# Patient Record
Sex: FEMALE | Race: WHITE | NOT HISPANIC OR LATINO | Employment: OTHER | URBAN - NONMETROPOLITAN AREA
[De-identification: names, ages, dates, MRNs, and addresses within clinical notes are randomized per-mention and may not be internally consistent; named-entity substitution may affect disease eponyms.]

---

## 2017-04-17 ENCOUNTER — TRANSCRIBE ORDERS (OUTPATIENT)
Dept: ADMINISTRATIVE | Facility: HOSPITAL | Age: 67
End: 2017-04-17

## 2017-04-17 DIAGNOSIS — Z12.31 ENCOUNTER FOR SCREENING MAMMOGRAM FOR MALIGNANT NEOPLASM OF BREAST: Primary | ICD-10-CM

## 2017-06-12 ENCOUNTER — HOSPITAL ENCOUNTER (OUTPATIENT)
Dept: MAMMOGRAPHY | Facility: HOSPITAL | Age: 67
Discharge: HOME | End: 2017-06-12
Attending: INTERNAL MEDICINE | Admitting: INTERNAL MEDICINE

## 2017-06-12 DIAGNOSIS — Z12.31 ENCOUNTER FOR SCREENING MAMMOGRAM FOR MALIGNANT NEOPLASM OF BREAST: ICD-10-CM

## 2017-06-12 PROCEDURE — G0202 SCR MAMMO BI INCL CAD: HCPCS

## 2017-06-12 PROCEDURE — 77063 BREAST TOMOSYNTHESIS BI: CPT

## 2017-08-18 RX ORDER — VERAPAMIL HYDROCHLORIDE 240 MG/1
240 TABLET, FILM COATED, EXTENDED RELEASE ORAL 2 TIMES DAILY
COMMUNITY
End: 2017-11-17 | Stop reason: SDUPTHER

## 2017-08-18 RX ORDER — VIT C/E/CUPERIC/ZINC/LUTEIN 226-90-0.8
1 CAPSULE ORAL DAILY
COMMUNITY

## 2017-08-18 RX ORDER — ERGOCALCIFEROL 1.25 MG/1
50000 CAPSULE ORAL WEEKLY
COMMUNITY
End: 2017-08-21 | Stop reason: SDUPTHER

## 2017-08-18 RX ORDER — LOSARTAN POTASSIUM 50 MG/1
50 TABLET ORAL DAILY
COMMUNITY
End: 2017-09-05 | Stop reason: SDUPTHER

## 2017-08-18 RX ORDER — ALPRAZOLAM 0.25 MG/1
0.25 TABLET ORAL DAILY PRN
COMMUNITY
End: 2018-04-09 | Stop reason: SDUPTHER

## 2017-08-18 RX ORDER — MONTELUKAST SODIUM 10 MG/1
10 TABLET ORAL DAILY
COMMUNITY
End: 2018-08-13 | Stop reason: SDUPTHER

## 2017-08-18 RX ORDER — NAPROXEN SODIUM 220 MG
220 TABLET ORAL DAILY PRN
COMMUNITY
End: 2018-04-09

## 2017-08-18 RX ORDER — CALCIUM CARBONATE 200(500)MG
1 TABLET,CHEWABLE ORAL DAILY
COMMUNITY

## 2017-08-18 RX ORDER — HYDROCHLOROTHIAZIDE 12.5 MG/1
12.5 CAPSULE, GELATIN COATED ORAL DAILY
COMMUNITY
End: 2018-08-13 | Stop reason: SDUPTHER

## 2017-08-18 RX ORDER — ESTRADIOL 0.1 MG/G
CREAM VAGINAL
COMMUNITY
End: 2018-04-09 | Stop reason: SDUPTHER

## 2017-08-18 RX ORDER — ROSUVASTATIN CALCIUM 5 MG/1
TABLET, COATED ORAL
COMMUNITY
End: 2018-08-13 | Stop reason: SDUPTHER

## 2017-08-20 PROBLEM — J45.909 CHRONIC ASTHMA WITHOUT COMPLICATION: Chronic | Status: ACTIVE | Noted: 2017-08-20

## 2017-08-20 PROBLEM — E11.9 TYPE 2 DIABETES MELLITUS WITHOUT COMPLICATION (HCC): Chronic | Status: ACTIVE | Noted: 2017-08-20

## 2017-08-20 PROBLEM — O16.9 HYPERTEN PREG NOS-UNSPEC: Chronic | Status: ACTIVE | Noted: 2017-08-20

## 2017-08-20 PROBLEM — G47.33 OBSTRUCTIVE SLEEP APNEA: Chronic | Status: ACTIVE | Noted: 2017-08-20

## 2017-08-20 PROBLEM — K76.0 FATTY LIVER: Chronic | Status: ACTIVE | Noted: 2017-08-20

## 2017-08-20 PROBLEM — K21.9 GASTROESOPHAGEAL REFLUX DISEASE WITHOUT ESOPHAGITIS: Chronic | Status: ACTIVE | Noted: 2017-08-20

## 2017-08-20 PROBLEM — G43.009 MIGRAINE WITHOUT AURA, NOT INTRACTABLE: Chronic | Status: ACTIVE | Noted: 2017-08-20

## 2017-08-20 PROBLEM — E78.2 MIXED HYPERLIPIDEMIA: Chronic | Status: ACTIVE | Noted: 2017-08-20

## 2017-08-20 PROBLEM — G50.0 TRIGEMINAL NEURALGIA OF LEFT SIDE OF FACE: Chronic | Status: ACTIVE | Noted: 2017-08-20

## 2017-08-20 PROBLEM — K80.20 CALCULUS OF GALLBLADDER: Chronic | Status: ACTIVE | Noted: 2017-08-20

## 2017-08-21 ENCOUNTER — OFFICE VISIT (OUTPATIENT)
Dept: INTERNAL MEDICINE | Age: 67
End: 2017-08-21
Payer: MEDICARE

## 2017-08-21 VITALS
OXYGEN SATURATION: 98 % | HEIGHT: 69 IN | DIASTOLIC BLOOD PRESSURE: 94 MMHG | RESPIRATION RATE: 20 BRPM | SYSTOLIC BLOOD PRESSURE: 132 MMHG | HEART RATE: 100 BPM | WEIGHT: 174 LBS | BODY MASS INDEX: 25.77 KG/M2

## 2017-08-21 DIAGNOSIS — I10 ESSENTIAL HYPERTENSION: Chronic | ICD-10-CM

## 2017-08-21 DIAGNOSIS — E11.9 TYPE 2 DIABETES MELLITUS WITHOUT COMPLICATION, WITHOUT LONG-TERM CURRENT USE OF INSULIN (HCC): Chronic | ICD-10-CM

## 2017-08-21 DIAGNOSIS — K21.9 GASTROESOPHAGEAL REFLUX DISEASE WITHOUT ESOPHAGITIS: Chronic | ICD-10-CM

## 2017-08-21 DIAGNOSIS — E55.9 VITAMIN D DEFICIENCY: Chronic | ICD-10-CM

## 2017-08-21 DIAGNOSIS — G50.0 TRIGEMINAL NEURALGIA OF LEFT SIDE OF FACE: Chronic | ICD-10-CM

## 2017-08-21 DIAGNOSIS — E78.2 MIXED HYPERLIPIDEMIA: Primary | Chronic | ICD-10-CM

## 2017-08-21 LAB
ALBUMIN SERPL-MCNC: 4.7 G/DL (ref 3.5–5.2)
ALP BLD-CCNC: 92 U/L (ref 35–104)
ALT SERPL-CCNC: 24 U/L (ref 5–33)
ANION GAP SERPL CALCULATED.3IONS-SCNC: 20 MMOL/L (ref 7–19)
AST SERPL-CCNC: 21 U/L (ref 5–32)
BILIRUB SERPL-MCNC: 0.3 MG/DL (ref 0.2–1.2)
BUN BLDV-MCNC: 23 MG/DL (ref 8–23)
CALCIUM SERPL-MCNC: 9.7 MG/DL (ref 8.8–10.2)
CHLORIDE BLD-SCNC: 99 MMOL/L (ref 98–111)
CHOLESTEROL, TOTAL: 153 MG/DL (ref 160–199)
CO2: 24 MMOL/L (ref 22–29)
CREAT SERPL-MCNC: 0.7 MG/DL (ref 0.5–0.9)
GFR NON-AFRICAN AMERICAN: >60
GLUCOSE BLD-MCNC: 138 MG/DL (ref 74–109)
HBA1C MFR BLD: 6.7 %
HDLC SERPL-MCNC: 34 MG/DL (ref 65–121)
LDL CHOLESTEROL CALCULATED: 93 MG/DL
POTASSIUM SERPL-SCNC: 3.6 MMOL/L (ref 3.5–5)
SODIUM BLD-SCNC: 143 MMOL/L (ref 136–145)
TOTAL PROTEIN: 8 G/DL (ref 6.6–8.7)
TRIGL SERPL-MCNC: 130 MG/DL (ref 150–199)
VITAMIN D 25-HYDROXY: 26.9 NG/ML

## 2017-08-21 PROCEDURE — 99214 OFFICE O/P EST MOD 30 MIN: CPT | Performed by: INTERNAL MEDICINE

## 2017-08-21 RX ORDER — ERGOCALCIFEROL 1.25 MG/1
50000 CAPSULE ORAL
Qty: 10 CAPSULE | Refills: 3 | Status: SHIPPED | OUTPATIENT
Start: 2017-08-21 | End: 2017-11-20 | Stop reason: SDUPTHER

## 2017-08-21 ASSESSMENT — ENCOUNTER SYMPTOMS
NAUSEA: 0
ABDOMINAL PAIN: 0
SHORTNESS OF BREATH: 0
COUGH: 0

## 2017-08-21 ASSESSMENT — PATIENT HEALTH QUESTIONNAIRE - PHQ9
SUM OF ALL RESPONSES TO PHQ9 QUESTIONS 1 & 2: 0
2. FEELING DOWN, DEPRESSED OR HOPELESS: 0
SUM OF ALL RESPONSES TO PHQ QUESTIONS 1-9: 0
1. LITTLE INTEREST OR PLEASURE IN DOING THINGS: 0

## 2017-09-05 RX ORDER — LOSARTAN POTASSIUM 50 MG/1
TABLET ORAL
Qty: 90 TABLET | Refills: 3 | Status: SHIPPED | OUTPATIENT
Start: 2017-09-05 | End: 2018-04-09 | Stop reason: DRUGHIGH

## 2017-09-06 RX ORDER — LOSARTAN POTASSIUM 100 MG/1
100 TABLET ORAL DAILY
Qty: 30 TABLET | Refills: 3 | Status: SHIPPED | OUTPATIENT
Start: 2017-09-06 | End: 2018-01-05 | Stop reason: SDUPTHER

## 2017-11-17 RX ORDER — VERAPAMIL HYDROCHLORIDE 240 MG/1
TABLET, FILM COATED, EXTENDED RELEASE ORAL
Qty: 180 TABLET | Refills: 3 | Status: SHIPPED | OUTPATIENT
Start: 2017-11-17 | End: 2018-08-13 | Stop reason: SDUPTHER

## 2017-11-20 RX ORDER — ERGOCALCIFEROL 1.25 MG/1
50000 CAPSULE ORAL
Qty: 10 CAPSULE | Refills: 3 | Status: SHIPPED | OUTPATIENT
Start: 2017-11-20 | End: 2018-04-09 | Stop reason: SDUPTHER

## 2017-12-06 ENCOUNTER — NURSE ONLY (OUTPATIENT)
Dept: INTERNAL MEDICINE | Age: 67
End: 2017-12-06
Payer: MEDICARE

## 2017-12-06 DIAGNOSIS — Z23 NEED FOR INFLUENZA VACCINATION: Primary | ICD-10-CM

## 2017-12-06 PROCEDURE — 90662 IIV NO PRSV INCREASED AG IM: CPT | Performed by: INTERNAL MEDICINE

## 2017-12-06 PROCEDURE — G0008 ADMIN INFLUENZA VIRUS VAC: HCPCS | Performed by: INTERNAL MEDICINE

## 2018-01-05 RX ORDER — LOSARTAN POTASSIUM 100 MG/1
TABLET ORAL
Qty: 90 TABLET | Refills: 3 | Status: SHIPPED | OUTPATIENT
Start: 2018-01-05 | End: 2018-08-13 | Stop reason: SDUPTHER

## 2018-01-08 ENCOUNTER — HOSPITAL ENCOUNTER (OUTPATIENT)
Dept: GENERAL RADIOLOGY | Age: 68
Discharge: HOME | End: 2018-01-08
Payer: MEDICARE

## 2018-01-08 DIAGNOSIS — K21.9 GASTROESOPHAGEAL REFLUX DISEASE WITHOUT ESOPHAGITIS: Chronic | ICD-10-CM

## 2018-01-08 DIAGNOSIS — E55.9 VITAMIN D DEFICIENCY: Chronic | ICD-10-CM

## 2018-01-08 DIAGNOSIS — I10 ESSENTIAL HYPERTENSION: Chronic | ICD-10-CM

## 2018-01-08 DIAGNOSIS — E78.2 MIXED HYPERLIPIDEMIA: Chronic | ICD-10-CM

## 2018-01-08 DIAGNOSIS — M79.642 HAND PAIN, LEFT: ICD-10-CM

## 2018-01-08 DIAGNOSIS — M79.643 PAIN OF HAND, UNSPECIFIED LATERALITY: ICD-10-CM

## 2018-01-08 DIAGNOSIS — G50.0 TRIGEMINAL NEURALGIA OF LEFT SIDE OF FACE: Chronic | ICD-10-CM

## 2018-01-08 DIAGNOSIS — M79.642 HAND PAIN, LEFT: Primary | ICD-10-CM

## 2018-01-08 DIAGNOSIS — E11.9 TYPE 2 DIABETES MELLITUS WITHOUT COMPLICATION, WITHOUT LONG-TERM CURRENT USE OF INSULIN (HCC): Chronic | ICD-10-CM

## 2018-01-08 LAB
ALBUMIN SERPL-MCNC: 4.6 G/DL (ref 3.5–5.2)
ALP BLD-CCNC: 90 U/L (ref 35–104)
ALT SERPL-CCNC: 19 U/L (ref 5–33)
ANION GAP SERPL CALCULATED.3IONS-SCNC: 13 MMOL/L (ref 7–19)
AST SERPL-CCNC: 21 U/L (ref 5–32)
BASOPHILS ABSOLUTE: 0 K/UL (ref 0–0.2)
BASOPHILS RELATIVE PERCENT: 0.6 % (ref 0–1)
BILIRUB SERPL-MCNC: 0.3 MG/DL (ref 0.2–1.2)
BUN BLDV-MCNC: 24 MG/DL (ref 8–23)
C-REACTIVE PROTEIN: 0.18 MG/DL (ref 0–0.5)
CALCIUM SERPL-MCNC: 10.1 MG/DL (ref 8.8–10.2)
CHLORIDE BLD-SCNC: 102 MMOL/L (ref 98–111)
CO2: 29 MMOL/L (ref 22–29)
CREAT SERPL-MCNC: 0.5 MG/DL (ref 0.5–0.9)
EOSINOPHILS ABSOLUTE: 0.2 K/UL (ref 0–0.6)
EOSINOPHILS RELATIVE PERCENT: 3.4 % (ref 0–5)
GFR NON-AFRICAN AMERICAN: >60
GLUCOSE BLD-MCNC: 136 MG/DL (ref 74–109)
HBA1C MFR BLD: 6.6 %
HCT VFR BLD CALC: 37.4 % (ref 37–47)
HEMOGLOBIN: 11.4 G/DL (ref 12–16)
LYMPHOCYTES ABSOLUTE: 1.8 K/UL (ref 1.1–4.5)
LYMPHOCYTES RELATIVE PERCENT: 25.3 % (ref 20–40)
MCH RBC QN AUTO: 25.1 PG (ref 27–31)
MCHC RBC AUTO-ENTMCNC: 30.5 G/DL (ref 33–37)
MCV RBC AUTO: 82.2 FL (ref 81–99)
MONOCYTES ABSOLUTE: 0.5 K/UL (ref 0–0.9)
MONOCYTES RELATIVE PERCENT: 6.8 % (ref 0–10)
NEUTROPHILS ABSOLUTE: 4.5 K/UL (ref 1.5–7.5)
NEUTROPHILS RELATIVE PERCENT: 63.6 % (ref 50–65)
PDW BLD-RTO: 15.6 % (ref 11.5–14.5)
PLATELET # BLD: 257 K/UL (ref 130–400)
PMV BLD AUTO: 11.1 FL (ref 9.4–12.3)
POTASSIUM SERPL-SCNC: 4.4 MMOL/L (ref 3.5–5)
RBC # BLD: 4.55 M/UL (ref 4.2–5.4)
RHEUMATOID FACTOR: <10 IU/ML
SEDIMENTATION RATE, ERYTHROCYTE: 12 MM/HR (ref 0–25)
SODIUM BLD-SCNC: 144 MMOL/L (ref 136–145)
TOTAL PROTEIN: 7.6 G/DL (ref 6.6–8.7)
URIC ACID, SERUM: 2.7 MG/DL (ref 2.4–5.7)
WBC # BLD: 7 K/UL (ref 4.8–10.8)

## 2018-01-08 PROCEDURE — 73130 X-RAY EXAM OF HAND: CPT

## 2018-01-10 LAB — VITAMIN D 1,25-DIHYDROXY: 71.8 PG/ML (ref 19.9–79.3)

## 2018-01-17 ENCOUNTER — TELEPHONE (OUTPATIENT)
Dept: INTERNAL MEDICINE | Age: 68
End: 2018-01-17

## 2018-01-17 DIAGNOSIS — D64.9 MILD ANEMIA: Primary | ICD-10-CM

## 2018-01-29 DIAGNOSIS — D64.9 MILD ANEMIA: ICD-10-CM

## 2018-01-29 LAB
BASOPHILS ABSOLUTE: 0.1 K/UL (ref 0–0.2)
BASOPHILS RELATIVE PERCENT: 1 % (ref 0–1)
EOSINOPHILS ABSOLUTE: 0.3 K/UL (ref 0–0.6)
EOSINOPHILS RELATIVE PERCENT: 4.1 % (ref 0–5)
FERRITIN: 8.5 NG/ML (ref 13–150)
HCT VFR BLD CALC: 37.2 % (ref 37–47)
HEMOGLOBIN: 11.2 G/DL (ref 12–16)
IRON: 20 UG/DL (ref 37–145)
LYMPHOCYTES ABSOLUTE: 1.9 K/UL (ref 1.1–4.5)
LYMPHOCYTES RELATIVE PERCENT: 27.5 % (ref 20–40)
MCH RBC QN AUTO: 24.9 PG (ref 27–31)
MCHC RBC AUTO-ENTMCNC: 30.1 G/DL (ref 33–37)
MCV RBC AUTO: 82.7 FL (ref 81–99)
MONOCYTES ABSOLUTE: 0.5 K/UL (ref 0–0.9)
MONOCYTES RELATIVE PERCENT: 7.6 % (ref 0–10)
NEUTROPHILS ABSOLUTE: 4.1 K/UL (ref 1.5–7.5)
NEUTROPHILS RELATIVE PERCENT: 59.5 % (ref 50–65)
PDW BLD-RTO: 16.1 % (ref 11.5–14.5)
PLATELET # BLD: 257 K/UL (ref 130–400)
PMV BLD AUTO: 11.6 FL (ref 9.4–12.3)
RBC # BLD: 4.5 M/UL (ref 4.2–5.4)
TOTAL IRON BINDING CAPACITY: 394 UG/DL (ref 250–400)
WBC # BLD: 6.8 K/UL (ref 4.8–10.8)

## 2018-02-05 DIAGNOSIS — D50.9 IRON DEFICIENCY ANEMIA, UNSPECIFIED IRON DEFICIENCY ANEMIA TYPE: Primary | ICD-10-CM

## 2018-02-09 ENCOUNTER — OFFICE VISIT (OUTPATIENT)
Dept: GASTROENTEROLOGY | Facility: CLINIC | Age: 68
End: 2018-02-09

## 2018-02-09 VITALS
HEIGHT: 69 IN | BODY MASS INDEX: 25.92 KG/M2 | SYSTOLIC BLOOD PRESSURE: 128 MMHG | DIASTOLIC BLOOD PRESSURE: 84 MMHG | WEIGHT: 175 LBS | HEART RATE: 84 BPM | OXYGEN SATURATION: 98 %

## 2018-02-09 DIAGNOSIS — R19.5 HEME POSITIVE STOOL: ICD-10-CM

## 2018-02-09 DIAGNOSIS — D50.9 IRON DEFICIENCY ANEMIA, UNSPECIFIED IRON DEFICIENCY ANEMIA TYPE: Primary | ICD-10-CM

## 2018-02-09 DIAGNOSIS — I10 HTN (HYPERTENSION), BENIGN: ICD-10-CM

## 2018-02-09 DIAGNOSIS — Z78.9 NONSMOKER: ICD-10-CM

## 2018-02-09 DIAGNOSIS — Z86.010 HX OF ADENOMATOUS COLONIC POLYPS: ICD-10-CM

## 2018-02-09 DIAGNOSIS — E11.9 CONTROLLED TYPE 2 DIABETES MELLITUS WITHOUT COMPLICATION, WITHOUT LONG-TERM CURRENT USE OF INSULIN (HCC): ICD-10-CM

## 2018-02-09 DIAGNOSIS — K21.9 GASTROESOPHAGEAL REFLUX DISEASE, ESOPHAGITIS PRESENCE NOT SPECIFIED: ICD-10-CM

## 2018-02-09 DIAGNOSIS — Z79.1 NSAID LONG-TERM USE: ICD-10-CM

## 2018-02-09 DIAGNOSIS — K62.5 BRBPR (BRIGHT RED BLOOD PER RECTUM): ICD-10-CM

## 2018-02-09 PROBLEM — Z86.0101 HX OF ADENOMATOUS COLONIC POLYPS: Status: ACTIVE | Noted: 2018-02-09

## 2018-02-09 PROCEDURE — 99204 OFFICE O/P NEW MOD 45 MIN: CPT | Performed by: CLINICAL NURSE SPECIALIST

## 2018-02-09 RX ORDER — ESOMEPRAZOLE MAGNESIUM 40 MG/1
20 CAPSULE, DELAYED RELEASE ORAL
COMMUNITY
End: 2019-11-15

## 2018-02-09 RX ORDER — MONTELUKAST SODIUM 10 MG/1
10 TABLET ORAL NIGHTLY
COMMUNITY
End: 2020-12-28

## 2018-02-09 RX ORDER — CALCIUM CARBONATE 200(500)MG
1 TABLET,CHEWABLE ORAL DAILY
COMMUNITY

## 2018-02-09 RX ORDER — HYDROCHLOROTHIAZIDE 12.5 MG/1
12.5 CAPSULE, GELATIN COATED ORAL EVERY MORNING
COMMUNITY

## 2018-02-09 RX ORDER — ERGOCALCIFEROL 1.25 MG/1
50000 CAPSULE ORAL
COMMUNITY
Start: 2017-11-20

## 2018-02-09 RX ORDER — ROSUVASTATIN CALCIUM 5 MG/1
TABLET, COATED ORAL
COMMUNITY

## 2018-02-09 RX ORDER — SODIUM, POTASSIUM,MAG SULFATES 17.5-3.13G
SOLUTION, RECONSTITUTED, ORAL ORAL
Qty: 2 BOTTLE | Refills: 0 | Status: SHIPPED | OUTPATIENT
Start: 2018-02-09 | End: 2018-03-12

## 2018-02-09 RX ORDER — VERAPAMIL HYDROCHLORIDE 240 MG/1
TABLET, FILM COATED, EXTENDED RELEASE ORAL
COMMUNITY
Start: 2017-11-17

## 2018-02-09 RX ORDER — LOSARTAN POTASSIUM 100 MG/1
TABLET ORAL
COMMUNITY
Start: 2018-01-05

## 2018-02-09 RX ORDER — ESTRADIOL 0.1 MG/G
CREAM VAGINAL
COMMUNITY
End: 2019-11-15

## 2018-02-09 NOTE — PROGRESS NOTES
Carlotta Dupont  1950 2/9/2018  Chief Complaint   Patient presents with   • GI Problem     Heme + stools     Subjective   HPI  Carlotta Dupont is a 67 y.o. female who presents with a complaint of newly diagnosed iron def anemia. Incidental finding on routine labs, chronicity is unknown. 1/29/2018 H/H 11.2/37.2 MCV 82.7, ferritin 8.5, iron 20. On  1/8/2018 H/H 11.4/37.4. She was hemoccult positive stool. Assocaited 2 episodes of BRBPR on the tissue when she wipes small amount 2 weeks ago. No triggers. No alleviating factors. No melena. No change in bowels she has chronic constipation intermittent she treats with Miralax as needed, this is stable. She has had some intermittent nausea since [place] mild to moderate no triggers. She has been on Nexium for years for her chronic reflux. This is stable. She has been taking some Aleve nightly to help her with sleeping and leg pain at night since her microvascular decompression over 1 year ago.   Past Medical History:   Diagnosis Date   • Asthma    • Diabetes mellitus     Type 2   • GERD (gastroesophageal reflux disease)    • Hx of colonic polyps    • Hyperlipidemia    • Hypertension    • Iron deficiency anemia    • Trigeminal neuralgia    • Vitamin D deficiency      Past Surgical History:   Procedure Laterality Date   • BREAST BIOPSY     • COLONOSCOPY W/ POLYPECTOMY  12/31/2014    Tubular adenomatous polyp at 80 cm, Hyperplastic polyp rectum repeat exam in 3 years   • HYSTERECTOMY     • TRIGEMINAL NERVE DECOMPRESSION       Outpatient Prescriptions Marked as Taking for the 2/9/18 encounter (Office Visit) with CHRISTIAN Jiang   Medication Sig Dispense Refill   • calcium carbonate (TUMS) 500 MG chewable tablet Chew.     • esomeprazole (nexIUM) 40 MG capsule Take 40 mg by mouth Every Morning Before Breakfast.     • estradiol (ESTRACE) 0.1 MG/GM vaginal cream Insert 1/4 applicatorful (1GM) vaginally twice weekly     • hydrochlorothiazide (MICROZIDE) 12.5 MG  capsule Take 12.5 mg by mouth.     • losartan (COZAAR) 100 MG tablet TAKE ONE TABLET BY MOUTH EVERY DAY     • metFORMIN (GLUCOPHAGE) 500 MG tablet Take 500 mg by mouth.     • montelukast (SINGULAIR) 10 MG tablet Take 10 mg by mouth.     • rosuvastatin (CRESTOR) 5 MG tablet Take 1/2 tablet on Mon, Wed, Fri     • verapamil SR (CALAN-SR) 240 MG CR tablet TAKE ONE TABLET BY MOUTH TWICE A DAY     • vitamin D (ERGOCALCIFEROL) 38189 units capsule capsule Take  by mouth.       Allergies   Allergen Reactions   • Zovirax [Acyclovir] Rash   • Vesicare [Solifenacin Succinate] Other (See Comments)     Severe constipation     Social History     Social History   • Marital status:      Spouse name: N/A   • Number of children: N/A   • Years of education: N/A     Occupational History   • Not on file.     Social History Main Topics   • Smoking status: Never Smoker   • Smokeless tobacco: Never Used   • Alcohol use Yes      Comment: Rare   • Drug use: Not on file   • Sexual activity: Not on file     Other Topics Concern   • Not on file     Social History Narrative     Family History   Problem Relation Age of Onset   • Breast cancer Sister    • Colon cancer Neg Hx    • Colon polyps Neg Hx      Health Maintenance   Topic Date Due   • TDAP/TD VACCINES (1 - Tdap) 09/12/1969   • PNEUMOCOCCAL VACCINES (65+ LOW/MEDIUM RISK) (1 of 2 - PCV13) 09/12/2015   • INFLUENZA VACCINE  08/01/2017   • HEPATITIS C SCREENING  09/28/2017   • MEDICARE ANNUAL WELLNESS  09/28/2017   • ZOSTER VACCINE  09/28/2017   • DIABETIC FOOT EXAM  02/09/2018   • LIPID PANEL  02/09/2018   • HEMOGLOBIN A1C  02/09/2018   • DIABETIC EYE EXAM  02/09/2018   • URINE MICROALBUMIN  02/09/2018   • MAMMOGRAM  06/12/2019   • COLONOSCOPY  12/31/2024     Review of Systems   Constitutional: Negative for activity change, appetite change, chills, diaphoresis, fatigue, fever and unexpected weight change.   HENT: Negative for ear pain, hearing loss, mouth sores, sore throat, trouble  "swallowing and voice change.    Eyes: Negative.    Respiratory: Negative for cough, choking, shortness of breath and wheezing.    Cardiovascular: Negative for chest pain and palpitations.   Gastrointestinal: Positive for blood in stool and nausea. Negative for abdominal pain, constipation, diarrhea and vomiting.   Endocrine: Negative for cold intolerance and heat intolerance.   Genitourinary: Negative for decreased urine volume, dysuria, frequency, hematuria and urgency.   Musculoskeletal: Negative for back pain, gait problem and myalgias.   Skin: Negative for color change, pallor and rash.   Allergic/Immunologic: Negative for food allergies and immunocompromised state.   Neurological: Negative for dizziness, tremors, seizures, syncope, weakness, light-headedness, numbness and headaches.   Hematological: Negative for adenopathy. Does not bruise/bleed easily.   Psychiatric/Behavioral: Negative for agitation and confusion. The patient is not nervous/anxious.    All other systems reviewed and are negative.    Objective   Vitals:    02/09/18 0951   BP: 128/84   Pulse: 84   SpO2: 98%   Weight: 79.4 kg (175 lb)   Height: 174 cm (68.5\")     Body mass index is 26.22 kg/(m^2).  Physical Exam   Constitutional: She is oriented to person, place, and time. She appears well-developed and well-nourished.   HENT:   Head: Normocephalic and atraumatic.   Eyes: Pupils are equal, round, and reactive to light.   Neck: Normal range of motion. Neck supple. No tracheal deviation present.   Cardiovascular: Normal rate, regular rhythm and normal heart sounds.  Exam reveals no gallop and no friction rub.    No murmur heard.  Pulmonary/Chest: Effort normal and breath sounds normal. No respiratory distress. She has no wheezes. She has no rales. She exhibits no tenderness.   Abdominal: Soft. Bowel sounds are normal. She exhibits no distension. There is no hepatosplenomegaly. There is tenderness (epigastric tenderness). There is no rigidity, no " rebound and no guarding.   Musculoskeletal: Normal range of motion. She exhibits no edema, tenderness or deformity.   Neurological: She is alert and oriented to person, place, and time. She has normal reflexes.   Skin: Skin is warm and dry. No rash noted. No pallor.   Psychiatric: She has a normal mood and affect. Her behavior is normal. Judgment and thought content normal.     Assessment/Plan   Carlotta was seen today for gi problem.    Diagnoses and all orders for this visit:    Iron deficiency anemia, unspecified iron deficiency anemia type  -     SUPREP BOWEL PREP KIT 17.5-3.13-1.6 GM/180ML solution oral solution; Take as directed by office instructions provided  -     Case Request; Standing  -     Implement Anesthesia Orders Day of Procedure; Standing  -     Obtain Informed Consent; Standing  -     Verify Bowel Prep Was Successful; Standing  -     Case Request    Heme positive stool    BRBPR (bright red blood per rectum)    Gastroesophageal reflux disease, esophagitis presence not specified  Comments:  stable with Nexium; cont current active therapy for this.     NSAID long-term use  Comments:  Aleve at night 1 year or more, advised to discontinue     Nonsmoker    Hx of adenomatous colonic polyps    Controlled type 2 diabetes mellitus without complication, without long-term current use of insulin  Comments:  Hold the am of procedure    HTN (hypertension), benign  Comments:  cont the am of procedure      Problem List Items Addressed This Visit        Cardiovascular and Mediastinum    HTN (hypertension), benign    Overview     cont the am of procedure         Relevant Medications    losartan (COZAAR) 100 MG tablet    verapamil SR (CALAN-SR) 240 MG CR tablet    hydrochlorothiazide (MICROZIDE) 12.5 MG capsule       Digestive    Heme positive stool    Gastroesophageal reflux disease    Overview     stable with Nexium; cont current active therapy for this.          Relevant Medications    calcium carbonate (TUMS) 500  MG chewable tablet    esomeprazole (nexIUM) 40 MG capsule       Endocrine    Controlled type 2 diabetes mellitus without complication, without long-term current use of insulin    Overview     Hold the am of procedure         Relevant Medications    metFORMIN (GLUCOPHAGE) 500 MG tablet       Hematopoietic and Hemostatic    Iron deficiency anemia - Primary    Relevant Medications    SUPREP BOWEL PREP KIT 17.5-3.13-1.6 GM/180ML solution oral solution    Other Relevant Orders    Case Request (Completed)       Other    BRBPR (bright red blood per rectum)    NSAID long-term use    Overview     Aleve at night 1 year or more, advised to discontinue          Hx of adenomatous colonic polyps    Overview     Colonoscopy tubular adenomatous polyp at 80cm; hyperplastic at rectum          Nonsmoker        Continue ongoing follow up and management with her PCP.   Dr Dupont is to order repeat CBC to follow trend, may consider iron supplementation. We discussed this being possible gastritis vs ulcer given NSAID use and tenderness on exam. Continue Nexium add Gaviscon as needed. Will get Endoscopy for further review as well.     ESOPHAGOGASTRODUODENOSCOPY WITH ANESTHESIA (N/A), COLONOSCOPY WITH ANESTHESIA (N/A)  EMR Dragon/transcription disclaimer: Much of this encounter note is electronic transcription/translation of spoken language to printed text. The electronic translation of spoken language may be erroneous, or at times, nonsensical words or phrases may be inadvertently transcribed. Although I have reviewed the note for such errors, some may still exist.  Body mass index is 26.22 kg/(m^2).  Return if symptoms worsen or fail to improve.    Patient's BMI is within normal parameters. No follow-up required.      All risks, benefits, alternatives, and indications of colonoscopy and/or Endoscopy procedure have been discussed with the patient. Risks to include perforation of the colon requiring possible surgery or colostomy, risk of  bleeding from biopsies or removal of colon tissue, possibility of missing a colon polyp or cancer, or adverse drug reaction.  Benefits to include the diagnosis and management of disease of the colon and rectum. Alternatives to include barium enema, radiographic evaluation, lab testing or no intervention. Pt verbalizes understanding and agrees.     Ayala Hooker, APRN  2/9/2018  11:52 AM

## 2018-02-21 DIAGNOSIS — D64.9 ANEMIA, UNSPECIFIED TYPE: Primary | ICD-10-CM

## 2018-02-22 DIAGNOSIS — D50.9 IRON DEFICIENCY ANEMIA, UNSPECIFIED IRON DEFICIENCY ANEMIA TYPE: ICD-10-CM

## 2018-02-22 DIAGNOSIS — D64.9 ANEMIA, UNSPECIFIED TYPE: ICD-10-CM

## 2018-02-22 LAB
ALBUMIN SERPL-MCNC: 4.3 G/DL (ref 3.5–5.2)
ALP BLD-CCNC: 84 U/L (ref 35–104)
ALT SERPL-CCNC: 17 U/L (ref 5–33)
ANION GAP SERPL CALCULATED.3IONS-SCNC: 18 MMOL/L (ref 7–19)
AST SERPL-CCNC: 14 U/L (ref 5–32)
BILIRUB SERPL-MCNC: <0.2 MG/DL (ref 0.2–1.2)
BUN BLDV-MCNC: 26 MG/DL (ref 8–23)
CALCIUM SERPL-MCNC: 9.5 MG/DL (ref 8.8–10.2)
CHLORIDE BLD-SCNC: 102 MMOL/L (ref 98–111)
CO2: 25 MMOL/L (ref 22–29)
CREAT SERPL-MCNC: 0.6 MG/DL (ref 0.5–0.9)
FERRITIN: 7.7 NG/ML (ref 13–150)
GFR NON-AFRICAN AMERICAN: >60
GLUCOSE BLD-MCNC: 142 MG/DL (ref 74–109)
HCT VFR BLD CALC: 37.5 % (ref 37–47)
HEMOGLOBIN: 11.2 G/DL (ref 12–16)
MCH RBC QN AUTO: 24.7 PG (ref 27–31)
MCHC RBC AUTO-ENTMCNC: 29.9 G/DL (ref 33–37)
MCV RBC AUTO: 82.6 FL (ref 81–99)
PDW BLD-RTO: 15.9 % (ref 11.5–14.5)
PLATELET # BLD: 278 K/UL (ref 130–400)
PMV BLD AUTO: 11.1 FL (ref 9.4–12.3)
POTASSIUM SERPL-SCNC: 3.8 MMOL/L (ref 3.5–5)
RBC # BLD: 4.54 M/UL (ref 4.2–5.4)
SODIUM BLD-SCNC: 145 MMOL/L (ref 136–145)
TOTAL PROTEIN: 7.1 G/DL (ref 6.6–8.7)
WBC # BLD: 6.8 K/UL (ref 4.8–10.8)

## 2018-03-01 ENCOUNTER — ANESTHESIA EVENT (OUTPATIENT)
Dept: GASTROENTEROLOGY | Facility: HOSPITAL | Age: 68
End: 2018-03-01

## 2018-03-01 ENCOUNTER — ANESTHESIA (OUTPATIENT)
Dept: GASTROENTEROLOGY | Facility: HOSPITAL | Age: 68
End: 2018-03-01

## 2018-03-01 ENCOUNTER — HOSPITAL ENCOUNTER (OUTPATIENT)
Facility: HOSPITAL | Age: 68
Setting detail: HOSPITAL OUTPATIENT SURGERY
Discharge: HOME | End: 2018-03-01
Attending: INTERNAL MEDICINE | Admitting: INTERNAL MEDICINE

## 2018-03-01 VITALS
BODY MASS INDEX: 25.46 KG/M2 | DIASTOLIC BLOOD PRESSURE: 70 MMHG | OXYGEN SATURATION: 99 % | HEIGHT: 68 IN | RESPIRATION RATE: 15 BRPM | SYSTOLIC BLOOD PRESSURE: 120 MMHG | TEMPERATURE: 98.1 F | WEIGHT: 168 LBS | HEART RATE: 78 BPM

## 2018-03-01 DIAGNOSIS — D50.9 IRON DEFICIENCY ANEMIA, UNSPECIFIED IRON DEFICIENCY ANEMIA TYPE: ICD-10-CM

## 2018-03-01 LAB — GLUCOSE BLDC GLUCOMTR-MCNC: 104 MG/DL (ref 70–130)

## 2018-03-01 PROCEDURE — 25010000002 PROPOFOL 10 MG/ML EMULSION: Performed by: NURSE ANESTHETIST, CERTIFIED REGISTERED

## 2018-03-01 PROCEDURE — 82962 GLUCOSE BLOOD TEST: CPT

## 2018-03-01 PROCEDURE — 45378 DIAGNOSTIC COLONOSCOPY: CPT | Performed by: INTERNAL MEDICINE

## 2018-03-01 PROCEDURE — 43235 EGD DIAGNOSTIC BRUSH WASH: CPT | Performed by: INTERNAL MEDICINE

## 2018-03-01 RX ORDER — SODIUM CHLORIDE 9 MG/ML
500 INJECTION, SOLUTION INTRAVENOUS CONTINUOUS PRN
Status: DISCONTINUED | OUTPATIENT
Start: 2018-03-01 | End: 2018-03-01 | Stop reason: HOSPADM

## 2018-03-01 RX ORDER — PROPOFOL 10 MG/ML
VIAL (ML) INTRAVENOUS AS NEEDED
Status: DISCONTINUED | OUTPATIENT
Start: 2018-03-01 | End: 2018-03-01 | Stop reason: SURG

## 2018-03-01 RX ORDER — SODIUM CHLORIDE 0.9 % (FLUSH) 0.9 %
3 SYRINGE (ML) INJECTION AS NEEDED
Status: DISCONTINUED | OUTPATIENT
Start: 2018-03-01 | End: 2018-03-01 | Stop reason: HOSPADM

## 2018-03-01 RX ADMIN — PROPOFOL 50 MG: 10 INJECTION, EMULSION INTRAVENOUS at 13:13

## 2018-03-01 RX ADMIN — PROPOFOL 50 MG: 10 INJECTION, EMULSION INTRAVENOUS at 13:10

## 2018-03-01 RX ADMIN — LIDOCAINE HYDROCHLORIDE 0.5 ML: 10 INJECTION, SOLUTION EPIDURAL; INFILTRATION; INTRACAUDAL; PERINEURAL at 12:35

## 2018-03-01 RX ADMIN — PROPOFOL 50 MG: 10 INJECTION, EMULSION INTRAVENOUS at 13:11

## 2018-03-01 RX ADMIN — PROPOFOL 50 MG: 10 INJECTION, EMULSION INTRAVENOUS at 13:07

## 2018-03-01 RX ADMIN — PROPOFOL 50 MG: 10 INJECTION, EMULSION INTRAVENOUS at 13:16

## 2018-03-01 RX ADMIN — PROPOFOL 50 MG: 10 INJECTION, EMULSION INTRAVENOUS at 13:08

## 2018-03-01 RX ADMIN — PROPOFOL 50 MG: 10 INJECTION, EMULSION INTRAVENOUS at 13:14

## 2018-03-01 RX ADMIN — SODIUM CHLORIDE 500 ML: 9 INJECTION, SOLUTION INTRAVENOUS at 12:35

## 2018-03-01 NOTE — INTERVAL H&P NOTE
H&P reviewed. The patient was examined and there are no changes to the H&P.      The following major R/B/A were discussed with the patient, however the list is not all inclusive . Risk:  Bleeding (immediate and delayed), perforation (rupture or tear), reaction to medication, missed lesion/cancer, pain during the procedure, infection, need for surgery, need for ostomy, need for mechanical ventilation (breathing machine), death.  Benefits: removal of polyp/tissue, burn/clip/or inject to stop bleeding, removal of foreign body, dilate any stricture.  Alternatives: Xray or CT, surgery, do nothing with associated risk   The patient was given time to ask question and received explanation, and agrees to proceed as per History and Physical.   No guarantee given or expressed.

## 2018-03-01 NOTE — PLAN OF CARE
Problem: GI Endoscopy (Adult)  Goal: Signs and Symptoms of Listed Potential Problems Will be Absent or Manageable (GI Endoscopy)  Outcome: Outcome(s) achieved Date Met: 03/01/18

## 2018-03-01 NOTE — PLAN OF CARE
Problem: Patient Care Overview (Adult)  Goal: Plan of Care Review  Outcome: Ongoing (interventions implemented as appropriate)   03/01/18 1313   Coping/Psychosocial Response Interventions   Plan Of Care Reviewed With patient   Patient Care Overview   Progress improving   Outcome Evaluation   Outcome Summary/Follow up Plan pt tolerating procedure well        Problem: GI Endoscopy (Adult)  Goal: Signs and Symptoms of Listed Potential Problems Will be Absent or Manageable (GI Endoscopy)  Outcome: Outcome(s) achieved Date Met: 03/01/18 03/01/18 1313   GI Endoscopy   Problems Assessed (GI Endoscopy) all   Problems Present (GI Endoscopy) none

## 2018-03-01 NOTE — ANESTHESIA PREPROCEDURE EVALUATION
Anesthesia Evaluation     Patient summary reviewed   no history of anesthetic complications:  NPO Solid Status: > 8 hours  NPO Liquid Status: > 4 hours           Airway   Mallampati: II  TM distance: >3 FB  Neck ROM: full  No difficulty expected  Dental - normal exam     Pulmonary    (+) asthma, sleep apnea,   (-) not a smoker  Cardiovascular   Exercise tolerance: good (4-7 METS)    (+) hypertension, hyperlipidemia,       Neuro/Psych  (+) numbness (s/p MVD for trigeminal neuralgia),     (-) seizures, TIA, CVA  GI/Hepatic/Renal/Endo    (+)  GERD,  diabetes mellitus,   (-) liver disease, no renal disease    Musculoskeletal     Abdominal    Substance History      OB/GYN          Other        ROS/Med Hx Other: Anemia                   Anesthesia Plan    ASA 2     general   total IV anesthesia  intravenous induction   Anesthetic plan and risks discussed with patient.

## 2018-03-01 NOTE — H&P (VIEW-ONLY)
Carlotta Dupont  1950 2/9/2018  Chief Complaint   Patient presents with   • GI Problem     Heme + stools     Subjective   HPI  Carlotta Dupont is a 67 y.o. female who presents with a complaint of newly diagnosed iron def anemia. Incidental finding on routine labs, chronicity is unknown. 1/29/2018 H/H 11.2/37.2 MCV 82.7, ferritin 8.5, iron 20. On  1/8/2018 H/H 11.4/37.4. She was hemoccult positive stool. Assocaited 2 episodes of BRBPR on the tissue when she wipes small amount 2 weeks ago. No triggers. No alleviating factors. No melena. No change in bowels she has chronic constipation intermittent she treats with Miralax as needed, this is stable. She has had some intermittent nausea since [place] mild to moderate no triggers. She has been on Nexium for years for her chronic reflux. This is stable. She has been taking some Aleve nightly to help her with sleeping and leg pain at night since her microvascular decompression over 1 year ago.   Past Medical History:   Diagnosis Date   • Asthma    • Diabetes mellitus     Type 2   • GERD (gastroesophageal reflux disease)    • Hx of colonic polyps    • Hyperlipidemia    • Hypertension    • Iron deficiency anemia    • Trigeminal neuralgia    • Vitamin D deficiency      Past Surgical History:   Procedure Laterality Date   • BREAST BIOPSY     • COLONOSCOPY W/ POLYPECTOMY  12/31/2014    Tubular adenomatous polyp at 80 cm, Hyperplastic polyp rectum repeat exam in 3 years   • HYSTERECTOMY     • TRIGEMINAL NERVE DECOMPRESSION       Outpatient Prescriptions Marked as Taking for the 2/9/18 encounter (Office Visit) with CHRISTIAN Jiang   Medication Sig Dispense Refill   • calcium carbonate (TUMS) 500 MG chewable tablet Chew.     • esomeprazole (nexIUM) 40 MG capsule Take 40 mg by mouth Every Morning Before Breakfast.     • estradiol (ESTRACE) 0.1 MG/GM vaginal cream Insert 1/4 applicatorful (1GM) vaginally twice weekly     • hydrochlorothiazide (MICROZIDE) 12.5 MG  capsule Take 12.5 mg by mouth.     • losartan (COZAAR) 100 MG tablet TAKE ONE TABLET BY MOUTH EVERY DAY     • metFORMIN (GLUCOPHAGE) 500 MG tablet Take 500 mg by mouth.     • montelukast (SINGULAIR) 10 MG tablet Take 10 mg by mouth.     • rosuvastatin (CRESTOR) 5 MG tablet Take 1/2 tablet on Mon, Wed, Fri     • verapamil SR (CALAN-SR) 240 MG CR tablet TAKE ONE TABLET BY MOUTH TWICE A DAY     • vitamin D (ERGOCALCIFEROL) 53888 units capsule capsule Take  by mouth.       Allergies   Allergen Reactions   • Zovirax [Acyclovir] Rash   • Vesicare [Solifenacin Succinate] Other (See Comments)     Severe constipation     Social History     Social History   • Marital status:      Spouse name: N/A   • Number of children: N/A   • Years of education: N/A     Occupational History   • Not on file.     Social History Main Topics   • Smoking status: Never Smoker   • Smokeless tobacco: Never Used   • Alcohol use Yes      Comment: Rare   • Drug use: Not on file   • Sexual activity: Not on file     Other Topics Concern   • Not on file     Social History Narrative     Family History   Problem Relation Age of Onset   • Breast cancer Sister    • Colon cancer Neg Hx    • Colon polyps Neg Hx      Health Maintenance   Topic Date Due   • TDAP/TD VACCINES (1 - Tdap) 09/12/1969   • PNEUMOCOCCAL VACCINES (65+ LOW/MEDIUM RISK) (1 of 2 - PCV13) 09/12/2015   • INFLUENZA VACCINE  08/01/2017   • HEPATITIS C SCREENING  09/28/2017   • MEDICARE ANNUAL WELLNESS  09/28/2017   • ZOSTER VACCINE  09/28/2017   • DIABETIC FOOT EXAM  02/09/2018   • LIPID PANEL  02/09/2018   • HEMOGLOBIN A1C  02/09/2018   • DIABETIC EYE EXAM  02/09/2018   • URINE MICROALBUMIN  02/09/2018   • MAMMOGRAM  06/12/2019   • COLONOSCOPY  12/31/2024     Review of Systems   Constitutional: Negative for activity change, appetite change, chills, diaphoresis, fatigue, fever and unexpected weight change.   HENT: Negative for ear pain, hearing loss, mouth sores, sore throat, trouble  "swallowing and voice change.    Eyes: Negative.    Respiratory: Negative for cough, choking, shortness of breath and wheezing.    Cardiovascular: Negative for chest pain and palpitations.   Gastrointestinal: Positive for blood in stool and nausea. Negative for abdominal pain, constipation, diarrhea and vomiting.   Endocrine: Negative for cold intolerance and heat intolerance.   Genitourinary: Negative for decreased urine volume, dysuria, frequency, hematuria and urgency.   Musculoskeletal: Negative for back pain, gait problem and myalgias.   Skin: Negative for color change, pallor and rash.   Allergic/Immunologic: Negative for food allergies and immunocompromised state.   Neurological: Negative for dizziness, tremors, seizures, syncope, weakness, light-headedness, numbness and headaches.   Hematological: Negative for adenopathy. Does not bruise/bleed easily.   Psychiatric/Behavioral: Negative for agitation and confusion. The patient is not nervous/anxious.    All other systems reviewed and are negative.    Objective   Vitals:    02/09/18 0951   BP: 128/84   Pulse: 84   SpO2: 98%   Weight: 79.4 kg (175 lb)   Height: 174 cm (68.5\")     Body mass index is 26.22 kg/(m^2).  Physical Exam   Constitutional: She is oriented to person, place, and time. She appears well-developed and well-nourished.   HENT:   Head: Normocephalic and atraumatic.   Eyes: Pupils are equal, round, and reactive to light.   Neck: Normal range of motion. Neck supple. No tracheal deviation present.   Cardiovascular: Normal rate, regular rhythm and normal heart sounds.  Exam reveals no gallop and no friction rub.    No murmur heard.  Pulmonary/Chest: Effort normal and breath sounds normal. No respiratory distress. She has no wheezes. She has no rales. She exhibits no tenderness.   Abdominal: Soft. Bowel sounds are normal. She exhibits no distension. There is no hepatosplenomegaly. There is tenderness (epigastric tenderness). There is no rigidity, no " rebound and no guarding.   Musculoskeletal: Normal range of motion. She exhibits no edema, tenderness or deformity.   Neurological: She is alert and oriented to person, place, and time. She has normal reflexes.   Skin: Skin is warm and dry. No rash noted. No pallor.   Psychiatric: She has a normal mood and affect. Her behavior is normal. Judgment and thought content normal.     Assessment/Plan   Carlotta was seen today for gi problem.    Diagnoses and all orders for this visit:    Iron deficiency anemia, unspecified iron deficiency anemia type  -     SUPREP BOWEL PREP KIT 17.5-3.13-1.6 GM/180ML solution oral solution; Take as directed by office instructions provided  -     Case Request; Standing  -     Implement Anesthesia Orders Day of Procedure; Standing  -     Obtain Informed Consent; Standing  -     Verify Bowel Prep Was Successful; Standing  -     Case Request    Heme positive stool    BRBPR (bright red blood per rectum)    Gastroesophageal reflux disease, esophagitis presence not specified  Comments:  stable with Nexium; cont current active therapy for this.     NSAID long-term use  Comments:  Aleve at night 1 year or more, advised to discontinue     Nonsmoker    Hx of adenomatous colonic polyps    Controlled type 2 diabetes mellitus without complication, without long-term current use of insulin  Comments:  Hold the am of procedure    HTN (hypertension), benign  Comments:  cont the am of procedure      Problem List Items Addressed This Visit        Cardiovascular and Mediastinum    HTN (hypertension), benign    Overview     cont the am of procedure         Relevant Medications    losartan (COZAAR) 100 MG tablet    verapamil SR (CALAN-SR) 240 MG CR tablet    hydrochlorothiazide (MICROZIDE) 12.5 MG capsule       Digestive    Heme positive stool    Gastroesophageal reflux disease    Overview     stable with Nexium; cont current active therapy for this.          Relevant Medications    calcium carbonate (TUMS) 500  MG chewable tablet    esomeprazole (nexIUM) 40 MG capsule       Endocrine    Controlled type 2 diabetes mellitus without complication, without long-term current use of insulin    Overview     Hold the am of procedure         Relevant Medications    metFORMIN (GLUCOPHAGE) 500 MG tablet       Hematopoietic and Hemostatic    Iron deficiency anemia - Primary    Relevant Medications    SUPREP BOWEL PREP KIT 17.5-3.13-1.6 GM/180ML solution oral solution    Other Relevant Orders    Case Request (Completed)       Other    BRBPR (bright red blood per rectum)    NSAID long-term use    Overview     Aleve at night 1 year or more, advised to discontinue          Hx of adenomatous colonic polyps    Overview     Colonoscopy tubular adenomatous polyp at 80cm; hyperplastic at rectum          Nonsmoker        Continue ongoing follow up and management with her PCP.   Dr Dupont is to order repeat CBC to follow trend, may consider iron supplementation. We discussed this being possible gastritis vs ulcer given NSAID use and tenderness on exam. Continue Nexium add Gaviscon as needed. Will get Endoscopy for further review as well.     ESOPHAGOGASTRODUODENOSCOPY WITH ANESTHESIA (N/A), COLONOSCOPY WITH ANESTHESIA (N/A)  EMR Dragon/transcription disclaimer: Much of this encounter note is electronic transcription/translation of spoken language to printed text. The electronic translation of spoken language may be erroneous, or at times, nonsensical words or phrases may be inadvertently transcribed. Although I have reviewed the note for such errors, some may still exist.  Body mass index is 26.22 kg/(m^2).  Return if symptoms worsen or fail to improve.    Patient's BMI is within normal parameters. No follow-up required.      All risks, benefits, alternatives, and indications of colonoscopy and/or Endoscopy procedure have been discussed with the patient. Risks to include perforation of the colon requiring possible surgery or colostomy, risk of  bleeding from biopsies or removal of colon tissue, possibility of missing a colon polyp or cancer, or adverse drug reaction.  Benefits to include the diagnosis and management of disease of the colon and rectum. Alternatives to include barium enema, radiographic evaluation, lab testing or no intervention. Pt verbalizes understanding and agrees.     Ayala Hooker, APRN  2/9/2018  11:52 AM

## 2018-03-01 NOTE — PLAN OF CARE
Problem: Patient Care Overview (Adult)  Goal: Plan of Care Review  Outcome: Outcome(s) achieved Date Met: 03/01/18 03/01/18 1339   Coping/Psychosocial Response Interventions   Plan Of Care Reviewed With patient   Patient Care Overview   Progress improving   Outcome Evaluation   Outcome Summary/Follow up Plan D/C CRITERIA MET

## 2018-03-01 NOTE — ANESTHESIA POSTPROCEDURE EVALUATION
Patient: Carlotta Dupont    Procedure Summary     Date Anesthesia Start Anesthesia Stop Room / Location    03/01/18 1303 [id number] ENDOSCOPY 4 / [facility] ENDOSCOPY       Procedure Diagnosis Surgeon Provider    ESOPHAGOGASTRODUODENOSCOPY WITH ANESTHESIA (N/A Esophagus); COLONOSCOPY WITH ANESTHESIA (N/A ) Iron deficiency anemia, unspecified iron deficiency anemia type  (Iron deficiency anemia, unspecified iron deficiency anemia type [D50.9]) MD Addi De La Cruz CRNA          Anesthesia Type: general  Last vitals  BP   122/86 (03/01/18 1218)   Temp   98.1 °F (36.7 °C) (03/01/18 1218)   Pulse   93 (03/01/18 1218)   Resp   20 (03/01/18 1218)     SpO2   98 % (03/01/18 1218)     Post Anesthesia Care and Evaluation    Patient location during evaluation: PHASE II  Patient participation: complete - patient participated  Level of consciousness: awake and sleepy but conscious  Pain score: 0  Pain management: adequate  Airway patency: patent  Anesthetic complications: No anesthetic complications    Cardiovascular status: acceptable  Respiratory status: acceptable  Hydration status: acceptable

## 2018-03-02 ENCOUNTER — TELEPHONE (OUTPATIENT)
Dept: INTERNAL MEDICINE | Age: 68
End: 2018-03-02

## 2018-03-02 DIAGNOSIS — D50.9 IRON DEFICIENCY ANEMIA, UNSPECIFIED IRON DEFICIENCY ANEMIA TYPE: Primary | ICD-10-CM

## 2018-03-06 ENCOUNTER — TELEPHONE (OUTPATIENT)
Dept: GASTROENTEROLOGY | Facility: CLINIC | Age: 68
End: 2018-03-06

## 2018-03-12 ENCOUNTER — OFFICE VISIT (OUTPATIENT)
Dept: NEUROSURGERY | Facility: CLINIC | Age: 68
End: 2018-03-12

## 2018-03-12 VITALS
SYSTOLIC BLOOD PRESSURE: 140 MMHG | WEIGHT: 170 LBS | DIASTOLIC BLOOD PRESSURE: 72 MMHG | HEIGHT: 69 IN | BODY MASS INDEX: 25.18 KG/M2

## 2018-03-12 DIAGNOSIS — G50.0 TRIGEMINAL NEURALGIA: ICD-10-CM

## 2018-03-12 DIAGNOSIS — IMO0001 SUPERFICIAL POSTOPERATIVE WOUND INFECTION, INITIAL ENCOUNTER: Primary | ICD-10-CM

## 2018-03-12 DIAGNOSIS — Z78.9 NONSMOKER: ICD-10-CM

## 2018-03-12 PROBLEM — T81.49XA SUPERFICIAL POSTOPERATIVE WOUND INFECTION: Status: ACTIVE | Noted: 2018-03-12

## 2018-03-12 PROCEDURE — 99203 OFFICE O/P NEW LOW 30 MIN: CPT | Performed by: NEUROLOGICAL SURGERY

## 2018-03-12 RX ORDER — CLINDAMYCIN HYDROCHLORIDE 300 MG/1
300 CAPSULE ORAL 4 TIMES DAILY
Qty: 28 CAPSULE | Refills: 0 | Status: SHIPPED | OUTPATIENT
Start: 2018-03-12 | End: 2018-03-19 | Stop reason: SDUPTHER

## 2018-03-12 NOTE — PROGRESS NOTES
Patient: Carlotta Dupont  : 1950    Primary Care Provider: Eduardo Woods MD    Requesting Provider: No ref. provider found        History    Chief Complaint: wound drainage  Chief Complaint   Patient presents with   • Scalp wound     patient had MVD on 2017 @ [facility]/[place]; she now presents to our office today with an open wound behind her left ear.       History of Present Illness: 67-year-old female who went to the operating room in May 2017 at [facility] in [place] for a left microvascular decompression.  She did very well after that surgery with a significant improvement in her trigeminal neuralgia pain.  About 4 weeks ago approximately she started to develop some changes to the after retro-a radicular wound and noticed some drainage on her pillow that was carole and color and about the size of a quarter.  This is continued has not really gotten any worse but has not really improved much.  She describes no worsening of her headache.  No recurrence of her trigeminal symptoms.  No fevers.  No difficulty with hearing.  No drainage from her nose.  She has been encouraged medication with [facility] and they have encouraged her to clean it with antibacterial solution but that does not seem to have improved the wound.     Review of Systems   All other systems reviewed and are negative.      Past Medical History:   Past Medical History:   Diagnosis Date   • Asthma    • Diabetes mellitus     Type 2   • GERD (gastroesophageal reflux disease)    • History of transfusion    • Hx of colonic polyps    • Hyperlipidemia    • Hypertension    • Iron deficiency anemia    • PONV (postoperative nausea and vomiting)    • Sleep apnea    • Trigeminal neuralgia    • Vitamin D deficiency        Past Surgical History:   Past Surgical History:   Procedure Laterality Date   • APPENDECTOMY     • BLADDER REPAIR      had vaginal repair at the same time   • BREAST BIOPSY     • COLONOSCOPY N/A  3/1/2018    Procedure: COLONOSCOPY WITH ANESTHESIA;  Surgeon: Garrett Ozuna MD;  Location: [facility] ENDOSCOPY;  Service:    • COLONOSCOPY W/ POLYPECTOMY  12/31/2014    Tubular adenomatous polyp at 80 cm, Hyperplastic polyp rectum repeat exam in 3 years   • ENDOSCOPY N/A 3/1/2018    Procedure: ESOPHAGOGASTRODUODENOSCOPY WITH ANESTHESIA;  Surgeon: Garrett Ozuna MD;  Location: [facility] ENDOSCOPY;  Service:    • HYSTERECTOMY     • TRIGEMINAL NERVE DECOMPRESSION         Family History: family history includes Breast cancer in her sister.    Social History:  reports that she has never smoked. She has never used smokeless tobacco. She reports that she drinks alcohol. She reports that she does not use drugs.    Medications:    (Not in a hospital admission)    Allergies:  Zovirax [acyclovir]; Vesicare [solifenacin succinate]; Meperidine; Propranolol; and Sertraline hcl    Physical Exam:     Physical Exam   Constitutional: She is oriented to person, place, and time. She appears well-developed and well-nourished.   Cardiovascular: Normal rate and regular rhythm.    Pulmonary/Chest: Effort normal. No respiratory distress.   Abdominal: Soft. She exhibits no distension.   Neurological: She is oriented to person, place, and time. She has normal strength. She has a normal Finger-Nose-Finger Test. Gait normal.   Psychiatric: Her speech is normal.       Neurologic Exam     Mental Status   Oriented to person, place, and time.   Attention: normal.   Speech: speech is normal   Level of consciousness: alert  Knowledge: good.     Cranial Nerves   Cranial nerves II through XII intact.     Motor Exam   Muscle bulk: normal  Overall muscle tone: normal  Right arm pronator drift: absent  Left arm pronator drift: absent    Strength   Strength 5/5 throughout.     Sensory Exam   Light touch normal.   Pinprick normal.     Gait, Coordination, and Reflexes     Gait  Gait: normal    Coordination   Finger to nose coordination: normal    Tremor    Resting tremor: absent  Intention tremor: absent  Action tremor: absent    Reflexes   Reflexes 2+ except as noted.     Wound with a small scab in the superior portion minimal erythema and no swelling no drainage visible or expressed from the wound.  See media tab for picture    Independent Review of Radiographic Studies:       ASSESSMENT/PLAN: The patient does have some scabbing and drainage around her wound.  This wound is almost a year old.  The musculature and skin around it seems to have atrophied and thin.  She is not demonstrating an overt signs or symptoms of spinal fluid fluid leak or meningismus.  There has been no recurrence of her trigeminal symptoms.  I may start her on clindamycin 300 mg every 6 for a week and we will get an MRI of the brain with and without contrast with attention to the skull base.  There is no portion of this wound that I feel is  culturable today so we will treat her with broad-spectrum antibiotic.  We will be in contact with her for the MRI results and see her in follow-up in a week  1. Superficial postoperative wound infection, initial encounter    2. Trigeminal neuralgia    3. BMI 25.0-25.9,adult    4. Nonsmoker          Return in about 1 week (around 3/19/2018).      Morro Perez MD

## 2018-03-12 NOTE — PATIENT INSTRUCTIONS

## 2018-03-14 ENCOUNTER — HOSPITAL ENCOUNTER (OUTPATIENT)
Dept: MRI IMAGING | Facility: HOSPITAL | Age: 68
Discharge: HOME | End: 2018-03-14
Attending: NEUROLOGICAL SURGERY | Admitting: NEUROLOGICAL SURGERY

## 2018-03-14 LAB — CREAT BLDA-MCNC: 0.8 MG/DL (ref 0.6–1.3)

## 2018-03-14 PROCEDURE — 82565 ASSAY OF CREATININE: CPT

## 2018-03-14 PROCEDURE — A9577 INJ MULTIHANCE: HCPCS | Performed by: NEUROLOGICAL SURGERY

## 2018-03-14 PROCEDURE — 70553 MRI BRAIN STEM W/O & W/DYE: CPT

## 2018-03-14 PROCEDURE — 0 GADOBENATE DIMEGLUMINE 529 MG/ML SOLUTION: Performed by: NEUROLOGICAL SURGERY

## 2018-03-14 RX ADMIN — GADOBENATE DIMEGLUMINE 15 ML: 529 INJECTION, SOLUTION INTRAVENOUS at 14:30

## 2018-03-19 ENCOUNTER — OFFICE VISIT (OUTPATIENT)
Dept: NEUROSURGERY | Facility: CLINIC | Age: 68
End: 2018-03-19

## 2018-03-19 VITALS
DIASTOLIC BLOOD PRESSURE: 76 MMHG | SYSTOLIC BLOOD PRESSURE: 134 MMHG | BODY MASS INDEX: 25.18 KG/M2 | HEIGHT: 69 IN | WEIGHT: 170 LBS

## 2018-03-19 DIAGNOSIS — IMO0001 SUPERFICIAL POSTOPERATIVE WOUND INFECTION, SUBSEQUENT ENCOUNTER: Primary | ICD-10-CM

## 2018-03-19 DIAGNOSIS — Z78.9 NONSMOKER: ICD-10-CM

## 2018-03-19 DIAGNOSIS — IMO0001 SUPERFICIAL POSTOPERATIVE WOUND INFECTION, INITIAL ENCOUNTER: ICD-10-CM

## 2018-03-19 PROCEDURE — 99213 OFFICE O/P EST LOW 20 MIN: CPT | Performed by: NEUROLOGICAL SURGERY

## 2018-03-19 RX ORDER — CLINDAMYCIN HYDROCHLORIDE 300 MG/1
300 CAPSULE ORAL 4 TIMES DAILY
Qty: 28 CAPSULE | Refills: 0 | Status: SHIPPED | OUTPATIENT
Start: 2018-03-19 | End: 2018-04-09

## 2018-03-19 NOTE — PROGRESS NOTES
SUBJECTIVE:  Patient ID: Carlotta Dupont is a 67 y.o. female is here today for follow-up.    Chief Complaint:draining wound  Chief Complaint   Patient presents with   • Superficial wound left scalp     patient here for wound check; she had MRI @ [facility] last week       HPI  68 yo female we've been following for a draining left retroauricular wound after MVD in May of 2017. She's been on clindamycin for 7 days. She says the pain around the wound has markedly improved, She still is getting dime size drainage on a dressing daily but no louie drainage running down her neck. She denies headache, face pain or nasal drainage    The following portions of the patient's history were reviewed and updated as appropriate: allergies, current medications, past family history, past medical history, past social history, past surgical history and problem list.    OBJECTIVE:    Review of Systems   All other systems reviewed and are negative.         Physical Exam   Constitutional: She is oriented to person, place, and time. She appears well-developed and well-nourished.   HENT:   Head: Normocephalic and atraumatic.   Right Ear: Hearing normal.   Left Ear: Hearing normal.   Eyes: EOM are normal. Pupils are equal, round, and reactive to light.   Neck: Normal range of motion.   Neurological: She is alert and oriented to person, place, and time. She has normal strength and normal reflexes. No cranial nerve deficit or sensory deficit. She displays a negative Romberg sign. GCS eye subscore is 4. GCS verbal subscore is 5. GCS motor subscore is 6. She displays no Babinski's sign on the right side. She displays no Babinski's sign on the left side.   Psychiatric: Her speech is normal. Judgment normal. Cognition and memory are normal.       Neurologic Exam     Mental Status   Oriented to person, place, and time.   Speech: speech is normal     Cranial Nerves     CN III, IV, VI   Pupils are equal, round, and reactive to light.  Extraocular motions are  normal.     Motor Exam     Strength   Strength 5/5 throughout.     See media tab for photographs of left retroauricular wound  Independent Review of Radiographic Studies:   MRI the brain with and without contrast shows expected postoperative changes likely fact graft in the mastoid air cells and no obvious signs or concern for deeper infection    ASSESSMENT/PLAN:  Mrs. Dupont is still having some scant drainage from the wound.  There is no obvious opening in the incision line.  The drainage seems to slow down in the pain is much better.  We are going to continue another week of clindamycin.  And see her in follow-up in a week.  There does appear to be a titanium plate underneath of the wound.  I explained to her that if this does not clear up with antibiotics there may be a role in a superficial debriding of this wound and removal of the titanium plate.      1. Superficial postoperative wound infection, subsequent encounter    2. Nonsmoker    3. BMI 25.0-25.9,adult    4. Superficial postoperative wound infection, initial encounter            Return in about 7 days (around 3/26/2018) for follow up w/DR TONEY.      Morro Toney MD

## 2018-03-20 ENCOUNTER — TELEPHONE (OUTPATIENT)
Dept: GASTROENTEROLOGY | Facility: CLINIC | Age: 68
End: 2018-03-20

## 2018-03-26 ENCOUNTER — OFFICE VISIT (OUTPATIENT)
Dept: NEUROSURGERY | Facility: CLINIC | Age: 68
End: 2018-03-26

## 2018-03-26 VITALS
DIASTOLIC BLOOD PRESSURE: 90 MMHG | SYSTOLIC BLOOD PRESSURE: 158 MMHG | BODY MASS INDEX: 25.18 KG/M2 | WEIGHT: 170 LBS | HEIGHT: 69 IN

## 2018-03-26 DIAGNOSIS — IMO0001 SUPERFICIAL POSTOPERATIVE WOUND INFECTION, SUBSEQUENT ENCOUNTER: Primary | ICD-10-CM

## 2018-03-26 DIAGNOSIS — Z78.9 NONSMOKER: ICD-10-CM

## 2018-03-26 PROCEDURE — 99213 OFFICE O/P EST LOW 20 MIN: CPT | Performed by: NEUROLOGICAL SURGERY

## 2018-03-26 NOTE — PROGRESS NOTES
SUBJECTIVE:  Patient ID: Carlotta Dupont is a 67 y.o. female is here today for follow-up.    Chief Complaint:wound  Chief Complaint   Patient presents with   • Superficial wound infection     patient here today for a 1 week wound check       HPI  66 yo female s/p MVD may of 2018. We've been following for scant wound drainage. She Reports daily scant yellow drainage on the dressing.  It is approximately a piece size area once a day.  She still says that the pain is remarkably improved and has no other complaints    The following portions of the patient's history were reviewed and updated as appropriate: allergies, current medications, past family history, past medical history, past social history, past surgical history and problem list.    OBJECTIVE:    Review of Systems   All other systems reviewed and are negative.         Physical Exam   Constitutional: She is oriented to person, place, and time. She appears well-developed and well-nourished.   HENT:   Head: Normocephalic and atraumatic.   Right Ear: Hearing normal.   Left Ear: Hearing normal.   Eyes: EOM are normal. Pupils are equal, round, and reactive to light.   Neck: Normal range of motion.   Neurological: She is alert and oriented to person, place, and time. She has normal strength and normal reflexes. No cranial nerve deficit or sensory deficit. She displays a negative Romberg sign. GCS eye subscore is 4. GCS verbal subscore is 5. GCS motor subscore is 6. She displays no Babinski's sign on the right side. She displays no Babinski's sign on the left side.   Psychiatric: Her speech is normal. Judgment normal. Cognition and memory are normal.       Neurologic Exam     Mental Status   Oriented to person, place, and time.   Speech: speech is normal     Cranial Nerves     CN III, IV, VI   Pupils are equal, round, and reactive to light.  Extraocular motions are normal.     Motor Exam     Strength   Strength 5/5 throughout.    See the media tab for  photographs    Independent Review of Radiographic Studies:       ASSESSMENT/PLAN:  Mrs. Dupont is completed 2 weeks of clindamycin.  The drainage has deathly slow down from where this started.  I wonder if she is just getting a little blotting of a non-epithelialized area on the dressing.  The wound overall looks better than it did 2 weeks ago.  We are going to take her off antibiotics and see her in follow up in 2 weeks.  This may be a subcutaneous stitch abscess that is just eroded through the skin and a few months later.      1. Superficial postoperative wound infection, subsequent encounter    2. Nonsmoker    3. BMI 25.0-25.9,adult            Return in about 2 weeks (around 4/9/2018) for follow up w/DR TONEY.      Morro Toney MD

## 2018-04-03 DIAGNOSIS — E11.9 TYPE 2 DIABETES MELLITUS WITHOUT COMPLICATION, WITHOUT LONG-TERM CURRENT USE OF INSULIN (HCC): ICD-10-CM

## 2018-04-03 DIAGNOSIS — D64.9 ANEMIA, UNSPECIFIED TYPE: ICD-10-CM

## 2018-04-03 DIAGNOSIS — D64.9 ANEMIA, UNSPECIFIED TYPE: Primary | ICD-10-CM

## 2018-04-03 LAB
ALBUMIN SERPL-MCNC: 4.7 G/DL (ref 3.5–5.2)
ALP BLD-CCNC: 86 U/L (ref 35–104)
ALT SERPL-CCNC: 17 U/L (ref 5–33)
ANION GAP SERPL CALCULATED.3IONS-SCNC: 15 MMOL/L (ref 7–19)
AST SERPL-CCNC: 15 U/L (ref 5–32)
BASOPHILS ABSOLUTE: 0 K/UL (ref 0–0.2)
BASOPHILS RELATIVE PERCENT: 0.6 % (ref 0–1)
BILIRUB SERPL-MCNC: 0.4 MG/DL (ref 0.2–1.2)
BUN BLDV-MCNC: 17 MG/DL (ref 8–23)
CALCIUM SERPL-MCNC: 10 MG/DL (ref 8.8–10.2)
CHLORIDE BLD-SCNC: 100 MMOL/L (ref 98–111)
CO2: 26 MMOL/L (ref 22–29)
CREAT SERPL-MCNC: 0.5 MG/DL (ref 0.5–0.9)
EOSINOPHILS ABSOLUTE: 0.2 K/UL (ref 0–0.6)
EOSINOPHILS RELATIVE PERCENT: 2.9 % (ref 0–5)
FERRITIN: 409 NG/ML (ref 13–150)
GFR NON-AFRICAN AMERICAN: >60
GLUCOSE BLD-MCNC: 110 MG/DL (ref 74–109)
HBA1C MFR BLD: 6.6 %
HCT VFR BLD CALC: 38 % (ref 37–47)
HEMOGLOBIN: 11.7 G/DL (ref 12–16)
IRON: 80 UG/DL (ref 37–145)
LYMPHOCYTES ABSOLUTE: 1.8 K/UL (ref 1.1–4.5)
LYMPHOCYTES RELATIVE PERCENT: 27 % (ref 20–40)
MCH RBC QN AUTO: 25.8 PG (ref 27–31)
MCHC RBC AUTO-ENTMCNC: 30.8 G/DL (ref 33–37)
MCV RBC AUTO: 83.7 FL (ref 81–99)
MONOCYTES ABSOLUTE: 0.4 K/UL (ref 0–0.9)
MONOCYTES RELATIVE PERCENT: 6.3 % (ref 0–10)
NEUTROPHILS ABSOLUTE: 4.1 K/UL (ref 1.5–7.5)
NEUTROPHILS RELATIVE PERCENT: 62.6 % (ref 50–65)
PDW BLD-RTO: 18.6 % (ref 11.5–14.5)
PLATELET # BLD: 231 K/UL (ref 130–400)
PMV BLD AUTO: 11.4 FL (ref 9.4–12.3)
POTASSIUM SERPL-SCNC: 4 MMOL/L (ref 3.5–5)
RBC # BLD: 4.54 M/UL (ref 4.2–5.4)
SODIUM BLD-SCNC: 141 MMOL/L (ref 136–145)
TOTAL PROTEIN: 7.2 G/DL (ref 6.6–8.7)
WBC # BLD: 6.5 K/UL (ref 4.8–10.8)

## 2018-04-05 LAB — VITAMIN D 1,25-DIHYDROXY: 45.2 PG/ML (ref 19.9–79.3)

## 2018-04-06 ENCOUNTER — PREP FOR SURGERY (OUTPATIENT)
Dept: GASTROENTEROLOGY | Facility: CLINIC | Age: 68
End: 2018-04-06

## 2018-04-09 ENCOUNTER — OFFICE VISIT (OUTPATIENT)
Dept: INTERNAL MEDICINE | Age: 68
End: 2018-04-09
Payer: MEDICARE

## 2018-04-09 ENCOUNTER — OFFICE VISIT (OUTPATIENT)
Dept: NEUROSURGERY | Facility: CLINIC | Age: 68
End: 2018-04-09

## 2018-04-09 VITALS
SYSTOLIC BLOOD PRESSURE: 142 MMHG | WEIGHT: 171.6 LBS | DIASTOLIC BLOOD PRESSURE: 86 MMHG | HEIGHT: 69 IN | BODY MASS INDEX: 25.42 KG/M2

## 2018-04-09 VITALS
WEIGHT: 170.6 LBS | SYSTOLIC BLOOD PRESSURE: 118 MMHG | DIASTOLIC BLOOD PRESSURE: 80 MMHG | HEART RATE: 89 BPM | BODY MASS INDEX: 25.27 KG/M2 | HEIGHT: 69 IN | OXYGEN SATURATION: 98 %

## 2018-04-09 DIAGNOSIS — IMO0001 SUPERFICIAL POSTOPERATIVE WOUND INFECTION, SUBSEQUENT ENCOUNTER: Primary | ICD-10-CM

## 2018-04-09 DIAGNOSIS — E78.2 MIXED HYPERLIPIDEMIA: Chronic | ICD-10-CM

## 2018-04-09 DIAGNOSIS — K21.9 GASTROESOPHAGEAL REFLUX DISEASE WITHOUT ESOPHAGITIS: Chronic | ICD-10-CM

## 2018-04-09 DIAGNOSIS — Z78.9 NONSMOKER: ICD-10-CM

## 2018-04-09 DIAGNOSIS — I10 ESSENTIAL HYPERTENSION: Primary | Chronic | ICD-10-CM

## 2018-04-09 DIAGNOSIS — G50.0 TRIGEMINAL NEURALGIA OF LEFT SIDE OF FACE: Chronic | ICD-10-CM

## 2018-04-09 DIAGNOSIS — G47.33 OBSTRUCTIVE SLEEP APNEA: Chronic | ICD-10-CM

## 2018-04-09 DIAGNOSIS — E11.9 TYPE 2 DIABETES MELLITUS WITHOUT COMPLICATION, WITHOUT LONG-TERM CURRENT USE OF INSULIN (HCC): Chronic | ICD-10-CM

## 2018-04-09 DIAGNOSIS — E55.9 VITAMIN D DEFICIENCY: Chronic | ICD-10-CM

## 2018-04-09 DIAGNOSIS — D50.8 OTHER IRON DEFICIENCY ANEMIA: ICD-10-CM

## 2018-04-09 PROBLEM — T81.49XA SUPERFICIAL POSTOPERATIVE WOUND INFECTION: Status: ACTIVE | Noted: 2018-03-12

## 2018-04-09 PROBLEM — D50.9 IRON DEFICIENCY ANEMIA: Status: ACTIVE | Noted: 2018-02-09

## 2018-04-09 PROCEDURE — 99214 OFFICE O/P EST MOD 30 MIN: CPT | Performed by: INTERNAL MEDICINE

## 2018-04-09 PROCEDURE — 4040F PNEUMOC VAC/ADMIN/RCVD: CPT | Performed by: INTERNAL MEDICINE

## 2018-04-09 PROCEDURE — 99213 OFFICE O/P EST LOW 20 MIN: CPT | Performed by: NEUROLOGICAL SURGERY

## 2018-04-09 PROCEDURE — G8400 PT W/DXA NO RESULTS DOC: HCPCS | Performed by: INTERNAL MEDICINE

## 2018-04-09 PROCEDURE — 3014F SCREEN MAMMO DOC REV: CPT | Performed by: INTERNAL MEDICINE

## 2018-04-09 PROCEDURE — 1123F ACP DISCUSS/DSCN MKR DOCD: CPT | Performed by: INTERNAL MEDICINE

## 2018-04-09 PROCEDURE — 1090F PRES/ABSN URINE INCON ASSESS: CPT | Performed by: INTERNAL MEDICINE

## 2018-04-09 PROCEDURE — 1036F TOBACCO NON-USER: CPT | Performed by: INTERNAL MEDICINE

## 2018-04-09 PROCEDURE — G8427 DOCREV CUR MEDS BY ELIG CLIN: HCPCS | Performed by: INTERNAL MEDICINE

## 2018-04-09 PROCEDURE — 3044F HG A1C LEVEL LT 7.0%: CPT | Performed by: INTERNAL MEDICINE

## 2018-04-09 PROCEDURE — G8419 CALC BMI OUT NRM PARAM NOF/U: HCPCS | Performed by: INTERNAL MEDICINE

## 2018-04-09 PROCEDURE — 3017F COLORECTAL CA SCREEN DOC REV: CPT | Performed by: INTERNAL MEDICINE

## 2018-04-09 RX ORDER — ESTRADIOL 0.1 MG/G
CREAM VAGINAL
Qty: 1 TUBE | Refills: 3 | Status: SHIPPED | OUTPATIENT
Start: 2018-04-09 | End: 2018-08-13 | Stop reason: CLARIF

## 2018-04-09 RX ORDER — ALPRAZOLAM 0.25 MG/1
0.25 TABLET ORAL 2 TIMES DAILY PRN
Qty: 60 TABLET | Refills: 0 | Status: SHIPPED | OUTPATIENT
Start: 2018-04-09 | End: 2018-05-09

## 2018-04-09 RX ORDER — ERGOCALCIFEROL 1.25 MG/1
50000 CAPSULE ORAL WEEKLY
Qty: 15 CAPSULE | Refills: 3 | Status: SHIPPED | OUTPATIENT
Start: 2018-04-09 | End: 2018-08-13 | Stop reason: SDUPTHER

## 2018-04-09 ASSESSMENT — ENCOUNTER SYMPTOMS
DIARRHEA: 0
TROUBLE SWALLOWING: 0
COUGH: 0
ABDOMINAL PAIN: 0
BLOOD IN STOOL: 0
NAUSEA: 0
CONSTIPATION: 0
SHORTNESS OF BREATH: 0
SORE THROAT: 0

## 2018-04-09 NOTE — PATIENT INSTRUCTIONS

## 2018-04-09 NOTE — PROGRESS NOTES
SUBJECTIVE:  Patient ID: Carlotta Dupont is a 67 y.o. female is here today for follow-up.    Chief Complaint:wound drainage  Chief Complaint   Patient presents with   • Superficial postoperative wound     patient here for a 2 wk follow up wound check       HPI  67-year-old female that we have been following for a draining left retrosigmoid wound after MVD.  This been going on since about January.  She has had 2 rounds of clindamycin she is here in follow-up.  She says the wound is still draining.  She gets a spot of sero sanquinous drainage on the dressing daily.  It is not any worse but clearly is not improving.  She denies any significant pain associated with this.  There is no headache.  Her trigeminal pain is still resolved.    The following portions of the patient's history were reviewed and updated as appropriate: allergies, current medications, past family history, past medical history, past social history, past surgical history and problem list.    OBJECTIVE:    Review of Systems   All other systems reviewed and are negative.         Physical Exam   Constitutional: She is oriented to person, place, and time. She appears well-developed and well-nourished.   HENT:   Head: Normocephalic and atraumatic.   Right Ear: Hearing normal.   Left Ear: Hearing normal.   Eyes: EOM are normal. Pupils are equal, round, and reactive to light.   Neck: Normal range of motion.   Neurological: She is alert and oriented to person, place, and time. She has normal strength and normal reflexes. No cranial nerve deficit or sensory deficit. She displays a negative Romberg sign. GCS eye subscore is 4. GCS verbal subscore is 5. GCS motor subscore is 6. She displays no Babinski's sign on the right side. She displays no Babinski's sign on the left side.   Psychiatric: Her speech is normal. Judgment normal. Cognition and memory are normal.       Neurologic Exam     Mental Status   Oriented to person, place, and time.   Speech: speech is  normal     Cranial Nerves     CN III, IV, VI   Pupils are equal, round, and reactive to light.  Extraocular motions are normal.     Motor Exam     Strength   Strength 5/5 throughout.       Independent Review of Radiographic Studies:       ASSESSMENT/PLAN:  The wound looks unchanged.  Superior portion there is a granulomatous pinkish area.  There is no clear drainage.  I agree with the patient that while this does not appear to be any worse it is not getting better on it has been a persistent problem now for several months despite 2 weeks of oral antibiotic therapy.  Wearing a get a CT scan with fine cuts the skull base to see if there is any sort of foreign body or osteomyelitis.  At this point [facility] considering debridement and reclosure of this wound.  Once we get the imaging completed I will talk to plastic surgery about it and we will see her in follow up in 2 weeks      1. Superficial postoperative wound infection, subsequent encounter    2. Nonsmoker    3. BMI 25.0-25.9,adult            Return in about 2 weeks (around 4/23/2018) for follow up w/DR TONEY.      Morro Toney MD

## 2018-04-10 ENCOUNTER — TELEPHONE (OUTPATIENT)
Dept: INTERNAL MEDICINE | Age: 68
End: 2018-04-10

## 2018-04-12 ENCOUNTER — HOSPITAL ENCOUNTER (OUTPATIENT)
Dept: CT IMAGING | Facility: HOSPITAL | Age: 68
Discharge: HOME | End: 2018-04-12
Attending: NEUROLOGICAL SURGERY | Admitting: NEUROLOGICAL SURGERY

## 2018-04-12 DIAGNOSIS — IMO0001 SUPERFICIAL POSTOPERATIVE WOUND INFECTION, SUBSEQUENT ENCOUNTER: ICD-10-CM

## 2018-04-12 PROCEDURE — 70450 CT HEAD/BRAIN W/O DYE: CPT

## 2018-04-16 DIAGNOSIS — IMO0001 SUPERFICIAL POSTOPERATIVE WOUND INFECTION, SUBSEQUENT ENCOUNTER: Primary | ICD-10-CM

## 2018-04-20 ENCOUNTER — HOSPITAL ENCOUNTER (OUTPATIENT)
Facility: HOSPITAL | Age: 68
Setting detail: HOSPITAL OUTPATIENT SURGERY
Discharge: HOME | End: 2018-04-20
Attending: INTERNAL MEDICINE | Admitting: INTERNAL MEDICINE

## 2018-04-20 PROCEDURE — 91110 GI TRC IMG INTRAL ESOPH-ILE: CPT | Performed by: INTERNAL MEDICINE

## 2018-04-23 ENCOUNTER — PROCEDURE VISIT (OUTPATIENT)
Dept: OTOLARYNGOLOGY | Facility: CLINIC | Age: 68
End: 2018-04-23

## 2018-04-23 ENCOUNTER — OFFICE VISIT (OUTPATIENT)
Dept: OTOLARYNGOLOGY | Facility: CLINIC | Age: 68
End: 2018-04-23

## 2018-04-23 VITALS
WEIGHT: 168 LBS | TEMPERATURE: 97.8 F | HEIGHT: 68 IN | HEART RATE: 81 BPM | SYSTOLIC BLOOD PRESSURE: 133 MMHG | RESPIRATION RATE: 20 BRPM | BODY MASS INDEX: 25.46 KG/M2 | DIASTOLIC BLOOD PRESSURE: 75 MMHG

## 2018-04-23 DIAGNOSIS — S01.00XA OPEN WOUND OF SCALP, UNSPECIFIED OPEN WOUND TYPE, INITIAL ENCOUNTER: Primary | ICD-10-CM

## 2018-04-23 DIAGNOSIS — H90.3 SENSORINEURAL HEARING LOSS (SNHL), BILATERAL: Primary | ICD-10-CM

## 2018-04-23 PROCEDURE — 99204 OFFICE O/P NEW MOD 45 MIN: CPT | Performed by: OTOLARYNGOLOGY

## 2018-04-23 NOTE — PROGRESS NOTES
CASE HISTORY DETAILS   Ms. Dupont presented to the clinic this date for a baseline hearing evaluation prior to treatment for complications with surgical site following surgery for trigeminal neuralgia.        SUMMARY   RIGHT  · Otoscopy revealed clear EAC/Unremarkable TM.  · Mild cookie bite sensorineural hearing loss.    LEFT  · Otoscopy revealed clear EAC/Unremarkable TM.  · Mild cookie bite sensorineural hearing loss.    RECOMMENDATIONS   Results of today's evaluation were discussed with Ms. Dupont and the following recommendations were made:  1. ENT evaluation today as scheduled.    AUDIOGRAM AND IMMITANCE       Justina Dent, CCC-A  Audiologist

## 2018-04-23 NOTE — PROGRESS NOTES
PRIMARY CARE PROVIDER: Eduardo Woods MD  REFERRING PROVIDER: No ref. provider found    Chief Complaint   Patient presents with   • Skin Lesion     WOUND CHECK SCALP       Subjective   History of Present Illness:  Carlotta Dupont is a  67 y.o. 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  She has a MVD at  by Dr. Vu in May 2017.  She developed erythema and draining in January.  Her father had been very sick and she has not been able to follow up with Dr. Vu, so she has been seeing Dr. Perez.  She has had 2 rounds of clindamycin and has had continued drainage.  No headaches or neck stiffness or fevers.  Drainage is mild.  Drainage is serosanguinous.  She has photos of the drainage - no halo sign on these.  She denies any significant pain associated with this.  There is no headache.  Her trigeminal pain has resolved following the MVD.    Review of Systems:  Review of Systems   Constitutional: Negative for chills and fever.   Respiratory: Negative for shortness of breath.    Cardiovascular: Negative for chest pain.   Musculoskeletal: Negative for neck pain.   Skin: Negative for wound.   Neurological: Negative for headaches.   All other systems reviewed and are negative.      Past History:  Past Medical History:   Diagnosis Date   • Asthma    • Diabetes mellitus     Type 2   • GERD (gastroesophageal reflux disease)    • History of transfusion    • Hx of colonic polyps    • Hyperlipidemia    • Hypertension    • Iron deficiency anemia    • PONV (postoperative nausea and vomiting)    • Sleep apnea    • Trigeminal neuralgia    • Vitamin D deficiency      Past Surgical History:   Procedure Laterality Date   • APPENDECTOMY     • BLADDER REPAIR      had vaginal repair at the same time   • BREAST BIOPSY     • CAPSULE ENDOSCOPY N/A 4/20/2018    Procedure: CAPSULE ENDOSCOPY M2A;  Surgeon: Garrett Ozuna MD;  Location: [facility] ENDOSCOPY;  Service: Gastroenterology   • COLONOSCOPY N/A 3/1/2018     Procedure: COLONOSCOPY WITH ANESTHESIA;  Surgeon: Garrett Ozuna MD;  Location: [facility] ENDOSCOPY;  Service:    • COLONOSCOPY W/ POLYPECTOMY  12/31/2014    Tubular adenomatous polyp at 80 cm, Hyperplastic polyp rectum repeat exam in 3 years   • ENDOSCOPY N/A 3/1/2018    Procedure: ESOPHAGOGASTRODUODENOSCOPY WITH ANESTHESIA;  Surgeon: Garrett Ozuna MD;  Location: [facility] ENDOSCOPY;  Service:    • HYSTERECTOMY     • TRIGEMINAL NERVE DECOMPRESSION       Family History   Problem Relation Age of Onset   • Breast cancer Sister    • Colon cancer Neg Hx    • Colon polyps Neg Hx      Social History   Substance Use Topics   • Smoking status: Never Smoker   • Smokeless tobacco: Never Used   • Alcohol use Yes      Comment: Rare     Allergies:  Zovirax [acyclovir]; Vesicare [solifenacin succinate]; Meperidine; Propranolol; and Sertraline hcl    Current Outpatient Prescriptions:   •  calcium carbonate (TUMS) 500 MG chewable tablet, Chew., Disp: , Rfl:   •  esomeprazole (nexIUM) 40 MG capsule, Take 40 mg by mouth Every Morning Before Breakfast., Disp: , Rfl:   •  estradiol (ESTRACE) 0.1 MG/GM vaginal cream, Insert 1/4 applicatorful (1GM) vaginally twice weekly, Disp: , Rfl:   •  hydrochlorothiazide (MICROZIDE) 12.5 MG capsule, Take 12.5 mg by mouth., Disp: , Rfl:   •  losartan (COZAAR) 100 MG tablet, TAKE ONE TABLET BY MOUTH EVERY DAY, Disp: , Rfl:   •  metFORMIN (GLUCOPHAGE) 500 MG tablet, Take 500 mg by mouth., Disp: , Rfl:   •  montelukast (SINGULAIR) 10 MG tablet, Take 10 mg by mouth., Disp: , Rfl:   •  rosuvastatin (CRESTOR) 5 MG tablet, Take 1/2 tablet on Mon, Wed, Fri, Disp: , Rfl:   •  verapamil SR (CALAN-SR) 240 MG CR tablet, TAKE ONE TABLET BY MOUTH TWICE A DAY, Disp: , Rfl:   •  vitamin D (ERGOCALCIFEROL) 20379 units capsule capsule, Take  by mouth., Disp: , Rfl:       Objective     Vital Signs:  Temp:  [97.8 °F (36.6 °C)] 97.8 °F (36.6 °C)  Heart Rate:  [81] 81  Resp:  [20] 20  BP: (133)/(75) 133/75    Physical  Exam:  Physical Exam   Constitutional: She is oriented to person, place, and time. She appears well-developed and well-nourished. She is cooperative. No distress.   HENT:   Head: Normocephalic and atraumatic.       Right Ear: Tympanic membrane, external ear and ear canal normal.   Left Ear: Tympanic membrane, external ear and ear canal normal.   Nose: Nose normal.   Mouth/Throat: Oropharynx is clear and moist.   Eyes: Conjunctivae and EOM are normal. Pupils are equal, round, and reactive to light. Right eye exhibits no discharge. Left eye exhibits no discharge. No scleral icterus.   Neck: Normal range of motion. Neck supple. No JVD present. No tracheal deviation present. No thyromegaly present.   Cardiovascular: Normal rate, regular rhythm and normal heart sounds.    Pulmonary/Chest: Effort normal and breath sounds normal. No stridor.   Musculoskeletal: Normal range of motion. She exhibits no edema or deformity.   Lymphadenopathy:     She has no cervical adenopathy.   Neurological: She is alert and oriented to person, place, and time. She has normal strength. No cranial nerve deficit. Coordination normal.   Skin: Skin is warm and dry. No rash noted. She is not diaphoretic. No erythema. No pallor.   Psychiatric: She has a normal mood and affect. Her speech is normal and behavior is normal. Judgment and thought content normal. Cognition and memory are normal.   Nursing note and vitals reviewed.      Results Review:   I reviewed the CT scan of the head dated 04/12/2018.  The following is my interpretation:  Patient is status post approximate puzzle craniotomy on the left.  The overlying mastoid air cells demonstrate some partial fluid opacification of the posterior aspect closest to the craniotomy.  The remainder of the mastoid air cells are healthy without any evidence of breakdown of the bony septae.  There is lack of subcutaneous fat in the area of depression corresponding to the wound.      I have reviewed the MRI  of the brain dated 03/14/2018.  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  There is loss of fat signal on T1 tracking back towards the defect in the mastoid.        On T2, the area of opacification in the mastoid appears to be isointense to brain and muscle.  There is a slight amount of peripheral enhancement which may be reactive mucosal change within the mastoid air cells and cells.  This may represent soft tissue ingrowth into the mastoid air cells from the craniotomy.        Audiogram was reviewed:  Mild bilateral SNHL      Assessment   Assessment:  1. Open wound of scalp, unspecified open wound type, initial encounter        Plan   Plan:    I discussed the option of wound excision with exploration for possible fistula with possible limited mastoidectomy and wound closure by simple tissue closure versus recruiting sternocleidomastoid flap in the area to bolster the incision.  She will see Dr. Perez tomorrow and discuss this further with him.  We would likely perform this as a combined procedure should there be any violation of the dura.    My findings and recommendations were discussed and questions were answered.     Kadeem Oconnor MD  04/23/18  5:14 PM

## 2018-04-24 ENCOUNTER — OFFICE VISIT (OUTPATIENT)
Dept: NEUROSURGERY | Facility: CLINIC | Age: 68
End: 2018-04-24

## 2018-04-24 VITALS
WEIGHT: 170.2 LBS | DIASTOLIC BLOOD PRESSURE: 82 MMHG | HEIGHT: 68 IN | SYSTOLIC BLOOD PRESSURE: 140 MMHG | BODY MASS INDEX: 25.79 KG/M2

## 2018-04-24 DIAGNOSIS — Z78.9 NONSMOKER: ICD-10-CM

## 2018-04-24 DIAGNOSIS — IMO0001 SUPERFICIAL POSTOPERATIVE WOUND INFECTION, SUBSEQUENT ENCOUNTER: Primary | ICD-10-CM

## 2018-04-24 PROCEDURE — 99213 OFFICE O/P EST LOW 20 MIN: CPT | Performed by: NEUROLOGICAL SURGERY

## 2018-04-24 NOTE — PROGRESS NOTES
SUBJECTIVE:  Patient ID: Carlotta Dupont is a 67 y.o. female is here today for follow-up.    Chief Complaint: Wound drainage  Chief Complaint   Patient presents with   • Superficial wound infection     saw Dr Oconnor yesterday and is here to discuss surgical options       HPI  67-year-old female been following for a left retroauricular wound bed is been draining.  It R imaging workup demonstrated some mastoid involvement which may be contributing to her R chronic nonhealing wound.  She was sent to see Dr. Rene from the ENT service to discuss surgical strategies and she is here to finalize our discussion and decisions    The following portions of the patient's history were reviewed and updated as appropriate: allergies, current medications, past family history, past medical history, past social history, past surgical history and problem list.    OBJECTIVE:    Review of Systems   All other systems reviewed and are negative.         Physical Exam   Constitutional: She is oriented to person, place, and time. She appears well-developed and well-nourished.   HENT:   Head: Normocephalic and atraumatic.   Right Ear: Hearing normal.   Left Ear: Hearing normal.   Eyes: EOM are normal. Pupils are equal, round, and reactive to light.   Neck: Normal range of motion.   Neurological: She is alert and oriented to person, place, and time. She has normal strength and normal reflexes. No cranial nerve deficit or sensory deficit. She displays a negative Romberg sign. GCS eye subscore is 4. GCS verbal subscore is 5. GCS motor subscore is 6. She displays no Babinski's sign on the right side. She displays no Babinski's sign on the left side.   Psychiatric: Her speech is normal. Judgment normal. Cognition and memory are normal.       Neurologic Exam     Mental Status   Oriented to person, place, and time.   Speech: speech is normal     Cranial Nerves     CN III, IV, VI   Pupils are equal, round, and reactive to light.  Extraocular  motions are normal.     Motor Exam     Strength   Strength 5/5 throughout.     Left retroauricular wound stable compared to previous exams  Independent Review of Radiographic Studies:       ASSESSMENT/PLAN:  Dr. Rene and I agree that there is some mastoid involvement in the vicinity of this wound that could be contributing to why it is not healing.  I think it would be reasonable at this point to take her to the operating room for an expiration of the wound and treatment of the mastoid pathology seen on the imaging.  I think this will likely result in definitive treatment and healing of the wound.  Dr. Rene and I will do this together and we will get this preop and scheduled as soon as possible.      1. Superficial postoperative wound infection, subsequent encounter    2. Nonsmoker    3. BMI 25.0-25.9,adult            Return for postoperatively.      Morro Perez MD

## 2018-04-24 NOTE — PATIENT INSTRUCTIONS

## 2018-04-25 DIAGNOSIS — T81.30XA WOUND DEHISCENCE: Primary | ICD-10-CM

## 2018-04-28 PROCEDURE — 91110 GI TRC IMG INTRAL ESOPH-ILE: CPT | Performed by: INTERNAL MEDICINE

## 2018-05-01 NOTE — OP NOTE
M2A Capsule     Date capsule was swallowed: 4/20/2018     Date capsule was read : 4/28/2018       Reason for procedure:       RESULTS:  Gastric passage time: 1 hour 55 minutes  Small bowel passage time: 2 hours 39 minutes  The capsule was clearly seen in the colon.     Conclusion: 1 small AVM was noted at 1 hour 59 minutes and 38 seconds.  Several small areas of the lymphangitic ectasia noted.  The capsule was seen freely passing into the cecum.  There was no active noted on this exam.    EMR Dragon/transcription disclaimer: Much of this encounter note is electronic transcription/translation of spoken language to printed text.  The electronic translation of spoken language may permit erroneous, or at times, nonsensical words or phrases to be inadvertently transcribed.  Although I have reviewed the note for such errors, some may still exist.      Garrett Ozuna MD  [facility]  05/01/18  1:03 PM

## 2018-05-08 DIAGNOSIS — D50.8 OTHER IRON DEFICIENCY ANEMIA: Primary | ICD-10-CM

## 2018-05-10 ENCOUNTER — APPOINTMENT (OUTPATIENT)
Dept: PREADMISSION TESTING | Facility: HOSPITAL | Age: 68
End: 2018-05-10

## 2018-05-10 VITALS
HEART RATE: 81 BPM | BODY MASS INDEX: 25.7 KG/M2 | OXYGEN SATURATION: 96 % | SYSTOLIC BLOOD PRESSURE: 136 MMHG | WEIGHT: 173.5 LBS | HEIGHT: 69 IN | DIASTOLIC BLOOD PRESSURE: 73 MMHG | RESPIRATION RATE: 20 BRPM

## 2018-05-10 LAB
ANION GAP SERPL CALCULATED.3IONS-SCNC: 13 MMOL/L (ref 4–13)
BUN BLD-MCNC: 23 MG/DL (ref 5–21)
BUN/CREAT SERPL: 35.9 (ref 7–25)
CALCIUM SPEC-SCNC: 10 MG/DL (ref 8.4–10.4)
CHLORIDE SERPL-SCNC: 102 MMOL/L (ref 98–110)
CO2 SERPL-SCNC: 28 MMOL/L (ref 24–31)
CREAT BLD-MCNC: 0.64 MG/DL (ref 0.5–1.4)
DEPRECATED RDW RBC AUTO: 60.4 FL (ref 40–54)
ERYTHROCYTE [DISTWIDTH] IN BLOOD BY AUTOMATED COUNT: 19.8 % (ref 12–15)
GFR SERPL CREATININE-BSD FRML MDRD: 93 ML/MIN/1.73
GLUCOSE BLD-MCNC: 119 MG/DL (ref 70–100)
HCT VFR BLD AUTO: 39.2 % (ref 37–47)
HGB BLD-MCNC: 13.1 G/DL (ref 12–16)
MCH RBC QN AUTO: 28.3 PG (ref 28–32)
MCHC RBC AUTO-ENTMCNC: 33.4 G/DL (ref 33–36)
MCV RBC AUTO: 84.7 FL (ref 82–98)
PLATELET # BLD AUTO: 222 10*3/MM3 (ref 130–400)
PMV BLD AUTO: 11.7 FL (ref 6–12)
POTASSIUM BLD-SCNC: 3.9 MMOL/L (ref 3.5–5.3)
RBC # BLD AUTO: 4.63 10*6/MM3 (ref 4.2–5.4)
SODIUM BLD-SCNC: 143 MMOL/L (ref 135–145)
WBC NRBC COR # BLD: 6 10*3/MM3 (ref 4.8–10.8)

## 2018-05-10 PROCEDURE — 93005 ELECTROCARDIOGRAM TRACING: CPT

## 2018-05-10 PROCEDURE — 85027 COMPLETE CBC AUTOMATED: CPT | Performed by: OTOLARYNGOLOGY

## 2018-05-10 PROCEDURE — 93010 ELECTROCARDIOGRAM REPORT: CPT | Performed by: INTERNAL MEDICINE

## 2018-05-10 PROCEDURE — 80048 BASIC METABOLIC PNL TOTAL CA: CPT | Performed by: OTOLARYNGOLOGY

## 2018-05-10 RX ORDER — FAMOTIDINE 10 MG
10 TABLET ORAL NIGHTLY PRN
COMMUNITY

## 2018-05-10 RX ORDER — ACETAMINOPHEN, ASPIRIN AND CAFFEINE 250; 250; 65 MG/1; MG/1; MG/1
1 TABLET, FILM COATED ORAL AS NEEDED
COMMUNITY
End: 2018-05-17 | Stop reason: HOSPADM

## 2018-05-10 RX ORDER — NAPROXEN SODIUM 220 MG
220 TABLET ORAL AS NEEDED
COMMUNITY
End: 2018-05-17 | Stop reason: HOSPADM

## 2018-05-10 NOTE — DISCHARGE INSTRUCTIONS
DAY OF SURGERY INSTRUCTIONS        YOUR SURGEON: dr angela , dr sanchez    PROCEDURE: ***Wound exploration with possible mastoidectomy; possible sternocleidomastoid flap. Please schedule with Dr. Sanchez.  POSSIBLE STERNOCLEIDOMASTOID FLAP    DATE OF SURGERY: ***5/17/2018    ARRIVAL TIME: AS DIRECTED BY OFFICE    DAY OF SURGERY TAKE ONLY THESE MEDICATIONS UNLESS OTHERWISE INSTRUCTED BY YOUR PHYSICIAN: ***verapamil          MANAGING PAIN AFTER SURGERY    We know you are probably wondering what your pain will be like after surgery.  Following surgery it is unrealistic to expect you will not have pain.   Pain is how our bodies let us know that something is wrong or cautions us to be careful.  That said, our goal is to make your pain tolerable.    Methods we may use to treat your pain include (oral or IV medications, PCAs, epidurals, nerve blocks, etc.)   While some procedures require IV pain medications for a short time after surgery, transitioning to pain medications by mouth allows for better management of pain.   Your nurse will encourage you to take oral pain medications whenever possible.  IV medications work almost immediately, but only last a short while.  Taking medications by mouth allows for a more constant level of medication in your blood stream for a longer period of time.      Once your pain is out of control it is harder to get back under control.  It is important you are aware when your next dose of pain medication is due.  If you are admitted, your nurse may write the time of your next dose on the white board in your room to help you remember.      We are interested in your pain and encourage you to inform us about aggravating factors during your visit.   Many times a simple repositioning every few hours can make a big difference.    If your physician says it is okay, do not let your pain prevent you from getting out of bed. Be sure to call your nurse for assistance prior to getting up so you do not  fall.      Before surgery, please decide your tolerable pain goal.  These faces help describe the pain ratings we use on a 0-10 scale.   Be prepared to tell us your goal and whether or not you take pain or anxiety medications at home.            BEFORE YOU COME TO THE HOSPITAL  (Pre-op instructions)  • Do not eat, drink, smoke or chew gum after midnight the night before surgery.  This also includes no mints.  • Morning of surgery take only the medicines you have been instructed with a sip of water unless otherwise instructed  by your physician.  • Do not shave, wear makeup or dark nail polish.  • Remove all jewelry including rings.  • Leave anything you consider valuable at home.  • Leave your suitcase in the car until after your surgery.  • Bring the following with you if applicable:  o Picture ID and insurance, Medicare or Medicaid cards  o Co-pay/deductible required by insurance (cash, check, credit card)  o Copy of advance directive, living will or power-of- documents if not brought to PAT  o CPAP or BIPAP mask and tubing  o Relaxation aids (MP3 player, book, magazine)  • On the day of surgery check in at registration located at the main entrance of the hospital.       Outpatient Surgery Guidelines, Adult  Outpatient procedures are those for which the person having the procedure is allowed to go home the same day as the procedure. Various procedures are done on an outpatient basis. You should follow some general guidelines if you will be having an outpatient procedure.  LET YOUR HEALTH CARE PROVIDER KNOW ABOUT:  · Any allergies you have.  · All medicines you are taking, including vitamins, herbs, eye drops, creams, and over-the-counter medicines.  · Previous problems you or members of your family have had with the use of anesthetics.  · Any blood disorders you have.  · Previous surgeries you have had.  · Medical conditions you have.  RISKS AND COMPLICATIONS  Your health care provider will discuss  possible risks and complications with you before surgery. Common risks and complications include:    · Problems due to the use of anesthetics.  · Blood loss and replacement (does not apply to minor surgical procedures).  · Temporary increase in pain due to surgery.  · Uncorrected pain or problems that the surgery was meant to correct.  · Infection.  · New damage.  BEFORE THE PROCEDURE  · Ask your health care provider about changing or stopping your regular medicines. You may need to stop taking certain medicines in the days or weeks before the procedure.  · Stop smoking at least 2 weeks before surgery. This lowers your risk for complications during and after surgery. Ask your health care provider for help with this if needed.  · Eat your usual meals and a light supper the day before surgery. Continue fluid intake. Do not drink alcohol.  · Do not eat or drink after midnight the night before your surgery.   · Arrange for someone to take you home and to stay with you for 24 hours after the procedure. Medicine given for your procedure may affect your ability to drive or to care for yourself.  · Call your health care provider's office if you develop an illness or problem that may prevent you from safely having your procedure.  AFTER THE PROCEDURE  After surgery, you will be taken to a recovery area, where your progress will be monitored. If there are no complications, you will be allowed to go home when you are awake, stable, and taking fluids well. You may have numbness around the surgical site. Healing will take some time. You will have tenderness at the surgical site and may have some swelling and bruising. You may also have some nausea.  HOME CARE INSTRUCTIONS  · Do not drive for 24 hours, or as directed by your health care provider. Do not drive while taking prescription pain medicines.  · Do not drink alcohol for 24 hours.  · Do not make important decisions or sign legal documents for 24 hours.  · You may resume a  normal diet and activities as directed.  · Do not lift anything heavier than 10 pounds (4.5 kg) or play contact sports until your health care provider says it is okay.  · Change your bandages (dressings) as directed.  · Only take over-the-counter or prescription medicines as directed by your health care provider.  · Follow up with your health care provider as directed.  SEEK MEDICAL CARE IF:  · You have increased bleeding (more than a small spot) from the surgical site.  · You have redness, swelling, or increasing pain in the wound.  · You see pus coming from the wound.  · You have a fever.  · You notice a bad smell coming from the wound or dressing.  · You feel lightheaded or faint.  · You develop a rash.  · You have trouble breathing.  · You develop allergies.  MAKE SURE YOU:  · Understand these instructions.  · Will watch your condition.  · Will get help right away if you are not doing well or get worse.     This information is not intended to replace advice given to you by your health care provider. Make sure you discuss any questions you have with your health care provider.     Document Released: 09/12/2002 Document Revised: 05/03/2016 Document Reviewed: 05/22/2014  [employer] Interactive Patient Education ©2016 [employer]       Fall Prevention in Hospitals, Adult  As a hospital patient, your condition and the treatments you receive can increase your risk for falls. Some additional risk factors for falls in a hospital include:  · Being in an unfamiliar environment.  · Being on bed rest.  · Your surgery.  · Taking certain medicines.  · Your tubing requirements, such as intravenous (IV) therapy or catheters.  It is important that you learn how to decrease fall risks while at the hospital. Below are important tips that can help prevent falls.  SAFETY TIPS FOR PREVENTING FALLS  Talk about your risk of falling.  · Ask your health care provider why you are at risk for falling. Is it your medicine, illness, tubing  placement, or something else?  · Make a plan with your health care provider to keep you safe from falls.  · Ask your health care provider or pharmacist about side effects of your medicines. Some medicines can make you dizzy or affect your coordination.  Ask for help.  · Ask for help before getting out of bed. You may need to press your call button.  · Ask for assistance in getting safely to the toilet.  · Ask for a walker or cane to be put at your bedside. Ask that most of the side rails on your bed be placed up before your health care provider leaves the room.  · Ask family or friends to sit with you.  · Ask for things that are out of your reach, such as your glasses, hearing aids, telephone, bedside table, or call button.  Follow these tips to avoid falling:  · Stay lying or seated, rather than standing, while waiting for help.  · Wear rubber-soled slippers or shoes whenever you walk in the hospital.  · Avoid quick, sudden movements.  ¨ Change positions slowly.  ¨ Sit on the side of your bed before standing.  ¨ Stand up slowly and wait before you start to walk.  · Let your health care provider know if there is a spill on the floor.  · Pay careful attention to the medical equipment, electrical cords, and tubes around you.  · When you need help, use your call button by your bed or in the bathroom. Wait for one of your health care providers to help you.  · If you feel dizzy or unsure of your footing, return to bed and wait for assistance.  · Avoid being distracted by the TV, telephone, or another person in your room.  · Do not lean or support yourself on rolling objects, such as IV poles or bedside tables.     This information is not intended to replace advice given to you by your health care provider. Make sure you discuss any questions you have with your health care provider.     Document Released: 12/15/2001 Document Revised: 01/08/2016 Document Reviewed: 08/25/2013  [employer] Interactive Patient Education ©2016  [employer]       Surgical Site Infections FAQs  What is a Surgical Site Infection (SSI)?  A surgical site infection is an infection that occurs after surgery in the part of the body where the surgery took place. Most patients who have surgery do not develop an infection. However, infections develop in about 1 to 3 out of every 100 patients who have surgery.  Some of the common symptoms of a surgical site infection are:  · Redness and pain around the area where you had surgery  · Drainage of cloudy fluid from your surgical wound  · Fever  Can SSIs be treated?  Yes. Most surgical site infections can be treated with antibiotics. The antibiotic given to you depends on the bacteria (germs) causing the infection. Sometimes patients with SSIs also need another surgery to treat the infection.  What are some of the things that hospitals are doing to prevent SSIs?  To prevent SSIs, doctors, nurses, and other healthcare providers:  · Clean their hands and arms up to their elbows with an antiseptic agent just before the surgery.  · Clean their hands with soap and water or an alcohol-based hand rub before and after caring for each patient.  · May remove some of your hair immediately before your surgery using electric clippers if the hair is in the same area where the procedure will occur. They should not shave you with a razor.  · Wear special hair covers, masks, gowns, and gloves during surgery to keep the surgery area clean.  · Give you antibiotics before your surgery starts. In most cases, you should get antibiotics within 60 minutes before the surgery starts and the antibiotics should be stopped within 24 hours after surgery.  · Clean the skin at the site of your surgery with a special soap that kills germs.  What can I do to help prevent SSIs?  Before your surgery:  · Tell your doctor about other medical problems you may have. Health problems such as allergies, diabetes, and obesity could affect your surgery and your  treatment.  · Quit smoking. Patients who smoke get more infections. Talk to your doctor about how you can quit before your surgery.  · Do not shave near where you will have surgery. Shaving with a razor can irritate your skin and make it easier to develop an infection.  At the time of your surgery:  · Speak up if someone tries to shave you with a razor before surgery. Ask why you need to be shaved and talk with your surgeon if you have any concerns.  · Ask if you will get antibiotics before surgery.  After your surgery:  · Make sure that your healthcare providers clean their hands before examining you, either with soap and water or an alcohol-based hand rub.  · If you do not see your providers clean their hands, please ask them to do so.  · Family and friends who visit you should not touch the surgical wound or dressings.  · Family and friends should clean their hands with soap and water or an alcohol-based hand rub before and after visiting you. If you do not see them clean their hands, ask them to clean their hands.  What do I need to do when I go home from the hospital?  · Before you go home, your doctor or nurse should explain everything you need to know about taking care of your wound. Make sure you understand how to care for your wound before you leave the hospital.  · Always clean your hands before and after caring for your wound.  · Before you go home, make sure you know who to contact if you have questions or problems after you get home.  · If you have any symptoms of an infection, such as redness and pain at the surgery site, drainage, or fever, call your doctor immediately.  If you have additional questions, please ask your doctor or nurse.  Developed and co-sponsored by The Society for Healthcare Epidemiology of [place] (SHEA); Infectious Diseases Society of [place] (IDSA); American Hospital Association; Association for Professionals in Infection Control and Epidemiology (APIC); Centers for Disease  Control and Prevention (CDC); and The Joint Commission.     This information is not intended to replace advice given to you by your health care provider. Make sure you discuss any questions you have with your health care provider.     Document Released: 12/23/2014 Document Revised: 01/08/2016 Document Reviewed: 03/02/2016  [employer] Interactive Patient Education ©2016 [employer]     PATIENT/FAMILY/RESPONSIBLE PARTY VERBALIZES UNDERSTANDING OF ABOVE EDUCATION.  COPY OF PAIN SCALE GIVEN AND REVIEWED WITH VERBALIZED UNDERSTANDING.

## 2018-05-15 ENCOUNTER — HOSPITAL ENCOUNTER (OUTPATIENT)
Dept: CT IMAGING | Facility: HOSPITAL | Age: 68
Discharge: HOME | End: 2018-05-15
Attending: INTERNAL MEDICINE | Admitting: INTERNAL MEDICINE

## 2018-05-15 ENCOUNTER — APPOINTMENT (OUTPATIENT)
Dept: CT IMAGING | Facility: HOSPITAL | Age: 68
End: 2018-05-15
Attending: INTERNAL MEDICINE

## 2018-05-15 DIAGNOSIS — D50.8 OTHER IRON DEFICIENCY ANEMIA: ICD-10-CM

## 2018-05-15 PROCEDURE — 74160 CT ABDOMEN W/CONTRAST: CPT

## 2018-05-15 PROCEDURE — 0 IOPAMIDOL PER 1 ML: Performed by: INTERNAL MEDICINE

## 2018-05-15 RX ADMIN — IOPAMIDOL 150 ML: 755 INJECTION, SOLUTION INTRAVENOUS at 11:48

## 2018-05-16 ENCOUNTER — ANESTHESIA EVENT (OUTPATIENT)
Dept: PERIOP | Facility: HOSPITAL | Age: 68
End: 2018-05-16

## 2018-05-17 ENCOUNTER — HOSPITAL ENCOUNTER (OUTPATIENT)
Facility: HOSPITAL | Age: 68
Setting detail: HOSPITAL OUTPATIENT SURGERY
Discharge: HOME | End: 2018-05-17
Attending: OTOLARYNGOLOGY | Admitting: OTOLARYNGOLOGY

## 2018-05-17 ENCOUNTER — ANESTHESIA (OUTPATIENT)
Dept: PERIOP | Facility: HOSPITAL | Age: 68
End: 2018-05-17

## 2018-05-17 VITALS
RESPIRATION RATE: 16 BRPM | TEMPERATURE: 98 F | HEART RATE: 104 BPM | SYSTOLIC BLOOD PRESSURE: 120 MMHG | DIASTOLIC BLOOD PRESSURE: 57 MMHG | OXYGEN SATURATION: 94 %

## 2018-05-17 DIAGNOSIS — T81.30XA WOUND DEHISCENCE: ICD-10-CM

## 2018-05-17 LAB
GLUCOSE BLDC GLUCOMTR-MCNC: 145 MG/DL (ref 70–130)
GLUCOSE BLDC GLUCOMTR-MCNC: 212 MG/DL (ref 70–130)

## 2018-05-17 PROCEDURE — 87077 CULTURE AEROBIC IDENTIFY: CPT | Performed by: OTOLARYNGOLOGY

## 2018-05-17 PROCEDURE — 25010000002 DEXAMETHASONE PER 1 MG: Performed by: ANESTHESIOLOGY

## 2018-05-17 PROCEDURE — 25010000002 SUCCINYLCHOLINE PER 20 MG: Performed by: NURSE ANESTHETIST, CERTIFIED REGISTERED

## 2018-05-17 PROCEDURE — 87205 SMEAR GRAM STAIN: CPT | Performed by: OTOLARYNGOLOGY

## 2018-05-17 PROCEDURE — 25010000002 FENTANYL CITRATE (PF) 250 MCG/5ML SOLUTION: Performed by: NURSE ANESTHETIST, CERTIFIED REGISTERED

## 2018-05-17 PROCEDURE — 87075 CULTR BACTERIA EXCEPT BLOOD: CPT | Performed by: OTOLARYNGOLOGY

## 2018-05-17 PROCEDURE — 82962 GLUCOSE BLOOD TEST: CPT

## 2018-05-17 PROCEDURE — 69502 MASTOIDECTOMY: CPT | Performed by: OTOLARYNGOLOGY

## 2018-05-17 PROCEDURE — 12042 INTMD RPR N-HF/GENIT2.6-7.5: CPT | Performed by: NEUROLOGICAL SURGERY

## 2018-05-17 PROCEDURE — 87206 SMEAR FLUORESCENT/ACID STAI: CPT | Performed by: OTOLARYNGOLOGY

## 2018-05-17 PROCEDURE — 25010000002 MIDAZOLAM PER 1 MG: Performed by: ANESTHESIOLOGY

## 2018-05-17 PROCEDURE — 11423 EXC H-F-NK-SP B9+MARG 2.1-3: CPT | Performed by: NEUROLOGICAL SURGERY

## 2018-05-17 PROCEDURE — 25010000002 PROPOFOL 10 MG/ML EMULSION: Performed by: NURSE ANESTHETIST, CERTIFIED REGISTERED

## 2018-05-17 PROCEDURE — 87186 SC STD MICRODIL/AGAR DIL: CPT | Performed by: OTOLARYNGOLOGY

## 2018-05-17 PROCEDURE — 87070 CULTURE OTHR SPECIMN AEROBIC: CPT | Performed by: OTOLARYNGOLOGY

## 2018-05-17 PROCEDURE — 21554 EXC NECK TUM DEEP 5 CM/>: CPT | Performed by: OTOLARYNGOLOGY

## 2018-05-17 PROCEDURE — 25010000002 DEXAMETHASONE PER 1 MG: Performed by: NURSE ANESTHETIST, CERTIFIED REGISTERED

## 2018-05-17 PROCEDURE — 25010000002 FENTANYL CITRATE (PF) 100 MCG/2ML SOLUTION: Performed by: ANESTHESIOLOGY

## 2018-05-17 PROCEDURE — 87102 FUNGUS ISOLATION CULTURE: CPT | Performed by: OTOLARYNGOLOGY

## 2018-05-17 PROCEDURE — 25010000002 ONDANSETRON PER 1 MG: Performed by: NURSE ANESTHETIST, CERTIFIED REGISTERED

## 2018-05-17 PROCEDURE — 87116 MYCOBACTERIA CULTURE: CPT | Performed by: OTOLARYNGOLOGY

## 2018-05-17 PROCEDURE — 87176 TISSUE HOMOGENIZATION CULTR: CPT | Performed by: OTOLARYNGOLOGY

## 2018-05-17 RX ORDER — SODIUM CHLORIDE 9 MG/ML
INJECTION, SOLUTION INTRAVENOUS AS NEEDED
Status: DISCONTINUED | OUTPATIENT
Start: 2018-05-17 | End: 2018-05-17 | Stop reason: HOSPADM

## 2018-05-17 RX ORDER — FENTANYL CITRATE 50 UG/ML
25 INJECTION, SOLUTION INTRAMUSCULAR; INTRAVENOUS AS NEEDED
Status: DISCONTINUED | OUTPATIENT
Start: 2018-05-17 | End: 2018-05-17 | Stop reason: HOSPADM

## 2018-05-17 RX ORDER — SUCCINYLCHOLINE CHLORIDE 20 MG/ML
INJECTION INTRAMUSCULAR; INTRAVENOUS AS NEEDED
Status: DISCONTINUED | OUTPATIENT
Start: 2018-05-17 | End: 2018-05-17 | Stop reason: SURG

## 2018-05-17 RX ORDER — SODIUM CHLORIDE, SODIUM LACTATE, POTASSIUM CHLORIDE, CALCIUM CHLORIDE 600; 310; 30; 20 MG/100ML; MG/100ML; MG/100ML; MG/100ML
100 INJECTION, SOLUTION INTRAVENOUS CONTINUOUS
Status: DISCONTINUED | OUTPATIENT
Start: 2018-05-17 | End: 2018-05-17 | Stop reason: HOSPADM

## 2018-05-17 RX ORDER — IPRATROPIUM BROMIDE AND ALBUTEROL SULFATE 2.5; .5 MG/3ML; MG/3ML
3 SOLUTION RESPIRATORY (INHALATION) ONCE AS NEEDED
Status: DISCONTINUED | OUTPATIENT
Start: 2018-05-17 | End: 2018-05-17 | Stop reason: HOSPADM

## 2018-05-17 RX ORDER — ONDANSETRON 2 MG/ML
INJECTION INTRAMUSCULAR; INTRAVENOUS AS NEEDED
Status: DISCONTINUED | OUTPATIENT
Start: 2018-05-17 | End: 2018-05-17 | Stop reason: SURG

## 2018-05-17 RX ORDER — LABETALOL HYDROCHLORIDE 5 MG/ML
5 INJECTION, SOLUTION INTRAVENOUS
Status: DISCONTINUED | OUTPATIENT
Start: 2018-05-17 | End: 2018-05-17 | Stop reason: HOSPADM

## 2018-05-17 RX ORDER — MAGNESIUM HYDROXIDE 1200 MG/15ML
LIQUID ORAL AS NEEDED
Status: DISCONTINUED | OUTPATIENT
Start: 2018-05-17 | End: 2018-05-17 | Stop reason: HOSPADM

## 2018-05-17 RX ORDER — ONDANSETRON 2 MG/ML
4 INJECTION INTRAMUSCULAR; INTRAVENOUS AS NEEDED
Status: DISCONTINUED | OUTPATIENT
Start: 2018-05-17 | End: 2018-05-17 | Stop reason: HOSPADM

## 2018-05-17 RX ORDER — MORPHINE SULFATE 2 MG/ML
2 INJECTION, SOLUTION INTRAMUSCULAR; INTRAVENOUS
Status: DISCONTINUED | OUTPATIENT
Start: 2018-05-17 | End: 2018-05-17 | Stop reason: HOSPADM

## 2018-05-17 RX ORDER — FLUMAZENIL 0.1 MG/ML
0.2 INJECTION INTRAVENOUS AS NEEDED
Status: DISCONTINUED | OUTPATIENT
Start: 2018-05-17 | End: 2018-05-17 | Stop reason: HOSPADM

## 2018-05-17 RX ORDER — DEXAMETHASONE SODIUM PHOSPHATE 4 MG/ML
4 INJECTION, SOLUTION INTRA-ARTICULAR; INTRALESIONAL; INTRAMUSCULAR; INTRAVENOUS; SOFT TISSUE ONCE AS NEEDED
Status: COMPLETED | OUTPATIENT
Start: 2018-05-17 | End: 2018-05-17

## 2018-05-17 RX ORDER — DEXAMETHASONE SODIUM PHOSPHATE 4 MG/ML
INJECTION, SOLUTION INTRA-ARTICULAR; INTRALESIONAL; INTRAMUSCULAR; INTRAVENOUS; SOFT TISSUE AS NEEDED
Status: DISCONTINUED | OUTPATIENT
Start: 2018-05-17 | End: 2018-05-17 | Stop reason: SURG

## 2018-05-17 RX ORDER — SCOLOPAMINE TRANSDERMAL SYSTEM 1 MG/1
1 PATCH, EXTENDED RELEASE TRANSDERMAL ONCE
Status: DISCONTINUED | OUTPATIENT
Start: 2018-05-17 | End: 2018-05-17 | Stop reason: HOSPADM

## 2018-05-17 RX ORDER — METOCLOPRAMIDE HYDROCHLORIDE 5 MG/ML
5 INJECTION INTRAMUSCULAR; INTRAVENOUS
Status: DISCONTINUED | OUTPATIENT
Start: 2018-05-17 | End: 2018-05-17 | Stop reason: HOSPADM

## 2018-05-17 RX ORDER — SODIUM CHLORIDE 0.9 % (FLUSH) 0.9 %
1-10 SYRINGE (ML) INJECTION AS NEEDED
Status: DISCONTINUED | OUTPATIENT
Start: 2018-05-17 | End: 2018-05-17 | Stop reason: HOSPADM

## 2018-05-17 RX ORDER — NALOXONE HCL 0.4 MG/ML
0.04 VIAL (ML) INJECTION AS NEEDED
Status: DISCONTINUED | OUTPATIENT
Start: 2018-05-17 | End: 2018-05-17 | Stop reason: HOSPADM

## 2018-05-17 RX ORDER — LIDOCAINE HYDROCHLORIDE 20 MG/ML
INJECTION, SOLUTION INFILTRATION; PERINEURAL AS NEEDED
Status: DISCONTINUED | OUTPATIENT
Start: 2018-05-17 | End: 2018-05-17 | Stop reason: SURG

## 2018-05-17 RX ORDER — PROPOFOL 10 MG/ML
VIAL (ML) INTRAVENOUS AS NEEDED
Status: DISCONTINUED | OUTPATIENT
Start: 2018-05-17 | End: 2018-05-17 | Stop reason: SURG

## 2018-05-17 RX ORDER — HYDROCODONE BITARTRATE AND ACETAMINOPHEN 7.5; 325 MG/1; MG/1
1 TABLET ORAL EVERY 4 HOURS PRN
Qty: 20 TABLET | Refills: 0 | Status: SHIPPED | OUTPATIENT
Start: 2018-05-17 | End: 2018-05-25

## 2018-05-17 RX ORDER — MIDAZOLAM HYDROCHLORIDE 1 MG/ML
1 INJECTION INTRAMUSCULAR; INTRAVENOUS
Status: DISCONTINUED | OUTPATIENT
Start: 2018-05-17 | End: 2018-05-17 | Stop reason: HOSPADM

## 2018-05-17 RX ORDER — SODIUM CHLORIDE, SODIUM LACTATE, POTASSIUM CHLORIDE, CALCIUM CHLORIDE 600; 310; 30; 20 MG/100ML; MG/100ML; MG/100ML; MG/100ML
9 INJECTION, SOLUTION INTRAVENOUS CONTINUOUS PRN
Status: DISCONTINUED | OUTPATIENT
Start: 2018-05-17 | End: 2018-05-17 | Stop reason: HOSPADM

## 2018-05-17 RX ORDER — BACITRACIN ZINC 500 [USP'U]/G
OINTMENT TOPICAL AS NEEDED
Status: DISCONTINUED | OUTPATIENT
Start: 2018-05-17 | End: 2018-05-17 | Stop reason: HOSPADM

## 2018-05-17 RX ORDER — HYDRALAZINE HYDROCHLORIDE 20 MG/ML
5 INJECTION INTRAMUSCULAR; INTRAVENOUS
Status: DISCONTINUED | OUTPATIENT
Start: 2018-05-17 | End: 2018-05-17 | Stop reason: HOSPADM

## 2018-05-17 RX ORDER — FAMOTIDINE 10 MG/ML
20 INJECTION, SOLUTION INTRAVENOUS
Status: DISCONTINUED | OUTPATIENT
Start: 2018-05-17 | End: 2018-05-17 | Stop reason: HOSPADM

## 2018-05-17 RX ORDER — MIDAZOLAM HYDROCHLORIDE 1 MG/ML
2 INJECTION INTRAMUSCULAR; INTRAVENOUS
Status: DISCONTINUED | OUTPATIENT
Start: 2018-05-17 | End: 2018-05-17 | Stop reason: HOSPADM

## 2018-05-17 RX ORDER — OXYCODONE AND ACETAMINOPHEN 10; 325 MG/1; MG/1
1 TABLET ORAL ONCE AS NEEDED
Status: COMPLETED | OUTPATIENT
Start: 2018-05-17 | End: 2018-05-17

## 2018-05-17 RX ORDER — FENTANYL CITRATE 50 UG/ML
INJECTION, SOLUTION INTRAMUSCULAR; INTRAVENOUS AS NEEDED
Status: DISCONTINUED | OUTPATIENT
Start: 2018-05-17 | End: 2018-05-17 | Stop reason: SURG

## 2018-05-17 RX ORDER — HYDROCODONE BITARTRATE AND ACETAMINOPHEN 7.5; 325 MG/1; MG/1
1 TABLET ORAL ONCE AS NEEDED
Status: DISCONTINUED | OUTPATIENT
Start: 2018-05-17 | End: 2018-05-17 | Stop reason: HOSPADM

## 2018-05-17 RX ORDER — LIDOCAINE HYDROCHLORIDE 40 MG/ML
SOLUTION TOPICAL AS NEEDED
Status: DISCONTINUED | OUTPATIENT
Start: 2018-05-17 | End: 2018-05-17 | Stop reason: SURG

## 2018-05-17 RX ORDER — ACETAMINOPHEN 500 MG
1000 TABLET ORAL ONCE
Status: COMPLETED | OUTPATIENT
Start: 2018-05-17 | End: 2018-05-17

## 2018-05-17 RX ORDER — CEFDINIR 300 MG/1
300 CAPSULE ORAL 2 TIMES DAILY
Qty: 20 CAPSULE | Refills: 0 | Status: SHIPPED | OUTPATIENT
Start: 2018-05-17 | End: 2018-05-27

## 2018-05-17 RX ORDER — LIDOCAINE HYDROCHLORIDE AND EPINEPHRINE 10; 10 MG/ML; UG/ML
INJECTION, SOLUTION INFILTRATION; PERINEURAL AS NEEDED
Status: DISCONTINUED | OUTPATIENT
Start: 2018-05-17 | End: 2018-05-17 | Stop reason: HOSPADM

## 2018-05-17 RX ORDER — IPRATROPIUM BROMIDE AND ALBUTEROL SULFATE 2.5; .5 MG/3ML; MG/3ML
3 SOLUTION RESPIRATORY (INHALATION) ONCE
Status: COMPLETED | OUTPATIENT
Start: 2018-05-17 | End: 2018-05-17

## 2018-05-17 RX ORDER — ROCURONIUM BROMIDE 10 MG/ML
INJECTION, SOLUTION INTRAVENOUS AS NEEDED
Status: DISCONTINUED | OUTPATIENT
Start: 2018-05-17 | End: 2018-05-17 | Stop reason: SURG

## 2018-05-17 RX ADMIN — FENTANYL CITRATE 25 MCG: 50 INJECTION, SOLUTION INTRAMUSCULAR; INTRAVENOUS at 10:25

## 2018-05-17 RX ADMIN — DEXAMETHASONE SODIUM PHOSPHATE 8 MG: 4 INJECTION, SOLUTION INTRAMUSCULAR; INTRAVENOUS at 08:00

## 2018-05-17 RX ADMIN — LIDOCAINE HYDROCHLORIDE 80 MG: 20 INJECTION, SOLUTION INFILTRATION; PERINEURAL at 07:48

## 2018-05-17 RX ADMIN — LIDOCAINE HYDROCHLORIDE 1 EACH: 40 SOLUTION TOPICAL at 07:48

## 2018-05-17 RX ADMIN — FENTANYL CITRATE 150 MCG: 50 INJECTION INTRAMUSCULAR; INTRAVENOUS at 08:15

## 2018-05-17 RX ADMIN — IPRATROPIUM BROMIDE AND ALBUTEROL SULFATE 3 ML: 2.5; .5 SOLUTION RESPIRATORY (INHALATION) at 06:55

## 2018-05-17 RX ADMIN — SCOPOLAMINE 1 PATCH: 1 PATCH, EXTENDED RELEASE TRANSDERMAL at 06:55

## 2018-05-17 RX ADMIN — FENTANYL CITRATE 25 MCG: 50 INJECTION, SOLUTION INTRAMUSCULAR; INTRAVENOUS at 11:09

## 2018-05-17 RX ADMIN — OXYCODONE HYDROCHLORIDE AND ACETAMINOPHEN 1 TABLET: 10; 325 TABLET ORAL at 10:25

## 2018-05-17 RX ADMIN — DEXAMETHASONE SODIUM PHOSPHATE 4 MG: 4 INJECTION, SOLUTION INTRA-ARTICULAR; INTRALESIONAL; INTRAMUSCULAR; INTRAVENOUS; SOFT TISSUE at 06:55

## 2018-05-17 RX ADMIN — ROCURONIUM BROMIDE 5 MG: 10 INJECTION INTRAVENOUS at 07:48

## 2018-05-17 RX ADMIN — FAMOTIDINE 20 MG: 10 INJECTION INTRAVENOUS at 06:56

## 2018-05-17 RX ADMIN — SODIUM CHLORIDE, POTASSIUM CHLORIDE, SODIUM LACTATE AND CALCIUM CHLORIDE 9 ML/HR: 600; 310; 30; 20 INJECTION, SOLUTION INTRAVENOUS at 06:20

## 2018-05-17 RX ADMIN — ONDANSETRON HYDROCHLORIDE 4 MG: 2 SOLUTION INTRAMUSCULAR; INTRAVENOUS at 09:24

## 2018-05-17 RX ADMIN — PROPOFOL 150 MG: 10 INJECTION, EMULSION INTRAVENOUS at 07:48

## 2018-05-17 RX ADMIN — SUCCINYLCHOLINE CHLORIDE 100 MG: 20 INJECTION, SOLUTION INTRAMUSCULAR; INTRAVENOUS at 07:48

## 2018-05-17 RX ADMIN — FENTANYL CITRATE 25 MCG: 50 INJECTION, SOLUTION INTRAMUSCULAR; INTRAVENOUS at 10:30

## 2018-05-17 RX ADMIN — Medication 2 G: at 08:00

## 2018-05-17 RX ADMIN — FENTANYL CITRATE 100 MCG: 50 INJECTION INTRAMUSCULAR; INTRAVENOUS at 07:48

## 2018-05-17 RX ADMIN — MIDAZOLAM HYDROCHLORIDE 2 MG: 1 INJECTION, SOLUTION INTRAMUSCULAR; INTRAVENOUS at 07:03

## 2018-05-17 RX ADMIN — ACETAMINOPHEN 1000 MG: 500 TABLET ORAL at 06:55

## 2018-05-17 NOTE — H&P (VIEW-ONLY)
PRIMARY CARE PROVIDER: Eduardo Woods MD  REFERRING PROVIDER: No ref. provider found    Chief Complaint   Patient presents with   • Skin Lesion     WOUND CHECK SCALP       Subjective   History of Present Illness:  Carlotta Dupont is a  67 y.o. 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  She has a MVD at  by Dr. Vu in May 2017.  She developed erythema and draining in January.  Her father had been very sick and she has not been able to follow up with Dr. Vu, so she has been seeing Dr. Perez.  She has had 2 rounds of clindamycin and has had continued drainage.  No headaches or neck stiffness or fevers.  Drainage is mild.  Drainage is serosanguinous.  She has photos of the drainage - no halo sign on these.  She denies any significant pain associated with this.  There is no headache.  Her trigeminal pain has resolved following the MVD.    Review of Systems:  Review of Systems   Constitutional: Negative for chills and fever.   Respiratory: Negative for shortness of breath.    Cardiovascular: Negative for chest pain.   Musculoskeletal: Negative for neck pain.   Skin: Negative for wound.   Neurological: Negative for headaches.   All other systems reviewed and are negative.      Past History:  Past Medical History:   Diagnosis Date   • Asthma    • Diabetes mellitus     Type 2   • GERD (gastroesophageal reflux disease)    • History of transfusion    • Hx of colonic polyps    • Hyperlipidemia    • Hypertension    • Iron deficiency anemia    • PONV (postoperative nausea and vomiting)    • Sleep apnea    • Trigeminal neuralgia    • Vitamin D deficiency      Past Surgical History:   Procedure Laterality Date   • APPENDECTOMY     • BLADDER REPAIR      had vaginal repair at the same time   • BREAST BIOPSY     • CAPSULE ENDOSCOPY N/A 4/20/2018    Procedure: CAPSULE ENDOSCOPY M2A;  Surgeon: Garrett Ozuna MD;  Location: [facility] ENDOSCOPY;  Service: Gastroenterology   • COLONOSCOPY N/A 3/1/2018     Procedure: COLONOSCOPY WITH ANESTHESIA;  Surgeon: Garrett Ozuna MD;  Location: [facility] ENDOSCOPY;  Service:    • COLONOSCOPY W/ POLYPECTOMY  12/31/2014    Tubular adenomatous polyp at 80 cm, Hyperplastic polyp rectum repeat exam in 3 years   • ENDOSCOPY N/A 3/1/2018    Procedure: ESOPHAGOGASTRODUODENOSCOPY WITH ANESTHESIA;  Surgeon: Garrett Ozuna MD;  Location: [facility] ENDOSCOPY;  Service:    • HYSTERECTOMY     • TRIGEMINAL NERVE DECOMPRESSION       Family History   Problem Relation Age of Onset   • Breast cancer Sister    • Colon cancer Neg Hx    • Colon polyps Neg Hx      Social History   Substance Use Topics   • Smoking status: Never Smoker   • Smokeless tobacco: Never Used   • Alcohol use Yes      Comment: Rare     Allergies:  Zovirax [acyclovir]; Vesicare [solifenacin succinate]; Meperidine; Propranolol; and Sertraline hcl    Current Outpatient Prescriptions:   •  calcium carbonate (TUMS) 500 MG chewable tablet, Chew., Disp: , Rfl:   •  esomeprazole (nexIUM) 40 MG capsule, Take 40 mg by mouth Every Morning Before Breakfast., Disp: , Rfl:   •  estradiol (ESTRACE) 0.1 MG/GM vaginal cream, Insert 1/4 applicatorful (1GM) vaginally twice weekly, Disp: , Rfl:   •  hydrochlorothiazide (MICROZIDE) 12.5 MG capsule, Take 12.5 mg by mouth., Disp: , Rfl:   •  losartan (COZAAR) 100 MG tablet, TAKE ONE TABLET BY MOUTH EVERY DAY, Disp: , Rfl:   •  metFORMIN (GLUCOPHAGE) 500 MG tablet, Take 500 mg by mouth., Disp: , Rfl:   •  montelukast (SINGULAIR) 10 MG tablet, Take 10 mg by mouth., Disp: , Rfl:   •  rosuvastatin (CRESTOR) 5 MG tablet, Take 1/2 tablet on Mon, Wed, Fri, Disp: , Rfl:   •  verapamil SR (CALAN-SR) 240 MG CR tablet, TAKE ONE TABLET BY MOUTH TWICE A DAY, Disp: , Rfl:   •  vitamin D (ERGOCALCIFEROL) 22643 units capsule capsule, Take  by mouth., Disp: , Rfl:       Objective     Vital Signs:  Temp:  [97.8 °F (36.6 °C)] 97.8 °F (36.6 °C)  Heart Rate:  [81] 81  Resp:  [20] 20  BP: (133)/(75) 133/75    Physical  Exam:  Physical Exam   Constitutional: She is oriented to person, place, and time. She appears well-developed and well-nourished. She is cooperative. No distress.   HENT:   Head: Normocephalic and atraumatic.       Right Ear: Tympanic membrane, external ear and ear canal normal.   Left Ear: Tympanic membrane, external ear and ear canal normal.   Nose: Nose normal.   Mouth/Throat: Oropharynx is clear and moist.   Eyes: Conjunctivae and EOM are normal. Pupils are equal, round, and reactive to light. Right eye exhibits no discharge. Left eye exhibits no discharge. No scleral icterus.   Neck: Normal range of motion. Neck supple. No JVD present. No tracheal deviation present. No thyromegaly present.   Cardiovascular: Normal rate, regular rhythm and normal heart sounds.    Pulmonary/Chest: Effort normal and breath sounds normal. No stridor.   Musculoskeletal: Normal range of motion. She exhibits no edema or deformity.   Lymphadenopathy:     She has no cervical adenopathy.   Neurological: She is alert and oriented to person, place, and time. She has normal strength. No cranial nerve deficit. Coordination normal.   Skin: Skin is warm and dry. No rash noted. She is not diaphoretic. No erythema. No pallor.   Psychiatric: She has a normal mood and affect. Her speech is normal and behavior is normal. Judgment and thought content normal. Cognition and memory are normal.   Nursing note and vitals reviewed.      Results Review:   I reviewed the CT scan of the head dated 04/12/2018.  The following is my interpretation:  Patient is status post approximate puzzle craniotomy on the left.  The overlying mastoid air cells demonstrate some partial fluid opacification of the posterior aspect closest to the craniotomy.  The remainder of the mastoid air cells are healthy without any evidence of breakdown of the bony septae.  There is lack of subcutaneous fat in the area of depression corresponding to the wound.      I have reviewed the MRI  of the brain dated 03/14/2018.  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  There is loss of fat signal on T1 tracking back towards the defect in the mastoid.        On T2, the area of opacification in the mastoid appears to be isointense to brain and muscle.  There is a slight amount of peripheral enhancement which may be reactive mucosal change within the mastoid air cells and cells.  This may represent soft tissue ingrowth into the mastoid air cells from the craniotomy.        Audiogram was reviewed:  Mild bilateral SNHL      Assessment   Assessment:  1. Open wound of scalp, unspecified open wound type, initial encounter        Plan   Plan:    I discussed the option of wound excision with exploration for possible fistula with possible limited mastoidectomy and wound closure by simple tissue closure versus recruiting sternocleidomastoid flap in the area to bolster the incision.  She will see Dr. Perez tomorrow and discuss this further with him.  We would likely perform this as a combined procedure should there be any violation of the dura.    My findings and recommendations were discussed and questions were answered.     Kadeem Oconnor MD  04/23/18  5:14 PM

## 2018-05-17 NOTE — ANESTHESIA PROCEDURE NOTES
Airway  Urgency: elective    Airway not difficult    General Information and Staff    Patient location during procedure: OR  CRNA: STANFORD URIAS    Indications and Patient Condition  Indications for airway management: airway protection    Preoxygenated: yes  MILS maintained throughout  Mask difficulty assessment: 1 - vent by mask    Final Airway Details  Final airway type: endotracheal airway      Successful airway: ETT  Cuffed: yes   Successful intubation technique: direct laryngoscopy  Endotracheal tube insertion site: oral  Blade: Valadez  Blade size: #3  ETT size: 7.5 mm  Cormack-Lehane Classification: grade I - full view of glottis  Placement verified by: chest auscultation and capnometry   Cuff volume (mL): 10  Measured from: lips  ETT to lips (cm): 22  Number of attempts at approach: 1

## 2018-05-17 NOTE — OP NOTE
Procedure Note  Preop Diagnosis: Wound dehiscence [T81.30XA]  Mastoid cutaneous fistula  Post-Op Diagnosis Codes:     * Wound dehiscence [T81.30XA]     Procedure Name: Revision and debridement of retrosigmoid cranial wound    Indications:  A CT scan and MRI and clinical findings revealed findings of chronically draining microvascular decompression wound. The patient now presents for debridement after discussing therapeutic alternatives.          Surgeon: Morro Perez MD   Co-surgeon: Kadeem Oconnor M.D.  Assistants: None    Anesthesia: General endotracheal anesthesia    ASA Class: 3    Procedure Details   After obtaining informed consent, having the risks and benefits of the procedure explained including but not limited to infection, bleeding, spinal fluid leak, stroke, coma, and death.  The patient was brought to the operating.  She was given general anesthesia via an endotracheal tube.  She was laid supine on operating table.  Her head was turned to the right exposing the left mastoid.  The patient was then prepped and draped in a standard sterile fashion.  Dr. Rene will dictate the excision of the fistula and mastoidectomy.  Once the soft tissues around the cranial site were exposed it was immediately evident that there was a granulation tissue and purulent material coming from the cutaneous fistula connecting the mastoid air cells.  Several pieces of bone wax were removed the granulation tissue and scar covering the cranial defect was carefully debrided and removed using a 15 blade scalpel.  The condition of the dura appeared to be well vascularized and intact.  The dura was then debrided of granulation tissue.  The exposed edges of the craniotomy site were debrided using a Kerrison instrument.  Dr. Rene will dictate the mastoidectomy and closure of this wound.  All sponge needle and enhancement counts were correct at the end the procedure the patient was extubated in stable condition returned  recovery with about 10 mL of blood loss    Findings:  Mastoid cutaneous fistula, infection]    Estimated Blood Loss:  10           Drains: None           Total IV Fluids: ml           Specimens:   ID Type Source Tests Collected by Time   1 : left head behind ear. Wound Head ANAEROBIC CULTURE, WOUND CULTURE Kadeem Oconnor MD 5/17/2018 0839   2 : left head wound Wound Head ANAEROBIC CULTURE, WOUND CULTURE Kadeem Oconnor MD 5/17/2018 0849   3 : left head wound  Tissue Head ANAEROBIC CULTURE, FUNGAL CULTURE, TISSUE / BONE CULTURE, AFB CULTURE Kadeem Oconnor MD 5/17/2018 0852              Implants: * No implants in log *           Complications:  None           Disposition: PACU - hemodynamically stable.           Condition: stable      Morro Perez MD

## 2018-05-17 NOTE — OP NOTE
Preoperative diagnosis: Chronic, draining postoperative wound following suboccipital approach with microvascular decompression    Postoperative diagnosis: 1) chronic mastoiditis     2) mastoid-cutaneous fistula     3) foreign body reaction of mastoid    Procedure(s) performed: 1) excision of mastoid-cutaneous fistula 1 cm x 5 cm     2) left mastoidectomy, cortical     3) complex closure skin of left neck, 5 cm     4) facial nerve monitoring    Surgeon: Kadeem Oconnor M.D.  Complaints of-surgeon: Morro Perez MD    Anesthesia: General Endotracheal    IV fluids: per anesthesia    Estimated blood loss: 50cc    Drains: None    Implants: None    Specimens: 1) Left mastoid wound culture (swab)    2) Left mastoid-cutaneous fistula    Complications: None    Operative findings: There is a fistula from the mastoidectomy site of the craniotomy with associated bone wax and likely foreign body reaction.  This gross purulence was localized to the area of the bone wax and soft tissue, extending to the prior incision.  This was removed en bloc.  The posterior mastoid air cells were with bone wax and this was removed.  The exposed mastoid cells were then saucerized.  The approximately 2 cm craniotomy site appeared healthy with vascularized tissue overlying the dura.    Details: After patient verification and consent was reviewed, the patient was taken to the operating room.  Induction of anesthesia was performed.    The facial nerve monitoring electrodes were placed and confirmed.  I infiltrated 10 mL of 1% lidocaine with 1-100,000 epinephrine around the prior incision and extending into the neck for a possible sternocleidomastoid flap.    The patient was then sterilely prepped and draped.  I opened band was used.    The [place] overlying the prior suboccipital craniotomy incision was then removed.  A fusiform incision was created around this measuring approximate 1 cm x 5 cm using a fresh 15 blade.  Skin was reflected anteriorly  and posteriorly with skin hooks as the periphery of the prior scar was dissected down towards the craniotomy site utilizing sharp dissection with a 15 blade and tenotomy scissors.  The healthy bone surrounding the craniotomy site was identified and incised to the periosteum using the 15 blade.  The Lempert elevator was used to elevate circumferentially outward from the craniotomy site to expose the healthy bone.  Then used a Lempert to dissect the periosteum back towards the craniotomy site.  At the inferior anterior aspect, gross purulence was noted in an area of granulation and this was cultured.  The surrounding tissue was excised in order to help remove any associated infection and foreign body.  Overlying the mastoid, bone wax was noted in the mastoid air cells laterally.  Dissecting through the tracheotomy site using a [place] elevator, the prior dura was identified.  The fistula was elevated off the dura using a [place] elevator, and sharp dissection using a 15 blade.  Healthy tissue was noted to be overlying the dura with no evidence of CSF leak or fistula in this area.  The specimen was sent en bloc to pathology for culture.    The 6 mm cutting bur was then used to take down the lateral mastoid cells in the bone wax was also removed.  This is saucerized to expose the involved mastoid air cells.  The bone was taken down using constant irrigation with saline.  Then removed the mucosa of the mastoid air cells with the cutting bur and with the [place] elevator.  The deep aspect was cauterized using needle tip cautery set at 15.  Then used the 3 mm jose bur to further saucerized and smooth bone edges.  Hemostasis was obtained with bipolar cautery set at 15.  The wound was undermined for approximately 2 cm posteriorly and anteriorly to allow some advancement of the occipital muscle.  I initially placed 3-0 Vicryl's on the muscle to try to advance this to provide additional soft tissue coverage over the craniotomy  site.  There is very minimal advancement of the muscle.  I then advanced the deep subcutaneous tinnitus fat and dermis to close the defect overlying using 3-0 chromic suture.  Then reapproximated the dermis with 3-0 and 4-0 undyed Vicryl suture.  The skin was further closed using a running, locking 4-0 Prolene.  Anabolic ointment and Telfa followed by tape was then placed.  This marks the conclusion of the procedure the patient was weaned from anesthesia and transferred to PACU for recovery without complication.    Patient was weaned from anesthesia and transferred to PACU for recovery without immediate complication.

## 2018-05-17 NOTE — DISCHARGE INSTRUCTIONS

## 2018-05-17 NOTE — ANESTHESIA POSTPROCEDURE EVALUATION
Patient: Carlotta Dupont    Procedure Summary     Date:  05/17/18 Room / Location:  [facility] OR  /  [facility] OR    Anesthesia Start:  0744 Anesthesia Stop:  0953    Procedures:       Wound exploration with possible mastoidectomy; possible sternocleidomastoid flap.  Please schedule with Dr. Perez. (Left Ear)      POSSIBLE STERNOCLEIDOMASTOID FLAP (Left ) Diagnosis:       Wound dehiscence      (Wound dehiscence [T81.30XA])    Surgeon:  Kadeem Oconnor MD Provider:  Miles Stone CRNA    Anesthesia Type:  general ASA Status:  2          Anesthesia Type: general  Last vitals  BP   133/66 (05/17/18 1142)   Temp   98 °F (36.7 °C) (05/17/18 1135)   Pulse   102 (05/17/18 1142)   Resp   16 (05/17/18 1142)     SpO2   94 % (05/17/18 1142)     Post Anesthesia Care and Evaluation    Patient location during evaluation: PACU  Patient participation: complete - patient participated  Level of consciousness: awake and alert  Pain management: adequate  Airway patency: patent  Anesthetic complications: No anesthetic complications  PONV Status: none  Cardiovascular status: acceptable and hemodynamically stable  Respiratory status: acceptable  Hydration status: acceptable    Comments: Blood pressure 133/66, pulse 102, temperature 98 °F (36.7 °C), temperature source Temporal Artery , resp. rate 16, SpO2 94 %, not currently breastfeeding.    Patient discharged from PACU based upon Brittani score. Please see RN notes for further details

## 2018-05-17 NOTE — ANESTHESIA PREPROCEDURE EVALUATION
Anesthesia Evaluation     Patient summary reviewed and Nursing notes reviewed   history of anesthetic complications: PONV  NPO Solid Status: > 8 hours  NPO Liquid Status: > 8 hours           Airway   Mallampati: I  TM distance: >3 FB  No difficulty expected  Dental      Pulmonary     breath sounds clear to auscultation  (+) asthma (mild, well controlled), sleep apnea,   Cardiovascular   Exercise tolerance: good (4-7 METS)    ECG reviewed  Rhythm: regular  Rate: normal    (+) hypertension, hyperlipidemia,   (-) CAD      Neuro/Psych  (-) seizures, TIA, CVA    ROS Comment: H/o trigeminal neuralgia, s/p decompression in [place]. Now with chronic wound behind left ear. Presents for wound exploration.   GI/Hepatic/Renal/Endo    (+)  GERD,  diabetes mellitus type 2,   (-) liver disease, no renal disease    Musculoskeletal     Abdominal    Substance History      OB/GYN          Other          Other Comment: Iron deficiency anemia                  Anesthesia Plan    ASA 2     general     intravenous induction   Anesthetic plan and risks discussed with patient.

## 2018-05-18 ENCOUNTER — TELEPHONE (OUTPATIENT)
Dept: OTOLARYNGOLOGY | Facility: CLINIC | Age: 68
End: 2018-05-18

## 2018-05-18 NOTE — TELEPHONE ENCOUNTER
Postop phone call.  Doing well. No complaints.  Baking cookies.  Went to [place] and [place].  No fevers/chills/drainage.

## 2018-05-19 LAB
BACTERIA SPEC AEROBE CULT: ABNORMAL
GRAM STN SPEC: ABNORMAL

## 2018-05-22 LAB
BACTERIA SPEC ANAEROBE CULT: ABNORMAL

## 2018-05-25 ENCOUNTER — OFFICE VISIT (OUTPATIENT)
Dept: OTOLARYNGOLOGY | Facility: CLINIC | Age: 68
End: 2018-05-25

## 2018-05-25 VITALS
RESPIRATION RATE: 16 BRPM | TEMPERATURE: 97 F | HEIGHT: 68 IN | BODY MASS INDEX: 26.22 KG/M2 | SYSTOLIC BLOOD PRESSURE: 163 MMHG | HEART RATE: 95 BPM | WEIGHT: 173 LBS | DIASTOLIC BLOOD PRESSURE: 96 MMHG

## 2018-05-25 DIAGNOSIS — H90.3 SENSORINEURAL HEARING LOSS (SNHL), BILATERAL: Primary | ICD-10-CM

## 2018-05-25 DIAGNOSIS — S01.00XA OPEN WOUND OF SCALP, UNSPECIFIED OPEN WOUND TYPE, INITIAL ENCOUNTER: ICD-10-CM

## 2018-05-25 PROCEDURE — 99024 POSTOP FOLLOW-UP VISIT: CPT | Performed by: OTOLARYNGOLOGY

## 2018-05-25 NOTE — PROGRESS NOTES
"Carlotta Dupont returns to the office following excision of a mastoid-cutaneous fistula, left cortical mastoidectomy, complex closure of the wound and facial nerve monitoring on 5/17/2018.    SUBJECTIVE:  Doing well without complaints.  No fever/chills/headaches.  No drainage from the wound.  The fluttering in her left ear that was occurring after her initial MVD resolved 3 days after this surgery.    OBJECTIVE:  /96   Pulse 95   Temp 97 °F (36.1 °C)   Resp 16   Ht 172.7 cm (68\")   Wt 78.5 kg (173 lb)   BMI 26.30 kg/m²   Incision line healing well without dehiscence or drainage.  Sutures removed.  Tympanic membrane healthy without effusion present.    ASSESSMENT:  Carlotta was seen today for post-op.    Diagnoses and all orders for this visit:    Sensorineural hearing loss (SNHL), bilateral    Open wound of scalp, unspecified open wound type, initial encounter        PLAN:   Protect the incisions from sunlight. Sunlight to the incisions will cause permanent pigmentation to the incision line and make the incision more noticeable. After the incision has reepithelialized, you may begin to use sunscreen with an SPF of 15 or greater    I discussed the use of  Vitamin E, Mederma, or a high-quality extra virgin olive oil to the incisions to optimize the end result. Apply topically twice daily, or as directed, to help optimize wound healing and decrease erythema.    Call with questions/concerns.     If the flutter comes back, observe for soft palatal movement (palatal myoclonus)    Kadeem Oconnor MD   05/25/2018  10:38 AM    "

## 2018-06-18 ENCOUNTER — TELEPHONE (OUTPATIENT)
Dept: OTOLARYNGOLOGY | Facility: CLINIC | Age: 68
End: 2018-06-18

## 2018-06-18 ENCOUNTER — LAB (OUTPATIENT)
Dept: LAB | Facility: HOSPITAL | Age: 68
End: 2018-06-18
Attending: OTOLARYNGOLOGY

## 2018-06-18 ENCOUNTER — OFFICE VISIT (OUTPATIENT)
Dept: OTOLARYNGOLOGY | Facility: CLINIC | Age: 68
End: 2018-06-18

## 2018-06-18 VITALS
WEIGHT: 173 LBS | TEMPERATURE: 97.8 F | BODY MASS INDEX: 26.22 KG/M2 | HEART RATE: 76 BPM | SYSTOLIC BLOOD PRESSURE: 131 MMHG | HEIGHT: 68 IN | RESPIRATION RATE: 20 BRPM | DIASTOLIC BLOOD PRESSURE: 79 MMHG

## 2018-06-18 DIAGNOSIS — H92.02 OTALGIA OF LEFT EAR: Primary | ICD-10-CM

## 2018-06-18 DIAGNOSIS — H92.02 OTALGIA OF LEFT EAR: ICD-10-CM

## 2018-06-18 LAB
BASOPHILS # BLD AUTO: 0.05 10*3/MM3 (ref 0–0.2)
BASOPHILS NFR BLD AUTO: 0.7 % (ref 0–2)
DEPRECATED RDW RBC AUTO: 52.8 FL (ref 40–54)
EOSINOPHIL # BLD AUTO: 0.35 10*3/MM3 (ref 0–0.7)
EOSINOPHIL NFR BLD AUTO: 5 % (ref 0–4)
ERYTHROCYTE [DISTWIDTH] IN BLOOD BY AUTOMATED COUNT: 16.1 % (ref 12–15)
ERYTHROCYTE [SEDIMENTATION RATE] IN BLOOD: <1 MM/HR (ref 0–20)
HCT VFR BLD AUTO: 43.4 % (ref 37–47)
HGB BLD-MCNC: 14.5 G/DL (ref 12–16)
IMM GRANULOCYTES # BLD: 0.03 10*3/MM3 (ref 0–0.03)
IMM GRANULOCYTES NFR BLD: 0.4 % (ref 0–5)
LYMPHOCYTES # BLD AUTO: 2.04 10*3/MM3 (ref 0.72–4.86)
LYMPHOCYTES NFR BLD AUTO: 29.1 % (ref 15–45)
MCH RBC QN AUTO: 29.5 PG (ref 28–32)
MCHC RBC AUTO-ENTMCNC: 33.4 G/DL (ref 33–36)
MCV RBC AUTO: 88.4 FL (ref 82–98)
MONOCYTES # BLD AUTO: 0.46 10*3/MM3 (ref 0.19–1.3)
MONOCYTES NFR BLD AUTO: 6.6 % (ref 4–12)
NEUTROPHILS # BLD AUTO: 4.07 10*3/MM3 (ref 1.87–8.4)
NEUTROPHILS NFR BLD AUTO: 58.2 % (ref 39–78)
NRBC BLD MANUAL-RTO: 0 /100 WBC (ref 0–0)
PLATELET # BLD AUTO: 232 10*3/MM3 (ref 130–400)
PMV BLD AUTO: 11.7 FL (ref 6–12)
RBC # BLD AUTO: 4.91 10*6/MM3 (ref 4.2–5.4)
WBC NRBC COR # BLD: 7 10*3/MM3 (ref 4.8–10.8)

## 2018-06-18 PROCEDURE — 85651 RBC SED RATE NONAUTOMATED: CPT | Performed by: OTOLARYNGOLOGY

## 2018-06-18 PROCEDURE — 99024 POSTOP FOLLOW-UP VISIT: CPT | Performed by: OTOLARYNGOLOGY

## 2018-06-18 PROCEDURE — 85025 COMPLETE CBC W/AUTO DIFF WBC: CPT | Performed by: OTOLARYNGOLOGY

## 2018-06-18 PROCEDURE — 36415 COLL VENOUS BLD VENIPUNCTURE: CPT

## 2018-06-18 RX ORDER — AMOXICILLIN AND CLAVULANATE POTASSIUM 875; 125 MG/1; MG/1
1 TABLET, FILM COATED ORAL 2 TIMES DAILY
Qty: 20 TABLET | Refills: 0 | Status: SHIPPED | OUTPATIENT
Start: 2018-06-18 | End: 2018-06-25 | Stop reason: SDUPTHER

## 2018-06-25 ENCOUNTER — OFFICE VISIT (OUTPATIENT)
Dept: OTOLARYNGOLOGY | Facility: CLINIC | Age: 68
End: 2018-06-25

## 2018-06-25 VITALS
RESPIRATION RATE: 18 BRPM | WEIGHT: 172 LBS | TEMPERATURE: 97.2 F | DIASTOLIC BLOOD PRESSURE: 93 MMHG | BODY MASS INDEX: 26.07 KG/M2 | HEIGHT: 68 IN | SYSTOLIC BLOOD PRESSURE: 136 MMHG | HEART RATE: 85 BPM

## 2018-06-25 DIAGNOSIS — H92.02 OTALGIA OF LEFT EAR: Primary | ICD-10-CM

## 2018-06-25 DIAGNOSIS — S01.00XA OPEN WOUND OF SCALP, UNSPECIFIED OPEN WOUND TYPE, INITIAL ENCOUNTER: ICD-10-CM

## 2018-06-25 DIAGNOSIS — H90.3 SENSORINEURAL HEARING LOSS (SNHL), BILATERAL: ICD-10-CM

## 2018-06-25 PROCEDURE — 99024 POSTOP FOLLOW-UP VISIT: CPT | Performed by: OTOLARYNGOLOGY

## 2018-06-25 RX ORDER — AMOXICILLIN AND CLAVULANATE POTASSIUM 875; 125 MG/1; MG/1
1 TABLET, FILM COATED ORAL 2 TIMES DAILY
Qty: 20 TABLET | Refills: 0 | Status: SHIPPED | OUTPATIENT
Start: 2018-06-25 | End: 2018-07-05

## 2018-06-25 NOTE — PROGRESS NOTES
"Carlotta Dupont returns to the office following excision of a mastoid-cutaneous fistula, left cortical mastoidectomy, complex closure of the wound and facial nerve monitoring on 5/17/2018.    SUBJECTIVE:  Mrs. Dupont reports that the pain is decreasing in the left preauricular, infra-auricular, and inferior aspect of the incision.  The pain started decreasing on day for the antibiotic.  She does still have some residual pain and is interested in possibly continuing the antibiotics for another 10 days.      OBJECTIVE:  /93   Pulse 85   Temp 97.2 °F (36.2 °C)   Resp 18   Ht 172.7 cm (68\")   Wt 78 kg (172 lb)   BMI 26.15 kg/m²   The incision line is healing well.  There is mild tenderness over the mastoid process of the sternocleidomastoid insertion.  There is no erythema surrounding the wound.  There is no drainage.  The parotid is healthy without any evidence of mass.      Culture results:            ASSESSMENT:  Carlotta was seen today for post-op.    Diagnoses and all orders for this visit:    Otalgia of left ear    Sensorineural hearing loss (SNHL), bilateral    Open wound of scalp, unspecified open wound type, initial encounter        PLAN:     Medical ahead and continue the Augmentin for an additional 10 days.  We will send a follow-up in approximate 6 weeks.  Should the pain became completely resolved at that time, we will continue to follow.  Should the pain persist or get worse, will consider CT scan     Kadeem Oconnor MD   05/25/2018  10:38 AM    "

## 2018-06-28 LAB
FUNGUS WND CULT: NORMAL
MYCOBACTERIUM SPEC CULT: NORMAL
NIGHT BLUE STAIN TISS: NORMAL
NIGHT BLUE STAIN TISS: NORMAL

## 2018-07-17 ENCOUNTER — TELEPHONE (OUTPATIENT)
Dept: OTOLARYNGOLOGY | Facility: CLINIC | Age: 68
End: 2018-07-17

## 2018-07-17 ENCOUNTER — DOCUMENTATION (OUTPATIENT)
Dept: OTOLARYNGOLOGY | Facility: CLINIC | Age: 68
End: 2018-07-17

## 2018-07-17 DIAGNOSIS — M86.48 CHRONIC OSTEOMYELITIS OF OTHER SITE WITH DRAINING SINUS (HCC): Primary | ICD-10-CM

## 2018-07-17 RX ORDER — AMOXICILLIN AND CLAVULANATE POTASSIUM 562.5; 437.5; 62.5 MG/1; MG/1; MG/1
2 TABLET, FILM COATED, EXTENDED RELEASE ORAL EVERY 12 HOURS SCHEDULED
Qty: 112 TABLET | Refills: 0 | Status: SHIPPED | OUTPATIENT
Start: 2018-07-17 | End: 2018-08-13

## 2018-07-17 NOTE — TELEPHONE ENCOUNTER
Spouse called re: patient having pain at surgical site, behind left ear and over mastoid area. Pain has increased every day x3. There is no drainage, no redness, skin is not warm to touch. Denies fever. States patient finished 20 days of Augmentin on 07/08. Area is tender to touch. Dr. Oconnor notified

## 2018-07-17 NOTE — PROGRESS NOTES
Dr. Dupont regarding his wife.  She is status post repair of mastoid cutaneous fistula on 05/17/2018.  Postoperatively she was doing well, but developed pain on the incision line.  She was treated with 20 days of Augmentin.  During the course of the Augmentin, she had absolutely no pain.  Approximately 4-5 days after finishing her Augmentin has had 4 days of increasing pain in the area.  They deny any erythema or drainage.  Denies any fevers or chills.  Dr. Dupont is concerned that she may be developing osteomyelitis.    I called and discussed her case with Dr. Perez in neurosurgery, who was performing the procedure with me.      We have opted to obtain an ESR, CRP, CBC, CMP, CT scan without contrast of the temporal bone, and a contrast MRI of the temporal bone.  We will discuss the case with infectious disease, in anticipation of possibly needing X weeks of IV antibiotics    We do have cultures from the surgery demonstrating 3+, moderate growth of Enterobacter cloacae with sensitivities (resistant to cefazolin).  There is also 1+ Propionibacterium growth.  AFB was negative.  Fungal cultures were negative.

## 2018-07-18 DIAGNOSIS — M86.8X8 OTHER OSTEOMYELITIS, OTHER SITE (HCC): Primary | ICD-10-CM

## 2018-07-20 ENCOUNTER — HOSPITAL ENCOUNTER (OUTPATIENT)
Dept: CT IMAGING | Facility: HOSPITAL | Age: 68
Discharge: HOME | End: 2018-07-20
Attending: OTOLARYNGOLOGY | Admitting: OTOLARYNGOLOGY

## 2018-07-20 ENCOUNTER — HOSPITAL ENCOUNTER (OUTPATIENT)
Dept: MRI IMAGING | Facility: HOSPITAL | Age: 68
Discharge: HOME | End: 2018-07-20
Attending: OTOLARYNGOLOGY

## 2018-07-20 DIAGNOSIS — M86.48 CHRONIC OSTEOMYELITIS OF OTHER SITE WITH DRAINING SINUS (HCC): ICD-10-CM

## 2018-07-20 LAB — CREAT BLDA-MCNC: 0.6 MG/DL (ref 0.6–1.3)

## 2018-07-20 PROCEDURE — 70480 CT ORBIT/EAR/FOSSA W/O DYE: CPT

## 2018-07-20 PROCEDURE — 0 GADOBENATE DIMEGLUMINE 529 MG/ML SOLUTION: Performed by: OTOLARYNGOLOGY

## 2018-07-20 PROCEDURE — A9577 INJ MULTIHANCE: HCPCS | Performed by: OTOLARYNGOLOGY

## 2018-07-20 PROCEDURE — 70553 MRI BRAIN STEM W/O & W/DYE: CPT

## 2018-07-20 PROCEDURE — 82565 ASSAY OF CREATININE: CPT

## 2018-07-20 RX ADMIN — GADOBENATE DIMEGLUMINE 10 ML: 529 INJECTION, SOLUTION INTRAVENOUS at 14:30

## 2018-07-25 ENCOUNTER — LAB (OUTPATIENT)
Dept: LAB | Facility: HOSPITAL | Age: 68
End: 2018-07-25
Attending: OTOLARYNGOLOGY

## 2018-07-25 DIAGNOSIS — M86.48 CHRONIC OSTEOMYELITIS OF OTHER SITE WITH DRAINING SINUS (HCC): ICD-10-CM

## 2018-07-25 LAB
ALBUMIN SERPL-MCNC: 4.5 G/DL (ref 3.5–5)
ALBUMIN/GLOB SERPL: 1.7 G/DL (ref 1.1–2.5)
ALP SERPL-CCNC: 60 U/L (ref 24–120)
ALT SERPL W P-5'-P-CCNC: 43 U/L (ref 0–54)
ANION GAP SERPL CALCULATED.3IONS-SCNC: 15 MMOL/L (ref 4–13)
AST SERPL-CCNC: 34 U/L (ref 7–45)
BASOPHILS # BLD AUTO: 0.07 10*3/MM3 (ref 0–0.2)
BASOPHILS NFR BLD AUTO: 1 % (ref 0–2)
BILIRUB SERPL-MCNC: 0.6 MG/DL (ref 0.1–1)
BUN BLD-MCNC: 24 MG/DL (ref 5–21)
BUN/CREAT SERPL: 40.7 (ref 7–25)
CALCIUM SPEC-SCNC: 9.8 MG/DL (ref 8.4–10.4)
CHLORIDE SERPL-SCNC: 98 MMOL/L (ref 98–110)
CO2 SERPL-SCNC: 28 MMOL/L (ref 24–31)
CREAT BLD-MCNC: 0.59 MG/DL (ref 0.5–1.4)
CRP SERPL-MCNC: 0.73 MG/DL (ref 0–0.99)
DEPRECATED RDW RBC AUTO: 43.8 FL (ref 40–54)
EOSINOPHIL # BLD AUTO: 0.45 10*3/MM3 (ref 0–0.7)
EOSINOPHIL NFR BLD AUTO: 6.1 % (ref 0–4)
ERYTHROCYTE [DISTWIDTH] IN BLOOD BY AUTOMATED COUNT: 13.6 % (ref 12–15)
ERYTHROCYTE [SEDIMENTATION RATE] IN BLOOD: 6 MM/HR (ref 0–20)
GFR SERPL CREATININE-BSD FRML MDRD: 102 ML/MIN/1.73
GLOBULIN UR ELPH-MCNC: 2.6 GM/DL
GLUCOSE BLD-MCNC: 119 MG/DL (ref 70–100)
HCT VFR BLD AUTO: 40.5 % (ref 37–47)
HGB BLD-MCNC: 14 G/DL (ref 12–16)
IMM GRANULOCYTES # BLD: 0.02 10*3/MM3 (ref 0–0.03)
IMM GRANULOCYTES NFR BLD: 0.3 % (ref 0–5)
LYMPHOCYTES # BLD AUTO: 2.06 10*3/MM3 (ref 0.72–4.86)
LYMPHOCYTES NFR BLD AUTO: 28.1 % (ref 15–45)
MCH RBC QN AUTO: 30.8 PG (ref 28–32)
MCHC RBC AUTO-ENTMCNC: 34.6 G/DL (ref 33–36)
MCV RBC AUTO: 89 FL (ref 82–98)
MONOCYTES # BLD AUTO: 0.5 10*3/MM3 (ref 0.19–1.3)
MONOCYTES NFR BLD AUTO: 6.8 % (ref 4–12)
NEUTROPHILS # BLD AUTO: 4.22 10*3/MM3 (ref 1.87–8.4)
NEUTROPHILS NFR BLD AUTO: 57.7 % (ref 39–78)
NRBC BLD MANUAL-RTO: 0 /100 WBC (ref 0–0)
PLATELET # BLD AUTO: 241 10*3/MM3 (ref 130–400)
PMV BLD AUTO: 12 FL (ref 6–12)
POTASSIUM BLD-SCNC: 4 MMOL/L (ref 3.5–5.3)
PROT SERPL-MCNC: 7.1 G/DL (ref 6.3–8.7)
RBC # BLD AUTO: 4.55 10*6/MM3 (ref 4.2–5.4)
SODIUM BLD-SCNC: 141 MMOL/L (ref 135–145)
WBC NRBC COR # BLD: 7.32 10*3/MM3 (ref 4.8–10.8)

## 2018-07-25 PROCEDURE — 36415 COLL VENOUS BLD VENIPUNCTURE: CPT

## 2018-07-25 PROCEDURE — 86140 C-REACTIVE PROTEIN: CPT | Performed by: OTOLARYNGOLOGY

## 2018-07-25 PROCEDURE — 85025 COMPLETE CBC W/AUTO DIFF WBC: CPT | Performed by: OTOLARYNGOLOGY

## 2018-07-25 PROCEDURE — 85651 RBC SED RATE NONAUTOMATED: CPT | Performed by: OTOLARYNGOLOGY

## 2018-07-25 PROCEDURE — 80053 COMPREHEN METABOLIC PANEL: CPT | Performed by: OTOLARYNGOLOGY

## 2018-08-09 DIAGNOSIS — E55.9 VITAMIN D DEFICIENCY: Chronic | ICD-10-CM

## 2018-08-09 DIAGNOSIS — E78.2 MIXED HYPERLIPIDEMIA: Chronic | ICD-10-CM

## 2018-08-09 DIAGNOSIS — G50.0 TRIGEMINAL NEURALGIA OF LEFT SIDE OF FACE: Chronic | ICD-10-CM

## 2018-08-09 DIAGNOSIS — I10 ESSENTIAL HYPERTENSION: Chronic | ICD-10-CM

## 2018-08-09 DIAGNOSIS — D50.8 OTHER IRON DEFICIENCY ANEMIA: ICD-10-CM

## 2018-08-09 DIAGNOSIS — E11.9 TYPE 2 DIABETES MELLITUS WITHOUT COMPLICATION, WITHOUT LONG-TERM CURRENT USE OF INSULIN (HCC): Chronic | ICD-10-CM

## 2018-08-09 LAB
ALBUMIN SERPL-MCNC: 4.5 G/DL (ref 3.5–5.2)
ALP BLD-CCNC: 74 U/L (ref 35–104)
ALT SERPL-CCNC: 30 U/L (ref 5–33)
ANION GAP SERPL CALCULATED.3IONS-SCNC: 17 MMOL/L (ref 7–19)
AST SERPL-CCNC: 19 U/L (ref 5–32)
BASOPHILS ABSOLUTE: 0.1 K/UL (ref 0–0.2)
BASOPHILS RELATIVE PERCENT: 0.9 % (ref 0–1)
BILIRUB SERPL-MCNC: 0.4 MG/DL (ref 0.2–1.2)
BUN BLDV-MCNC: 20 MG/DL (ref 8–23)
C-REACTIVE PROTEIN: 0.28 MG/DL (ref 0–0.5)
CALCIUM SERPL-MCNC: 10.1 MG/DL (ref 8.8–10.2)
CHLORIDE BLD-SCNC: 101 MMOL/L (ref 98–111)
CHOLESTEROL, TOTAL: 141 MG/DL (ref 160–199)
CO2: 26 MMOL/L (ref 22–29)
CREAT SERPL-MCNC: 0.7 MG/DL (ref 0.5–0.9)
EOSINOPHILS ABSOLUTE: 0.4 K/UL (ref 0–0.6)
EOSINOPHILS RELATIVE PERCENT: 5.4 % (ref 0–5)
GFR NON-AFRICAN AMERICAN: >60
GLUCOSE BLD-MCNC: 156 MG/DL (ref 74–109)
HBA1C MFR BLD: 6.7 % (ref 4–6)
HCT VFR BLD CALC: 43.2 % (ref 37–47)
HDLC SERPL-MCNC: 33 MG/DL (ref 65–121)
HEMOGLOBIN: 14.1 G/DL (ref 12–16)
LDL CHOLESTEROL CALCULATED: 81 MG/DL
LYMPHOCYTES ABSOLUTE: 1.8 K/UL (ref 1.1–4.5)
LYMPHOCYTES RELATIVE PERCENT: 27.5 % (ref 20–40)
MCH RBC QN AUTO: 30.6 PG (ref 27–31)
MCHC RBC AUTO-ENTMCNC: 32.6 G/DL (ref 33–37)
MCV RBC AUTO: 93.7 FL (ref 81–99)
MONOCYTES ABSOLUTE: 0.5 K/UL (ref 0–0.9)
MONOCYTES RELATIVE PERCENT: 7.5 % (ref 0–10)
NEUTROPHILS ABSOLUTE: 3.8 K/UL (ref 1.5–7.5)
NEUTROPHILS RELATIVE PERCENT: 58.4 % (ref 50–65)
PDW BLD-RTO: 13.2 % (ref 11.5–14.5)
PLATELET # BLD: 224 K/UL (ref 130–400)
PMV BLD AUTO: 11.2 FL (ref 9.4–12.3)
POTASSIUM SERPL-SCNC: 4.5 MMOL/L (ref 3.5–5)
RBC # BLD: 4.61 M/UL (ref 4.2–5.4)
SODIUM BLD-SCNC: 144 MMOL/L (ref 136–145)
TOTAL PROTEIN: 7.2 G/DL (ref 6.6–8.7)
TRIGL SERPL-MCNC: 137 MG/DL (ref 0–149)
VITAMIN D 25-HYDROXY: 40.7 NG/ML
WBC # BLD: 6.5 K/UL (ref 4.8–10.8)

## 2018-08-13 ENCOUNTER — OFFICE VISIT (OUTPATIENT)
Dept: OTOLARYNGOLOGY | Facility: CLINIC | Age: 68
End: 2018-08-13

## 2018-08-13 ENCOUNTER — OFFICE VISIT (OUTPATIENT)
Dept: INTERNAL MEDICINE | Age: 68
End: 2018-08-13
Payer: MEDICARE

## 2018-08-13 VITALS
SYSTOLIC BLOOD PRESSURE: 124 MMHG | TEMPERATURE: 98 F | BODY MASS INDEX: 26.37 KG/M2 | HEIGHT: 68 IN | RESPIRATION RATE: 20 BRPM | WEIGHT: 174 LBS | DIASTOLIC BLOOD PRESSURE: 68 MMHG | HEART RATE: 65 BPM

## 2018-08-13 VITALS
HEART RATE: 93 BPM | WEIGHT: 176.2 LBS | OXYGEN SATURATION: 94 % | HEIGHT: 69 IN | BODY MASS INDEX: 26.1 KG/M2 | SYSTOLIC BLOOD PRESSURE: 126 MMHG | DIASTOLIC BLOOD PRESSURE: 88 MMHG

## 2018-08-13 DIAGNOSIS — H90.3 SENSORINEURAL HEARING LOSS (SNHL), BILATERAL: ICD-10-CM

## 2018-08-13 DIAGNOSIS — J45.909 CHRONIC ASTHMA WITHOUT COMPLICATION, UNSPECIFIED ASTHMA SEVERITY, UNSPECIFIED WHETHER PERSISTENT: Chronic | ICD-10-CM

## 2018-08-13 DIAGNOSIS — Z12.31 ENCOUNTER FOR SCREENING MAMMOGRAM FOR BREAST CANCER: ICD-10-CM

## 2018-08-13 DIAGNOSIS — I10 ESSENTIAL HYPERTENSION: Chronic | ICD-10-CM

## 2018-08-13 DIAGNOSIS — H70.12 MASTOIDITIS, CHRONIC, LEFT: Chronic | ICD-10-CM

## 2018-08-13 DIAGNOSIS — E11.9 TYPE 2 DIABETES MELLITUS WITHOUT COMPLICATION, WITHOUT LONG-TERM CURRENT USE OF INSULIN (HCC): Primary | Chronic | ICD-10-CM

## 2018-08-13 DIAGNOSIS — K21.9 GASTROESOPHAGEAL REFLUX DISEASE WITHOUT ESOPHAGITIS: Chronic | ICD-10-CM

## 2018-08-13 DIAGNOSIS — M86.8X8 OTHER OSTEOMYELITIS, OTHER SITE (HCC): Primary | ICD-10-CM

## 2018-08-13 DIAGNOSIS — D50.8 OTHER IRON DEFICIENCY ANEMIA: ICD-10-CM

## 2018-08-13 DIAGNOSIS — E78.2 MIXED HYPERLIPIDEMIA: Chronic | ICD-10-CM

## 2018-08-13 DIAGNOSIS — G50.0 TRIGEMINAL NEURALGIA OF LEFT SIDE OF FACE: Chronic | ICD-10-CM

## 2018-08-13 PROCEDURE — 2022F DILAT RTA XM EVC RTNOPTHY: CPT | Performed by: INTERNAL MEDICINE

## 2018-08-13 PROCEDURE — 1101F PT FALLS ASSESS-DOCD LE1/YR: CPT | Performed by: INTERNAL MEDICINE

## 2018-08-13 PROCEDURE — 4040F PNEUMOC VAC/ADMIN/RCVD: CPT | Performed by: INTERNAL MEDICINE

## 2018-08-13 PROCEDURE — 1123F ACP DISCUSS/DSCN MKR DOCD: CPT | Performed by: INTERNAL MEDICINE

## 2018-08-13 PROCEDURE — G8419 CALC BMI OUT NRM PARAM NOF/U: HCPCS | Performed by: INTERNAL MEDICINE

## 2018-08-13 PROCEDURE — 99214 OFFICE O/P EST MOD 30 MIN: CPT | Performed by: INTERNAL MEDICINE

## 2018-08-13 PROCEDURE — G8400 PT W/DXA NO RESULTS DOC: HCPCS | Performed by: INTERNAL MEDICINE

## 2018-08-13 PROCEDURE — G8427 DOCREV CUR MEDS BY ELIG CLIN: HCPCS | Performed by: INTERNAL MEDICINE

## 2018-08-13 PROCEDURE — 1090F PRES/ABSN URINE INCON ASSESS: CPT | Performed by: INTERNAL MEDICINE

## 2018-08-13 PROCEDURE — 3044F HG A1C LEVEL LT 7.0%: CPT | Performed by: INTERNAL MEDICINE

## 2018-08-13 PROCEDURE — 3017F COLORECTAL CA SCREEN DOC REV: CPT | Performed by: INTERNAL MEDICINE

## 2018-08-13 PROCEDURE — 99024 POSTOP FOLLOW-UP VISIT: CPT | Performed by: OTOLARYNGOLOGY

## 2018-08-13 PROCEDURE — 1036F TOBACCO NON-USER: CPT | Performed by: INTERNAL MEDICINE

## 2018-08-13 RX ORDER — MONTELUKAST SODIUM 10 MG/1
10 TABLET ORAL DAILY
Qty: 90 TABLET | Refills: 3 | Status: SHIPPED | OUTPATIENT
Start: 2018-08-13 | End: 2019-01-14 | Stop reason: SDUPTHER

## 2018-08-13 RX ORDER — HYDROCHLOROTHIAZIDE 12.5 MG/1
12.5 CAPSULE, GELATIN COATED ORAL DAILY
Qty: 90 CAPSULE | Refills: 3 | Status: SHIPPED | OUTPATIENT
Start: 2018-08-13 | End: 2019-01-14 | Stop reason: SDUPTHER

## 2018-08-13 RX ORDER — VERAPAMIL HYDROCHLORIDE 240 MG/1
240 TABLET, FILM COATED, EXTENDED RELEASE ORAL 2 TIMES DAILY
Qty: 180 TABLET | Refills: 3 | Status: SHIPPED | OUTPATIENT
Start: 2018-08-13 | End: 2019-01-14 | Stop reason: SDUPTHER

## 2018-08-13 RX ORDER — ROSUVASTATIN CALCIUM 5 MG/1
TABLET, COATED ORAL
Qty: 36 TABLET | Refills: 3 | Status: SHIPPED | OUTPATIENT
Start: 2018-08-13 | End: 2019-01-14 | Stop reason: SDUPTHER

## 2018-08-13 RX ORDER — LOSARTAN POTASSIUM 100 MG/1
100 TABLET ORAL DAILY
Qty: 90 TABLET | Refills: 3 | Status: SHIPPED | OUTPATIENT
Start: 2018-08-13 | End: 2019-01-14 | Stop reason: SDUPTHER

## 2018-08-13 RX ORDER — ERGOCALCIFEROL 1.25 MG/1
50000 CAPSULE ORAL WEEKLY
Qty: 15 CAPSULE | Refills: 3 | Status: SHIPPED | OUTPATIENT
Start: 2018-08-13 | End: 2019-01-14 | Stop reason: SDUPTHER

## 2018-08-13 ASSESSMENT — ENCOUNTER SYMPTOMS
DIARRHEA: 0
SHORTNESS OF BREATH: 0
ABDOMINAL PAIN: 0
TROUBLE SWALLOWING: 0
COUGH: 0

## 2018-08-13 NOTE — PROGRESS NOTES
conjugated estrogens (PREMARIN) 0.625 MG/GM vaginal cream 1gm twice a week (this replaces Estradiol cream per insurance 4/10/18) 1 Tube 3    rosuvastatin (CRESTOR) 5 MG tablet Take 1/2 tablet on Mon, Wed, Fri 36 tablet 3    montelukast (SINGULAIR) 10 MG tablet Take 1 tablet by mouth daily 90 tablet 3    hydrochlorothiazide (MICROZIDE) 12.5 MG capsule Take 1 capsule by mouth daily 90 capsule 3    aspirin 81 MG tablet Take 81 mg by mouth daily      Multiple Vitamins-Minerals (PRESERVISION/LUTEIN) CAPS Take 1 capsule by mouth 2 times daily      calcium carbonate (TUMS) 500 MG chewable tablet Take 1 tablet by mouth daily       No current facility-administered medications for this visit. Review of Systems   Constitutional: Negative for chills, fatigue, fever and unexpected weight change. HENT: Negative for congestion and trouble swallowing. Respiratory: Negative for cough and shortness of breath. Cardiovascular: Negative for chest pain, palpitations and leg swelling. Gastrointestinal: Negative for abdominal pain and diarrhea. Musculoskeletal: Negative for arthralgias and myalgias. Neurological: Negative for weakness and light-headedness. /86 this morning. BP (!) 140/90   Pulse 93   Ht 5' 8.5\" (1.74 m)   Wt 176 lb 3.2 oz (79.9 kg)   SpO2 94%   BMI 26.40 kg/m²    Physical Exam   Gen. : Alert in no distress. HEENT: Normocephalic. Neck: No thyromegaly or adenopathy  Cardiac: Regular rate and rhythm without murmur S3 or S4. No carotid bruits  Pulmonary: Lungs clear with normal respiratory effort. Extremities: No clubbing cyanosis or edema. Neurologic: No focal weakness. Cranial nerves are intact.      Results for orders placed or performed in visit on 08/09/18   C-Reactive Protein   Result Value Ref Range    CRP 0.28 0.00 - 0.50 mg/dL      Lab Results   Component Value Date     08/09/2018    K 4.5 08/09/2018     08/09/2018    CO2 26 08/09/2018    BUN 20 08/09/2018 CREATININE 0.7 08/09/2018    GLUCOSE 156 (H) 08/09/2018    CALCIUM 10.1 08/09/2018    PROT 7.2 08/09/2018    LABALBU 4.5 08/09/2018    BILITOT 0.4 08/09/2018    ALKPHOS 74 08/09/2018    AST 19 08/09/2018    ALT 30 08/09/2018    LABGLOM >60 08/09/2018        Lab Results   Component Value Date    WBC 6.5 08/09/2018    HGB 14.1 08/09/2018    HCT 43.2 08/09/2018    MCV 93.7 08/09/2018     08/09/2018      Lab Results   Component Value Date    CHOL 141 (L) 08/09/2018    CHOL 153 (L) 08/21/2017     Lab Results   Component Value Date    TRIG 137 08/09/2018    TRIG 130 (L) 08/21/2017     Lab Results   Component Value Date    HDL 33 (L) 08/09/2018    HDL 34 (L) 08/21/2017     Lab Results   Component Value Date    LDLCALC 81 08/09/2018    [facility] 93 08/21/2017     No results found for: LABVLDL, VLDL  No results found for: CHOLHDLRATIO   Lab Results   Component Value Date    LABA1C 6.7 (H) 08/09/2018     No results found for: EAG   Lab Results   Component Value Date    VITD25 40.7 08/09/2018         Patient Active Problem List   Diagnosis    Trigeminal neuralgia of left side of face    Mixed hyperlipidemia    Type 2 diabetes mellitus without complication (HCC)    Chronic asthma without complication    Migraine without aura, not intractable    Fatty liver    Calculus of gallbladder    Gastroesophageal reflux disease without esophagitis    Obstructive sleep apnea    Vitamin D deficiency    Essential hypertension    Iron deficiency anemia    Superficial postoperative wound infection    Mastoiditis, chronic, left - post surgical       DIAGNOSES:    ICD-10-CM ICD-9-CM    1. Type 2 diabetes mellitus without complication, without long-term current use of insulin (HCC) E11.9 250.00 Comprehensive Metabolic Panel      Hemoglobin A1C   2. Encounter for screening mammogram for breast cancer Z12.31 V76.12 JOSE LUIS Digital Screen Bilateral [[id number]]   3.  Chronic asthma without complication, unspecified asthma severity, unspecified whether persistent J45.909 493.90 CBC Auto Differential      Comprehensive Metabolic Panel   4. Essential hypertension I10 401.9 Comprehensive Metabolic Panel   5. Gastroesophageal reflux disease without esophagitis K21.9 530.81 Comprehensive Metabolic Panel   6. Other iron deficiency anemia D50.8 280.8 CBC Auto Differential      Comprehensive Metabolic Panel   7. Mixed hyperlipidemia E78.2 272.2 Comprehensive Metabolic Panel      Lipid Panel   8. Trigeminal neuralgia of left side of face G50.0 350.1 Comprehensive Metabolic Panel   9. Mastoiditis, chronic, left - post surgical H70.12 383.1 CBC Auto Differential      Comprehensive Metabolic Panel        Orders Placed This Encounter   Procedures    JOSE LUIS Digital Screen Bilateral [[id number]]    CBC Auto Differential    Comprehensive Metabolic Panel    Hemoglobin A1C    Lipid Panel          New Prescriptions    No medications on file        ASSESSMENT/PLAN:  Her diabetic control remains very good. Lipids are acceptable. She is having to eat a bit more due to the ongoing Augmentin therapy due to GI symptoms. She will likely be on as another 4 weeks possibly need IV antibiotics depending on response. She is seeing both Dr. Michelle Baxter and Dr. Js Coon on a regular basis at this point. Her trigeminal neuralgia pain has been minimal. She has been pleased with the results of the surgery even though she's having to do with current mastoiditis  Her hemoglobin has recovered. Blood pressure has been stable at home. I'll see her back in follow-up in 4 months with CBC CMP lipid and hemoglobin A1c. She has asked that we let Jorge Lemus do her Pap smear which is fine.

## 2018-08-13 NOTE — PROGRESS NOTES
"Carlotta Dupont returns to the office following excision of a mastoid-cutaneous fistula, left cortical mastoidectomy, complex closure of the wound and facial nerve monitoring on 5/17/2018.    SUBJECTIVE:  Mrs. Dupont reports no pain in the left preauricular, infra-auricular, and inferior aspect of the incision. She reports the pain resolved within days of restarting the Augmentin. Tomorrow, she will complete 4 weeks of Augmentin 2,000 mg BID. She is going to continue this for another 4 weeks per Dr. Pelayo and will be following up with him on the 30th - planning for repeat scan after completion of Augmentin. If the Augmentin is not effective as monotherapy, Dr. Pelayo may opt for a PICC and antibiotic change.  She reports she cannot lay flat and will wake up with pain in the left mastoid region if she lays on the her left side for too long. This is a little better.  The pain is not immediate, it presents and wakes her after laying on it for a while.      OBJECTIVE:  /68   Pulse 65   Temp 98 °F (36.7 °C)   Resp 20   Ht 172.7 cm (68\")   Wt 78.9 kg (174 lb)   BMI 26.46 kg/m²   The incision line is well healed. No tenderness over the mastoid process of the sternocleidomastoid insertion.  There is no erythema surrounding the wound.  There is no drainage.     Culture results:            ASSESSMENT:  Carlotta was seen today for follow-up.    Diagnoses and all orders for this visit:    Other osteomyelitis, other site (CMS/HCC)    Sensorineural hearing loss (SNHL), bilateral        PLAN:     Continue Augmentin for another 4 weeks per Dr. Pelayo. Plan is to repeat CT after completion of Augmentin. I would like to follow-up with her after this in 2 months. She was instructed to call or return should symptoms worsen.     Kadeem Oconnor MD   05/25/2018  10:38 AM    "

## 2018-08-14 ENCOUNTER — TRANSCRIBE ORDERS (OUTPATIENT)
Dept: ADMINISTRATIVE | Facility: HOSPITAL | Age: 68
End: 2018-08-14

## 2018-08-14 DIAGNOSIS — Z12.31 ENCOUNTER FOR SCREENING MAMMOGRAM FOR MALIGNANT NEOPLASM OF BREAST: Primary | ICD-10-CM

## 2018-08-16 ENCOUNTER — HOSPITAL ENCOUNTER (OUTPATIENT)
Dept: MAMMOGRAPHY | Facility: HOSPITAL | Age: 68
Discharge: HOME | End: 2018-08-16
Attending: INTERNAL MEDICINE | Admitting: INTERNAL MEDICINE

## 2018-08-16 DIAGNOSIS — Z12.31 ENCOUNTER FOR SCREENING MAMMOGRAM FOR MALIGNANT NEOPLASM OF BREAST: ICD-10-CM

## 2018-08-16 PROCEDURE — 77067 SCR MAMMO BI INCL CAD: CPT

## 2018-08-16 PROCEDURE — 77063 BREAST TOMOSYNTHESIS BI: CPT

## 2018-08-17 DIAGNOSIS — Z12.31 ENCOUNTER FOR SCREENING MAMMOGRAM FOR BREAST CANCER: ICD-10-CM

## 2018-08-27 DIAGNOSIS — E78.2 MIXED HYPERLIPIDEMIA: Chronic | ICD-10-CM

## 2018-08-27 DIAGNOSIS — Z12.4 ENCOUNTER FOR SCREENING FOR CERVICAL CANCER: ICD-10-CM

## 2018-10-02 ENCOUNTER — NURSE ONLY (OUTPATIENT)
Dept: INTERNAL MEDICINE | Age: 68
End: 2018-10-02
Payer: MEDICARE

## 2018-10-02 DIAGNOSIS — Z23 NEED FOR INFLUENZA VACCINATION: Primary | ICD-10-CM

## 2018-10-02 PROCEDURE — G0008 ADMIN INFLUENZA VIRUS VAC: HCPCS | Performed by: NURSE PRACTITIONER

## 2018-10-02 PROCEDURE — 90662 IIV NO PRSV INCREASED AG IM: CPT | Performed by: NURSE PRACTITIONER

## 2018-10-04 PROBLEM — H70.12: Status: ACTIVE | Noted: 2018-08-13

## 2018-10-08 ENCOUNTER — OFFICE VISIT (OUTPATIENT)
Dept: OTOLARYNGOLOGY | Facility: CLINIC | Age: 68
End: 2018-10-08

## 2018-10-08 VITALS
HEIGHT: 68 IN | DIASTOLIC BLOOD PRESSURE: 76 MMHG | RESPIRATION RATE: 20 BRPM | SYSTOLIC BLOOD PRESSURE: 145 MMHG | HEART RATE: 80 BPM | WEIGHT: 174 LBS | TEMPERATURE: 98 F | BODY MASS INDEX: 26.37 KG/M2

## 2018-10-08 DIAGNOSIS — H92.02 OTALGIA OF LEFT EAR: ICD-10-CM

## 2018-10-08 DIAGNOSIS — M86.8X8 OTHER OSTEOMYELITIS, OTHER SITE (HCC): Primary | ICD-10-CM

## 2018-10-08 PROCEDURE — 99213 OFFICE O/P EST LOW 20 MIN: CPT | Performed by: OTOLARYNGOLOGY

## 2018-10-08 RX ORDER — ALPRAZOLAM 0.25 MG/1
TABLET ORAL 3 TIMES DAILY PRN
COMMUNITY

## 2018-10-08 NOTE — PROGRESS NOTES
PRIMARY CARE PROVIDER: Eduardo Woods MD  REFERRING PROVIDER: No ref. provider found    Chief Complaint   Patient presents with   • Follow-up     EXC OF MASTOID-CUTANEOUS FISTULA        Subjective   History of Present Illness:  Carlotta Dupont is a  68 y.o. female who returns to the office following excision of a mastoid-cutaneous fistula, left cortical mastoidectomy, complex closure of the wound and facial nerve monitoring on 5/17/2018.  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  She been seeing Dr. Pelayo.  He has placed her on two 28 day courses of 4000 mg Augmentin.  She reports that during the Augmentin, her pain level is down to 5 out of 10.  She is been off the Augmentin since September 13 and reports that her pain has increased to 7-8 out of 10.  This is constant and gnawing in nature.  Nothing makes this better.  Laying on that side makes this worse.  She has no associated drainage.  Her pain is different from the original trigeminal neuralgia that she had prior to Dr. Chemo Vu's decompression.    Review of Systems:  Review of Systems   Constitutional: Negative for chills and fatigue.   HENT: Positive for ear pain and facial swelling. Negative for congestion and ear discharge.    Eyes: Negative for visual disturbance.   Skin: Negative for wound.   Neurological: Positive for facial asymmetry and headaches.       Past History:  Past Medical History:   Diagnosis Date   • Acid reflux    • Asthma    • Diabetes mellitus (CMS/HCC)     Type 2   • Fistula of mastoid, left    • GERD (gastroesophageal reflux disease)    • History of transfusion    • Hx of colonic polyps    • Hyperlipidemia    • Hypertension    • Iron deficiency anemia    • Mastoid pain, left    • Numbness     left side of face   • Other osteomyelitis, other site (CMS/HCC)    • PONV (postoperative nausea and vomiting)    • Sensorineural hearing loss    • Sleep apnea     does not use machine   •  Trigeminal neuralgia    • Vitamin D deficiency      Past Surgical History:   Procedure Laterality Date   • APPENDECTOMY     • BLADDER REPAIR      had vaginal repair at the same time   • BREAST BIOPSY Bilateral    • CAPSULE ENDOSCOPY N/A 4/20/2018    Procedure: CAPSULE ENDOSCOPY M2A;  Surgeon: Garrett Ozuna MD;  Location:  [facility];  Service: Gastroenterology   • COLONOSCOPY N/A 3/1/2018    Procedure: COLONOSCOPY WITH ANESTHESIA;  Surgeon: Garrett Ozuna MD;  Location:  [facility];  Service:    • COLONOSCOPY W/ POLYPECTOMY  12/31/2014    Tubular adenomatous polyp at 80 cm, Hyperplastic polyp rectum repeat exam in 3 years   • ENDOSCOPY N/A 3/1/2018    Procedure: ESOPHAGOGASTRODUODENOSCOPY WITH ANESTHESIA;  Surgeon: Garrett Ozuna MD;  Location:  [facility];  Service:    • FLAP HEAD/NECK Left 5/17/2018    Procedure: POSSIBLE STERNOCLEIDOMASTOID FLAP;  Surgeon: Kadeem Oconnor MD;  Location:  [facility];  Service: ENT   • HYSTERECTOMY     • MASTOIDECTOMY Left 5/17/2018    Procedure: Wound exploration with possible mastoidectomy; possible sternocleidomastoid flap.  Please schedule with Dr. Perez.;  Surgeon: Kadeem Oconnor MD;  Location:  [facility];  Service: ENT   • TRIGEMINAL NERVE DECOMPRESSION       Family History   Problem Relation Age of Onset   • Breast cancer Sister    • Colon cancer Neg Hx    • Colon polyps Neg Hx      Social History   Substance Use Topics   • Smoking status: Never Smoker   • Smokeless tobacco: Never Used   • Alcohol use Yes      Comment: Rare     Allergies:  Zovirax [acyclovir]; Meperidine; Propranolol; Sertraline hcl; Vesicare [solifenacin succinate]; and Solifenacin    Current Outpatient Prescriptions:   •  ALPRAZolam (XANAX) 0.25 MG tablet, Take  by mouth., Disp: , Rfl:   •  calcium carbonate (TUMS) 500 MG chewable tablet, Chew 1 tablet Daily., Disp: , Rfl:   •  esomeprazole (nexIUM) 40 MG capsule, Take 20 mg by mouth Every Morning Before Breakfast., Disp: , Rfl:   •   estradiol (ESTRACE) 0.1 MG/GM vaginal cream, Insert 1/4 applicatorful (1GM) vaginally twice weekly, Disp: , Rfl:   •  famotidine (PEPCID) 10 MG tablet, Take 10 mg by mouth At Night As Needed for Heartburn., Disp: , Rfl:   •  hydrochlorothiazide (MICROZIDE) 12.5 MG capsule, Take 12.5 mg by mouth Every Morning., Disp: , Rfl:   •  losartan (COZAAR) 100 MG tablet, TAKE ONE TABLET BY MOUTH EVERY DAY, Disp: , Rfl:   •  metFORMIN (GLUCOPHAGE) 500 MG tablet, Take 500 mg by mouth 2 (Two) Times a Day With Meals. 500m g in am and 1000mg at night, Disp: , Rfl:   •  montelukast (SINGULAIR) 10 MG tablet, Take 10 mg by mouth Every Night., Disp: , Rfl:   •  Multiple Vitamins-Minerals (PRESERVISION AREDS PO), Take  by mouth., Disp: , Rfl:   •  rosuvastatin (CRESTOR) 5 MG tablet, Take 1/2 tablet on Mon, Wed, Fri, Disp: , Rfl:   •  verapamil SR (CALAN-SR) 240 MG CR tablet, TAKE ONE TABLET BY MOUTH TWICE A DAY, Disp: , Rfl:   •  vitamin D (ERGOCALCIFEROL) 08048 units capsule capsule, Take 50,000 Units by mouth Every 7 (Seven) Days., Disp: , Rfl:       Objective     Vital Signs:  Temp:  [98 °F (36.7 °C)] 98 °F (36.7 °C)  Heart Rate:  [80] 80  Resp:  [20] 20  BP: (145)/(76) 145/76    Physical Exam:  Physical Exam   Constitutional: She is oriented to person, place, and time. She appears well-developed and well-nourished. She is cooperative. No distress.   HENT:   Head: Normocephalic and atraumatic.       Right Ear: External ear normal.   Left Ear: External ear normal.   Nose: Nose normal.   Mouth/Throat: Oropharynx is clear and moist.   Eyes: Pupils are equal, round, and reactive to light. Conjunctivae and EOM are normal. Right eye exhibits no discharge. Left eye exhibits no discharge. No scleral icterus.   Neck: Normal range of motion. Neck supple. No JVD present. No tracheal deviation present. No thyromegaly present.   Pulmonary/Chest: Effort normal. No stridor.   Musculoskeletal: Normal range of motion. She exhibits no edema or  deformity.   Lymphadenopathy:     She has no cervical adenopathy.   Neurological: She is alert and oriented to person, place, and time. She has normal strength. No cranial nerve deficit. Coordination normal.   Skin: Skin is warm and dry. No rash noted. She is not diaphoretic. No erythema. No pallor.   Psychiatric: She has a normal mood and affect. Her speech is normal and behavior is normal. Judgment and thought content normal. Cognition and memory are normal.   Nursing note and vitals reviewed.        Assessment   Assessment:  1. Other osteomyelitis, other site (CMS/[facility])    2. Otalgia of left ear        Plan   Plan:    With her persistence of symptoms despite 2 oral 4 week courses of Augmentin, I would like to speak with Dr. Pelayo about possibly trying oral Cipro with IV Unasyn or Zosyn.  I discussed the case with Dr. Perez, regarding imaging and whether a CT scan, MRI, and/or PET/CT would be helpful.  He is recommended starting with a CT of the skull base without contrast to assess for any evidence of persistent osteomyelitis.  After speaking with Dr. Pelayo, he would like a contrasted and noncontrasted CT.  ESR and CRP have been relatively unhelpful.  Dr. Pelayo is considering PICC and Merrem - he would like to review the CT first.  I called Dr. Dupont and discussed this with him and eh is in agreement.  He is not interested in going back to [facility] at this time.      My findings and recommendations were discussed and questions were answered.     Kadeem Oconnor MD  10/08/18  7:27 PM

## 2018-10-09 ENCOUNTER — TRANSCRIBE ORDERS (OUTPATIENT)
Dept: ADMINISTRATIVE | Facility: HOSPITAL | Age: 68
End: 2018-10-09

## 2018-10-09 ENCOUNTER — HOSPITAL ENCOUNTER (OUTPATIENT)
Dept: CT IMAGING | Facility: HOSPITAL | Age: 68
Discharge: HOME | End: 2018-10-09
Attending: OTOLARYNGOLOGY | Admitting: OTOLARYNGOLOGY

## 2018-10-09 ENCOUNTER — APPOINTMENT (OUTPATIENT)
Dept: LAB | Facility: HOSPITAL | Age: 68
End: 2018-10-09
Attending: INTERNAL MEDICINE

## 2018-10-09 DIAGNOSIS — H70.92 MASTOIDITIS OF LEFT SIDE: ICD-10-CM

## 2018-10-09 DIAGNOSIS — M86.8X9: ICD-10-CM

## 2018-10-09 DIAGNOSIS — M86.8X8 OTHER OSTEOMYELITIS, OTHER SITE (HCC): ICD-10-CM

## 2018-10-09 LAB
ALBUMIN SERPL-MCNC: 4.8 G/DL (ref 3.5–5)
ALBUMIN/GLOB SERPL: 1.7 G/DL (ref 1.1–2.5)
ALP SERPL-CCNC: 70 U/L (ref 24–120)
ALT SERPL W P-5'-P-CCNC: 60 U/L (ref 0–54)
ANION GAP SERPL CALCULATED.3IONS-SCNC: 15 MMOL/L (ref 4–13)
AST SERPL-CCNC: 38 U/L (ref 7–45)
BASOPHILS # BLD AUTO: 0.05 10*3/MM3 (ref 0–0.2)
BASOPHILS NFR BLD AUTO: 0.8 % (ref 0–2)
BILIRUB SERPL-MCNC: 0.4 MG/DL (ref 0.1–1)
BUN BLD-MCNC: 19 MG/DL (ref 5–21)
BUN/CREAT SERPL: 33.9 (ref 7–25)
CALCIUM SPEC-SCNC: 10 MG/DL (ref 8.4–10.4)
CHLORIDE SERPL-SCNC: 100 MMOL/L (ref 98–110)
CO2 SERPL-SCNC: 25 MMOL/L (ref 24–31)
CREAT BLD-MCNC: 0.56 MG/DL (ref 0.5–1.4)
CREAT BLDA-MCNC: 0.7 MG/DL (ref 0.6–1.3)
CRP SERPL-MCNC: <0.5 MG/DL (ref 0–0.99)
DEPRECATED RDW RBC AUTO: 41 FL (ref 40–54)
EOSINOPHIL # BLD AUTO: 0.13 10*3/MM3 (ref 0–0.7)
EOSINOPHIL NFR BLD AUTO: 2 % (ref 0–4)
ERYTHROCYTE [DISTWIDTH] IN BLOOD BY AUTOMATED COUNT: 12.2 % (ref 12–15)
ERYTHROCYTE [SEDIMENTATION RATE] IN BLOOD: 5 MM/HR (ref 0–20)
GFR SERPL CREATININE-BSD FRML MDRD: 108 ML/MIN/1.73
GLOBULIN UR ELPH-MCNC: 2.8 GM/DL
GLUCOSE BLD-MCNC: 134 MG/DL (ref 70–100)
HCT VFR BLD AUTO: 43.2 % (ref 37–47)
HGB BLD-MCNC: 15.1 G/DL (ref 12–16)
IMM GRANULOCYTES # BLD: 0.03 10*3/MM3 (ref 0–0.03)
IMM GRANULOCYTES NFR BLD: 0.5 % (ref 0–5)
LYMPHOCYTES # BLD AUTO: 1.9 10*3/MM3 (ref 0.72–4.86)
LYMPHOCYTES NFR BLD AUTO: 29 % (ref 15–45)
MCH RBC QN AUTO: 31.9 PG (ref 28–32)
MCHC RBC AUTO-ENTMCNC: 35 G/DL (ref 33–36)
MCV RBC AUTO: 91.1 FL (ref 82–98)
MONOCYTES # BLD AUTO: 0.4 10*3/MM3 (ref 0.19–1.3)
MONOCYTES NFR BLD AUTO: 6.1 % (ref 4–12)
NEUTROPHILS # BLD AUTO: 4.05 10*3/MM3 (ref 1.87–8.4)
NEUTROPHILS NFR BLD AUTO: 61.6 % (ref 39–78)
NRBC BLD MANUAL-RTO: 0 /100 WBC (ref 0–0)
PLATELET # BLD AUTO: 231 10*3/MM3 (ref 130–400)
PMV BLD AUTO: 11.5 FL (ref 6–12)
POTASSIUM BLD-SCNC: 3.6 MMOL/L (ref 3.5–5.3)
PROT SERPL-MCNC: 7.6 G/DL (ref 6.3–8.7)
RBC # BLD AUTO: 4.74 10*6/MM3 (ref 4.2–5.4)
SODIUM BLD-SCNC: 140 MMOL/L (ref 135–145)
WBC NRBC COR # BLD: 6.56 10*3/MM3 (ref 4.8–10.8)

## 2018-10-09 PROCEDURE — 86140 C-REACTIVE PROTEIN: CPT | Performed by: INTERNAL MEDICINE

## 2018-10-09 PROCEDURE — 80053 COMPREHEN METABOLIC PANEL: CPT | Performed by: INTERNAL MEDICINE

## 2018-10-09 PROCEDURE — 36415 COLL VENOUS BLD VENIPUNCTURE: CPT | Performed by: INTERNAL MEDICINE

## 2018-10-09 PROCEDURE — 85651 RBC SED RATE NONAUTOMATED: CPT | Performed by: INTERNAL MEDICINE

## 2018-10-09 PROCEDURE — 25010000002 IOPAMIDOL 61 % SOLUTION: Performed by: OTOLARYNGOLOGY

## 2018-10-09 PROCEDURE — 85025 COMPLETE CBC W/AUTO DIFF WBC: CPT | Performed by: INTERNAL MEDICINE

## 2018-10-09 PROCEDURE — 82565 ASSAY OF CREATININE: CPT

## 2018-10-09 PROCEDURE — 70470 CT HEAD/BRAIN W/O & W/DYE: CPT

## 2018-10-09 RX ADMIN — IOPAMIDOL 100 ML: 612 INJECTION, SOLUTION INTRAVENOUS at 08:17

## 2018-10-11 ENCOUNTER — APPOINTMENT (OUTPATIENT)
Dept: LAB | Facility: HOSPITAL | Age: 68
End: 2018-10-11
Attending: INTERNAL MEDICINE

## 2018-10-11 PROCEDURE — 36415 COLL VENOUS BLD VENIPUNCTURE: CPT | Performed by: INTERNAL MEDICINE

## 2018-10-12 ENCOUNTER — TRANSCRIBE ORDERS (OUTPATIENT)
Dept: ADMINISTRATIVE | Facility: HOSPITAL | Age: 68
End: 2018-10-12

## 2018-10-12 DIAGNOSIS — M86.38 CHRONIC MULTIFOCAL OSTEOMYELITIS, OTHER SITE (HCC): Primary | ICD-10-CM

## 2018-10-16 ENCOUNTER — HOSPITAL ENCOUNTER (OUTPATIENT)
Dept: MRI IMAGING | Facility: HOSPITAL | Age: 68
Discharge: HOME | End: 2018-10-16
Attending: INTERNAL MEDICINE | Admitting: INTERNAL MEDICINE

## 2018-10-16 DIAGNOSIS — M86.38 CHRONIC MULTIFOCAL OSTEOMYELITIS, OTHER SITE (HCC): ICD-10-CM

## 2018-10-16 PROCEDURE — A9577 INJ MULTIHANCE: HCPCS | Performed by: INTERNAL MEDICINE

## 2018-10-16 PROCEDURE — 0 GADOBENATE DIMEGLUMINE 529 MG/ML SOLUTION: Performed by: INTERNAL MEDICINE

## 2018-10-16 PROCEDURE — 70553 MRI BRAIN STEM W/O & W/DYE: CPT

## 2018-10-16 RX ADMIN — GADOBENATE DIMEGLUMINE 17 ML: 529 INJECTION, SOLUTION INTRAVENOUS at 15:16

## 2018-11-24 NOTE — PATIENT INSTRUCTIONS
"  History     Chief Complaint   Patient presents with     Dental Pain     swelling and bleeding     Oral Swelling     HPI  Alec Kenny is a 22 year old male who presents to the ED with dental pain for the past month. He states the pain is located in his upper jaw.  Friend reports he vomited earlier today. He denies any medication allergies. He is not driving.         I have reviewed the Medications, Allergies, Past Medical and Surgical History, and Social History in the Epic system.  No past medical history on file.    No past surgical history on file.    No family history on file.    Social History   Substance Use Topics     Smoking status: Not on file     Smokeless tobacco: Not on file     Alcohol use Not on file       No current facility-administered medications for this encounter.      Current Outpatient Prescriptions   Medication     acetaminophen-codeine (TYLENOL WITH CODEINE #3) 300-30 MG per tablet     clindamycin (CLEOCIN) 300 MG capsule     IBUPROFEN PO      Not on File    Review of Systems   Constitutional: Negative for fever.   HENT: Positive for dental problem (upper).    Gastrointestinal: Positive for vomiting.   All other systems reviewed and are negative.      Physical Exam   BP: 127/45  Pulse: 58  Temp: 98  F (36.7  C)  Resp: 16  Height: 172.7 cm (5' 8\")  Weight: 65.2 kg (143 lb 11.2 oz)  SpO2: 100 %      Physical Exam   Constitutional: No distress.   HENT:   Head: Atraumatic.   Mouth/Throat: Oropharynx is clear and moist. No oropharyngeal exudate.   Multiple caries and poor oral hygiene with moderate decay of the right second molar as well as the first upper molar teeth.  There is also moderate gingivitis but no abscesses no other abnormalities, no trismus   Eyes: Pupils are equal, round, and reactive to light. No scleral icterus.   Cardiovascular: Normal heart sounds and intact distal pulses.    Pulmonary/Chest: Breath sounds normal. No respiratory distress.   Abdominal: Soft. Bowel sounds are " BMI for Adults  Body mass index (BMI) is a number that is calculated from a person's weight and height. In most adults, the number is used to find how much of an adult's weight is made up of fat. BMI is not as accurate as a direct measure of body fat.  How is BMI calculated?  BMI is calculated by dividing weight in kilograms by height in meters squared. It can also be calculated by dividing weight in pounds by height in inches squared, then multiplying the resulting number by 703. Charts are available to help you find your BMI quickly and easily without doing this calculation.  How is BMI interpreted?  Health care professionals use BMI charts to identify whether an adult is underweight, at a normal weight, or overweight based on the following guidelines:  · Underweight: BMI less than 18.5.  · Normal weight: BMI between 18.5 and 24.9.  · Overweight: BMI between 25 and 29.9.  · Obese: BMI of 30 and above.  BMI is usually interpreted the same for males and females.  Weight includes both fat and muscle, so someone with a muscular build, such as an athlete, may have a BMI that is higher than 24.9. In cases like these, BMI may not accurately depict body fat. To determine if excess body fat is the cause of a BMI of 25 or higher, further assessments may need to be done by a health care provider.  Why is BMI a useful tool?  BMI is used to identify a possible weight problem that may be related to a medical problem or may increase the risk for medical problems. BMI can also be used to promote changes to reach a healthy weight.  This information is not intended to replace advice given to you by your health care provider. Make sure you discuss any questions you have with your health care provider.  Document Released: 08/29/2005 Document Revised: 04/27/2017 Document Reviewed: 05/15/2015  [employer] Interactive Patient Education © 2017 [employer]     normal. There is no tenderness.   Musculoskeletal: He exhibits no edema or tenderness.   Skin: Skin is warm. No rash noted. He is not diaphoretic.     Physical Exam   Constitutional:   well nourished, well developed, resting comfortably   HENT:   Head: Normocephalic and atraumatic.   Eyes: Conjunctivae are normal. Pupils are equal, round, and reactive to light.   TMS are clear, pharynx has no erythema or exudate, mucous membranes are moist  Neck:   no adenopathy, no bony tenderness  Cardiovascular: regular rate and rhythm without murmurs or gallops  Pulmonary/Chest: Clear to auscultation bilaterally, with no wheezes or retractions. No respiratory distress.  GI: Soft with good bowel sounds.  Non-tender, non-distended, with no guarding, no rebound, no peritoneal signs.   Back:  No bony or CVA tenderness   Musculoskeletal:  no edema or clubbing   Skin: Skin is warm and dry. No rash noted.   Neurological: alert and oriented to person, place, and time. Nonfocal exam  Psychiatric:  normal mood and affect.      ED Course     ED Course   Dictation Disclaimer: These note that this medical chart was completed with an electronic voice recognition dictation system ([email or web address]).  Even though efforts have been made to review and edit this chart, it is possible that some warts may have been transcribed by error in my dictation.  This is simply due to normal deficiencies and variance that may happen while using a voice recognition dictation.    Plan to do a nerve block and will send patient home with dental follow-up and antibiotics  Procedures   A dental block block was in this patient by applying 1% lidocaine without epinephrine with a 27-gauge around the inferior alveolar nerves on both right and left side.  Patient did have some periapical injection of anesthetic solution as well for a total of 4 cc of lidocaine without epinephrine patient tolerated procedure well and was pain-free after the injections.  The procedure was done  using normal sterile technique by Dr. Mcmahon       Critical Care time:  none             Labs Ordered and Resulted from Time of ED Arrival Up to the Time of Departure from the ED - No data to display         Assessments & Plan (with Medical Decision Making)   This is a 20-year-old male with dental pain patient has multiple cavities and caries I plan to discharge on clindamycin with dental follow-up patient agrees    I have reviewed the nursing notes.    I have reviewed the findings, diagnosis, plan and need for follow up with the patient.    New Prescriptions    ACETAMINOPHEN-CODEINE (TYLENOL WITH CODEINE #3) 300-30 MG PER TABLET    Take 1-2 tablets by mouth every 6 hours as needed for pain    CLINDAMYCIN (CLEOCIN) 300 MG CAPSULE    Take 1 capsule (300 mg) by mouth 4 times daily for 10 days       Final diagnoses:   Toothache   Pain, dental     I, Lola Delgadillo, am serving as a trained medical scribe to document services personally performed by Feng Mcmahon MD, based on the provider's statements to me.      IFeng MD, was physically present and have reviewed and verified the accuracy of this note documented by Lola Delgadillo.     11/24/2018   [facility], [place], EMERGENCY DEPARTMENT     Feng Mcmahon MD  11/24/18 [id number]

## 2019-01-07 RX ORDER — METHYLPREDNISOLONE SODIUM SUCCINATE 125 MG/2ML
125 INJECTION, POWDER, LYOPHILIZED, FOR SOLUTION INTRAMUSCULAR; INTRAVENOUS AS NEEDED
Status: CANCELLED | OUTPATIENT
Start: 2019-01-11

## 2019-01-07 RX ORDER — DIPHENHYDRAMINE HYDROCHLORIDE 50 MG/ML
50 INJECTION INTRAMUSCULAR; INTRAVENOUS AS NEEDED
Status: CANCELLED | OUTPATIENT
Start: 2019-01-11

## 2019-01-09 DIAGNOSIS — E78.2 MIXED HYPERLIPIDEMIA: Chronic | ICD-10-CM

## 2019-01-09 DIAGNOSIS — E11.9 TYPE 2 DIABETES MELLITUS WITHOUT COMPLICATION, WITHOUT LONG-TERM CURRENT USE OF INSULIN (HCC): Chronic | ICD-10-CM

## 2019-01-09 DIAGNOSIS — K21.9 GASTROESOPHAGEAL REFLUX DISEASE WITHOUT ESOPHAGITIS: Chronic | ICD-10-CM

## 2019-01-09 DIAGNOSIS — H70.12 MASTOIDITIS, CHRONIC, LEFT: Chronic | ICD-10-CM

## 2019-01-09 DIAGNOSIS — J45.909 CHRONIC ASTHMA WITHOUT COMPLICATION, UNSPECIFIED ASTHMA SEVERITY, UNSPECIFIED WHETHER PERSISTENT: Chronic | ICD-10-CM

## 2019-01-09 DIAGNOSIS — D50.8 OTHER IRON DEFICIENCY ANEMIA: ICD-10-CM

## 2019-01-09 DIAGNOSIS — G50.0 TRIGEMINAL NEURALGIA OF LEFT SIDE OF FACE: Chronic | ICD-10-CM

## 2019-01-09 DIAGNOSIS — I10 ESSENTIAL HYPERTENSION: Chronic | ICD-10-CM

## 2019-01-09 LAB
ALBUMIN SERPL-MCNC: 4.7 G/DL (ref 3.5–5.2)
ALP BLD-CCNC: 77 U/L (ref 35–104)
ALT SERPL-CCNC: 54 U/L (ref 5–33)
ANION GAP SERPL CALCULATED.3IONS-SCNC: 15 MMOL/L (ref 7–19)
AST SERPL-CCNC: 29 U/L (ref 5–32)
BASOPHILS ABSOLUTE: 0.1 K/UL (ref 0–0.2)
BASOPHILS RELATIVE PERCENT: 0.8 % (ref 0–1)
BILIRUB SERPL-MCNC: 0.5 MG/DL (ref 0.2–1.2)
BUN BLDV-MCNC: 17 MG/DL (ref 8–23)
CALCIUM SERPL-MCNC: 10.1 MG/DL (ref 8.8–10.2)
CHLORIDE BLD-SCNC: 103 MMOL/L (ref 98–111)
CO2: 26 MMOL/L (ref 22–29)
CREAT SERPL-MCNC: 0.6 MG/DL (ref 0.5–0.9)
EOSINOPHILS ABSOLUTE: 0.5 K/UL (ref 0–0.6)
EOSINOPHILS RELATIVE PERCENT: 8 % (ref 0–5)
GFR NON-AFRICAN AMERICAN: >60
GLUCOSE BLD-MCNC: 161 MG/DL (ref 74–109)
HBA1C MFR BLD: 6.1 % (ref 4–6)
HCT VFR BLD CALC: 43.1 % (ref 37–47)
HEMOGLOBIN: 14.4 G/DL (ref 12–16)
LYMPHOCYTES ABSOLUTE: 1.6 K/UL (ref 1.1–4.5)
LYMPHOCYTES RELATIVE PERCENT: 24.9 % (ref 20–40)
MCH RBC QN AUTO: 32.1 PG (ref 27–31)
MCHC RBC AUTO-ENTMCNC: 33.4 G/DL (ref 33–37)
MCV RBC AUTO: 96.2 FL (ref 81–99)
MONOCYTES ABSOLUTE: 0.5 K/UL (ref 0–0.9)
MONOCYTES RELATIVE PERCENT: 7.4 % (ref 0–10)
NEUTROPHILS ABSOLUTE: 3.8 K/UL (ref 1.5–7.5)
NEUTROPHILS RELATIVE PERCENT: 58.6 % (ref 50–65)
PDW BLD-RTO: 13.3 % (ref 11.5–14.5)
PLATELET # BLD: 215 K/UL (ref 130–400)
PMV BLD AUTO: 11.5 FL (ref 9.4–12.3)
POTASSIUM SERPL-SCNC: 3.9 MMOL/L (ref 3.5–5)
RBC # BLD: 4.48 M/UL (ref 4.2–5.4)
SODIUM BLD-SCNC: 144 MMOL/L (ref 136–145)
TOTAL PROTEIN: 7.5 G/DL (ref 6.6–8.7)
WBC # BLD: 6.4 K/UL (ref 4.8–10.8)

## 2019-01-11 ENCOUNTER — INFUSION (OUTPATIENT)
Dept: ONCOLOGY | Facility: HOSPITAL | Age: 69
End: 2019-01-11

## 2019-01-11 VITALS
RESPIRATION RATE: 17 BRPM | HEIGHT: 68 IN | HEART RATE: 74 BPM | BODY MASS INDEX: 27.58 KG/M2 | DIASTOLIC BLOOD PRESSURE: 65 MMHG | WEIGHT: 182 LBS | SYSTOLIC BLOOD PRESSURE: 119 MMHG | OXYGEN SATURATION: 97 % | TEMPERATURE: 97.8 F

## 2019-01-11 DIAGNOSIS — D50.9 IRON DEFICIENCY ANEMIA, UNSPECIFIED IRON DEFICIENCY ANEMIA TYPE: Primary | ICD-10-CM

## 2019-01-11 PROCEDURE — 96365 THER/PROPH/DIAG IV INF INIT: CPT

## 2019-01-11 PROCEDURE — 25010000002 DIPHENHYDRAMINE PER 50 MG: Performed by: INTERNAL MEDICINE

## 2019-01-11 PROCEDURE — 96367 TX/PROPH/DG ADDL SEQ IV INF: CPT

## 2019-01-11 PROCEDURE — 96375 TX/PRO/DX INJ NEW DRUG ADDON: CPT

## 2019-01-11 PROCEDURE — 25010000002 HYDROCORTISONE SODIUM SUCCINATE 100 MG RECONSTITUTED SOLUTION: Performed by: INTERNAL MEDICINE

## 2019-01-11 PROCEDURE — 25010000002 FERRIC CARBOXYMALTOSE 750 MG/15ML SOLUTION 15 ML VIAL: Performed by: INTERNAL MEDICINE

## 2019-01-11 RX ORDER — METHYLPREDNISOLONE SODIUM SUCCINATE 125 MG/2ML
125 INJECTION, POWDER, LYOPHILIZED, FOR SOLUTION INTRAMUSCULAR; INTRAVENOUS AS NEEDED
OUTPATIENT
Start: 2019-01-11

## 2019-01-11 RX ORDER — DIPHENHYDRAMINE HYDROCHLORIDE 50 MG/ML
50 INJECTION INTRAMUSCULAR; INTRAVENOUS AS NEEDED
OUTPATIENT
Start: 2019-01-11

## 2019-01-11 RX ORDER — METHYLPREDNISOLONE SODIUM SUCCINATE 125 MG/2ML
125 INJECTION, POWDER, LYOPHILIZED, FOR SOLUTION INTRAMUSCULAR; INTRAVENOUS AS NEEDED
Status: DISCONTINUED | OUTPATIENT
Start: 2019-01-11 | End: 2019-01-11 | Stop reason: HOSPADM

## 2019-01-11 RX ORDER — DIPHENHYDRAMINE HYDROCHLORIDE 50 MG/ML
50 INJECTION INTRAMUSCULAR; INTRAVENOUS AS NEEDED
Status: DISCONTINUED | OUTPATIENT
Start: 2019-01-11 | End: 2019-01-11 | Stop reason: HOSPADM

## 2019-01-11 RX ORDER — SODIUM CHLORIDE 9 MG/ML
250 INJECTION, SOLUTION INTRAVENOUS ONCE
Status: COMPLETED | OUTPATIENT
Start: 2019-01-11 | End: 2019-01-11

## 2019-01-11 RX ADMIN — FERRIC CARBOXYMALTOSE INJECTION 750 MG: 50 INJECTION, SOLUTION INTRAVENOUS at 08:42

## 2019-01-11 RX ADMIN — SODIUM CHLORIDE 250 ML: 9 INJECTION, SOLUTION INTRAVENOUS at 08:07

## 2019-01-11 RX ADMIN — DIPHENHYDRAMINE HYDROCHLORIDE 25 MG: 50 INJECTION, SOLUTION INTRAMUSCULAR; INTRAVENOUS at 08:16

## 2019-01-11 RX ADMIN — HYDROCORTISONE SODIUM SUCCINATE 100 MG: 100 INJECTION, POWDER, FOR SOLUTION INTRAMUSCULAR; INTRAVENOUS at 08:11

## 2019-01-14 ENCOUNTER — OFFICE VISIT (OUTPATIENT)
Dept: INTERNAL MEDICINE | Age: 69
End: 2019-01-14
Payer: MEDICARE

## 2019-01-14 ENCOUNTER — TELEPHONE (OUTPATIENT)
Dept: INTERNAL MEDICINE | Age: 69
End: 2019-01-14

## 2019-01-14 VITALS
WEIGHT: 181 LBS | SYSTOLIC BLOOD PRESSURE: 164 MMHG | HEIGHT: 68 IN | OXYGEN SATURATION: 97 % | BODY MASS INDEX: 27.43 KG/M2 | HEART RATE: 90 BPM | RESPIRATION RATE: 18 BRPM | DIASTOLIC BLOOD PRESSURE: 92 MMHG

## 2019-01-14 DIAGNOSIS — Z23 NEED FOR PROPHYLACTIC VACCINATION AND INOCULATION AGAINST VARICELLA: ICD-10-CM

## 2019-01-14 DIAGNOSIS — I10 ESSENTIAL HYPERTENSION: Chronic | ICD-10-CM

## 2019-01-14 DIAGNOSIS — F41.9 ANXIETY: ICD-10-CM

## 2019-01-14 DIAGNOSIS — M51.16 LUMBAR DISC DISEASE WITH RADICULOPATHY: Chronic | ICD-10-CM

## 2019-01-14 DIAGNOSIS — K76.0 FATTY LIVER: Chronic | ICD-10-CM

## 2019-01-14 DIAGNOSIS — Z23 NEED FOR PNEUMOCOCCAL VACCINATION: ICD-10-CM

## 2019-01-14 DIAGNOSIS — M54.32 SCIATICA OF LEFT SIDE: Chronic | ICD-10-CM

## 2019-01-14 DIAGNOSIS — G47.33 OBSTRUCTIVE SLEEP APNEA: Chronic | ICD-10-CM

## 2019-01-14 DIAGNOSIS — Z78.0 MENOPAUSE: ICD-10-CM

## 2019-01-14 DIAGNOSIS — E78.2 MIXED HYPERLIPIDEMIA: Chronic | ICD-10-CM

## 2019-01-14 DIAGNOSIS — G50.0 TRIGEMINAL NEURALGIA OF LEFT SIDE OF FACE: Chronic | ICD-10-CM

## 2019-01-14 DIAGNOSIS — J45.909 CHRONIC ASTHMA WITHOUT COMPLICATION, UNSPECIFIED ASTHMA SEVERITY, UNSPECIFIED WHETHER PERSISTENT: Chronic | ICD-10-CM

## 2019-01-14 DIAGNOSIS — E11.9 TYPE 2 DIABETES MELLITUS WITHOUT COMPLICATION, WITHOUT LONG-TERM CURRENT USE OF INSULIN (HCC): Primary | Chronic | ICD-10-CM

## 2019-01-14 DIAGNOSIS — E55.9 VITAMIN D DEFICIENCY: Chronic | ICD-10-CM

## 2019-01-14 DIAGNOSIS — K21.9 GASTROESOPHAGEAL REFLUX DISEASE WITHOUT ESOPHAGITIS: Chronic | ICD-10-CM

## 2019-01-14 PROCEDURE — G8400 PT W/DXA NO RESULTS DOC: HCPCS | Performed by: INTERNAL MEDICINE

## 2019-01-14 PROCEDURE — G8482 FLU IMMUNIZE ORDER/ADMIN: HCPCS | Performed by: INTERNAL MEDICINE

## 2019-01-14 PROCEDURE — 2022F DILAT RTA XM EVC RTNOPTHY: CPT | Performed by: INTERNAL MEDICINE

## 2019-01-14 PROCEDURE — 1101F PT FALLS ASSESS-DOCD LE1/YR: CPT | Performed by: INTERNAL MEDICINE

## 2019-01-14 PROCEDURE — 3044F HG A1C LEVEL LT 7.0%: CPT | Performed by: INTERNAL MEDICINE

## 2019-01-14 PROCEDURE — G8419 CALC BMI OUT NRM PARAM NOF/U: HCPCS | Performed by: INTERNAL MEDICINE

## 2019-01-14 PROCEDURE — 1036F TOBACCO NON-USER: CPT | Performed by: INTERNAL MEDICINE

## 2019-01-14 PROCEDURE — 4040F PNEUMOC VAC/ADMIN/RCVD: CPT | Performed by: INTERNAL MEDICINE

## 2019-01-14 PROCEDURE — G8427 DOCREV CUR MEDS BY ELIG CLIN: HCPCS | Performed by: INTERNAL MEDICINE

## 2019-01-14 PROCEDURE — 1123F ACP DISCUSS/DSCN MKR DOCD: CPT | Performed by: INTERNAL MEDICINE

## 2019-01-14 PROCEDURE — 1090F PRES/ABSN URINE INCON ASSESS: CPT | Performed by: INTERNAL MEDICINE

## 2019-01-14 PROCEDURE — 99214 OFFICE O/P EST MOD 30 MIN: CPT | Performed by: INTERNAL MEDICINE

## 2019-01-14 PROCEDURE — G0009 ADMIN PNEUMOCOCCAL VACCINE: HCPCS | Performed by: INTERNAL MEDICINE

## 2019-01-14 PROCEDURE — 90732 PPSV23 VACC 2 YRS+ SUBQ/IM: CPT | Performed by: INTERNAL MEDICINE

## 2019-01-14 PROCEDURE — 3017F COLORECTAL CA SCREEN DOC REV: CPT | Performed by: INTERNAL MEDICINE

## 2019-01-14 RX ORDER — ROSUVASTATIN CALCIUM 5 MG/1
TABLET, COATED ORAL
Qty: 36 TABLET | Refills: 3 | Status: SHIPPED | OUTPATIENT
Start: 2019-01-14 | End: 2020-04-10 | Stop reason: SDUPTHER

## 2019-01-14 RX ORDER — ACETAMINOPHEN, ASPIRIN AND CAFFEINE 250; 250; 65 MG/1; MG/1; MG/1
1 TABLET, FILM COATED ORAL
COMMUNITY
End: 2022-06-24

## 2019-01-14 RX ORDER — VERAPAMIL HYDROCHLORIDE 240 MG/1
240 TABLET, FILM COATED, EXTENDED RELEASE ORAL 2 TIMES DAILY
Qty: 180 TABLET | Refills: 3 | Status: SHIPPED | OUTPATIENT
Start: 2019-01-14 | End: 2019-06-19 | Stop reason: SDUPTHER

## 2019-01-14 RX ORDER — MONTELUKAST SODIUM 10 MG/1
10 TABLET ORAL DAILY
Qty: 90 TABLET | Refills: 3 | Status: SHIPPED | OUTPATIENT
Start: 2019-01-14 | End: 2020-10-16

## 2019-01-14 RX ORDER — METHYLPREDNISOLONE 4 MG/1
TABLET ORAL
Qty: 1 KIT | Refills: 0 | Status: SHIPPED | OUTPATIENT
Start: 2019-01-14 | End: 2019-01-20

## 2019-01-14 RX ORDER — HYDROCHLOROTHIAZIDE 12.5 MG/1
12.5 CAPSULE, GELATIN COATED ORAL DAILY
Qty: 90 CAPSULE | Refills: 3 | Status: SHIPPED | OUTPATIENT
Start: 2019-01-14 | End: 2021-03-16

## 2019-01-14 RX ORDER — LOSARTAN POTASSIUM 100 MG/1
100 TABLET ORAL DAILY
Qty: 90 TABLET | Refills: 3 | Status: SHIPPED | OUTPATIENT
Start: 2019-01-14 | End: 2021-03-26

## 2019-01-14 RX ORDER — ALPRAZOLAM 0.25 MG/1
0.25 TABLET ORAL 2 TIMES DAILY PRN
Qty: 60 TABLET | Refills: 2 | Status: SHIPPED | OUTPATIENT
Start: 2019-01-14 | End: 2019-04-14

## 2019-01-14 RX ORDER — ALPRAZOLAM 0.25 MG/1
TABLET ORAL
COMMUNITY
End: 2019-01-14 | Stop reason: SDUPTHER

## 2019-01-14 RX ORDER — ERGOCALCIFEROL 1.25 MG/1
50000 CAPSULE ORAL WEEKLY
Qty: 15 CAPSULE | Refills: 3 | Status: SHIPPED | OUTPATIENT
Start: 2019-01-14 | End: 2020-03-13 | Stop reason: SDUPTHER

## 2019-01-14 RX ORDER — FAMOTIDINE 10 MG
10 TABLET ORAL
COMMUNITY
End: 2020-05-18

## 2019-01-14 ASSESSMENT — PATIENT HEALTH QUESTIONNAIRE - PHQ9
2. FEELING DOWN, DEPRESSED OR HOPELESS: 0
1. LITTLE INTEREST OR PLEASURE IN DOING THINGS: 0
SUM OF ALL RESPONSES TO PHQ QUESTIONS 1-9: 0
SUM OF ALL RESPONSES TO PHQ9 QUESTIONS 1 & 2: 0
SUM OF ALL RESPONSES TO PHQ QUESTIONS 1-9: 0

## 2019-01-14 ASSESSMENT — ENCOUNTER SYMPTOMS
ABDOMINAL PAIN: 0
COUGH: 0
SHORTNESS OF BREATH: 0
NAUSEA: 0
BACK PAIN: 1

## 2019-01-15 ENCOUNTER — TRANSCRIBE ORDERS (OUTPATIENT)
Dept: ADMINISTRATIVE | Facility: HOSPITAL | Age: 69
End: 2019-01-15

## 2019-01-15 DIAGNOSIS — Z78.0 MENOPAUSE: Primary | ICD-10-CM

## 2019-02-13 ENCOUNTER — HOSPITAL ENCOUNTER (OUTPATIENT)
Dept: BONE DENSITY | Facility: HOSPITAL | Age: 69
Discharge: HOME | End: 2019-02-13
Attending: INTERNAL MEDICINE | Admitting: INTERNAL MEDICINE

## 2019-02-13 DIAGNOSIS — Z78.0 MENOPAUSE: ICD-10-CM

## 2019-02-13 PROCEDURE — 77080 DXA BONE DENSITY AXIAL: CPT

## 2019-03-05 ENCOUNTER — TRANSCRIBE ORDERS (OUTPATIENT)
Dept: ONCOLOGY | Facility: CLINIC | Age: 69
End: 2019-03-05

## 2019-03-05 DIAGNOSIS — D50.9 IRON DEFICIENCY ANEMIA, UNSPECIFIED IRON DEFICIENCY ANEMIA TYPE: Primary | ICD-10-CM

## 2019-03-22 ENCOUNTER — APPOINTMENT (OUTPATIENT)
Dept: LAB | Facility: HOSPITAL | Age: 69
End: 2019-03-22

## 2019-03-22 ENCOUNTER — TRANSCRIBE ORDERS (OUTPATIENT)
Dept: ADMINISTRATIVE | Facility: HOSPITAL | Age: 69
End: 2019-03-22

## 2019-03-22 DIAGNOSIS — R79.89 ELEVATED LIVER FUNCTION TESTS: Primary | ICD-10-CM

## 2019-03-22 LAB
ALBUMIN SERPL-MCNC: 4.8 G/DL (ref 3.5–5)
ALBUMIN/GLOB SERPL: 1.8 G/DL (ref 1.1–2.5)
ALP SERPL-CCNC: 86 U/L (ref 24–120)
ALT SERPL W P-5'-P-CCNC: 93 U/L (ref 0–54)
ANION GAP SERPL CALCULATED.3IONS-SCNC: 14 MMOL/L (ref 4–13)
AST SERPL-CCNC: 56 U/L (ref 7–45)
BASOPHILS # BLD AUTO: 0.05 10*3/MM3 (ref 0–0.2)
BASOPHILS NFR BLD AUTO: 0.9 % (ref 0–2)
BILIRUB SERPL-MCNC: 0.7 MG/DL (ref 0.1–1)
BUN BLD-MCNC: 22 MG/DL (ref 5–21)
BUN/CREAT SERPL: 36.7 (ref 7–25)
CALCIUM SPEC-SCNC: 10 MG/DL (ref 8.4–10.4)
CHLORIDE SERPL-SCNC: 98 MMOL/L (ref 98–110)
CO2 SERPL-SCNC: 28 MMOL/L (ref 24–31)
CREAT BLD-MCNC: 0.6 MG/DL (ref 0.5–1.4)
DEPRECATED RDW RBC AUTO: 41.9 FL (ref 40–54)
EOSINOPHIL # BLD AUTO: 0.35 10*3/MM3 (ref 0–0.7)
EOSINOPHIL NFR BLD AUTO: 6.3 % (ref 0–4)
ERYTHROCYTE [DISTWIDTH] IN BLOOD BY AUTOMATED COUNT: 12 % (ref 12–15)
FERRITIN SERPL-MCNC: 745 NG/ML (ref 11.1–264)
GFR SERPL CREATININE-BSD FRML MDRD: 99 ML/MIN/1.73
GLOBULIN UR ELPH-MCNC: 2.6 GM/DL
GLUCOSE BLD-MCNC: 215 MG/DL (ref 70–100)
HCT VFR BLD AUTO: 43.1 % (ref 37–47)
HGB BLD-MCNC: 14.8 G/DL (ref 12–16)
IMM GRANULOCYTES # BLD AUTO: 0.02 10*3/MM3 (ref 0–0.05)
IMM GRANULOCYTES NFR BLD AUTO: 0.4 % (ref 0–5)
IRON 24H UR-MRATE: 126 MCG/DL (ref 42–180)
IRON SATN MFR SERPL: 43 % (ref 20–45)
LYMPHOCYTES # BLD AUTO: 1.31 10*3/MM3 (ref 0.72–4.86)
LYMPHOCYTES NFR BLD AUTO: 23.6 % (ref 15–45)
MCH RBC QN AUTO: 32 PG (ref 28–32)
MCHC RBC AUTO-ENTMCNC: 34.3 G/DL (ref 33–36)
MCV RBC AUTO: 93.1 FL (ref 82–98)
MONOCYTES # BLD AUTO: 0.46 10*3/MM3 (ref 0.19–1.3)
MONOCYTES NFR BLD AUTO: 8.3 % (ref 4–12)
NEUTROPHILS # BLD AUTO: 3.36 10*3/MM3 (ref 1.87–8.4)
NEUTROPHILS NFR BLD AUTO: 60.5 % (ref 39–78)
NRBC BLD AUTO-RTO: 0 /100 WBC (ref 0–0)
PLATELET # BLD AUTO: 196 10*3/MM3 (ref 130–400)
PMV BLD AUTO: 11.7 FL (ref 6–12)
POTASSIUM BLD-SCNC: 3.8 MMOL/L (ref 3.5–5.3)
PROT SERPL-MCNC: 7.4 G/DL (ref 6.3–8.7)
RBC # BLD AUTO: 4.63 10*6/MM3 (ref 4.2–5.4)
SODIUM BLD-SCNC: 140 MMOL/L (ref 135–145)
TIBC SERPL-MCNC: 294 MCG/DL (ref 225–420)
WBC NRBC COR # BLD: 5.55 10*3/MM3 (ref 4.8–10.8)

## 2019-03-22 PROCEDURE — 80053 COMPREHEN METABOLIC PANEL: CPT | Performed by: INTERNAL MEDICINE

## 2019-03-22 PROCEDURE — 85025 COMPLETE CBC W/AUTO DIFF WBC: CPT | Performed by: INTERNAL MEDICINE

## 2019-03-22 PROCEDURE — 36415 COLL VENOUS BLD VENIPUNCTURE: CPT | Performed by: INTERNAL MEDICINE

## 2019-03-22 PROCEDURE — 82728 ASSAY OF FERRITIN: CPT | Performed by: INTERNAL MEDICINE

## 2019-03-22 PROCEDURE — 83550 IRON BINDING TEST: CPT | Performed by: INTERNAL MEDICINE

## 2019-03-22 PROCEDURE — 83540 ASSAY OF IRON: CPT | Performed by: INTERNAL MEDICINE

## 2019-03-27 ENCOUNTER — HOSPITAL ENCOUNTER (OUTPATIENT)
Dept: ULTRASOUND IMAGING | Facility: HOSPITAL | Age: 69
Discharge: HOME | End: 2019-03-27
Admitting: INTERNAL MEDICINE

## 2019-03-27 DIAGNOSIS — R79.89 ELEVATED LIVER FUNCTION TESTS: ICD-10-CM

## 2019-03-27 PROCEDURE — 76705 ECHO EXAM OF ABDOMEN: CPT

## 2019-04-01 ENCOUNTER — TRANSCRIBE ORDERS (OUTPATIENT)
Dept: ONCOLOGY | Facility: CLINIC | Age: 69
End: 2019-04-01

## 2019-04-01 DIAGNOSIS — D50.9 IRON DEFICIENCY ANEMIA, UNSPECIFIED IRON DEFICIENCY ANEMIA TYPE: Primary | ICD-10-CM

## 2019-06-19 ENCOUNTER — TRANSCRIBE ORDERS (OUTPATIENT)
Dept: ADMINISTRATIVE | Facility: HOSPITAL | Age: 69
End: 2019-06-19

## 2019-06-19 ENCOUNTER — OFFICE VISIT (OUTPATIENT)
Dept: INTERNAL MEDICINE | Age: 69
End: 2019-06-19
Payer: MEDICARE

## 2019-06-19 VITALS
WEIGHT: 180 LBS | OXYGEN SATURATION: 97 % | BODY MASS INDEX: 26.66 KG/M2 | SYSTOLIC BLOOD PRESSURE: 138 MMHG | HEART RATE: 56 BPM | HEIGHT: 69 IN | DIASTOLIC BLOOD PRESSURE: 90 MMHG

## 2019-06-19 DIAGNOSIS — E55.9 VITAMIN D DEFICIENCY: ICD-10-CM

## 2019-06-19 DIAGNOSIS — Z12.31 SCREENING MAMMOGRAM, ENCOUNTER FOR: Primary | ICD-10-CM

## 2019-06-19 DIAGNOSIS — Z12.31 SCREENING MAMMOGRAM, ENCOUNTER FOR: ICD-10-CM

## 2019-06-19 DIAGNOSIS — Z00.00 ROUTINE GENERAL MEDICAL EXAMINATION AT A HEALTH CARE FACILITY: ICD-10-CM

## 2019-06-19 DIAGNOSIS — Z23 NEED FOR PROPHYLACTIC VACCINATION AND INOCULATION AGAINST VARICELLA: ICD-10-CM

## 2019-06-19 DIAGNOSIS — Z00.00 MEDICARE ANNUAL WELLNESS VISIT, SUBSEQUENT: Primary | ICD-10-CM

## 2019-06-19 DIAGNOSIS — D50.8 OTHER IRON DEFICIENCY ANEMIA: ICD-10-CM

## 2019-06-19 DIAGNOSIS — I10 ESSENTIAL HYPERTENSION: Chronic | ICD-10-CM

## 2019-06-19 DIAGNOSIS — N89.9 NODULE OF VAGINA: ICD-10-CM

## 2019-06-19 DIAGNOSIS — Z23 NEED FOR PROPHYLACTIC VACCINATION AGAINST DIPHTHERIA-TETANUS-PERTUSSIS (DTP): ICD-10-CM

## 2019-06-19 DIAGNOSIS — G50.0 TRIGEMINAL NEURALGIA OF LEFT SIDE OF FACE: ICD-10-CM

## 2019-06-19 DIAGNOSIS — E11.9 TYPE 2 DIABETES MELLITUS WITHOUT COMPLICATION, WITHOUT LONG-TERM CURRENT USE OF INSULIN (HCC): ICD-10-CM

## 2019-06-19 PROCEDURE — 1123F ACP DISCUSS/DSCN MKR DOCD: CPT | Performed by: INTERNAL MEDICINE

## 2019-06-19 PROCEDURE — 4040F PNEUMOC VAC/ADMIN/RCVD: CPT | Performed by: INTERNAL MEDICINE

## 2019-06-19 PROCEDURE — 3044F HG A1C LEVEL LT 7.0%: CPT | Performed by: INTERNAL MEDICINE

## 2019-06-19 PROCEDURE — G0439 PPPS, SUBSEQ VISIT: HCPCS | Performed by: INTERNAL MEDICINE

## 2019-06-19 PROCEDURE — 3017F COLORECTAL CA SCREEN DOC REV: CPT | Performed by: INTERNAL MEDICINE

## 2019-06-19 RX ORDER — VERAPAMIL HYDROCHLORIDE 240 MG/1
240 TABLET, FILM COATED, EXTENDED RELEASE ORAL 2 TIMES DAILY
Qty: 180 TABLET | Refills: 3 | Status: SHIPPED | OUTPATIENT
Start: 2019-06-19 | End: 2020-08-11

## 2019-06-19 RX ORDER — ALPRAZOLAM 0.25 MG/1
0.25 TABLET ORAL NIGHTLY PRN
COMMUNITY
End: 2022-04-12 | Stop reason: SDUPTHER

## 2019-06-19 ASSESSMENT — LIFESTYLE VARIABLES
HOW OFTEN DO YOU HAVE SIX OR MORE DRINKS ON ONE OCCASION: 0
AUDIT-C TOTAL SCORE: 1
HOW OFTEN DURING THE LAST YEAR HAVE YOU FAILED TO DO WHAT WAS NORMALLY EXPECTED FROM YOU BECAUSE OF DRINKING: 0
AUDIT TOTAL SCORE: 1
HAVE YOU OR SOMEONE ELSE BEEN INJURED AS A RESULT OF YOUR DRINKING: 0
HOW OFTEN DO YOU HAVE A DRINK CONTAINING ALCOHOL: 1
HAS A RELATIVE, FRIEND, DOCTOR, OR ANOTHER HEALTH PROFESSIONAL EXPRESSED CONCERN ABOUT YOUR DRINKING OR SUGGESTED YOU CUT DOWN: 0
HOW OFTEN DURING THE LAST YEAR HAVE YOU FOUND THAT YOU WERE NOT ABLE TO STOP DRINKING ONCE YOU HAD STARTED: 0
HOW MANY STANDARD DRINKS CONTAINING ALCOHOL DO YOU HAVE ON A TYPICAL DAY: 0
HOW OFTEN DURING THE LAST YEAR HAVE YOU NEEDED AN ALCOHOLIC DRINK FIRST THING IN THE MORNING TO GET YOURSELF GOING AFTER A NIGHT OF HEAVY DRINKING: 0
HOW OFTEN DURING THE LAST YEAR HAVE YOU BEEN UNABLE TO REMEMBER WHAT HAPPENED THE NIGHT BEFORE BECAUSE YOU HAD BEEN DRINKING: 0
HOW OFTEN DURING THE LAST YEAR HAVE YOU HAD A FEELING OF GUILT OR REMORSE AFTER DRINKING: 0

## 2019-06-19 ASSESSMENT — PATIENT HEALTH QUESTIONNAIRE - PHQ9
SUM OF ALL RESPONSES TO PHQ QUESTIONS 1-9: 0
SUM OF ALL RESPONSES TO PHQ QUESTIONS 1-9: 0

## 2019-06-19 NOTE — PROGRESS NOTES
Chief Complaint   Patient presents with    Medicare AWV    Diabetes    Hypertension       HPI: Patient is here today to establish as a new patient with me and for Medicare annual wellness visit very complicated history of trigeminal neuralgia requiring Surgery that was done at the [facility]. He had extremely severe pain prior to this getting diagnosis. She also has a history of the wound later becoming infected have prolonged antibiotics is finally better but since that time has had some tightness and discomfort in the left parotid left face with some swelling. 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 In the midst of all this she's had this issue with trigeminal neuralgia and parotid swelling. She also had an abnormality felt on pelvic exam a firm nodule at the opening of the vaginal introitus that 12:00 this was assessed by Dr. Maria Del Rosario Isbell at [address] [place] [address] MRI and other testing was done it was felt to be consistent with some postsurgical scar tissue.  In terms of the trigeminal neuralgia procedure that was about 2 years ago surgery was done at the [address] [place] [address],[address] - 2 yrs ago surgery for trigeminal neuralgia and then several months later started draining and then pain at incision site and drainage for some time and took antibiotics for 6 months    Past Medical History:   Diagnosis Date    Calculus of gallbladder 2017    Chronic asthma without complication     Essential hypertension 2017    Fatty liver 2017    Gastroesophageal reflux disease without esophagitis 2017    Lumbar disc disease     L5-S1    Migraine without aura, not intractable 2017    Mixed hyperlipidemia 2017    Obstructive sleep apnea 2017    [address]roiliitis ([address]     Trigeminal neuralgia of left side of face 8/20/2017    Type 2 diabetes mellitus without complication ([facility] [address] [address] 2/45/7013    Venous insufficiency     Vitamin D deficiency        Past Surgical History:   Procedure Laterality Date    HYSTERECTOMY, TOTAL ABDOMINAL      No BSO       Family History   Problem Relation Age of Onset    High Blood Pressure Mother     High Blood Pressure Father     Prostate Cancer Father     Diabetes Father         Type 2    Breast Cancer Sister 47    Diabetes Sister         Type 2       Social History     Socioeconomic History    Marital status:      Spouse name: Poncho Canales Number of children: 2    Years of education: 15    Highest education level: Not on file   Occupational History    Occupation: retired    Social Needs    Financial resource strain: Not on file    Food insecurity:     Worry: Not on file     Inability: Not on file    Transportation needs:     Medical: Not on file     Non-medical: Not on file   Tobacco Use    Smoking status: Never Smoker    Smokeless tobacco: Never Used   Substance and Sexual Activity    Alcohol use: No    Drug use: No    Sexual activity: Yes     Partners: Male   Lifestyle    Physical activity:     Days per week: Not on file     Minutes per session: Not on file    Stress: Not on file   Relationships    Social connections:     Talks on phone: Not on file     Gets together: Not on file     Attends Religion service: Not on file     Active member of club or organization: Not on file     Attends meetings of clubs or organizations: Not on file     Relationship status: Not on file    Intimate partner violence:     Fear of current or ex partner: Not on file     Emotionally abused: Not on file     Physically abused: Not on file     Forced sexual activity: Not on file   Other Topics Concern    Not on file   Social History Narrative    Not on file       Allergies   Allergen Reactions    Inderal [Propranolol] Other (See Comments) Bad dreams  unknown    Solifenacin Succinate Other (See Comments)     Severe constipation    Demerol Hcl [Meperidine]      halluncinations    Vesicare [Solifenacin]      Couldn't urinate    Zoloft [Sertraline Hcl]      hallucinations    Zovirax [Acyclovir] Hives and Other (See Comments)     PhX       Current Outpatient Medications   Medication Sig Dispense Refill    ALPRAZolam (XANAX) 0.25 MG tablet Take 0.25 mg by mouth nightly as needed for Sleep.  verapamil (CALAN SR) 240 MG extended release tablet Take 1 tablet by mouth 2 times daily 180 tablet 3    metFORMIN (GLUCOPHAGE) 500 MG tablet Take one tablet q am and 2 tablets q hs 270 tablet 3    aspirin-acetaminophen-caffeine (EXCEDRIN MIGRAINE) 250-250-65 MG per tablet Take 1 tablet by mouth      famotidine (PEPCID) 10 MG tablet Take 10 mg by mouth      esomeprazole (NEXIUM) 20 MG delayed release capsule Take 20 mg by mouth every morning (before breakfast)      vitamin D (ERGOCALCIFEROL) 80516 units CAPS capsule Take 1 capsule by mouth once a week 15 capsule 3    rosuvastatin (CRESTOR) 5 MG tablet Take 1/2 tablet on Mon, Wed, Fri 36 tablet 3    montelukast (SINGULAIR) 10 MG tablet Take 1 tablet by mouth daily 90 tablet 3    losartan (COZAAR) 100 MG tablet Take 1 tablet by mouth daily 90 tablet 3    hydrochlorothiazide (MICROZIDE) 12.5 MG capsule Take 1 capsule by mouth daily 90 capsule 3    conjugated estrogens (PREMARIN) 0.625 MG/GM vaginal cream 1gm twice a week (this replaces Estradiol cream per insurance 4/10/18) 1 Tube 3    aspirin 81 MG tablet Take 81 mg by mouth daily      Multiple Vitamins-Minerals (PRESERVISION/LUTEIN) CAPS Take 1 capsule by mouth 2 times daily      calcium carbonate (TUMS) 500 MG chewable tablet Take 1 tablet by mouth daily      zoster recombinant adjuvanted vaccine (SHINGRIX) 50 MCG/0.5ML SUSR injection 50 MCG IM then repeat 2-6 months. 0.5 mL 1     No current facility-administered medications for this visit. Review of Systems   Constitutional: Positive for fatigue. Negative for chills and fever. HENT: Negative for congestion and sinus pressure. Facial pain. Eyes: Negative for discharge and redness. Respiratory: Negative for cough and shortness of breath. Cardiovascular: Negative for chest pain, palpitations and leg swelling. Gastrointestinal: Negative for abdominal distention and abdominal pain. Genitourinary: Negative for dysuria, frequency and urgency. Musculoskeletal: Negative for arthralgias and back pain. Skin: Negative for rash and wound. Neurological: Positive for headaches. Negative for dizziness and light-headedness. Psychiatric/Behavioral: Negative for dysphoric mood and sleep disturbance. The patient is not nervous/anxious.          Stressors       BP (!) 138/90   Pulse 56   Ht 5' 9\" (1.753 m)   Wt 180 lb (81.6 kg)   SpO2 97%   BMI 26.58 kg/m²   BP Readings from Last 7 Encounters:   06/19/19 (!) 138/90   01/14/19 (!) 164/92   08/27/18 130/82   08/13/18 126/88   04/09/18 118/80   01/08/18 134/82   08/21/17 (!) 132/94     Wt Readings from Last 7 Encounters:   06/19/19 180 lb (81.6 kg)   01/14/19 181 lb (82.1 kg)   08/27/18 178 lb 9.6 oz (81 kg)   08/13/18 176 lb 3.2 oz (79.9 kg)   04/09/18 170 lb 9.6 oz (77.4 kg)   01/08/18 175 lb (79.4 kg)   08/21/17 174 lb (78.9 kg)     BMI Readings from Last 7 Encounters:   06/19/19 26.58 kg/m²   01/14/19 27.52 kg/m²   08/27/18 27.16 kg/m²   08/13/18 26.40 kg/m²   04/09/18 25.56 kg/m²   01/08/18 26.22 kg/m²   08/21/17 26.07 kg/m²     Resp Readings from Last 7 Encounters:   01/14/19 18   01/08/18 16   08/21/17 20       Physical Exam    Results for orders placed or performed in visit on 01/09/19   CBC Auto Differential   Result Value Ref Range    WBC 6.4 4.8 - 10.8 K/uL    RBC 4.48 4.20 - 5.40 M/uL    Hemoglobin 14.4 12.0 - 16.0 g/dL    Hematocrit 43.1 37.0 - 47.0 %    MCV 96.2 81.0 - 99.0 fL    MCH 32.1 (H) 27.0 - 31.0 pg    MCHC 33.4 33.0 - 37.0 g/dL    RDW 13.3 11.5 - 14.5 %    Platelets 457 663 - 371 K/uL    MPV 11.5 9.4 - 12.3 fL    Neutrophils % 58.6 50.0 - 65.0 %    Lymphocytes % 24.9 20.0 - 40.0 %    Monocytes % 7.4 0.0 - 10.0 %    Eosinophils % 8.0 (H) 0.0 - 5.0 %    Basophils % 0.8 0.0 - 1.0 %    Neutrophils # 3.8 1.5 - 7.5 K/uL    Lymphocytes # 1.6 1.1 - 4.5 K/uL    Monocytes # 0.50 0.00 - 0.90 K/uL    Eosinophils # 0.50 0.00 - 0.60 K/uL    Basophils # 0.10 0.00 - 0.20 K/uL   Comprehensive Metabolic Panel   Result Value Ref Range    Sodium 144 136 - 145 mmol/L    Potassium 3.9 3.5 - 5.0 mmol/L    Chloride 103 98 - 111 mmol/L    CO2 26 22 - 29 mmol/L    Anion Gap 15 7 - 19 mmol/L    Glucose 161 (H) 74 - 109 mg/dL    BUN 17 8 - 23 mg/dL    CREATININE 0.6 0.5 - 0.9 mg/dL    GFR Non-African American >60 >60    Calcium 10.1 8.8 - 10.2 mg/dL    Total Protein 7.5 6.6 - 8.7 g/dL    Alb 4.7 3.5 - 5.2 g/dL    Total Bilirubin 0.5 0.2 - 1.2 mg/dL    Alkaline Phosphatase 77 35 - 104 U/L    ALT 54 (H) 5 - 33 U/L    AST 29 5 - 32 U/L   Hemoglobin A1C   Result Value Ref Range    Hemoglobin A1C 6.1 (H) 4.0 - 6.0 %       ASSESSMENT/ PLAN:  1. Medicare annual wellness visit, subsequent  Chart medications labs vaccines reviewed keep up-to-date with routine medical care follow-up call with any proximal or complaints    2. Trigeminal neuralgia of left side of face  Pt is better with the trigeminal neuralgia - infection also resolved   - Sedimentation Rate; Future  - C-Reactive Protein; Future    3. Type 2 diabetes mellitus without complication, without long-term current use of insulin (HCC)  Stable and f/u  - CBC; Future  - Comprehensive Metabolic Panel; Future  - Hemoglobin A1C; Future  - TSH without Reflex; Future  - Lipid Panel; Future    4. Need for prophylactic vaccination and inoculation against varicella    - zoster recombinant adjuvanted vaccine [facility]) 50 MCG/0.5ML SUSR injection;  Inject 0.5 mLs into the muscle once for 1 dose 50 MCG IM then repeat 2-6 months. Dispense: 1 each; Refill: 1    5. Screening mammogram, encounter for    - JOSE LUIS DIGITAL SCREEN W OR WO CAD BILATERAL; Future    6. Routine general medical examination at a health care facility  As above keep up-to-date with routine medical screening and care vaccines reviewed    7. Need for prophylactic vaccination against diphtheria-tetanus-pertussis (DTP)    - Tdap (ADACEL) 5-2-15.5 LF-MCG/0.5 injection; Inject 0.5 mLs into the muscle once for 1 dose  Dispense: 0.5 mL; Refill: 0    8. Vitamin D deficiency  Continue vitamin D therapy    9. Essential hypertension  Continue current blood pressure regimen and follow    10. Other iron deficiency anemia  Follow labs    11. Nodule of vagina  Unusual nodule palpable it sounds like it stable and it was felt to be a postoperative change but I would fill safer having the urologist Dr. Roby Jacobo follow-up patient sees her in the near future                    Medicare Annual Wellness Visit  Name: Holly Iraheta Date: 2019   MRN: [id number] Sex: Female   Age: 76 y.o. Ethnicity: Non-/Non    : 1950 Race: Mary Persaud is here for Medicare AWV; Diabetes; and Hypertension    Screenings for behavioral, psychosocial and functional/safety risks, and cognitive dysfunction are all negative except as indicated below. These results, as well as other patient data from the [address] [facility] [place] form, are documented in Flowsheets linked to this Encounter. Allergies   Allergen Reactions    Inderal [Propranolol] Other (See Comments)     Bad dreams  unknown    Solifenacin Succinate Other (See Comments)     Severe constipation    Demerol Hcl [Meperidine]      halluncinations    Vesicare [Solifenacin]      Couldn't urinate    Zoloft [Sertraline Hcl]      hallucinations    Zovirax [Acyclovir] Hives and Other (See Comments)     PhX       Prior to Visit Medications    Medication Sig Taking?  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/20/2017    Lumbar disc disease     L5-S1    Migraine without aura, not intractable 8/20/2017    Mixed hyperlipidemia 8/20/2017    Obstructive sleep apnea 8/20/2017    Sacroiliitis (HCC)     Trigeminal neuralgia of left side of face 8/20/2017    Type [facility] mellitus without complication [facility] 9/80/9011    Venous insufficiency     Vitamin D deficiency      Past Surgical History:   Procedure Laterality Date    HYSTERECTOMY, TOTAL ABDOMINAL      No BSO       Family History   Problem Relation Age of Onset    High Blood Pressure Mother     High Blood Pressure Father     Prostate Cancer Father     Diabetes Father         Type 2    Breast Cancer Sister 47    Diabetes Sister         Type 2       CareTeam (Including outside providers/suppliers regularly involved in providing care):   Patient Care Team:  Venancio Rivers MD as PCP - General (Internal Medicine)  Venancio Rivers MD as PCP - [facility] Empaneled Provider    Wt Readings from Last 3 Encounters:   06/19/19 180 lb (81.6 kg)   01/14/19 181 lb (82.1 kg)   08/27/18 178 lb 9.6 oz (81 kg)     Vitals:    06/19/19 0830   BP: (!) 138/90   Pulse: 56   SpO2: 97%   Weight: 180 lb (81.6 kg)   Height: 5' 9\" (1.753 m)     Body mass index is 26.58 kg/m². Based upon direct observation of the patient, evaluation of cognition reveals no issues    Patient's complete Health Risk Assessment and screening values have been reviewed and are found in Flowsheets. The following problems were reviewed today and where indicated follow up appointments were made and/or referrals ordered.     Positive Risk Factor Screenings with Interventions:     Health Habits/Nutrition:  Health Habits/Nutrition  Do you exercise for at least 20 minutes 2-3 times per week?: (!) No  Have you lost any weight without trying in the past 3 months?: No  Do you eat fewer than 2 meals per day?: No  Have you seen a dentist within the past year?: Yes  Body mass index is 26.58 kg/m². Health Habits/Nutrition Interventions:  · follow    Personalized Preventive Plan   Current Health Maintenance Status  Immunization History   Administered Date(s) Administered    Influenza, High Dose (Fluzone 65 yrs and older) 10/03/2016, 12/06/2017, 10/02/2018    Pneumococcal Conjugate 13-valent ([id number]) 11/30/2015    Pneumococcal Polysaccharide ([id number]) 01/14/2019        Health Maintenance   Topic Date Due    Hepatitis C screen  1950    Diabetic foot exam  09/12/1960    Diabetic microalbuminuria test  09/12/1968    DTaP/Tdap/Td vaccine (1 - Tdap) 09/12/1969    Shingles Vaccine (1 of 2) 09/12/2000    Colon cancer screen colonoscopy  12/31/2017    Lipid screen  08/09/2019    Breast cancer screen  08/16/2019    Diabetic retinal exam  11/21/2019    A1C test (Diabetic or Prediabetic)  01/09/2020    Potassium monitoring  01/09/2020    Creatinine monitoring  01/09/2020    Annual Wellness Visit (AWV)  06/18/2020    DEXA (modify frequency per FRAX score)  02/13/2021    Flu vaccine  Completed    Pneumococcal 65+ years Vaccine  Completed     Recommendations for Preventive Services Due: see orders and patient instructions/AVS.  .   Recommended screening schedule for the next 5-10 years is provided to the patient in written form: see Patient Instructions/AVS.

## 2019-06-21 ENCOUNTER — TRANSCRIBE ORDERS (OUTPATIENT)
Dept: ADMINISTRATIVE | Facility: HOSPITAL | Age: 69
End: 2019-06-21

## 2019-06-21 ENCOUNTER — LAB (OUTPATIENT)
Dept: LAB | Facility: HOSPITAL | Age: 69
End: 2019-06-21

## 2019-06-21 DIAGNOSIS — G50.0 TRIGEMINAL NEURALGIA OF LEFT SIDE OF FACE: ICD-10-CM

## 2019-06-21 DIAGNOSIS — E11.9 TYPE 2 DIABETES MELLITUS WITHOUT COMPLICATION, WITHOUT LONG-TERM CURRENT USE OF INSULIN (HCC): ICD-10-CM

## 2019-06-21 DIAGNOSIS — E11.9 TYPE 2 DIABETES MELLITUS WITHOUT COMPLICATION, WITHOUT LONG-TERM CURRENT USE OF INSULIN (HCC): Primary | ICD-10-CM

## 2019-06-21 DIAGNOSIS — D50.9 IRON DEFICIENCY ANEMIA, UNSPECIFIED IRON DEFICIENCY ANEMIA TYPE: Primary | ICD-10-CM

## 2019-06-21 LAB
ALBUMIN SERPL-MCNC: 4.9 G/DL (ref 3.5–5)
ALBUMIN/GLOB SERPL: 1.8 G/DL (ref 1.1–2.5)
ALP SERPL-CCNC: 85 U/L (ref 24–120)
ALT SERPL W P-5'-P-CCNC: 75 U/L (ref 0–54)
ANION GAP SERPL CALCULATED.3IONS-SCNC: 14 MMOL/L (ref 4–13)
AST SERPL-CCNC: 55 U/L (ref 7–45)
BASOPHILS # BLD AUTO: 0.05 10*3/MM3 (ref 0–0.2)
BASOPHILS NFR BLD AUTO: 0.8 % (ref 0–2)
BILIRUB SERPL-MCNC: 0.9 MG/DL (ref 0.1–1)
BUN BLD-MCNC: 25 MG/DL (ref 5–21)
BUN/CREAT SERPL: 38.5 (ref 7–25)
CALCIUM SPEC-SCNC: 9.9 MG/DL (ref 8.4–10.4)
CHLORIDE SERPL-SCNC: 100 MMOL/L (ref 98–110)
CO2 SERPL-SCNC: 26 MMOL/L (ref 24–31)
CREAT BLD-MCNC: 0.65 MG/DL (ref 0.5–1.4)
DEPRECATED RDW RBC AUTO: 42 FL (ref 40–54)
EOSINOPHIL # BLD AUTO: 0.24 10*3/MM3 (ref 0–0.7)
EOSINOPHIL NFR BLD AUTO: 3.8 % (ref 0–4)
ERYTHROCYTE [DISTWIDTH] IN BLOOD BY AUTOMATED COUNT: 12.4 % (ref 12–15)
FERRITIN SERPL-MCNC: 548 NG/ML (ref 11.1–264)
GFR SERPL CREATININE-BSD FRML MDRD: 91 ML/MIN/1.73
GLOBULIN UR ELPH-MCNC: 2.8 GM/DL
GLUCOSE BLD-MCNC: 162 MG/DL (ref 70–100)
HCT VFR BLD AUTO: 41.9 % (ref 37–47)
HGB BLD-MCNC: 14.6 G/DL (ref 12–16)
HOLD SPECIMEN: NORMAL
IMM GRANULOCYTES # BLD AUTO: 0.02 10*3/MM3 (ref 0–0.05)
IMM GRANULOCYTES NFR BLD AUTO: 0.3 % (ref 0–5)
IRON 24H UR-MRATE: 126 MCG/DL (ref 42–180)
IRON SATN MFR SERPL: 43 % (ref 20–45)
LYMPHOCYTES # BLD AUTO: 1.48 10*3/MM3 (ref 0.72–4.86)
LYMPHOCYTES NFR BLD AUTO: 23.6 % (ref 15–45)
MCH RBC QN AUTO: 32.1 PG (ref 28–32)
MCHC RBC AUTO-ENTMCNC: 34.8 G/DL (ref 33–36)
MCV RBC AUTO: 92.1 FL (ref 82–98)
MONOCYTES # BLD AUTO: 0.49 10*3/MM3 (ref 0.19–1.3)
MONOCYTES NFR BLD AUTO: 7.8 % (ref 4–12)
NEUTROPHILS # BLD AUTO: 3.98 10*3/MM3 (ref 1.87–8.4)
NEUTROPHILS NFR BLD AUTO: 63.7 % (ref 39–78)
NRBC BLD AUTO-RTO: 0 /100 WBC (ref 0–0.2)
PLATELET # BLD AUTO: 196 10*3/MM3 (ref 130–400)
PMV BLD AUTO: 11.7 FL (ref 6–12)
POTASSIUM BLD-SCNC: 3.9 MMOL/L (ref 3.5–5.3)
PROT SERPL-MCNC: 7.7 G/DL (ref 6.3–8.7)
RBC # BLD AUTO: 4.55 10*6/MM3 (ref 4.2–5.4)
SODIUM BLD-SCNC: 140 MMOL/L (ref 135–145)
TIBC SERPL-MCNC: 294 MCG/DL (ref 225–420)
WBC NRBC COR # BLD: 6.26 10*3/MM3 (ref 4.8–10.8)
WHOLE BLOOD HOLD SPECIMEN: NORMAL

## 2019-06-21 PROCEDURE — 80053 COMPREHEN METABOLIC PANEL: CPT | Performed by: INTERNAL MEDICINE

## 2019-06-21 PROCEDURE — 80061 LIPID PANEL: CPT

## 2019-06-21 PROCEDURE — 83550 IRON BINDING TEST: CPT | Performed by: INTERNAL MEDICINE

## 2019-06-21 PROCEDURE — 84443 ASSAY THYROID STIM HORMONE: CPT

## 2019-06-21 PROCEDURE — 85025 COMPLETE CBC W/AUTO DIFF WBC: CPT | Performed by: INTERNAL MEDICINE

## 2019-06-21 PROCEDURE — 86140 C-REACTIVE PROTEIN: CPT

## 2019-06-21 PROCEDURE — 83540 ASSAY OF IRON: CPT | Performed by: INTERNAL MEDICINE

## 2019-06-21 PROCEDURE — 82728 ASSAY OF FERRITIN: CPT | Performed by: INTERNAL MEDICINE

## 2019-06-21 PROCEDURE — 36415 COLL VENOUS BLD VENIPUNCTURE: CPT | Performed by: INTERNAL MEDICINE

## 2019-06-23 PROBLEM — N89.9 NODULE OF VAGINA: Status: ACTIVE | Noted: 2019-06-23

## 2019-06-23 ASSESSMENT — ENCOUNTER SYMPTOMS
SINUS PRESSURE: 0
EYE DISCHARGE: 0
EYE REDNESS: 0
ABDOMINAL DISTENTION: 0
SHORTNESS OF BREATH: 0
ABDOMINAL PAIN: 0
BACK PAIN: 0
COUGH: 0

## 2019-06-24 LAB
ARTICHOKE IGE QN: 101 MG/DL (ref 0–99)
CHOLEST SERPL-MCNC: 142 MG/DL (ref 130–200)
CRP SERPL-MCNC: 0.84 MG/DL (ref 0–0.99)
HDLC SERPL-MCNC: 27 MG/DL
LDLC/HDLC SERPL: 3.14 {RATIO}
TRIGL SERPL-MCNC: 151 MG/DL (ref 0–149)
TSH SERPL DL<=0.05 MIU/L-ACNC: 1.22 MIU/ML (ref 0.47–4.68)

## 2019-06-25 DIAGNOSIS — E11.9 TYPE 2 DIABETES MELLITUS WITHOUT COMPLICATION, WITHOUT LONG-TERM CURRENT USE OF INSULIN (HCC): ICD-10-CM

## 2019-06-25 DIAGNOSIS — G50.0 TRIGEMINAL NEURALGIA OF LEFT SIDE OF FACE: ICD-10-CM

## 2019-06-26 ENCOUNTER — LAB (OUTPATIENT)
Dept: LAB | Facility: HOSPITAL | Age: 69
End: 2019-06-26

## 2019-06-26 DIAGNOSIS — E11.9 TYPE 2 DIABETES MELLITUS WITHOUT COMPLICATION, WITHOUT LONG-TERM CURRENT USE OF INSULIN (HCC): ICD-10-CM

## 2019-06-26 DIAGNOSIS — G50.0 TRIGEMINAL NEURALGIA OF LEFT SIDE OF FACE: ICD-10-CM

## 2019-06-26 LAB
ERYTHROCYTE [SEDIMENTATION RATE] IN BLOOD: 7 MM/HR (ref 0–20)
HBA1C MFR BLD: 7.3 %

## 2019-06-26 PROCEDURE — 83036 HEMOGLOBIN GLYCOSYLATED A1C: CPT

## 2019-06-26 PROCEDURE — 85651 RBC SED RATE NONAUTOMATED: CPT

## 2019-06-26 PROCEDURE — 36415 COLL VENOUS BLD VENIPUNCTURE: CPT

## 2019-06-27 DIAGNOSIS — E11.9 TYPE 2 DIABETES MELLITUS WITHOUT COMPLICATION, WITHOUT LONG-TERM CURRENT USE OF INSULIN (HCC): ICD-10-CM

## 2019-06-27 DIAGNOSIS — D50.8 OTHER IRON DEFICIENCY ANEMIA: Primary | ICD-10-CM

## 2019-06-27 DIAGNOSIS — E78.2 MIXED HYPERLIPIDEMIA: ICD-10-CM

## 2019-06-27 DIAGNOSIS — G50.0 TRIGEMINAL NEURALGIA OF LEFT SIDE OF FACE: ICD-10-CM

## 2019-06-27 DIAGNOSIS — K76.0 FATTY LIVER: ICD-10-CM

## 2019-06-28 ENCOUNTER — LAB REQUISITION (OUTPATIENT)
Dept: LAB | Facility: HOSPITAL | Age: 69
End: 2019-06-28

## 2019-06-28 DIAGNOSIS — Z00.00 ENCOUNTER FOR GENERAL ADULT MEDICAL EXAMINATION WITHOUT ABNORMAL FINDINGS: ICD-10-CM

## 2019-06-28 LAB
HAV IGM SERPL QL IA: NEGATIVE
HBV CORE IGM SERPL QL IA: NEGATIVE
HBV SURFACE AG SERPL QL IA: NEGATIVE
HCV AB SER DONR QL: NEGATIVE
HCV S/C RATIO: 0.01 (ref 0–0.99)

## 2019-06-28 PROCEDURE — 80074 ACUTE HEPATITIS PANEL: CPT | Performed by: INTERNAL MEDICINE

## 2019-09-17 DIAGNOSIS — D50.9 IRON DEFICIENCY ANEMIA, UNSPECIFIED IRON DEFICIENCY ANEMIA TYPE: Primary | ICD-10-CM

## 2019-09-30 ENCOUNTER — HOSPITAL ENCOUNTER (OUTPATIENT)
Dept: MAMMOGRAPHY | Facility: HOSPITAL | Age: 69
Discharge: HOME | End: 2019-09-30
Admitting: INTERNAL MEDICINE

## 2019-09-30 DIAGNOSIS — Z12.31 SCREENING MAMMOGRAM, ENCOUNTER FOR: ICD-10-CM

## 2019-09-30 PROCEDURE — 77067 SCR MAMMO BI INCL CAD: CPT

## 2019-09-30 PROCEDURE — 77063 BREAST TOMOSYNTHESIS BI: CPT

## 2019-11-05 PROBLEM — Z86.010 HX OF ADENOMATOUS COLONIC POLYPS: Status: ACTIVE | Noted: 2018-02-09

## 2019-11-05 PROBLEM — Z96.1 PSEUDOPHAKIA: Status: ACTIVE | Noted: 2018-11-21

## 2019-11-05 PROBLEM — M72.2 PLANTAR FASCIITIS: Status: ACTIVE | Noted: 2019-11-05

## 2019-11-05 PROBLEM — Z86.0101 HX OF ADENOMATOUS COLONIC POLYPS: Status: ACTIVE | Noted: 2018-02-09

## 2019-11-05 PROBLEM — H04.129 TEAR FILM INSUFFICIENCY: Status: ACTIVE | Noted: 2018-11-21

## 2019-11-08 ENCOUNTER — LAB (OUTPATIENT)
Dept: LAB | Facility: HOSPITAL | Age: 69
End: 2019-11-08

## 2019-11-08 DIAGNOSIS — D50.9 IRON DEFICIENCY ANEMIA, UNSPECIFIED IRON DEFICIENCY ANEMIA TYPE: ICD-10-CM

## 2019-11-08 LAB
ALBUMIN SERPL-MCNC: 4.6 G/DL (ref 3.5–5.2)
ALBUMIN/GLOB SERPL: 2 G/DL
ALP SERPL-CCNC: 72 U/L (ref 39–117)
ALT SERPL W P-5'-P-CCNC: 24 U/L (ref 1–33)
ANION GAP SERPL CALCULATED.3IONS-SCNC: 10 MMOL/L (ref 5–15)
AST SERPL-CCNC: 18 U/L (ref 1–32)
BASOPHILS # BLD AUTO: 0.06 10*3/MM3 (ref 0–0.2)
BASOPHILS NFR BLD AUTO: 0.8 % (ref 0–1.5)
BILIRUB SERPL-MCNC: 0.4 MG/DL (ref 0.2–1.2)
BUN BLD-MCNC: 25 MG/DL (ref 8–23)
BUN/CREAT SERPL: 43.9 (ref 7–25)
CALCIUM SPEC-SCNC: 9.8 MG/DL (ref 8.6–10.5)
CHLORIDE SERPL-SCNC: 100 MMOL/L (ref 98–107)
CO2 SERPL-SCNC: 29 MMOL/L (ref 22–29)
CREAT BLD-MCNC: 0.57 MG/DL (ref 0.57–1)
DEPRECATED RDW RBC AUTO: 42.2 FL (ref 37–54)
EOSINOPHIL # BLD AUTO: 0.23 10*3/MM3 (ref 0–0.4)
EOSINOPHIL NFR BLD AUTO: 3.1 % (ref 0.3–6.2)
ERYTHROCYTE [DISTWIDTH] IN BLOOD BY AUTOMATED COUNT: 12.6 % (ref 12.3–15.4)
FERRITIN SERPL-MCNC: 407.7 NG/ML (ref 13–150)
GFR SERPL CREATININE-BSD FRML MDRD: 105 ML/MIN/1.73
GLOBULIN UR ELPH-MCNC: 2.3 GM/DL
GLUCOSE BLD-MCNC: 160 MG/DL (ref 65–99)
HCT VFR BLD AUTO: 41.9 % (ref 34–46.6)
HGB BLD-MCNC: 14.4 G/DL (ref 12–15.9)
IMM GRANULOCYTES # BLD AUTO: 0.04 10*3/MM3 (ref 0–0.05)
IMM GRANULOCYTES NFR BLD AUTO: 0.5 % (ref 0–0.5)
IRON 24H UR-MRATE: 74 MCG/DL (ref 37–145)
IRON SATN MFR SERPL: 24 % (ref 20–50)
LYMPHOCYTES # BLD AUTO: 1.77 10*3/MM3 (ref 0.7–3.1)
LYMPHOCYTES NFR BLD AUTO: 24.1 % (ref 19.6–45.3)
MCH RBC QN AUTO: 31.4 PG (ref 26.6–33)
MCHC RBC AUTO-ENTMCNC: 34.4 G/DL (ref 31.5–35.7)
MCV RBC AUTO: 91.3 FL (ref 79–97)
MONOCYTES # BLD AUTO: 0.51 10*3/MM3 (ref 0.1–0.9)
MONOCYTES NFR BLD AUTO: 7 % (ref 5–12)
NEUTROPHILS # BLD AUTO: 4.72 10*3/MM3 (ref 1.7–7)
NEUTROPHILS NFR BLD AUTO: 64.5 % (ref 42.7–76)
NRBC BLD AUTO-RTO: 0 /100 WBC (ref 0–0.2)
PLATELET # BLD AUTO: 200 10*3/MM3 (ref 140–450)
PMV BLD AUTO: 11.6 FL (ref 6–12)
POTASSIUM BLD-SCNC: 3.7 MMOL/L (ref 3.5–5.2)
PROT SERPL-MCNC: 6.9 G/DL (ref 6–8.5)
RBC # BLD AUTO: 4.59 10*6/MM3 (ref 3.77–5.28)
SODIUM BLD-SCNC: 139 MMOL/L (ref 136–145)
TIBC SERPL-MCNC: 314 MCG/DL (ref 298–536)
TRANSFERRIN SERPL-MCNC: 211 MG/DL (ref 200–360)
WBC NRBC COR # BLD: 7.33 10*3/MM3 (ref 3.4–10.8)

## 2019-11-08 PROCEDURE — 83540 ASSAY OF IRON: CPT

## 2019-11-08 PROCEDURE — 36415 COLL VENOUS BLD VENIPUNCTURE: CPT

## 2019-11-08 PROCEDURE — 85025 COMPLETE CBC W/AUTO DIFF WBC: CPT

## 2019-11-08 PROCEDURE — 82728 ASSAY OF FERRITIN: CPT

## 2019-11-08 PROCEDURE — 80053 COMPREHEN METABOLIC PANEL: CPT

## 2019-11-08 PROCEDURE — 84466 ASSAY OF TRANSFERRIN: CPT

## 2019-11-13 DIAGNOSIS — E78.2 MIXED HYPERLIPIDEMIA: ICD-10-CM

## 2019-11-13 DIAGNOSIS — K76.0 FATTY LIVER: ICD-10-CM

## 2019-11-13 DIAGNOSIS — E11.9 TYPE 2 DIABETES MELLITUS WITHOUT COMPLICATION, WITHOUT LONG-TERM CURRENT USE OF INSULIN (HCC): ICD-10-CM

## 2019-11-13 LAB
ALBUMIN SERPL-MCNC: 4.9 G/DL (ref 3.5–5.2)
ALP BLD-CCNC: 78 U/L (ref 35–104)
ALT SERPL-CCNC: 26 U/L (ref 5–33)
ANION GAP SERPL CALCULATED.3IONS-SCNC: 18 MMOL/L (ref 7–19)
AST SERPL-CCNC: 21 U/L (ref 5–32)
BILIRUB SERPL-MCNC: 0.6 MG/DL (ref 0.2–1.2)
BUN BLDV-MCNC: 22 MG/DL (ref 8–23)
CALCIUM SERPL-MCNC: 10 MG/DL (ref 8.8–10.2)
CHLORIDE BLD-SCNC: 99 MMOL/L (ref 98–111)
CHOLESTEROL, TOTAL: 149 MG/DL (ref 160–199)
CO2: 24 MMOL/L (ref 22–29)
CREAT SERPL-MCNC: 0.6 MG/DL (ref 0.5–0.9)
FERRITIN: 487.5 NG/ML (ref 13–150)
GFR NON-AFRICAN AMERICAN: >60
GLUCOSE BLD-MCNC: 137 MG/DL (ref 74–109)
HBA1C MFR BLD: 6.4 % (ref 4–6)
HCT VFR BLD CALC: 43.9 % (ref 37–47)
HDLC SERPL-MCNC: 41 MG/DL (ref 65–121)
HEMOGLOBIN: 14.2 G/DL (ref 12–16)
HEPATITIS C ANTIBODY INTERPRETATION: NORMAL
IRON: 101 UG/DL (ref 37–145)
LDL CHOLESTEROL CALCULATED: 86 MG/DL
MCH RBC QN AUTO: 30.9 PG (ref 27–31)
MCHC RBC AUTO-ENTMCNC: 32.3 G/DL (ref 33–37)
MCV RBC AUTO: 95.4 FL (ref 81–99)
PDW BLD-RTO: 12.6 % (ref 11.5–14.5)
PLATELET # BLD: 229 K/UL (ref 130–400)
PMV BLD AUTO: 12 FL (ref 9.4–12.3)
POTASSIUM SERPL-SCNC: 4.1 MMOL/L (ref 3.5–5)
RBC # BLD: 4.6 M/UL (ref 4.2–5.4)
SODIUM BLD-SCNC: 141 MMOL/L (ref 136–145)
TOTAL PROTEIN: 7.6 G/DL (ref 6.6–8.7)
TRIGL SERPL-MCNC: 108 MG/DL (ref 0–149)
TSH SERPL DL<=0.05 MIU/L-ACNC: 1.74 UIU/ML (ref 0.27–4.2)
WBC # BLD: 7.3 K/UL (ref 4.8–10.8)

## 2019-11-13 NOTE — PROGRESS NOTES
"[facility] HEMATOLOGY AND ONCOLOGY  [address]  [facility] [address] [phone]    Patient Name: Carlotta Dupont  Encounter Date: 11/15/2019  YOB: 1950  Patient Number: [id number]      REASON FOR VISIT: Ms. Carlotta Dupont is a 68-year-old female who returns in follow-up of anemia with iron deficiency. It as been 10 months since last receipt of IV Injectafer. She is here alone (usually with her spouse Hung). History is obtained from the patient who is considered reliable.     DIAGNOSTIC ABNORMALITIES:   1. Review of labs from the referring office dating back to 01/12/2018 includes a CBC showing a hemoglobin of 11.4, hematocrit 37.4, MCV 82.2 (81 - 99), MCH 25.1, MCHC 30 (each depressed), RDW 15.6% (elevated). CMP notable for hyperglycemia and BUN of 21 (elevated), otherwise normal with GFR greater than 60, calcium 10.1, total protein 7.6, and normal liver enzymes.   2. Labs, 02/03/2018: Hemoglobin 11.2, hematocrit 37.2, MCV 82.7, platelets 257,000, WBC 6.8. Serum iron 20, \"low iron saturation,\" ferritin 8.5. She prescribed ferrous sulfate 1 p.o. daily 02/03/2018 which she did not start due to prior intolerance, \"Bone and joint pains when I took it while I was pregnant.\" Her last colonoscopy was 12/2014 by Dr. Ozuna with 3-year followup. Stools for fecal occult blood x3 were said to be (+).   3. Labs, 02/22/2018: Hemoglobin 11.2, hematocrit 37.5, MCV 82.6, platelets 278,000, WBC 6.8. Ferritin 7.7. BUN 26, creatinine 0.6 (GFR greater than 60).  4. EGD, 03/01/2018: Normal examined duodenum. Normal stomach. Z-line regular, 38 cm from the incisors. No specimens collected.  5. Colonoscopy, 03/01/2018: The entire examined colon is normal on direct and retroflexion views. No specimens collected. Repeat 3 years.  6. Labs, 03/19/2018: Hemoglobin 12.2, hematocrit 35.5, MCV 81.5, platelets 315,000, WBC 7.7 with a normal differential. CMP " notable for glucose 157, otherwise normal with BUN 18, creatinine 0.7 (GFR 88.7), calcium 10.4, total protein 8.3, normal liver enzymes. Serum iron 30, saturation 6.22%, ferritin 5.2, TIBC 482.  7. M2A Capsule - Date capsule was swallowed: 04/20/2018. Date capsule was read : 04/28/2018. RESULTS: Gastric passage time: 1 hour 55 minutes. Small bowel passage time: 2 hours 39 minutes. The capsule was clearly seen in the colon. Conclusion: 1 small AVM was noted at 1 hour 59 minutes and 38 seconds. Several small areas of the lymphangitic ectasia noted. The capsule was seen freely passing into the cecum. There was no activity noted on this exam.    PREVIOUS INTERVENTIONS:   1. Injectafer 750 mg IV weekly x2, 03/23/2018 through 03/30/2018 (1500 mg); 10/12/2018 (750 mg); 01/11/2019 (750 mg)        Problem List Items Addressed This Visit        Endocrine    Controlled type 2 diabetes mellitus without complication, without long-term current use of insulin (CMS/[facility])    Overview     Hold the am of procedure            Nervous and Auditory    Trigeminal neuralgia       Hematopoietic and Hemostatic    Iron deficiency anemia - Primary         No history exists.       PAST MEDICAL HISTORY:  ALLERGIES:  Allergies   Allergen Reactions   • Zovirax [Acyclovir] Rash and Other (See Comments)     Other reaction(s): Other (See Comments)  PhX  PhX   • Meperidine Other (See Comments)     halluncinations  halluncinations   • Propranolol Other (See Comments)     Other reaction(s): Other (See Comments)  Bad dreams  unknown  Bad dreams  unknown  Bad dreams   • Sertraline Hcl Other (See Comments)     hallucinations  hallucinations   • Vesicare [Solifenacin Succinate] Other (See Comments)     Severe constipation   • Solifenacin Other (See Comments)     Other reaction(s): Other (See Comments)  Phx  Phx  Couldn't urinate     CURRENT MEDICATIONS:  Outpatient Encounter Medications as of 11/15/2019   Medication Sig Dispense Refill   • albuterol sulfate   (90 Base) MCG/ACT inhaler   5   • ALPRAZolam (XANAX) 0.25 MG tablet Take  by mouth.     • aspirin 81 MG tablet Take 81 mg by mouth.     • aspirin-acetaminophen-caffeine (EXCEDRIN MIGRAINE) 250-250-65 MG per tablet Take 1 tablet by mouth.     • calcium carbonate (TUMS) 500 MG chewable tablet Chew 1 tablet Daily.     • conjugated estrogens (PREMARIN) 0.625 MG/GM vaginal cream 1gm twice a week (this replaces Estradiol cream per insurance 4/10/18)     • famotidine (PEPCID) 10 MG tablet Take 10 mg by mouth At Night As Needed for Heartburn.     • hydrochlorothiazide (MICROZIDE) 12.5 MG capsule Take 12.5 mg by mouth Every Morning.     • losartan (COZAAR) 100 MG tablet TAKE ONE TABLET BY MOUTH EVERY DAY     • metFORMIN (GLUCOPHAGE) 500 MG tablet Take 500 mg by mouth 2 (Two) Times a Day With Meals. 500m g in am and 1000mg at night     • montelukast (SINGULAIR) 10 MG tablet Take 10 mg by mouth Every Night.     • Multiple Vitamins-Minerals (PRESERVISION AREDS PO) Take  by mouth.     • rosuvastatin (CRESTOR) 5 MG tablet Take 1/2 tablet on Mon, Wed, Fri     • verapamil SR (CALAN-SR) 240 MG CR tablet TAKE ONE TABLET BY MOUTH TWICE A DAY     • vitamin D (ERGOCALCIFEROL) 49925 units capsule capsule Take 50,000 Units by mouth Every 7 (Seven) Days.     • [DISCONTINUED] butalbital-acetaminophen-caffeine (FIORICET, ESGIC) -40 MG per tablet Take 1-2 tablets by mouth.     • [DISCONTINUED] esomeprazole (nexIUM) 40 MG capsule Take 20 mg by mouth Every Morning Before Breakfast.     • [DISCONTINUED] estradiol (ESTRACE) 0.1 MG/GM vaginal cream Insert 1/4 applicatorful (1GM) vaginally twice weekly     • [DISCONTINUED] oxyCODONE (ROXICODONE) 5 MG immediate release tablet Take 5-10 mg by mouth.     • [DISCONTINUED] SHINGRIX 50 MCG/0.5ML reconstituted suspension        No facility-administered encounter medications on file as of 11/15/2019.      ADULT ILLNESSES:   Iron deficiency anemia (disorder) ( ICD-10:D50.9 ;Iron deficiency  anemia, unspecified   Elevated liver function test ( ICD-10:R94.5 ;Abnormal results of liver function studies   Fatigue ( ICD-10:R53.83 ;Other fatigue   Fatty liver ( ICD-10:K76.0 ;Fatty (change of) liver, not elsewhere classified   Gallbladder calculus (disorder) ( ICD-10:K80.20 ;Calculus of gallbladder without cholecystitis without obstruction   Gastroesophageal reflux disease without esophagitis (disorder) ( ICD-10:K21.9 ;Gastro-esophageal reflux disease without esophagitis   Hyperlipidemia ( ICD-10:E78.5 ;Hyperlipidemia, unspecified   Hypertension ( ICD-10:I10 ;Essential (primary) hypertension   Lumbar disc degenerative disease ( ICD-10:M51.36 ;Other intervertebral disc degeneration, lumbar region   Migraine ( ICD-10:G43.909 ;Migraine, unspecified, not intractable, without status migrainosus   Mixed hyperlipidemia ( ICD-10:E78.2 ;Mixed hyperlipidemia   Obstructive sleep apnea ( ICD-10:G47.33 ;Obstructive sleep apnea (adult) (pediatric)   Sacroiliitis ( ICD-10:M46.1 ;Sacroiliitis, not elsewhere classified   Trigeminal neuralgia ( ICD-10:G50.0 ;Trigeminal neuralgia   Type 2 diabetes ( ICD-10:E11.9 ;Type 2 diabetes mellitus without complications   Vitamin D deficiency (disorder) ( ICD-10:E55.9 ;Vitamin D deficiency, unspecified    SURGERIES:   Mastoidectomy, 05/17/2018. 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 Perez/Dr. Oconnor)   Colonoscopy, 03/01/2018. The entire examined colon is normal on direct and retroflexion views. No specimens collected. Repeat 3 years   Cystoscopy, 09/072018 ([facility]). No urethral lesions. No tumors, stones, or other mucosal lesions in the bladder. Ureteral orifices were orthotopic and normal in their appearance   EGD, 03/01/2018. Normal examined duodenum. Normal stomach. Z-line regular, 38 cm from the incisors. No specimens collected.   Decompression of trigeminal nerve (V) (procedure), Note: microvascular, at the [facility]  "[place], 05/12/2017   Biopsy of breast, x3, 1969, 1974, 1980   Vaginal and bladder repair, 07/03/2012   Hysterectomy without BSO, 1976   Colonoscopic polypectomy: Colonoscopy, 2014. \"3 polyps.\" Dr. Ozuna  Sacrocolpopexy and Macroplastique, 7/3/2012      ADULT ILLNESSES:  Patient Active Problem List   Diagnosis Code   • Iron deficiency anemia D50.9   • Heme positive stool R19.5   • BRBPR (bright red blood per rectum) K62.5   • NSAID long-term use Z79.1   • Gastroesophageal reflux disease K21.9   • Hx of adenomatous colonic polyps Z86.010   • Nonsmoker Z78.9   • Controlled type 2 diabetes mellitus without complication, without long-term current use of insulin (CMS/HCC) E11.9   • HTN (hypertension), benign I10   • BMI 25.0-25.9,adult Z68.25   • Superficial postoperative wound infection T81.49XA   • Trigeminal neuralgia G50.0   • Open scalp wound S01.00XA   • Wound dehiscence T81.30XA   • Mastoiditis, chronic, left H70.12   • Asthma J45.909   • Calculus of gallbladder K80.20   • Fatty liver K76.0   • Lumbar disc disease with radiculopathy M51.16   • Migraine without aura, not intractable G43.009   • Mixed hyperlipidemia E78.2   • Hill's metatarsalgia G57.60   • Plantar fasciitis M72.2   • Obstructive sleep apnea G47.33   • Nodule of vagina N89.9   • Prolapse of vaginal wall N81.10   • Pseudophakia Z96.1   • Tear film insufficiency H04.129   • Vitamin D deficiency E55.9     SURGERIES:  Past Surgical History:   Procedure Laterality Date   • APPENDECTOMY     • BLADDER REPAIR      had vaginal repair at the same time   • BREAST BIOPSY Bilateral    • CAPSULE ENDOSCOPY N/A 4/20/2018    Procedure: CAPSULE ENDOSCOPY M2A;  Surgeon: Garrett Ozuna MD;  Location:  [facility];  Service: Gastroenterology   • COLONOSCOPY N/A 3/1/2018    Procedure: COLONOSCOPY WITH ANESTHESIA;  Surgeon: Garrett Ozuna MD;  Location:  [facility];  Service:    • COLONOSCOPY W/ POLYPECTOMY  12/31/2014    Tubular adenomatous polyp at 80 cm, " Hyperplastic polyp rectum repeat exam in 3 years   • ENDOSCOPY N/A 3/1/2018    Procedure: ESOPHAGOGASTRODUODENOSCOPY WITH ANESTHESIA;  Surgeon: Garrett Ozuna MD;  Location:  [facility];  Service:    • FLAP HEAD/NECK Left 5/17/2018    Procedure: POSSIBLE STERNOCLEIDOMASTOID FLAP;  Surgeon: Kadeem Oconnor MD;  Location:  [facility];  Service: ENT   • HYSTERECTOMY     • MASTOIDECTOMY Left 5/17/2018    Procedure: Wound exploration with possible mastoidectomy; possible sternocleidomastoid flap.  Please schedule with Dr. Perez.;  Surgeon: Kadeem Oconnor MD;  Location:  [facility];  Service: ENT   • TRIGEMINAL NERVE DECOMPRESSION       HEALTH MAINTENANCE ITEMS:  Health Maintenance Due   Topic Date Due   • URINE MICROALBUMIN  1950   • TDAP/TD VACCINES (1 - Tdap) 09/12/1969   • ZOSTER VACCINE (1 of 2) 09/12/2000   • MEDICARE ANNUAL WELLNESS  09/28/2017   • DIABETIC FOOT EXAM  09/28/2017   • DIABETIC EYE EXAM  09/28/2017   • INFLUENZA VACCINE  08/01/2019       <no information>  Last Completed Colonoscopy       Status Date      COLONOSCOPY Done 3/1/2018 Surg:COLONOSCOPY     Patient has more history with this topic...          There is no immunization history on file for this patient.  Last Completed Mammogram       Status Date      MAMMOGRAM Done 9/30/2019 MAMMO SCREENING DIGITAL TOMOSYNTHESIS BILATERAL W CAD     Patient has more history with this topic...            FAMILY HISTORY:  Family History   Problem Relation Age of Onset   • Breast cancer Sister    • No Known Problems Mother    • No Known Problems Father    • No Known Problems Brother    • No Known Problems Daughter    • No Known Problems Son    • No Known Problems Maternal Grandmother    • No Known Problems Paternal Grandmother    • No Known Problems Maternal Aunt    • No Known Problems Paternal Aunt    • Breast cancer Niece    • Colon cancer Neg Hx    • Colon polyps Neg Hx    • BRCA 1/2 Neg Hx    • Endometrial cancer Neg Hx    • Ovarian cancer Neg Hx   "    SOCIAL HISTORY:  Social History     Socioeconomic History   • Marital status:      Spouse name: Not on file   • Number of children: Not on file   • Years of education: Not on file   • Highest education level: Not on file   Tobacco Use   • Smoking status: Never Smoker   • Smokeless tobacco: Never Used   Substance and Sexual Activity   • Alcohol use: Yes     Comment: Rare   • Drug use: No   • Sexual activity: Defer       REVIEW OF SYSTEMS:  Constitutional:   The patient's appetite is good but energy is low. \"Tired from taking care of my mother.\" She has no fevers, chills, or drenching night sweats. Her sleep habits seem appropriate with a posture pedic bed that allows her to sleep semi-recumbent.  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 Perez and Dr. Oconnor). \"It's just there.\" Says repeat MRI sometime 11/2018 and 12/2018 after antibiotics (Dr. Rivers) showed clearing of the mastoid infection.  She has no ear pains, sinus symptoms, sore throat, nosebleeds, or sore tongue. She has no headaches. She denies any hoarseness, change in voice quality, or hemoptysis.   Ocular:   She reports no eye pain, significant change in visual acuity, double vision, with occasional blurry vision, left eye (OS).  Respiratory:   She reports no chronic cough, significant shortness of breathing, phlegm production, or unexplained chest wall pain. Has sleep apnea but still does not tolerate mask for CPAP.  Cardiovascular:   She reports no exertional chest pain, chest pressure, or chest heaviness. She reports no claudication. She reports no palpitations or symptomatic orthostasis.  Gastrointestinal:   She reports no pica, dysphagia, nausea, vomiting, postprandial abdominal pain, bloating, cramping, change in bowel habits, or discoloration of the " "stool. She reports no rectal bleeding. She reports no constipation or diarrhea. EGD and colonoscopy last 03/01/2018.  Genitourinary:   She reports no urinary burning, frequency, dribbling, or discoloration. She reports no difficulty controlling her bladder. She has no need to urinate frequently through the night.   Musculoskeletal:   She reports no new arthralgias or myalgias but has more frequent bouts of nighttime leg pains/cramping for the past \"10 years or more.\" Says she uses Aleve at bedtime 3-4x/week.  Extremities:   She reports no trouble with fluid retention or significant leg swelling.  Endocrine:   She reports no problems with excess thirst, excessive urination, vasomotor instability, or unexplained fatigue.  Heme/Lymphatic:   She reports no unexplained bleeding, bruising, petechial rashes, or swollen glands.  Skin:   She reports no itching, rashes, or lesions which won't heal.  Neuro:   She reports no loss of consciousness, seizures, fainting spells, or dizziness. She reports no weakness of face, arms, or legs. She has no difficulty with speech. She has no tremors. Has paresthesias of the soles of her feet.  She has residual left-sided facial numbness.   Psych:   She denies depression currently. She reports no mood swings.  Thinks she may be fatigued due to being the primary caregiver of her 90 year old mother. \"All my woes may be related to that.\"          VITAL SIGNS: /82   Pulse 76   Temp 96.5 °F (35.8 °C)   Resp 16   Ht 172.7 cm (68\")   Wt 78.7 kg (173 lb 9.6 oz)   SpO2 98%   Breastfeeding? No   BMI 26.40 kg/m² Body surface area is 1.92 meters squared.  Pain Score    11/15/19 0936   PainSc: 0-No pain         PHYSICAL EXAMINATION:   General:   She is a pleasant, well-developed, well-nourished, and modestly-kept female who is comfortable at rest. She arrived in the exam room ambulatory. She appears to be her stated age. Her skin color is normal (previously slightly pale).  Head/Neck: "   The patient is anicteric and atraumatic. The oropharynx is clear. The mouth and throat are clear. The trachea is midline. The neck is supple without evidence of jugular venous distention or cervical adenopathy. The left mastoid is not swollen and much less tender.  Eyes:   The pupils are equal, round, and reactive to light. The extraocular movements are full. There is no scleral jaundice or erythema.   Chest:   The respiratory efforts are normal and unhindered. The chest is clear to auscultation and percussion. There are no wheezes, rhonchi, rales, or asymmetry of breath sounds.  Cardiovascular:   The patient has a regular cardiac rate and rhythm without murmurs, rubs, or gallops. The peripheral pulses are equal and full.  Abdomen:   The belly is soft and slightly globose. There is no rebound or guarding. There is no organomegaly, mass-effect, or tenderness. Bowel sounds are active and of normal character.  Extremities:   There is no evidence of cyanosis, clubbing, or edema.  Rheumatologic:   There is no overt evidence of rheumatoid deformities of the hands. There is no sausaging of the fingers. There is no sign of active synovitis. The gait is normal.  Cutaneous:   There are no overt rashes, disseminated lesions, purpura, or petechiae.   Lymphatics:   There is no evidence of adenopathy in the cervical, supraclavicular, axillary, inguinal, or femoral areas. There is no splenomegaly.  Neurologic:   The patient is alert, oriented, cooperative, and pleasant. She is appropriately conversant. She ambulated into the exam room without assistance and transferred from chair to exam table unaided. There is no overt dysfunction of the motor, sensory, cerebellar systems.  Psych:   Mood and affect are appropriate for circumstance. Eye contact is appropriate. Normal judgement and decision making.         LABS    Lab Results - Last 18 Months   Lab Units 11/13/19  0647 11/08/19  0739 06/21/19  0824 03/22/19  0950 10/09/18  120  08/09/18  0802 07/25/18  1126 06/18/18  1119   HEMOGLOBIN g/dL 14.2 14.4 14.6 14.8 15.1 14.1 14.0 14.5   HEMATOCRIT % 43.9 41.9 41.9 43.1 43.2 43.2 40.5 43.4   MCV fL 95.4 91.3 92.1 93.1 91.1 93.7 89.0 88.4   WBC K/uL 7.3 7.33 6.26 5.55 6.56 6.5 7.32 7.00   RDW % 12.6 12.6 12.4 12.0 12.2 13.2 13.6 16.1*   MPV fL 12.0 11.6 11.7 11.7 11.5 11.2 12.0 11.7   PLATELETS K/uL 229 200 196 196 231 224 241 232   IMM GRAN % %  --  0.5 0.3 0.4 0.5  --  0.3 0.4   NEUTROS ABS 10*3/mm3  --  4.72 3.98 3.36 4.05 3.8 4.22 4.07   LYMPHS ABS 10*3/mm3  --  1.77 1.48 1.31 1.90 1.8 2.06 2.04   MONOS ABS 10*3/mm3  --  0.51 0.49 0.46 0.40 0.50 0.50 0.46   EOS ABS 10*3/mm3  --  0.23 0.24 0.35 0.13 0.40 0.45 0.35   BASOS ABS 10*3/mm3  --  0.06 0.05 0.05 0.05 0.10 0.07 0.05   IMMATURE GRANS (ABS) 10*3/mm3  --  0.04 0.02 0.02 0.03  --  0.02 0.03   NRBC /100 WBC  --  0.0 0.0 0.0 0.0  --  0.0 0.0       Lab Results - Last 18 Months   Lab Units 11/13/19  0647 11/08/19  0739 06/21/19  0824 03/22/19  0950 10/09/18  1207 10/09/18  0813 07/25/18  1126   GLUCOSE mg/dL 137* 160* 162* 215* 134*  --  119*   SODIUM mmol/L 141 139 140 140 140  --  141   POTASSIUM mmol/L 4.1 3.7 3.9 3.8 3.6  --  4.0   TOTAL CO2 mmol/L 24  --   --   --   --   --   --    CO2 mmol/L  --  29.0 26.0 28.0 25.0  --  28.0   CHLORIDE mmol/L 99 100 100 98 100  --  98   ANION GAP mmol/L 18 10.0 14.0* 14.0* 15.0*  --  15.0*   CREATININE mg/dL 0.6 0.57 0.65 0.60 0.56 0.70 0.59   BUN mg/dL 22 25* 25* 22* 19  --  24*   BUN / CREAT RATIO   --  43.9* 38.5* 36.7* 33.9*  --  40.7*   CALCIUM mg/dL 10.0 9.8 9.9 10.0 10.0  --  9.8   EGFR IF NONAFRICN AM  >60 105 91 99 108  --  102   ALK PHOS U/L 78 72 85 86 70  --  60   TOTAL PROTEIN g/dL 7.6 6.9 7.7 7.4 7.6  --  7.1   ALT (SGPT) U/L 26 24 75* 93* 60*  --  43   AST (SGOT) U/L 21 18 55* 56* 38  --  34   BILIRUBIN mg/dL 0.6 0.4 0.9 0.7 0.4  --  0.6   ALBUMIN g/dL 4.9 4.60 4.90 4.80 4.80  --  4.50   GLOBULIN gm/dL  --  2.3 2.8 2.6 2.8  --  2.6        No results for input(s): MSPIKE, KAPPALAMB, IGLFLC, URICACID, FREEKAPPAL, CEA, LDH, REFLABREPO in the last 54781 hours.    Lab Results - Last 18 Months   Lab Units 11/13/19  0647 11/08/19  0739 06/21/19  0825 06/21/19  0824 03/22/19  0950   IRON ug/dL 101 74  --  126 126   TIBC mcg/dL  --  314  --  294 294   IRON SATURATION %  --  24  --  43 43   FERRITIN ng/mL 487.5* 407.70*  --  548.00* 745.00*   TSH uIU/mL 1.740  --  1.220  --   --        ASSESSMENT:   1. Normocytic/borderline microcytic anemia with profound iron deficiency. Hemoglobin 14.4; MCV 91.3, 11/08/2019 (prior range: Hemoglobin 11.2 - 14.2; MCV 81.5 - 92.1). Resolved since last receipt of Injectafer.   2. Fatigue related to #1. Much improved.   3. Profound iron deficiency with a ferritin of 2.7 on 03/03/2018.   a. Last EGD and colonoscopy 03/01/2018 (above). Both unrevealing.   b. M2A Capsule - Date capsule was swallowed: 04/20/2018. Date capsule was read : 04/28/2018. RESULTS: Gastric passage time: 1 hour 55 minutes. Small bowel passage time: 2 hours 39 minutes. The capsule was clearly seen in the colon. Conclusion: 1 small AVM was noted at 1 hour 59 minutes and 38 seconds. Several small areas of the lymphangitic ectasia noted. The capsule was seen freely passing into the cecum. There was no activity noted on this exam.   c. Abnormal CT abdomen/enterography, 05/15/2018 at [facility]: Impression: 3.9 cm focus of mass-like enhancement involving the vagina, proximal urethra, and bladder dome. Unsure if this reflects neoplasm or post surgical change; however, neoplastic process arising from the vagina certainly not excluded by CT. Recommend direct visualization. No suspicious focal lesions identified in the bowel. No suspicious adenopathy in the abdomen or pelvis. Hepatic steatosis.         d. Seen by Urology (Dr. Mae Esquivel) at [place], 08/04/2019. Stable exam, MRI and cystoscopic findings highly suggestive of the nodule  "representing the Macroplastique implant.  Discussed the risks of excision including recurrent stress incontinence and fistula.  Continue observation.  Continue transvaginal estrogen.  RTC 6 months..     4. Insulin-requiring type 2 diabetes.   5. Hyperlipidemia.   6. Trigeminal neuralgia of the left side of the face, lingering symptoms since trigeminal decompression procedure. 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   7. Gastroesophageal reflux disease with esophagitis.   8. Vitamin D deficiency.   9. Lumbar disk disease.   10. Elevated LFTs, NOS. Crestor held, hep profile and liver US ordered on 03/22. Liver US, 03/27/2019: 1. Hepatic steatosis. 2. Limited visualization of the pancreas. Hepatitis profile, 06/28/2019 negative.  Normal on 11/08/2019        11. Fatigue of caregiver.  Primary caregiver of her 90 year old mother. \"All my woes may be related to that.\"    RECOMMENDATIONS:   1.  Re: Apprised of the labs from 11/08/2019 (above) noting the normalization of the MCV and normalization of the Hgb, hyperglycemia, normal calcium, normal AST/ALT with otherwise normal CMP, repleted ferritin (> 100) normal Fe sat (greater than 20%), normal serum iron (from previously severe iron deficiency).   2. Previously discussed the liver ultrasound, 03/27/2019: 1. Hepatic steatosis. 2. Limited visualization of the pancreas.   3. Discuss the leg pains.  Offered a gabapentin trial.  Says she did not do well on it previously.  Still has some at home.   4. Previously reviewed report of capsule endoscopy on 04/20/2018 (above). 1 AVM and lymphangitic ectasia noted.   5. Previously reviewed reports of EGD and colonoscopy, 03/01/2018 (above).   6. The rationale and potential toxicities of IV iron (anaphylaxis included) previously discussed at length. She will agree to therapy as indicated.   7.  Review office encounter with Urology (Dr. Wall " Alvin) at [place], 08/04/2019.  Stable exam, MRI and cystoscopic findings highly suggestive of the nodule representing the Macroplastique implant.  Discussed the risks of excision including recurrent stress incontinence and fistula.  Continue observation.  Continue transvaginal estrogen.  RTC 6 months..   8. Previously reviewed reports from Urology brought in from [place], including pelvis US/MRI, 09/14/2018 and cystoscopy report, 09/07/2018 (above). Followup 6 months by Dr. Mae Ko (GYN).   9. Continue currently identified medications.   10. Continue management per primary care and other specialists.   11. Advance Care Directive discussed and forms given, 03/29/2019.   12. Return to the [place] office in 20 weeks with pre-office CMP, CBC, serum iron, iron saturation, ferritin.    MEDICAL DECISION MAKING: Moderate Complexity   AMOUNT OF DATA: Moderate     TIME SPENT: Face-to-face time on this encounter, as defined by the [employer] in the 2019 Current Procedural Terminology codebook; assessment, record review, lab review, review of office encounters from [place], planning and education - 30 minutes (greater than 50% face to face).     cc: MD Garrett Borrego MD

## 2019-11-14 LAB — ANA IGG, ELISA: NORMAL

## 2019-11-15 ENCOUNTER — OFFICE VISIT (OUTPATIENT)
Dept: ONCOLOGY | Facility: CLINIC | Age: 69
End: 2019-11-15

## 2019-11-15 VITALS
WEIGHT: 173.6 LBS | HEIGHT: 68 IN | BODY MASS INDEX: 26.31 KG/M2 | RESPIRATION RATE: 16 BRPM | OXYGEN SATURATION: 98 % | HEART RATE: 76 BPM | DIASTOLIC BLOOD PRESSURE: 82 MMHG | TEMPERATURE: 96.5 F | SYSTOLIC BLOOD PRESSURE: 128 MMHG

## 2019-11-15 DIAGNOSIS — E11.9 CONTROLLED TYPE 2 DIABETES MELLITUS WITHOUT COMPLICATION, WITHOUT LONG-TERM CURRENT USE OF INSULIN (HCC): ICD-10-CM

## 2019-11-15 DIAGNOSIS — D50.9 IRON DEFICIENCY ANEMIA, UNSPECIFIED IRON DEFICIENCY ANEMIA TYPE: Primary | ICD-10-CM

## 2019-11-15 DIAGNOSIS — G50.0 TRIGEMINAL NEURALGIA: ICD-10-CM

## 2019-11-15 PROBLEM — N81.10 PROLAPSE OF VAGINAL WALL: Status: ACTIVE | Noted: 2019-11-15

## 2019-11-15 PROBLEM — Z96.1 PSEUDOPHAKIA: Status: ACTIVE | Noted: 2018-11-21

## 2019-11-15 PROBLEM — M72.2 PLANTAR FASCIITIS: Status: ACTIVE | Noted: 2019-11-05

## 2019-11-15 PROBLEM — J45.909 ASTHMA: Status: ACTIVE | Noted: 2017-08-20

## 2019-11-15 PROBLEM — G57.60 MORTON'S METATARSALGIA: Status: ACTIVE | Noted: 2019-11-15

## 2019-11-15 PROBLEM — G47.33 OBSTRUCTIVE SLEEP APNEA: Status: ACTIVE | Noted: 2017-08-20

## 2019-11-15 PROBLEM — M51.16 LUMBAR DISC DISEASE WITH RADICULOPATHY: Status: ACTIVE | Noted: 2019-01-14

## 2019-11-15 PROBLEM — E78.2 MIXED HYPERLIPIDEMIA: Status: ACTIVE | Noted: 2017-08-20

## 2019-11-15 PROBLEM — E55.9 VITAMIN D DEFICIENCY: Status: ACTIVE | Noted: 2017-08-21

## 2019-11-15 PROBLEM — K76.0 FATTY LIVER: Status: ACTIVE | Noted: 2017-08-20

## 2019-11-15 PROBLEM — K80.20 CALCULUS OF GALLBLADDER: Status: ACTIVE | Noted: 2017-08-20

## 2019-11-15 PROBLEM — N89.9 NODULE OF VAGINA: Status: ACTIVE | Noted: 2019-06-23

## 2019-11-15 PROBLEM — G43.009 MIGRAINE WITHOUT AURA, NOT INTRACTABLE: Status: ACTIVE | Noted: 2017-08-20

## 2019-11-15 PROBLEM — H04.129 TEAR FILM INSUFFICIENCY: Status: ACTIVE | Noted: 2018-11-21

## 2019-11-15 PROCEDURE — 99214 OFFICE O/P EST MOD 30 MIN: CPT | Performed by: INTERNAL MEDICINE

## 2019-11-15 RX ORDER — ZOSTER VACCINE RECOMBINANT, ADJUVANTED 50 MCG/0.5
KIT INTRAMUSCULAR
COMMUNITY
Start: 2019-10-18 | End: 2019-11-15

## 2019-11-15 RX ORDER — BUTALBITAL, ACETAMINOPHEN AND CAFFEINE 50; 325; 40 MG/1; MG/1; MG/1
1-2 TABLET ORAL
COMMUNITY
Start: 2017-05-14 | End: 2019-11-15 | Stop reason: ALTCHOICE

## 2019-11-15 RX ORDER — ACETAMINOPHEN, ASPIRIN AND CAFFEINE 250; 250; 65 MG/1; MG/1; MG/1
1 TABLET, FILM COATED ORAL AS NEEDED
COMMUNITY

## 2019-11-15 RX ORDER — ALBUTEROL SULFATE 90 UG/1
AEROSOL, METERED RESPIRATORY (INHALATION) AS NEEDED
Refills: 5 | COMMUNITY
Start: 2019-10-25

## 2019-11-15 RX ORDER — OXYCODONE HYDROCHLORIDE 5 MG/1
5-10 TABLET ORAL
COMMUNITY
Start: 2017-05-14 | End: 2019-11-15

## 2019-11-15 NOTE — TELEPHONE ENCOUNTER
Patient reports that she has Gabapentin 100 mg. I let her know that Dr. Hsieh wants her to take 1 tablet at bedtime for her leg pains.

## 2019-11-18 ENCOUNTER — OFFICE VISIT (OUTPATIENT)
Dept: INTERNAL MEDICINE | Age: 69
End: 2019-11-18
Payer: MEDICARE

## 2019-11-18 VITALS
HEART RATE: 86 BPM | OXYGEN SATURATION: 98 % | WEIGHT: 170 LBS | BODY MASS INDEX: 25.18 KG/M2 | SYSTOLIC BLOOD PRESSURE: 130 MMHG | DIASTOLIC BLOOD PRESSURE: 80 MMHG | HEIGHT: 69 IN

## 2019-11-18 DIAGNOSIS — G50.0 TRIGEMINAL NEURALGIA OF LEFT SIDE OF FACE: Chronic | ICD-10-CM

## 2019-11-18 DIAGNOSIS — Z23 NEED FOR INFLUENZA VACCINATION: ICD-10-CM

## 2019-11-18 DIAGNOSIS — I10 ESSENTIAL HYPERTENSION: Chronic | ICD-10-CM

## 2019-11-18 DIAGNOSIS — E11.9 TYPE 2 DIABETES MELLITUS WITHOUT COMPLICATION, WITHOUT LONG-TERM CURRENT USE OF INSULIN (HCC): Primary | Chronic | ICD-10-CM

## 2019-11-18 DIAGNOSIS — M79.604 PAIN IN BOTH LOWER EXTREMITIES: ICD-10-CM

## 2019-11-18 DIAGNOSIS — M79.605 PAIN IN BOTH LOWER EXTREMITIES: ICD-10-CM

## 2019-11-18 PROCEDURE — 99213 OFFICE O/P EST LOW 20 MIN: CPT | Performed by: INTERNAL MEDICINE

## 2019-11-18 PROCEDURE — G8400 PT W/DXA NO RESULTS DOC: HCPCS | Performed by: INTERNAL MEDICINE

## 2019-11-18 PROCEDURE — 1123F ACP DISCUSS/DSCN MKR DOCD: CPT | Performed by: INTERNAL MEDICINE

## 2019-11-18 PROCEDURE — 90653 IIV ADJUVANT VACCINE IM: CPT | Performed by: INTERNAL MEDICINE

## 2019-11-18 PROCEDURE — G8417 CALC BMI ABV UP PARAM F/U: HCPCS | Performed by: INTERNAL MEDICINE

## 2019-11-18 PROCEDURE — 1036F TOBACCO NON-USER: CPT | Performed by: INTERNAL MEDICINE

## 2019-11-18 PROCEDURE — 1090F PRES/ABSN URINE INCON ASSESS: CPT | Performed by: INTERNAL MEDICINE

## 2019-11-18 PROCEDURE — 4040F PNEUMOC VAC/ADMIN/RCVD: CPT | Performed by: INTERNAL MEDICINE

## 2019-11-18 PROCEDURE — 3017F COLORECTAL CA SCREEN DOC REV: CPT | Performed by: INTERNAL MEDICINE

## 2019-11-18 PROCEDURE — G8427 DOCREV CUR MEDS BY ELIG CLIN: HCPCS | Performed by: INTERNAL MEDICINE

## 2019-11-18 PROCEDURE — 2022F DILAT RTA XM EVC RTNOPTHY: CPT | Performed by: INTERNAL MEDICINE

## 2019-11-18 PROCEDURE — G0008 ADMIN INFLUENZA VIRUS VAC: HCPCS | Performed by: INTERNAL MEDICINE

## 2019-11-18 PROCEDURE — G8482 FLU IMMUNIZE ORDER/ADMIN: HCPCS | Performed by: INTERNAL MEDICINE

## 2019-11-18 PROCEDURE — 3044F HG A1C LEVEL LT 7.0%: CPT | Performed by: INTERNAL MEDICINE

## 2019-11-18 RX ORDER — GABAPENTIN 100 MG/1
100 CAPSULE ORAL NIGHTLY
Qty: 90 CAPSULE | Refills: 1
Start: 2019-11-18 | End: 2020-05-18

## 2019-11-30 ASSESSMENT — ENCOUNTER SYMPTOMS
COUGH: 0
ABDOMINAL PAIN: 0
SINUS PRESSURE: 0
EYE DISCHARGE: 0
BACK PAIN: 0
ABDOMINAL DISTENTION: 0
SHORTNESS OF BREATH: 0
EYE REDNESS: 0

## 2020-02-03 NOTE — PROGRESS NOTES
"Carlotta Dupont returns to the office following excision of a mastoid-cutaneous fistula, left cortical mastoidectomy, complex closure of the wound and facial nerve monitoring on 2018.    SUBJECTIVE:  Mrs Dupont reports immediate onset nerve pain along the scar 1 week ago on .  This resolved after 3 minutes.  She now complains of 6 at 10 tenderness to the left mastoid, auricle, and on the upper aspect of the sternocleidomastoid muscle.  This is mildly tender to palpation.  This is no longer like a nerve type pain.  She denies symptoms similar to her prior trigeminal neuralgia.  She denies any fevers or chills.  She reports a little bit of decreased hearing on the left.  Denies any vertigo or disequilibrium.  Denies any wound drainage.  She is taking Fioricet and this helped a little bit.  She has been under a bit of stress lately with the loss of her father and the amount of visitors due to the .      OBJECTIVE:  /79   Pulse 76   Temp 97.8 °F (36.6 °C)   Resp 20   Ht 172.7 cm (68\")   Wt 78.5 kg (173 lb)   BMI 26.30 kg/m²   The incision line is healing well.  There is mild tenderness over the mastoid process of the sternocleidomastoid insertion.  There is no erythema surrounding the wound.  There is no drainage.  The parotid is healthy without any evidence of mass.  The external auditory canal and tympanic membranes are healthy.  There is no evidence of middle ear effusion.  [place] testing is non-lateralizing at 512 Hz.  [place] testing given strict air conduction greater than bone conduction bilaterally.      Culture results:          She was originally discharged on Omnicef for 10 days.  She finished this.      ASSESSMENT:  Carlotta was seen today for follow-up.    Diagnoses and all orders for this visit:    Otalgia of left ear  -     CBC & Differential; Future  -     Sedimentation Rate; Future    Other orders  -     amoxicillin-clavulanate (AUGMENTIN) 875-125 MG per tablet; Take 1 tablet " by mouth 2 (Two) Times a Day for 10 days.        PLAN:     We will start 10 days of Augmentin.  Prior to treatment, will get an ESR and a CBC.  If her symptoms persist after to 3 days, may consider switching to Bactrim.  I do not feel like a CT scan is warranted at this time, unless her symptoms fail to resolve on antibiotics or progress.    Follow-up one week.  I provided them my cell phone number should her symptoms be worsening.      Kadeem Oconnor MD   05/25/2018  10:38 AM     DISPLAY PLAN FREE TEXT

## 2020-03-13 ENCOUNTER — LAB (OUTPATIENT)
Dept: LAB | Facility: HOSPITAL | Age: 70
End: 2020-03-13

## 2020-03-13 DIAGNOSIS — G50.0 TRIGEMINAL NEURALGIA: ICD-10-CM

## 2020-03-13 DIAGNOSIS — E11.9 CONTROLLED TYPE 2 DIABETES MELLITUS WITHOUT COMPLICATION, WITHOUT LONG-TERM CURRENT USE OF INSULIN (HCC): ICD-10-CM

## 2020-03-13 DIAGNOSIS — D50.9 IRON DEFICIENCY ANEMIA, UNSPECIFIED IRON DEFICIENCY ANEMIA TYPE: ICD-10-CM

## 2020-03-13 LAB
ALBUMIN SERPL-MCNC: 4.9 G/DL (ref 3.5–5.2)
ALBUMIN/GLOB SERPL: 1.7 G/DL
ALP SERPL-CCNC: 88 U/L (ref 39–117)
ALT SERPL W P-5'-P-CCNC: 41 U/L (ref 1–33)
ANION GAP SERPL CALCULATED.3IONS-SCNC: 15 MMOL/L (ref 5–15)
AST SERPL-CCNC: 24 U/L (ref 1–32)
BASOPHILS # BLD AUTO: 0.04 10*3/MM3 (ref 0–0.2)
BASOPHILS NFR BLD AUTO: 0.6 % (ref 0–1.5)
BILIRUB SERPL-MCNC: 0.6 MG/DL (ref 0.2–1.2)
BUN BLD-MCNC: 25 MG/DL (ref 8–23)
BUN/CREAT SERPL: 37.9 (ref 7–25)
CALCIUM SPEC-SCNC: 10 MG/DL (ref 8.6–10.5)
CHLORIDE SERPL-SCNC: 101 MMOL/L (ref 98–107)
CO2 SERPL-SCNC: 25 MMOL/L (ref 22–29)
CREAT BLD-MCNC: 0.66 MG/DL (ref 0.57–1)
DEPRECATED RDW RBC AUTO: 42 FL (ref 37–54)
EOSINOPHIL # BLD AUTO: 0.22 10*3/MM3 (ref 0–0.4)
EOSINOPHIL NFR BLD AUTO: 3.3 % (ref 0.3–6.2)
ERYTHROCYTE [DISTWIDTH] IN BLOOD BY AUTOMATED COUNT: 12.5 % (ref 12.3–15.4)
FERRITIN SERPL-MCNC: 446.8 NG/ML (ref 13–150)
GFR SERPL CREATININE-BSD FRML MDRD: 89 ML/MIN/1.73
GLOBULIN UR ELPH-MCNC: 2.9 GM/DL
GLUCOSE BLD-MCNC: 210 MG/DL (ref 65–99)
HCT VFR BLD AUTO: 43.8 % (ref 34–46.6)
HGB BLD-MCNC: 15 G/DL (ref 12–15.9)
IMM GRANULOCYTES # BLD AUTO: 0.01 10*3/MM3 (ref 0–0.05)
IMM GRANULOCYTES NFR BLD AUTO: 0.2 % (ref 0–0.5)
IRON 24H UR-MRATE: 74 MCG/DL (ref 37–145)
IRON SATN MFR SERPL: 22 % (ref 20–50)
LYMPHOCYTES # BLD AUTO: 1.96 10*3/MM3 (ref 0.7–3.1)
LYMPHOCYTES NFR BLD AUTO: 29.8 % (ref 19.6–45.3)
MCH RBC QN AUTO: 31.3 PG (ref 26.6–33)
MCHC RBC AUTO-ENTMCNC: 34.2 G/DL (ref 31.5–35.7)
MCV RBC AUTO: 91.3 FL (ref 79–97)
MONOCYTES # BLD AUTO: 0.49 10*3/MM3 (ref 0.1–0.9)
MONOCYTES NFR BLD AUTO: 7.5 % (ref 5–12)
NEUTROPHILS # BLD AUTO: 3.85 10*3/MM3 (ref 1.7–7)
NEUTROPHILS NFR BLD AUTO: 58.6 % (ref 42.7–76)
NRBC BLD AUTO-RTO: 0 /100 WBC (ref 0–0.2)
PLATELET # BLD AUTO: 230 10*3/MM3 (ref 140–450)
PMV BLD AUTO: 11.3 FL (ref 6–12)
POTASSIUM BLD-SCNC: 3.7 MMOL/L (ref 3.5–5.2)
PROT SERPL-MCNC: 7.8 G/DL (ref 6–8.5)
RBC # BLD AUTO: 4.8 10*6/MM3 (ref 3.77–5.28)
SODIUM BLD-SCNC: 141 MMOL/L (ref 136–145)
TIBC SERPL-MCNC: 337 MCG/DL (ref 298–536)
TRANSFERRIN SERPL-MCNC: 226 MG/DL (ref 200–360)
WBC NRBC COR # BLD: 6.57 10*3/MM3 (ref 3.4–10.8)

## 2020-03-13 PROCEDURE — 36415 COLL VENOUS BLD VENIPUNCTURE: CPT

## 2020-03-13 PROCEDURE — 85025 COMPLETE CBC W/AUTO DIFF WBC: CPT

## 2020-03-13 PROCEDURE — 80053 COMPREHEN METABOLIC PANEL: CPT

## 2020-03-13 PROCEDURE — 83540 ASSAY OF IRON: CPT

## 2020-03-13 PROCEDURE — 84466 ASSAY OF TRANSFERRIN: CPT

## 2020-03-13 PROCEDURE — 82728 ASSAY OF FERRITIN: CPT

## 2020-03-13 RX ORDER — ERGOCALCIFEROL 1.25 MG/1
50000 CAPSULE ORAL WEEKLY
Qty: 12 CAPSULE | Refills: 1 | Status: SHIPPED | OUTPATIENT
Start: 2020-03-13 | End: 2020-09-17

## 2020-03-17 ENCOUNTER — TELEPHONE (OUTPATIENT)
Dept: ONCOLOGY | Facility: CLINIC | Age: 70
End: 2020-03-17

## 2020-03-17 NOTE — TELEPHONE ENCOUNTER
When I called patient to schedule she wanted to know if she could be given a call concerning her iron results that she just recently had done.

## 2020-04-10 RX ORDER — ROSUVASTATIN CALCIUM 5 MG/1
TABLET, COATED ORAL
Qty: 36 TABLET | Refills: 3 | Status: SHIPPED | OUTPATIENT
Start: 2020-04-10 | End: 2021-07-06

## 2020-05-08 ENCOUNTER — LAB (OUTPATIENT)
Dept: LAB | Facility: HOSPITAL | Age: 70
End: 2020-05-08

## 2020-05-08 DIAGNOSIS — D50.9 IRON DEFICIENCY ANEMIA, UNSPECIFIED IRON DEFICIENCY ANEMIA TYPE: Primary | ICD-10-CM

## 2020-05-08 DIAGNOSIS — D50.9 IRON DEFICIENCY ANEMIA, UNSPECIFIED IRON DEFICIENCY ANEMIA TYPE: ICD-10-CM

## 2020-05-08 LAB
ALBUMIN SERPL-MCNC: 4.7 G/DL (ref 3.5–5.2)
ALBUMIN/GLOB SERPL: 2 G/DL
ALP SERPL-CCNC: 65 U/L (ref 39–117)
ALT SERPL W P-5'-P-CCNC: 31 U/L (ref 1–33)
ANION GAP SERPL CALCULATED.3IONS-SCNC: 13 MMOL/L (ref 5–15)
AST SERPL-CCNC: 23 U/L (ref 1–32)
BASOPHILS # BLD AUTO: 0.06 10*3/MM3 (ref 0–0.2)
BASOPHILS NFR BLD AUTO: 0.9 % (ref 0–1.5)
BILIRUB SERPL-MCNC: 0.4 MG/DL (ref 0.2–1.2)
BUN BLD-MCNC: 21 MG/DL (ref 8–23)
BUN/CREAT SERPL: 39.6 (ref 7–25)
CALCIUM SPEC-SCNC: 9.5 MG/DL (ref 8.6–10.5)
CHLORIDE SERPL-SCNC: 98 MMOL/L (ref 98–107)
CO2 SERPL-SCNC: 26 MMOL/L (ref 22–29)
CREAT BLD-MCNC: 0.53 MG/DL (ref 0.57–1)
DEPRECATED RDW RBC AUTO: 40.9 FL (ref 37–54)
EOSINOPHIL # BLD AUTO: 0.39 10*3/MM3 (ref 0–0.4)
EOSINOPHIL NFR BLD AUTO: 6.1 % (ref 0.3–6.2)
ERYTHROCYTE [DISTWIDTH] IN BLOOD BY AUTOMATED COUNT: 12.3 % (ref 12.3–15.4)
FERRITIN SERPL-MCNC: 349.7 NG/ML (ref 13–150)
GFR SERPL CREATININE-BSD FRML MDRD: 114 ML/MIN/1.73
GLOBULIN UR ELPH-MCNC: 2.4 GM/DL
GLUCOSE BLD-MCNC: 135 MG/DL (ref 65–99)
HCT VFR BLD AUTO: 39.7 % (ref 34–46.6)
HGB BLD-MCNC: 13.6 G/DL (ref 12–15.9)
IMM GRANULOCYTES # BLD AUTO: 0.01 10*3/MM3 (ref 0–0.05)
IMM GRANULOCYTES NFR BLD AUTO: 0.2 % (ref 0–0.5)
IRON 24H UR-MRATE: 69 MCG/DL (ref 37–145)
IRON SATN MFR SERPL: 22 % (ref 20–50)
LYMPHOCYTES # BLD AUTO: 1.77 10*3/MM3 (ref 0.7–3.1)
LYMPHOCYTES NFR BLD AUTO: 27.6 % (ref 19.6–45.3)
MCH RBC QN AUTO: 31.1 PG (ref 26.6–33)
MCHC RBC AUTO-ENTMCNC: 34.3 G/DL (ref 31.5–35.7)
MCV RBC AUTO: 90.6 FL (ref 79–97)
MONOCYTES # BLD AUTO: 0.48 10*3/MM3 (ref 0.1–0.9)
MONOCYTES NFR BLD AUTO: 7.5 % (ref 5–12)
NEUTROPHILS # BLD AUTO: 3.7 10*3/MM3 (ref 1.7–7)
NEUTROPHILS NFR BLD AUTO: 57.7 % (ref 42.7–76)
NRBC BLD AUTO-RTO: 0 /100 WBC (ref 0–0.2)
PLATELET # BLD AUTO: 206 10*3/MM3 (ref 140–450)
PMV BLD AUTO: 11.7 FL (ref 6–12)
POTASSIUM BLD-SCNC: 3.9 MMOL/L (ref 3.5–5.2)
PROT SERPL-MCNC: 7.1 G/DL (ref 6–8.5)
RBC # BLD AUTO: 4.38 10*6/MM3 (ref 3.77–5.28)
SODIUM BLD-SCNC: 137 MMOL/L (ref 136–145)
TIBC SERPL-MCNC: 311 MCG/DL (ref 298–536)
TRANSFERRIN SERPL-MCNC: 209 MG/DL (ref 200–360)
WBC NRBC COR # BLD: 6.41 10*3/MM3 (ref 3.4–10.8)

## 2020-05-08 PROCEDURE — 85025 COMPLETE CBC W/AUTO DIFF WBC: CPT

## 2020-05-08 PROCEDURE — 80053 COMPREHEN METABOLIC PANEL: CPT

## 2020-05-08 PROCEDURE — 36415 COLL VENOUS BLD VENIPUNCTURE: CPT

## 2020-05-08 PROCEDURE — 83540 ASSAY OF IRON: CPT

## 2020-05-08 PROCEDURE — 82728 ASSAY OF FERRITIN: CPT

## 2020-05-08 PROCEDURE — 84466 ASSAY OF TRANSFERRIN: CPT

## 2020-05-09 NOTE — PROGRESS NOTES
"[facility] GROUP HEMATOLOGY AND ONCOLOGY  [address]  [place] [address] [phone]    Patient Name: Carlotta Dupont  Encounter Date: 05/15/2020  YOB: 1950  Patient Number: [id number]    REASON FOR VISIT: Ms. Carlotta Dupont is a 69-year-old female who returns in follow-up of anemia with iron deficiency. It as been 16 months since last receipt of IV Injectafer. She is here alone (usually with her spouse Hung). History is obtained from the patient who is considered reliable.     DIAGNOSTIC ABNORMALITIES:   1. Review of labs from the referring office dating back to 01/12/2018 includes a CBC showing a hemoglobin of 11.4, hematocrit 37.4, MCV 82.2 (81 - 99), MCH 25.1, MCHC 30 (each depressed), RDW 15.6% (elevated). CMP notable for hyperglycemia and BUN of 21 (elevated), otherwise normal with GFR greater than 60, calcium 10.1, total protein 7.6, and normal liver enzymes.   2. Labs, 02/03/2018: Hemoglobin 11.2, hematocrit 37.2, MCV 82.7, platelets 257,000, WBC 6.8. Serum iron 20, \"low iron saturation,\" ferritin 8.5. She prescribed ferrous sulfate 1 p.o. daily 02/03/2018 which she did not start due to prior intolerance, \"Bone and joint pains when I took it while I was pregnant.\" Her last colonoscopy was 12/2014 by Dr. Ozuna with 3-year followup. Stools for fecal occult blood x3 were said to be (+).   3. Labs, 02/22/2018: Hemoglobin 11.2, hematocrit 37.5, MCV 82.6, platelets 278,000, WBC 6.8. Ferritin 7.7. BUN 26, creatinine 0.6 (GFR greater than 60).  4. EGD, 03/01/2018: Normal examined duodenum. Normal stomach. Z-line regular, 38 cm from the incisors. No specimens collected.  5. Colonoscopy, 03/01/2018: The entire examined colon is normal on direct and retroflexion views. No specimens collected. Repeat 3 years.  6. Labs, 03/19/2018: Hemoglobin 12.2, hematocrit 35.5, MCV 81.5, platelets 315,000, WBC 7.7 with a normal differential. CMP " notable for glucose 157, otherwise normal with BUN 18, creatinine 0.7 (GFR 88.7), calcium 10.4, total protein 8.3, normal liver enzymes. Serum iron 30, saturation 6.22%, ferritin 5.2, TIBC 482.  7. M2A Capsule - Date capsule was swallowed: 04/20/2018. Date capsule was read : 04/28/2018. RESULTS: Gastric passage time: 1 hour 55 minutes. Small bowel passage time: 2 hours 39 minutes. The capsule was clearly seen in the colon. Conclusion: 1 small AVM was noted at 1 hour 59 minutes and 38 seconds. Several small areas of the lymphangitic ectasia noted. The capsule was seen freely passing into the cecum. There was no activity noted on this exam.    PREVIOUS INTERVENTIONS:   1. Injectafer 750 mg IV weekly x2, 03/23/2018 through 03/30/2018 (1500 mg); 10/12/2018 (750 mg); 01/11/2019 (750 mg)        Problem List Items Addressed This Visit        Hematopoietic and Hemostatic    Iron deficiency anemia - Primary         No history exists.       PAST MEDICAL HISTORY:  ALLERGIES:  Allergies   Allergen Reactions   • Zovirax [Acyclovir] Rash and Provider Review Needed     Other reaction(s): Other (See Comments)  PhX  PhX   • Meperidine Provider Review Needed     halluncinations  halluncinations   • Propranolol Provider Review Needed     Other reaction(s): Other (See Comments)  Bad dreams  unknown  Bad dreams  unknown  Bad dreams   • Sertraline Hcl Provider Review Needed     hallucinations  hallucinations   • Vesicare [Solifenacin Succinate] Provider Review Needed     Severe constipation   • Solifenacin Provider Review Needed     Other reaction(s): Other (See Comments)  Phx  Phx  Couldn't urinate     CURRENT MEDICATIONS:  Outpatient Encounter Medications as of 5/15/2020   Medication Sig Dispense Refill   • albuterol sulfate  (90 Base) MCG/ACT inhaler   5   • ALPRAZolam (XANAX) 0.25 MG tablet Take  by mouth.     • aspirin 81 MG tablet Take 81 mg by mouth.     • aspirin-acetaminophen-caffeine (EXCEDRIN MIGRAINE) 250-250-65 MG per  tablet Take 1 tablet by mouth.     • calcium carbonate (TUMS) 500 MG chewable tablet Chew 1 tablet Daily.     • conjugated estrogens (PREMARIN) 0.625 MG/GM vaginal cream 1gm twice a week (this replaces Estradiol cream per insurance 4/10/18)     • famotidine (PEPCID) 10 MG tablet Take 10 mg by mouth At Night As Needed for Heartburn.     • hydrochlorothiazide (MICROZIDE) 12.5 MG capsule Take 12.5 mg by mouth Every Morning.     • losartan (COZAAR) 100 MG tablet TAKE ONE TABLET BY MOUTH EVERY DAY     • metFORMIN (GLUCOPHAGE) 500 MG tablet Take 1,000 mg by mouth 2 (Two) Times a Day With Meals. 500m g in am and 1000mg at night     • montelukast (SINGULAIR) 10 MG tablet Take 10 mg by mouth Every Night.     • Multiple Vitamins-Minerals (PRESERVISION AREDS PO) Take  by mouth.     • rosuvastatin (CRESTOR) 5 MG tablet Take 1/2 tablet on Mon, Wed, Fri     • verapamil SR (CALAN-SR) 240 MG CR tablet TAKE ONE TABLET BY MOUTH TWICE A DAY     • vitamin D (ERGOCALCIFEROL) 60657 units capsule capsule Take 50,000 Units by mouth Every 7 (Seven) Days.       No facility-administered encounter medications on file as of 5/15/2020.      ADULT ILLNESSES:   Iron deficiency anemia (disorder) ( ICD-10:D50.9 ;Iron deficiency anemia, unspecified   Elevated liver function test ( ICD-10:R94.5 ;Abnormal results of liver function studies   Fatigue ( ICD-10:R53.83 ;Other fatigue   Fatty liver ( ICD-10:K76.0 ;Fatty (change of) liver, not elsewhere classified   Gallbladder calculus (disorder) ( ICD-10:K80.20 ;Calculus of gallbladder without cholecystitis without obstruction   Gastroesophageal reflux disease without esophagitis (disorder) ( ICD-10:K21.9 ;Gastro-esophageal reflux disease without esophagitis   Hyperlipidemia ( ICD-10:E78.5 ;Hyperlipidemia, unspecified   Hypertension ( ICD-10:I10 ;Essential (primary) hypertension   Lumbar disc degenerative disease ( ICD-10:M51.36 ;Other intervertebral disc degeneration, lumbar region   Migraine (  "ICD-10:G43.909 ;Migraine, unspecified, not intractable, without status migrainosus   Mixed hyperlipidemia ( ICD-10:E78.2 ;Mixed hyperlipidemia   Obstructive sleep apnea ( ICD-10:G47.33 ;Obstructive sleep apnea (adult) (pediatric)   Sacroiliitis ( ICD-10:M46.1 ;Sacroiliitis, not elsewhere classified   Trigeminal neuralgia ( ICD-10:G50.0 ;Trigeminal neuralgia   Type 2 diabetes ( ICD-10:E11.9 ;Type 2 diabetes mellitus without complications   Vitamin D deficiency (disorder) ( ICD-10:E55.9 ;Vitamin D deficiency, unspecified    SURGERIES:   Mastoidectomy, 05/17/2018. 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 Perez/Dr. Oconnor)   Colonoscopy, 03/01/2018. The entire examined colon is normal on direct and retroflexion views. No specimens collected. Repeat 3 years   Cystoscopy, 09/072018 ([facility]). No urethral lesions. No tumors, stones, or other mucosal lesions in the bladder. Ureteral orifices were orthotopic and normal in their appearance   EGD, 03/01/2018. Normal examined duodenum. Normal stomach. Z-line regular, 38 cm from the incisors. No specimens collected.   Decompression of trigeminal nerve (V) (procedure), Note: microvascular, at the [facility], 05/12/2017   Biopsy of breast, x3, 1969, 1974, 1980   Vaginal and bladder repair, 07/03/2012   Hysterectomy without BSO, 1976   Colonoscopic polypectomy: Colonoscopy, 2014. \"3 polyps.\" Dr. Ozuna  Sacrocolpopexy and Macroplastique, 7/3/2012      ADULT ILLNESSES:  Patient Active Problem List   Diagnosis Code   • Iron deficiency anemia D50.9   • Heme positive stool R19.5   • BRBPR (bright red blood per rectum) K62.5   • NSAID long-term use Z79.1   • Gastroesophageal reflux disease K21.9   • Hx of adenomatous colonic polyps Z86.010   • Nonsmoker Z78.9   • Controlled type 2 diabetes mellitus without complication, without long-term current use of insulin (CMS/[facility]) E11.9   • HTN (hypertension), benign I10   • BMI " 25.0-25.9,adult Z68.25   • Superficial postoperative wound infection T81.49XA   • Trigeminal neuralgia G50.0   • Open scalp wound S01.00XA   • Wound dehiscence T81.30XA   • Mastoiditis, chronic, left H70.12   • Asthma J45.909   • Calculus of gallbladder K80.20   • Fatty liver K76.0   • Lumbar disc disease with radiculopathy M51.16   • Migraine without aura, not intractable G43.009   • Mixed hyperlipidemia E78.2   • Hill's metatarsalgia G57.60   • Plantar fasciitis M72.2   • Obstructive sleep apnea G47.33   • Nodule of vagina N89.9   • Prolapse of vaginal wall N81.10   • Pseudophakia Z96.1   • Tear film insufficiency H04.129   • Vitamin D deficiency E55.9     SURGERIES:  Past Surgical History:   Procedure Laterality Date   • APPENDECTOMY     • BLADDER REPAIR      had vaginal repair at the same time   • BREAST BIOPSY Bilateral    • CAPSULE ENDOSCOPY N/A 4/20/2018    Procedure: CAPSULE ENDOSCOPY M2A;  Surgeon: Garrett Ozuna MD;  Location: [facility] ENDOSCOPY;  Service: Gastroenterology   • COLONOSCOPY N/A 3/1/2018    Procedure: COLONOSCOPY WITH ANESTHESIA;  Surgeon: Garrett Ozuna MD;  Location: [facility] ENDOSCOPY;  Service:    • COLONOSCOPY W/ POLYPECTOMY  12/31/2014    Tubular adenomatous polyp at 80 cm, Hyperplastic polyp rectum repeat exam in 3 years   • ENDOSCOPY N/A 3/1/2018    Procedure: ESOPHAGOGASTRODUODENOSCOPY WITH ANESTHESIA;  Surgeon: Garrett Ozuna MD;  Location: [facility] ENDOSCOPY;  Service:    • FLAP HEAD/NECK Left 5/17/2018    Procedure: POSSIBLE STERNOCLEIDOMASTOID FLAP;  Surgeon: Kadeem Oconnor MD;  Location: [facility] OR;  Service: ENT   • HYSTERECTOMY     • MASTOIDECTOMY Left 5/17/2018    Procedure: Wound exploration with possible mastoidectomy; possible sternocleidomastoid flap.  Please schedule with Dr. Perez.;  Surgeon: Kadeem Oconnor MD;  Location: [facility] OR;  Service: ENT   • TRIGEMINAL NERVE DECOMPRESSION       HEALTH MAINTENANCE ITEMS:  Health Maintenance Due   Topic Date Due   • URINE  "MICROALBUMIN  1950   • ZOSTER VACCINE (1 of 2) 09/12/2000   • Pneumococcal Vaccine Once at 65 Years Old  09/12/2015   • MEDICARE ANNUAL WELLNESS  09/28/2017   • DIABETIC FOOT EXAM  09/28/2017   • DIABETIC EYE EXAM  09/28/2017   • HEMOGLOBIN A1C  05/13/2020       <no information>  Last Completed Colonoscopy       Status Date      COLONOSCOPY Done 3/1/2018 COLONOSCOPY     Patient has more history with this topic...          There is no immunization history on file for this patient.  Last Completed Mammogram       Status Date      MAMMOGRAM Done 9/30/2019 MAMMO SCREENING DIGITAL TOMOSYNTHESIS BILATERAL W CAD     Patient has more history with this topic...            FAMILY HISTORY:  Family History   Problem Relation Age of Onset   • Breast cancer Sister    • No Known Problems Mother    • No Known Problems Father    • No Known Problems Brother    • No Known Problems Daughter    • No Known Problems Son    • No Known Problems Maternal Grandmother    • No Known Problems Paternal Grandmother    • No Known Problems Maternal Aunt    • No Known Problems Paternal Aunt    • Breast cancer Niece    • Colon cancer Neg Hx    • Colon polyps Neg Hx    • BRCA 1/2 Neg Hx    • Endometrial cancer Neg Hx    • Ovarian cancer Neg Hx      SOCIAL HISTORY:  Social History     Socioeconomic History   • Marital status:      Spouse name: Not on file   • Number of children: Not on file   • Years of education: Not on file   • Highest education level: Not on file   Tobacco Use   • Smoking status: Never Smoker   • Smokeless tobacco: Never Used   Substance and Sexual Activity   • Alcohol use: Yes     Comment: Rare   • Drug use: No   • Sexual activity: Defer       REVIEW OF SYSTEMS:  Constitutional:   The patient's appetite is good but energy is low to fair. \"Tired from being my mother's primary care giver.\" Says she has help with 12 hour shift caregivers.  She has lost 8 pounds since her last visit, \"I had lost 12 pounds from the " "diabetes.\" She has no fevers, chills, or drenching night sweats. Her sleep habits seem appropriate with a posture pedic bed that allows her to sleep semi-recumbent.  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 Perez and Dr. Oconnor). \"It's part of life now.\" Says repeat MRI sometime 11/2018 and 12/2018 after antibiotics (Dr. Rivers) showed clearing of the mastoid infection.  She has no ear pains, sinus symptoms, sore throat, nosebleeds, or sore tongue. She has no headaches. She denies any hoarseness, change in voice quality, or hemoptysis.   Ocular:   She reports no eye pain, significant change in visual acuity, double vision, with occasional blurry vision, left eye (OS).  Respiratory:   She reports no chronic cough, significant shortness of breathing, phlegm production, or unexplained chest wall pain. Has sleep apnea but still does not tolerate mask for CPAP.  Cardiovascular:   She reports no exertional chest pain, chest pressure, or chest heaviness. She reports no claudication. She reports no palpitations or symptomatic orthostasis.  Gastrointestinal:   She reports no pica, dysphagia, nausea, vomiting, postprandial abdominal pain, bloating, cramping, change in bowel habits, or discoloration of the stool. She reports no rectal bleeding. She reports no constipation or diarrhea. EGD and colonoscopy last 03/01/2018.  Genitourinary:   She reports no urinary burning, frequency, dribbling, or discoloration. She reports no difficulty controlling her bladder. She has no need to urinate frequently through the night.   Musculoskeletal:   She reports no new arthralgias or myalgias with less frequent bouts of nighttime leg pains/cramping, chronic, \"90% better.\" Says back pains have been quiescent. Says she uses Aleve at bedtime 1x/week (from " "3-4x/week).  Extremities:   She reports no trouble with fluid retention or significant leg swelling.  Endocrine:   She reports no problems with excess thirst, excessive urination, vasomotor instability.  Heme/Lymphatic:   She reports no unexplained bleeding, bruising, petechial rashes, or swollen glands.  Skin:   She reports no itching, rashes, or lesions which won't heal.  Neuro:   She reports no loss of consciousness, seizures, fainting spells, or dizziness. She reports no weakness of face, arms, or legs. She has no difficulty with speech. She has no tremors. Has paresthesias of the soles of her feet.  She has residual left-sided facial numbness.   Psych:         She denies depression nor anxiety currently. She reports no mood swings.  Again says she is fatigued and stressed due to being the primary caregiver of her 90 year old mother. \"It's better since I have help now.\"      VITAL SIGNS: /74   Pulse 86   Temp 96 °F (35.6 °C)   Resp 16   Ht 172.7 cm (68\")   Wt 74.9 kg (165 lb 1.6 oz)   SpO2 96%   Breastfeeding No   BMI 25.10 kg/m² Body surface area is 1.88 meters squared.  Pain Score    05/15/20 0946   PainSc: 0-No pain         PHYSICAL EXAMINATION:   General:   She is a pleasant, slimmer, otherwise well-developed, well-nourished, and modestly-kept elderly female who is comfortable at rest. She arrived in the exam room ambulatory. She appears to be her stated age. Her skin color is normal (previously slightly pale).  Head/Neck:   The patient is anicteric and atraumatic. The mouth and throat are clear. She is wearing a surgical mask.  The trachea is midline. The neck is supple without evidence of jugular venous distention or cervical adenopathy. The left mastoid is not swollen and much less tender.  Eyes:   The pupils are equal, round, and reactive to light. The extraocular movements are full. There is no scleral jaundice or erythema.   Chest:   The respiratory efforts are normal and unhindered. The " chest is clear to auscultation and percussion. There are no wheezes, rhonchi, rales, or asymmetry of breath sounds.  Cardiovascular:   The patient has a regular cardiac rate and rhythm without murmurs, rubs, or gallops. The peripheral pulses are equal and full.  Abdomen:   The belly is soft and slightly globose. There is no rebound or guarding. There is no organomegaly, mass-effect, or tenderness. Bowel sounds are active and of normal character.  Extremities:   There is no evidence of cyanosis, clubbing, or edema.  Rheumatologic:   There is no overt evidence of rheumatoid deformities of the hands. There is no sausaging of the fingers. There is no sign of active synovitis. The gait is normal.  Cutaneous:   There are no overt rashes, disseminated lesions, purpura, or petechiae.   Lymphatics:   There is no evidence of adenopathy in the cervical, supraclavicular, axillary areas. There is no splenomegaly.  Neurologic:   The patient is alert, oriented, cooperative, and pleasant. She is appropriately conversant. She ambulated into the exam room without assistance and transferred from chair to exam table unaided. There is no overt dysfunction of the motor, sensory, cerebellar systems.  Psych:   Mood and affect are appropriate for circumstance. Eye contact is appropriate. Normal judgement and decision making.         LABS    Lab Results - Last 18 Months   Lab Units 05/12/20  0806 05/08/20  0746 03/13/20  0730 11/13/19  0647 11/08/19  0739 06/21/19  0824 03/22/19  0950  01/09/19  1030   HEMOGLOBIN g/dL 13.9 13.6 15.0 14.2 14.4 14.6 14.8   < > 14.4   HEMATOCRIT % 41.7 39.7 43.8 43.9 41.9 41.9 43.1   < > 43.1   MCV fL 95.2 90.6 91.3 95.4 91.3 92.1 93.1   < > 96.2   WBC K/uL 6.9 6.41 6.57 7.3 7.33 6.26 5.55   < > 6.4   RDW % 12.6 12.3 12.5 12.6 12.6 12.4 12.0   < > 13.3   MPV fL 11.9 11.7 11.3 12.0 11.6 11.7 11.7   < > 11.5   PLATELETS K/uL 222 206 230 229 200 196 196   < > 215   IMM GRAN % %  --  0.2 0.2  --  0.5 0.3 0.4  --    --    NEUTROS ABS 10*3/mm3  --  3.70 3.85  --  4.72 3.98 3.36  --  3.8   LYMPHS ABS 10*3/mm3  --  1.77 1.96  --  1.77 1.48 1.31  --  1.6   MONOS ABS 10*3/mm3  --  0.48 0.49  --  0.51 0.49 0.46  --  0.50   EOS ABS 10*3/mm3  --  0.39 0.22  --  0.23 0.24 0.35  --  0.50   BASOS ABS 10*3/mm3  --  0.06 0.04  --  0.06 0.05 0.05  --  0.10   IMMATURE GRANS (ABS) 10*3/mm3  --  0.01 0.01  --  0.04 0.02 0.02  --   --    NRBC /100 WBC  --  0.0 0.0  --  0.0 0.0 0.0  --   --     < > = values in this interval not displayed.       Lab Results - Last 18 Months   Lab Units 05/12/20  0806 05/08/20  0746 03/13/20  0730 11/13/19  0647 11/08/19  0739 06/21/19  0824 03/22/19  0950   GLUCOSE mg/dL 135* 135* 210* 137* 160* 162* 215*   SODIUM mmol/L 140 137 141 141 139 140 140   POTASSIUM mmol/L 4.0 3.9 3.7 4.1 3.7 3.9 3.8   TOTAL CO2 mmol/L 26  --   --  24  --   --   --    CO2 mmol/L  --  26.0 25.0  --  29.0 26.0 28.0   CHLORIDE mmol/L 98 98 101 99 100 100 98   ANION GAP mmol/L 16 13.0 15.0 18 10.0 14.0* 14.0*   CREATININE mg/dL 0.5 0.53* 0.66 0.6 0.57 0.65 0.60   BUN mg/dL 22 21 25* 22 25* 25* 22*   BUN / CREAT RATIO   --  39.6* 37.9*  --  43.9* 38.5* 36.7*   CALCIUM mg/dL 9.7 9.5 10.0 10.0 9.8 9.9 10.0   EGFR IF NONAFRICN AM  >60 114 89 >60 105 91 99   ALK PHOS U/L 69 65 88 78 72 85 86   TOTAL PROTEIN g/dL 7.2 7.1 7.8 7.6 6.9 7.7 7.4   ALT (SGPT) U/L 28 31 41* 26 24 75* 93*   AST (SGOT) U/L 19 23 24 21 18 55* 56*   BILIRUBIN mg/dL 0.5 0.4 0.6 0.6 0.4 0.9 0.7   ALBUMIN g/dL 4.9 4.70 4.90 4.9 4.60 4.90 4.80   GLOBULIN gm/dL  --  2.4 2.9  --  2.3 2.8 2.6       No results for input(s): MSPIKE, KAPPALAMB, IGLFLC, URICACID, FREEKAPPAL, CEA, LDH, REFLABREPO in the last 34002 hours.    Lab Results - Last 18 Months   Lab Units 05/08/20  0746 03/13/20  0730 11/13/19  0647 11/08/19  0739 06/21/19  0825 06/21/19  0824 03/22/19  0950   IRON mcg/dL 69 74 101 74  --  126 126   TIBC mcg/dL 311 337  --  314  --  294 294   IRON SATURATION % 22 22  --  24   --  43 43   FERRITIN ng/mL 349.70* 446.80* 487.5* 407.70*  --  548.00* 745.00*   TSH uIU/mL  --   --  1.740  --  1.220  --   --        ASSESSMENT:   1. Normocytic/borderline microcytic anemia with profound iron deficiency. Hemoglobin 13.6; MCV 90.6, 05/08/2020 (prior range: Hemoglobin 11.2 - 14.4; MCV 81.5 - 92.1). Resolved since last receipt of Injectafer.   2. Fatigue related to #1. Subjectively improved.   3. Profound iron deficiency with a ferritin of 2.7 on 03/03/2018.   a. Last EGD and colonoscopy 03/01/2018 (above). Both unrevealing.   b. M2A Capsule - Date capsule was swallowed: 04/20/2018. Date capsule was read : 04/28/2018. RESULTS: Gastric passage time: 1 hour 55 minutes. Small bowel passage time: 2 hours 39 minutes. The capsule was clearly seen in the colon. Conclusion: 1 small AVM was noted at 1 hour 59 minutes and 38 seconds. Several small areas of the lymphangitic ectasia noted. The capsule was seen freely passing into the cecum. There was no activity noted on this exam.   c. Abnormal CT abdomen/enterography, 05/15/2018 at [facility]: Impression: 3.9 cm focus of mass-like enhancement involving the vagina, proximal urethra, and bladder dome. Unsure if this reflects neoplasm or post surgical change; however, neoplastic process arising from the vagina certainly not excluded by CT. Recommend direct visualization. No suspicious focal lesions identified in the bowel. No suspicious adenopathy in the abdomen or pelvis. Hepatic steatosis.         d. Seen by Urology (Dr. Mae Esquivel) at [place], 08/04/2019. Stable exam, MRI and cystoscopic findings highly suggestive of the nodule representing the Macroplastique implant.  Discussed the risks of excision including recurrent stress incontinence and fistula.  Continue observation.  Continue transvaginal estrogen.  RTC 6 months..     4. Insulin-requiring type 2 diabetes. Dr. Pelayo.  5. Hyperlipidemia. Remains on Crestor  6. Trigeminal neuralgia  "of the left side of the face, lingering symptoms since trigeminal decompression procedure. 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   7. Gastroesophageal reflux disease with esophagitis. On Pepcid.  8. Vitamin D deficiency. On weekly ergocalciferol.  9. Lumbar disk disease. Variable back pains.  Uses NSAIDs sparingly.  10. Elevated LFTs, NOS. Crestor held, hep profile and liver US ordered on 03/22. Liver US, 03/27/2019: 1. Hepatic steatosis. 2. Limited visualization of the pancreas. Hepatitis profile, 06/28/2019 negative.  Normal since 11/08/2019        11. Fatigue of caregiver.  Primary caregiver of her 90 year old mother. \"Better since I've gotten caregivers to help.\"    RECOMMENDATIONS:   1.  Re: Apprised of the labs from 05/08/2020 (above) noting the normal MCV and normal Hgb, hyperglycemia, normal calcium, normal AST/ALT with otherwise normal CMP, repleted ferritin (> 100) normal Fe sat (greater than 20%), normal serum iron (from previously severe iron deficiency).   2.  Previously reviewed report of capsule endoscopy on 04/20/2018 (above). 1 AVM and lymphangitic ectasia noted.   3. Previously reviewed reports of EGD and colonoscopy, 03/01/2018 (above).   4. The rationale and potential toxicities of IV iron (anaphylaxis included) previously discussed at length. She will agree to therapy as indicated.   5.  Continue currently identified medications.   6.  Continue management per primary care and other specialists.   7.  Return to the [place] office in 24 weeks with pre-office CMP, CBC, serum iron, iron saturation, ferritin.    MEDICAL DECISION MAKING: Moderate Complexity   AMOUNT OF DATA: Moderate     I spent 25 total minutes, face-to-face, caring for Carlotta today.  Greater than 50% of this time involved counseling and/or coordination of care as documented within this note regarding the patient's illness(es), pros and " cons of various treatment options, instructions and/or risk reduction.    cc: MD Garrett Borrego MD

## 2020-05-12 DIAGNOSIS — E11.9 TYPE 2 DIABETES MELLITUS WITHOUT COMPLICATION, WITHOUT LONG-TERM CURRENT USE OF INSULIN (HCC): Chronic | ICD-10-CM

## 2020-05-12 LAB
ALBUMIN SERPL-MCNC: 4.9 G/DL (ref 3.5–5.2)
ALP BLD-CCNC: 69 U/L (ref 35–104)
ALT SERPL-CCNC: 28 U/L (ref 5–33)
ANION GAP SERPL CALCULATED.3IONS-SCNC: 16 MMOL/L (ref 7–19)
AST SERPL-CCNC: 19 U/L (ref 5–32)
BILIRUB SERPL-MCNC: 0.5 MG/DL (ref 0.2–1.2)
BUN BLDV-MCNC: 22 MG/DL (ref 8–23)
CALCIUM SERPL-MCNC: 9.7 MG/DL (ref 8.8–10.2)
CHLORIDE BLD-SCNC: 98 MMOL/L (ref 98–111)
CO2: 26 MMOL/L (ref 22–29)
CREAT SERPL-MCNC: 0.5 MG/DL (ref 0.5–0.9)
GFR NON-AFRICAN AMERICAN: >60
GLUCOSE BLD-MCNC: 135 MG/DL (ref 74–109)
HBA1C MFR BLD: 6.8 % (ref 4–6)
HCT VFR BLD CALC: 41.7 % (ref 37–47)
HEMOGLOBIN: 13.9 G/DL (ref 12–16)
MCH RBC QN AUTO: 31.7 PG (ref 27–31)
MCHC RBC AUTO-ENTMCNC: 33.3 G/DL (ref 33–37)
MCV RBC AUTO: 95.2 FL (ref 81–99)
PDW BLD-RTO: 12.6 % (ref 11.5–14.5)
PLATELET # BLD: 222 K/UL (ref 130–400)
PMV BLD AUTO: 11.9 FL (ref 9.4–12.3)
POTASSIUM SERPL-SCNC: 4 MMOL/L (ref 3.5–5)
RBC # BLD: 4.38 M/UL (ref 4.2–5.4)
SODIUM BLD-SCNC: 140 MMOL/L (ref 136–145)
TOTAL PROTEIN: 7.2 G/DL (ref 6.6–8.7)
WBC # BLD: 6.9 K/UL (ref 4.8–10.8)

## 2020-05-15 ENCOUNTER — OFFICE VISIT (OUTPATIENT)
Dept: ONCOLOGY | Facility: CLINIC | Age: 70
End: 2020-05-15

## 2020-05-15 VITALS
TEMPERATURE: 96 F | HEIGHT: 68 IN | SYSTOLIC BLOOD PRESSURE: 122 MMHG | DIASTOLIC BLOOD PRESSURE: 74 MMHG | RESPIRATION RATE: 16 BRPM | BODY MASS INDEX: 25.02 KG/M2 | HEART RATE: 86 BPM | OXYGEN SATURATION: 96 % | WEIGHT: 165.1 LBS

## 2020-05-15 DIAGNOSIS — D50.9 IRON DEFICIENCY ANEMIA, UNSPECIFIED IRON DEFICIENCY ANEMIA TYPE: Primary | ICD-10-CM

## 2020-05-15 PROCEDURE — 99214 OFFICE O/P EST MOD 30 MIN: CPT | Performed by: INTERNAL MEDICINE

## 2020-05-18 ENCOUNTER — OFFICE VISIT (OUTPATIENT)
Dept: INTERNAL MEDICINE | Age: 70
End: 2020-05-18
Payer: MEDICARE

## 2020-05-18 VITALS
HEART RATE: 86 BPM | DIASTOLIC BLOOD PRESSURE: 80 MMHG | SYSTOLIC BLOOD PRESSURE: 120 MMHG | OXYGEN SATURATION: 98 % | WEIGHT: 163 LBS | BODY MASS INDEX: 24.14 KG/M2 | HEIGHT: 69 IN

## 2020-05-18 PROCEDURE — 3044F HG A1C LEVEL LT 7.0%: CPT | Performed by: INTERNAL MEDICINE

## 2020-05-18 PROCEDURE — G8427 DOCREV CUR MEDS BY ELIG CLIN: HCPCS | Performed by: INTERNAL MEDICINE

## 2020-05-18 PROCEDURE — 1036F TOBACCO NON-USER: CPT | Performed by: INTERNAL MEDICINE

## 2020-05-18 PROCEDURE — 1090F PRES/ABSN URINE INCON ASSESS: CPT | Performed by: INTERNAL MEDICINE

## 2020-05-18 PROCEDURE — G8400 PT W/DXA NO RESULTS DOC: HCPCS | Performed by: INTERNAL MEDICINE

## 2020-05-18 PROCEDURE — 1123F ACP DISCUSS/DSCN MKR DOCD: CPT | Performed by: INTERNAL MEDICINE

## 2020-05-18 PROCEDURE — 99213 OFFICE O/P EST LOW 20 MIN: CPT | Performed by: INTERNAL MEDICINE

## 2020-05-18 PROCEDURE — G8420 CALC BMI NORM PARAMETERS: HCPCS | Performed by: INTERNAL MEDICINE

## 2020-05-18 PROCEDURE — 2022F DILAT RTA XM EVC RTNOPTHY: CPT | Performed by: INTERNAL MEDICINE

## 2020-05-18 PROCEDURE — 4040F PNEUMOC VAC/ADMIN/RCVD: CPT | Performed by: INTERNAL MEDICINE

## 2020-05-18 PROCEDURE — 3017F COLORECTAL CA SCREEN DOC REV: CPT | Performed by: INTERNAL MEDICINE

## 2020-05-18 ASSESSMENT — PATIENT HEALTH QUESTIONNAIRE - PHQ9
SUM OF ALL RESPONSES TO PHQ QUESTIONS 1-9: 0
1. LITTLE INTEREST OR PLEASURE IN DOING THINGS: 0
SUM OF ALL RESPONSES TO PHQ9 QUESTIONS 1 & 2: 0
SUM OF ALL RESPONSES TO PHQ QUESTIONS 1-9: 0
2. FEELING DOWN, DEPRESSED OR HOPELESS: 0

## 2020-05-18 NOTE — PROGRESS NOTES
tobacco: Never Used   Substance and Sexual Activity    Alcohol use: No    Drug use: No    Sexual activity: Yes     Partners: Male   Lifestyle    Physical activity     Days per week: Not on file     Minutes per session: Not on file    Stress: Not on file   Relationships    Social connections     Talks on phone: Not on file     Gets together: Not on file     Attends Yazidi service: Not on file     Active member of club or organization: Not on file     Attends meetings of clubs or organizations: Not on file     Relationship status: Not on file    Intimate partner violence     Fear of current or ex partner: Not on file     Emotionally abused: Not on file     Physically abused: Not on file     Forced sexual activity: Not on file   Other Topics Concern    Not on file   Social History Narrative    Not on file       Allergies   Allergen Reactions    Inderal [Propranolol] Other (See Comments)     Bad dreams  unknown    Solifenacin Succinate Other (See Comments)     Severe constipation    Demerol Hcl [Meperidine]      halluncinations    Vesicare [Solifenacin]      Couldn't urinate    Zoloft [Sertraline Hcl]      hallucinations    Zovirax [Acyclovir] Hives and Other (See Comments)     PhX       Current Outpatient Medications   Medication Sig Dispense Refill    SOFT TOUCH LANCETS MISC USE TWO TIMES DAILY 100 each 3    blood glucose test strips (ASCENSIA AUTODISC VI;ONE TOUCH ULTRA TEST VI) strip 1 each by In Vitro route daily As needed. 100 each 3    metFORMIN (GLUCOPHAGE) 500 MG tablet 2 tablets po bid 360 tablet 3    rosuvastatin (CRESTOR) 5 MG tablet Take 1/2 tablet on Mon, Wed, Fri 36 tablet 3    vitamin D (ERGOCALCIFEROL) 1.25 MG (06568 UT) CAPS capsule Take 1 capsule by mouth once a week 12 capsule 1    ALPRAZolam (XANAX) 0.25 MG tablet Take 0.25 mg by mouth nightly as needed for Sleep.       verapamil (CALAN SR) 240 MG extended release tablet Take 1 tablet by mouth 2 times daily 180 tablet 3    MPV 11.9 9.4 - 12.3 fL       ASSESSMENT/ PLAN:  1. Type 2 diabetes mellitus without complication, without long-term current use of insulin (HCC)  Diabetes is in good control we will follow closely let us know if any problems continue current care and follow. If weight loss persists will let us know  - CBC; Future  - Comprehensive Metabolic Panel; Future  - Hemoglobin A1C; Future  - TSH without Reflex; Future  - Lipid Panel; Future  - Microalbumin / Creatinine Urine Ratio; Future    2. Essential hypertension  Blood pressure stable and follow    3.  Trigeminal neuralgia of left side of face  Currently stable

## 2020-05-20 ASSESSMENT — ENCOUNTER SYMPTOMS
EYE REDNESS: 0
SHORTNESS OF BREATH: 0
BACK PAIN: 0
COUGH: 0
ABDOMINAL DISTENTION: 0
SINUS PRESSURE: 0
EYE DISCHARGE: 0
ABDOMINAL PAIN: 0

## 2020-08-11 RX ORDER — VERAPAMIL HYDROCHLORIDE 240 MG/1
TABLET, FILM COATED, EXTENDED RELEASE ORAL
Qty: 180 TABLET | Refills: 3 | Status: SHIPPED | OUTPATIENT
Start: 2020-08-11 | End: 2021-08-09

## 2020-08-25 DIAGNOSIS — E11.9 TYPE 2 DIABETES MELLITUS WITHOUT COMPLICATION, WITHOUT LONG-TERM CURRENT USE OF INSULIN (HCC): Chronic | ICD-10-CM

## 2020-08-25 LAB
ALBUMIN SERPL-MCNC: 4.9 G/DL (ref 3.5–5.2)
ALP BLD-CCNC: 74 U/L (ref 35–104)
ALT SERPL-CCNC: 24 U/L (ref 5–33)
ANION GAP SERPL CALCULATED.3IONS-SCNC: 13 MMOL/L (ref 7–19)
AST SERPL-CCNC: 18 U/L (ref 5–32)
BILIRUB SERPL-MCNC: 0.6 MG/DL (ref 0.2–1.2)
BUN BLDV-MCNC: 20 MG/DL (ref 8–23)
CALCIUM SERPL-MCNC: 10.1 MG/DL (ref 8.8–10.2)
CHLORIDE BLD-SCNC: 99 MMOL/L (ref 98–111)
CHOLESTEROL, TOTAL: 133 MG/DL (ref 160–199)
CO2: 27 MMOL/L (ref 22–29)
CREAT SERPL-MCNC: 0.5 MG/DL (ref 0.5–0.9)
CREATININE URINE: 210.8 MG/DL (ref 4.2–622)
GFR AFRICAN AMERICAN: >59
GFR NON-AFRICAN AMERICAN: >60
GLUCOSE BLD-MCNC: 129 MG/DL (ref 74–109)
HBA1C MFR BLD: 6.3 % (ref 4–6)
HCT VFR BLD CALC: 42.8 % (ref 37–47)
HDLC SERPL-MCNC: 41 MG/DL (ref 65–121)
HEMOGLOBIN: 14.4 G/DL (ref 12–16)
LDL CHOLESTEROL CALCULATED: 81 MG/DL
MCH RBC QN AUTO: 31.4 PG (ref 27–31)
MCHC RBC AUTO-ENTMCNC: 33.6 G/DL (ref 33–37)
MCV RBC AUTO: 93.2 FL (ref 81–99)
MICROALBUMIN UR-MCNC: 1.9 MG/DL (ref 0–19)
MICROALBUMIN/CREAT UR-RTO: 9 MG/G
PDW BLD-RTO: 12.9 % (ref 11.5–14.5)
PLATELET # BLD: 202 K/UL (ref 130–400)
PMV BLD AUTO: 12 FL (ref 9.4–12.3)
POTASSIUM SERPL-SCNC: 4 MMOL/L (ref 3.5–5)
RBC # BLD: 4.59 M/UL (ref 4.2–5.4)
SODIUM BLD-SCNC: 139 MMOL/L (ref 136–145)
TOTAL PROTEIN: 7.2 G/DL (ref 6.6–8.7)
TRIGL SERPL-MCNC: 57 MG/DL (ref 0–149)
TSH SERPL DL<=0.05 MIU/L-ACNC: 1.47 UIU/ML (ref 0.27–4.2)
WBC # BLD: 5.7 K/UL (ref 4.8–10.8)

## 2020-08-31 ENCOUNTER — OFFICE VISIT (OUTPATIENT)
Dept: INTERNAL MEDICINE | Age: 70
End: 2020-08-31
Payer: MEDICARE

## 2020-08-31 VITALS
SYSTOLIC BLOOD PRESSURE: 138 MMHG | HEIGHT: 69 IN | BODY MASS INDEX: 24.14 KG/M2 | OXYGEN SATURATION: 96 % | HEART RATE: 92 BPM | DIASTOLIC BLOOD PRESSURE: 90 MMHG | WEIGHT: 163 LBS

## 2020-08-31 PROCEDURE — 1123F ACP DISCUSS/DSCN MKR DOCD: CPT | Performed by: INTERNAL MEDICINE

## 2020-08-31 PROCEDURE — 3017F COLORECTAL CA SCREEN DOC REV: CPT | Performed by: INTERNAL MEDICINE

## 2020-08-31 PROCEDURE — 4040F PNEUMOC VAC/ADMIN/RCVD: CPT | Performed by: INTERNAL MEDICINE

## 2020-08-31 PROCEDURE — G0439 PPPS, SUBSEQ VISIT: HCPCS | Performed by: INTERNAL MEDICINE

## 2020-08-31 PROCEDURE — 3044F HG A1C LEVEL LT 7.0%: CPT | Performed by: INTERNAL MEDICINE

## 2020-08-31 ASSESSMENT — PATIENT HEALTH QUESTIONNAIRE - PHQ9
1. LITTLE INTEREST OR PLEASURE IN DOING THINGS: 0
SUM OF ALL RESPONSES TO PHQ9 QUESTIONS 1 & 2: 0
2. FEELING DOWN, DEPRESSED OR HOPELESS: 0
SUM OF ALL RESPONSES TO PHQ QUESTIONS 1-9: 0
SUM OF ALL RESPONSES TO PHQ QUESTIONS 1-9: 0

## 2020-08-31 NOTE — PROGRESS NOTES
Chief Complaint   Patient presents with    Medicare AWV    Diabetes    Hypertension       HPI: Patient is here today for Medicare annual wellness visit and to follow-up diabetes and other medical problems. Diabetes is overall in good control. She feels okay under stress. Recent episode of severe right groin pain has happened a few other times but not as severe as the other night it is resolved now. No chest pain no dyspnea no abdominal pain.   -140 in am  at lunch occaisionally 200 at night  Pt recently awakened with severe groin pain into pelvis - has happenend  Colon due soon   Past Medical History:   Diagnosis Date    Calculus of gallbladder 8/20/2017    Chronic asthma without complication 9/64/6291    Essential hypertension 8/21/2017    Fatty liver 8/20/2017    Gastroesophageal reflux disease without esophagitis 8/20/2017    Lumbar disc disease     L5-S1    Migraine without aura, not intractable 8/20/2017    Mixed hyperlipidemia 8/20/2017    Obstructive sleep apnea 8/20/2017    Sacroiliitis (HCC)     Trigeminal neuralgia of left side of face 8/20/2017    Type 2 diabetes mellitus without complication ([facility] [address] 9/45/6290    Venous insufficiency     Vitamin D deficiency        Past Surgical History:   Procedure Laterality Date    EYE LID SURGERY Bilateral 2020    eye lids raised    HYSTERECTOMY, TOTAL ABDOMINAL      No BSO       Family History   Problem Relation Age of Onset    High Blood Pressure Mother     High Blood Pressure Father     Prostate Cancer Father     Diabetes Father         Type 2    Breast Cancer Sister 47    Diabetes Sister         Type 2       Social History     Socioeconomic History    Marital status:      Spouse name: Theresa Somers Number of children: 2    Years of education: 15    Highest education level: Not on file   Occupational History    Occupation: retired    Social Needs    Financial resource strain: Not on file   Lucrecia-Torsten insecurity Worry: Not on file     Inability: Not on file    Transportation needs     Medical: Not on file     Non-medical: Not on file   Tobacco Use    Smoking status: Never Smoker    Smokeless tobacco: Never Used   Substance and Sexual Activity    Alcohol use: No    Drug use: No    Sexual activity: Yes     Partners: Male   Lifestyle    Physical activity     Days per week: Not on file     Minutes per session: Not on file    Stress: Not on file   Relationships    Social connections     Talks on phone: Not on file     Gets together: Not on file     Attends Oriental orthodox service: Not on file     Active member of club or organization: Not on file     Attends meetings of clubs or organizations: Not on file     Relationship status: Not on file    Intimate partner violence     Fear of current or ex partner: Not on file     Emotionally abused: Not on file     Physically abused: Not on file     Forced sexual activity: Not on file   Other Topics Concern    Not on file   Social History Narrative    Not on file       Allergies   Allergen Reactions    Inderal [Propranolol] Other (See Comments)     Bad dreams  unknown    Solifenacin Succinate Other (See Comments)     Severe constipation    Demerol Hcl [Meperidine]      halluncinations    Vesicare [Solifenacin]      Couldn't urinate    Zoloft [Sertraline Hcl]      hallucinations    Zovirax [Acyclovir] Hives and Other (See Comments)     PhX       Current Outpatient Medications   Medication Sig Dispense Refill    verapamil (CALAN SR) 240 MG extended release tablet TAKE ONE TABLET BY MOUTH TWICE A  tablet 3    SOFT TOUCH LANCETS MISC USE TWO TIMES DAILY 100 each 3    blood glucose test strips (ASCENSIA AUTODISC VI;ONE TOUCH ULTRA TEST VI) strip 1 each by In Vitro route daily As needed.  100 each 3    metFORMIN (GLUCOPHAGE) 500 MG tablet 2 tablets po bid 360 tablet 3    rosuvastatin (CRESTOR) 5 MG tablet Take 1/2 tablet on Mon, Wed, Fri 36 tablet 3    vitamin D (73.9 kg)   SpO2 96%   BMI 24.07 kg/m²   BP Readings from Last 7 Encounters:   08/31/20 (!) 138/90   05/18/20 120/80   11/18/19 130/80   06/19/19 (!) 138/90   01/14/19 (!) 164/92   08/27/18 130/82   08/13/18 126/88     Wt Readings from Last 7 Encounters:   08/31/20 163 lb (73.9 kg)   05/18/20 163 lb (73.9 kg)   11/18/19 170 lb (77.1 kg)   06/19/19 180 lb (81.6 kg)   01/14/19 181 lb (82.1 kg)   08/27/18 178 lb 9.6 oz (81 kg)   08/13/18 176 lb 3.2 oz (79.9 kg)     BMI Readings from Last 7 Encounters:   08/31/20 24.07 kg/m²   05/18/20 24.07 kg/m²   11/18/19 25.10 kg/m²   06/19/19 26.58 kg/m²   01/14/19 27.52 kg/m²   08/27/18 27.16 kg/m²   08/13/18 26.40 kg/m²     Resp Readings from Last 7 Encounters:   01/14/19 18   01/08/18 16   08/21/17 20       Physical Exam  Constitutional:       General: She is not in acute distress. Appearance: Normal appearance. She is well-developed. HENT:      Right Ear: External ear normal. Tympanic membrane is not injected. Left Ear: External ear normal. Tympanic membrane is not injected. Mouth/Throat:      Pharynx: No oropharyngeal exudate. Eyes:      General: No scleral icterus. Conjunctiva/sclera: Conjunctivae normal.   Neck:      Musculoskeletal: Neck supple. Thyroid: No thyroid mass or thyromegaly. Vascular: No carotid bruit. Cardiovascular:      Rate and Rhythm: Normal rate and regular rhythm. Heart sounds: S1 normal and S2 normal. No murmur. No S3 or S4 sounds. Pulmonary:      Effort: Pulmonary effort is normal. No respiratory distress. Breath sounds: Normal breath sounds. No wheezing or rales. Abdominal:      General: Bowel sounds are normal. There is no distension. Palpations: Abdomen is soft. There is no mass. Tenderness: There is no abdominal tenderness. Musculoskeletal: Normal range of motion. Lymphadenopathy:      Cervical: No cervical adenopathy.       Upper Body:      Right upper body: No supraclavicular adenopathy. Left upper body: No supraclavicular adenopathy. Skin:     General: Skin is warm and dry. Findings: No rash. Neurological:      Mental Status: She is alert and oriented to person, place, and time. Cranial Nerves: No cranial nerve deficit. Psychiatric:         Mood and Affect: Mood normal.     Breast exam without any discrete masses no axillary adenopathy. Results for orders placed or performed in visit on 08/25/20   Microalbumin / Creatinine Urine Ratio   Result Value Ref Range    Microalbumin, Random Urine 1.90 0.00 - 19.00 mg/dL    Creatinine, Ur 210.8 4.2 - 622.0 mg/dL    Microalbumin Creatinine Ratio 9.0 mg/g   Lipid Panel   Result Value Ref Range    Cholesterol, Total 133 (L) 160 - 199 mg/dL    Triglycerides 57 0 - 149 mg/dL    HDL 41 (L) 65 - 121 mg/dL    LDL Calculated 81 <100 mg/dL   TSH without Reflex   Result Value Ref Range    TSH 1.470 0.270 - 4.200 uIU/mL   Hemoglobin A1C   Result Value Ref Range    Hemoglobin A1C 6.3 (H) 4.0 - 6.0 %   Comprehensive Metabolic Panel   Result Value Ref Range    Sodium 139 136 - 145 mmol/L    Potassium 4.0 3.5 - 5.0 mmol/L    Chloride 99 98 - 111 mmol/L    CO2 27 22 - 29 mmol/L    Anion Gap 13 7 - 19 mmol/L    Glucose 129 (H) 74 - 109 mg/dL    BUN 20 8 - 23 mg/dL    CREATININE 0.5 0.5 - 0.9 mg/dL    GFR Non-African American >60 >60    GFR African American >59 >59    Calcium 10.1 8.8 - 10.2 mg/dL    Total Protein 7.2 6.6 - 8.7 g/dL    Alb 4.9 3.5 - 5.2 g/dL    Total Bilirubin 0.6 0.2 - 1.2 mg/dL    Alkaline Phosphatase 74 35 - 104 U/L    ALT 24 5 - 33 U/L    AST 18 5 - 32 U/L   CBC   Result Value Ref Range    WBC 5.7 4.8 - 10.8 K/uL    RBC 4.59 4.20 - 5.40 M/uL    Hemoglobin 14.4 12.0 - 16.0 g/dL    Hematocrit 42.8 37.0 - 47.0 %    MCV 93.2 81.0 - 99.0 fL    MCH 31.4 (H) 27.0 - 31.0 pg    MCHC 33.6 33.0 - 37.0 g/dL    RDW 12.9 11.5 - 14.5 %    Platelets 469 972 - 158 K/uL    MPV 12.0 9.4 - 12.3 fL       ASSESSMENT/ PLAN:  1.  Medicare annual wellness visit, subsequent  Chart medications labs vaccines reviewed keep up-to-date with routine medical care and follow-up call with any problems or complaints    2. Type 2 diabetes mellitus without complication, without long-term current use of insulin (HCC)  Diabetes overall in good control  - Comprehensive Metabolic Panel; Future  - Hemoglobin A1C; Future    3. Chronic asthma without complication, unspecified asthma severity, unspecified whether persistent  Asthma stable continue current meds current plan of care and follow  4. Screening mammogram, encounter for    - JOSE LUIS DIGITAL SCREEN W OR WO CAD BILATERAL; Future    5. Essential hypertension  Blood pressure control stable continue current care and follow    6. Mixed hyperlipidemia  On crestor- tolerates low dose                     Medicare Annual Wellness Visit  Name: Kathy Edwards Date: 2020   MRN: [id number] Sex: Female   Age: 79 y.o. Ethnicity: Non-/Non    : 1950 Race: Royce Bias is here for Medicare AWV; Diabetes; and Hypertension    Screenings for behavioral, psychosocial and functional/safety risks, and cognitive dysfunction are all negative except as indicated below. These results, as well as other patient data from the [address] form, are documented in Flowsheets linked to this Encounter. Allergies   Allergen Reactions    Inderal [Propranolol] Other (See Comments)     Bad dreams  unknown    Solifenacin Succinate Other (See Comments)     Severe constipation    Demerol Hcl [Meperidine]      halluncinations    Vesicare [Solifenacin]      Couldn't urinate    Zoloft [Sertraline Hcl]      hallucinations    Zovirax [Acyclovir] Hives and Other (See Comments)     PhX       Prior to Visit Medications    Medication Sig Taking?  Authorizing Provider   verapamil (CALAN SR) 240 MG extended release tablet TAKE ONE TABLET BY MOUTH TWICE A DAY  Jj Key MD   SOFT TOUCH LANCETS MISC USE Eliceo Green MD   blood glucose test strips (ASCENSIA AUTODISC VI;ONE TOUCH ULTRA TEST VI) strip 1 each by In Vitro route daily As needed. Mallika Mckeon MD   metFORMIN (GLUCOPHAGE) 500 MG tablet 2 tablets po bid  Mallika Mckeno MD   rosuvastatin (CRESTOR) 5 MG tablet Take 1/2 tablet on Mon, Wed, Fri  Lili Vogel MD   vitamin D (ERGOCALCIFEROL) 1.25 MG (24744 UT) CAPS capsule Take 1 capsule by mouth once a week  Mallika Mckeon MD   gabapentin (NEURONTIN) 100 MG capsule Take 1 capsule by mouth nightly for 90 days. Mallika Mckeon MD   ALPRAZolam Shy Frost) 0.25 MG tablet Take 0.25 mg by mouth nightly as needed for Sleep.   Historical Provider, MD   aspirin-acetaminophen-caffeine (Carlmaree Banjohnter) 069865-90 MG per tablet Take 1 tablet by mouth  Historical Provider, MD   esomeprazole (NEXIUM) 20 MG delayed release capsule Take 20 mg by mouth every morning (before breakfast)  Historical Provider, MD   montelukast (SINGULAIR) 10 MG tablet Take 1 tablet by mouth daily  Janettex MD Fazal   losartan (COZAAR) 100 MG tablet Take 1 tablet by mouth daily  Kia Diehl MD   hydrochlorothiazide (MICROZIDE) 12.5 MG capsule Take 1 capsule by mouth daily  Kia Diehl MD   conjugated estrogens (PREMARIN) 0.625 MG/GM vaginal cream 1gm twice a week (this replaces Estradiol cream per insurance 4/10/18)  Kia Diehl MD   aspirin 81 MG tablet Take 81 mg by mouth daily  Historical Provider, MD   Multiple Vitamins-Minerals (PRESERVISION/LUTEIN) CAPS Take 1 capsule by mouth 2 times daily  Historical Provider, MD   calcium carbonate (TUMS) 500 MG chewable tablet Take 1 tablet by mouth daily  Historical Provider, MD       Past Medical History:   Diagnosis Date    Calculus of gallbladder 8/20/2017    Chronic asthma without complication 4/89/2935    Essential hypertension 8/21/2017    Fatty liver 8/20/2017    Gastroesophageal reflux disease without esophagitis 8/20/2017    Lumbar disc disease     L5-S1    Migraine Living Will?: Yes  General Health Risk Interventions:  · Call if hip pain recurs    Health Habits/Nutrition:  Health Habits/Nutrition  Do you exercise for at least 20 minutes 2-3 times per week?: (!) No  Have you lost any weight without trying in the past 3 months?: No  Do you eat fewer than 2 meals per day?: No  Have you seen a dentist within the past year?: Yes  Body mass index is 24.07 kg/m². Health Habits/Nutrition Interventions:  · Keep following diabetic diet     Personalized Preventive Plan   Current Health Maintenance Status  Immunization History   Administered Date(s) Administered    Influenza, High Dose (Fluzone 65 yrs and older) 10/03/2016, 12/06/2017, 10/02/2018    Influenza, Triv, inactivated, subunit, adjuvanted, IM (Fluad 65 yrs and older) 11/18/2019    Pneumococcal Conjugate 13-valent ([id number]) 11/30/2015    Pneumococcal Polysaccharide ([id number]) 01/14/2019    Tdap (Boostrix, Adacel) 07/02/2019    Zoster Recombinant (Shingrix) 07/02/2019, 10/18/2019        Health Maintenance   Topic Date Due    Diabetic foot exam  09/12/1960    Colon cancer screen colonoscopy  12/31/2017    Annual Wellness Visit (AWV)  05/29/2019    Flu vaccine (1) 09/01/2020    Breast cancer screen  09/30/2020    Diabetic retinal exam  01/16/2021    DEXA (modify frequency per FRAX score)  02/13/2021    A1C test (Diabetic or Prediabetic)  08/25/2021    Diabetic microalbuminuria test  08/25/2021    Lipid screen  08/25/2021    Potassium monitoring  08/25/2021    Creatinine monitoring  08/25/2021    DTaP/Tdap/Td vaccine (2 - Td) 07/02/2029    Shingles Vaccine  Completed    Pneumococcal 65+ years Vaccine  Completed    Hepatitis C screen  Completed    Hepatitis A vaccine  Aged Out    Hib vaccine  Aged Out    Meningococcal (ACWY) vaccine  Aged Out     Recommendations for Latest Medical Due: see orders and patient instructions/AVS.  .   Recommended screening schedule for the next 5-10 years is provided to

## 2020-09-03 ENCOUNTER — TRANSCRIBE ORDERS (OUTPATIENT)
Dept: ADMINISTRATIVE | Facility: HOSPITAL | Age: 70
End: 2020-09-03

## 2020-09-03 DIAGNOSIS — Z12.31 ENCOUNTER FOR SCREENING MAMMOGRAM FOR MALIGNANT NEOPLASM OF BREAST: Primary | ICD-10-CM

## 2020-09-12 ASSESSMENT — ENCOUNTER SYMPTOMS
SHORTNESS OF BREATH: 0
EYE REDNESS: 0
ABDOMINAL PAIN: 0
COUGH: 0
BACK PAIN: 0
EYE DISCHARGE: 0
SINUS PRESSURE: 0
ABDOMINAL DISTENTION: 0

## 2020-09-12 NOTE — PATIENT INSTRUCTIONS
Personalized Preventive Plan for Dianne Penaloza - 8/31/2020  Medicare offers a range of preventive health benefits. Some of the tests and screenings are paid in full while other may be subject to a deductible, co-insurance, and/or copay. Some of these benefits include a comprehensive review of your medical history including lifestyle, illnesses that may run in your family, and various assessments and screenings as appropriate. After reviewing your medical record and screening and assessments performed today your provider may have ordered immunizations, labs, imaging, and/or referrals for you. A list of these orders (if applicable) as well as your Preventive Care list are included within your After Visit Summary for your review. Other Preventive Recommendations:    · A preventive eye exam performed by an eye specialist is recommended every 1-2 years to screen for glaucoma; cataracts, macular degeneration, and other eye disorders. · A preventive dental visit is recommended every 6 months. · Try to get at least 150 minutes of exercise per week or 10,000 steps per day on a pedometer . · Order or download the FREE \"Exercise & Physical Activity: Your Everyday Guide\" from [employer]. Call 1-963.533.4490 or search [employer] online. · You need 8701-9146 mg of calcium and 0762-3555 IU of vitamin D per day. It is possible to meet your calcium requirement with diet alone, but a vitamin D supplement is usually necessary to meet this goal.  · When exposed to the sun, use a sunscreen that protects against both UVA and UVB radiation with an SPF of 30 or greater. Reapply every 2 to 3 hours or after sweating, drying off with a towel, or swimming. · Always wear a seat belt when traveling in a car. Always wear a helmet when riding a bicycle or motorcycle.

## 2020-09-17 RX ORDER — ERGOCALCIFEROL 1.25 MG/1
50000 CAPSULE ORAL WEEKLY
Qty: 4 CAPSULE | Refills: 0 | Status: CANCELLED | OUTPATIENT
Start: 2020-09-17

## 2020-09-17 RX ORDER — ERGOCALCIFEROL 1.25 MG/1
50000 CAPSULE ORAL WEEKLY
Qty: 12 CAPSULE | Refills: 1 | Status: SHIPPED | OUTPATIENT
Start: 2020-09-17 | End: 2020-10-16

## 2020-10-12 ENCOUNTER — APPOINTMENT (OUTPATIENT)
Dept: MAMMOGRAPHY | Facility: HOSPITAL | Age: 70
End: 2020-10-12

## 2020-10-15 ENCOUNTER — HOSPITAL ENCOUNTER (OUTPATIENT)
Dept: MAMMOGRAPHY | Facility: HOSPITAL | Age: 70
Discharge: HOME | End: 2020-10-15
Admitting: INTERNAL MEDICINE

## 2020-10-15 DIAGNOSIS — Z12.31 ENCOUNTER FOR SCREENING MAMMOGRAM FOR MALIGNANT NEOPLASM OF BREAST: ICD-10-CM

## 2020-10-15 PROCEDURE — 77067 SCR MAMMO BI INCL CAD: CPT

## 2020-10-15 PROCEDURE — 77063 BREAST TOMOSYNTHESIS BI: CPT

## 2020-10-16 RX ORDER — ERGOCALCIFEROL 1.25 MG/1
50000 CAPSULE ORAL WEEKLY
Qty: 12 CAPSULE | Refills: 3 | Status: SHIPPED | OUTPATIENT
Start: 2020-10-16 | End: 2021-12-07

## 2020-10-16 RX ORDER — MONTELUKAST SODIUM 10 MG/1
10 TABLET ORAL DAILY
Qty: 90 TABLET | Refills: 3 | Status: SHIPPED | OUTPATIENT
Start: 2020-10-16 | End: 2022-01-13

## 2020-11-06 ENCOUNTER — LAB (OUTPATIENT)
Dept: LAB | Facility: HOSPITAL | Age: 70
End: 2020-11-06

## 2020-11-06 DIAGNOSIS — D50.9 IRON DEFICIENCY ANEMIA, UNSPECIFIED IRON DEFICIENCY ANEMIA TYPE: ICD-10-CM

## 2020-11-06 LAB
ALBUMIN SERPL-MCNC: 4.6 G/DL (ref 3.5–5.2)
ALBUMIN/GLOB SERPL: 1.9 G/DL
ALP SERPL-CCNC: 70 U/L (ref 39–117)
ALT SERPL W P-5'-P-CCNC: 25 U/L (ref 1–33)
ANION GAP SERPL CALCULATED.3IONS-SCNC: 11 MMOL/L (ref 5–15)
AST SERPL-CCNC: 19 U/L (ref 1–32)
BASOPHILS # BLD AUTO: 0.06 10*3/MM3 (ref 0–0.2)
BASOPHILS NFR BLD AUTO: 1 % (ref 0–1.5)
BILIRUB SERPL-MCNC: 0.5 MG/DL (ref 0–1.2)
BUN SERPL-MCNC: 22 MG/DL (ref 8–23)
BUN/CREAT SERPL: 37.9 (ref 7–25)
CALCIUM SPEC-SCNC: 9.8 MG/DL (ref 8.6–10.5)
CHLORIDE SERPL-SCNC: 100 MMOL/L (ref 98–107)
CO2 SERPL-SCNC: 27 MMOL/L (ref 22–29)
CREAT SERPL-MCNC: 0.58 MG/DL (ref 0.57–1)
DEPRECATED RDW RBC AUTO: 41 FL (ref 37–54)
EOSINOPHIL # BLD AUTO: 0.21 10*3/MM3 (ref 0–0.4)
EOSINOPHIL NFR BLD AUTO: 3.4 % (ref 0.3–6.2)
ERYTHROCYTE [DISTWIDTH] IN BLOOD BY AUTOMATED COUNT: 12.4 % (ref 12.3–15.4)
FERRITIN SERPL-MCNC: 296.6 NG/ML (ref 13–150)
GFR SERPL CREATININE-BSD FRML MDRD: 103 ML/MIN/1.73
GLOBULIN UR ELPH-MCNC: 2.4 GM/DL
GLUCOSE SERPL-MCNC: 150 MG/DL (ref 65–99)
HCT VFR BLD AUTO: 39.9 % (ref 34–46.6)
HGB BLD-MCNC: 13.8 G/DL (ref 12–15.9)
IMM GRANULOCYTES # BLD AUTO: 0.01 10*3/MM3 (ref 0–0.05)
IMM GRANULOCYTES NFR BLD AUTO: 0.2 % (ref 0–0.5)
IRON 24H UR-MRATE: 71 MCG/DL (ref 37–145)
IRON SATN MFR SERPL: 21 % (ref 20–50)
LYMPHOCYTES # BLD AUTO: 1.94 10*3/MM3 (ref 0.7–3.1)
LYMPHOCYTES NFR BLD AUTO: 31.5 % (ref 19.6–45.3)
MCH RBC QN AUTO: 31.4 PG (ref 26.6–33)
MCHC RBC AUTO-ENTMCNC: 34.6 G/DL (ref 31.5–35.7)
MCV RBC AUTO: 90.9 FL (ref 79–97)
MONOCYTES # BLD AUTO: 0.47 10*3/MM3 (ref 0.1–0.9)
MONOCYTES NFR BLD AUTO: 7.6 % (ref 5–12)
NEUTROPHILS NFR BLD AUTO: 3.46 10*3/MM3 (ref 1.7–7)
NEUTROPHILS NFR BLD AUTO: 56.3 % (ref 42.7–76)
NRBC BLD AUTO-RTO: 0 /100 WBC (ref 0–0.2)
PLATELET # BLD AUTO: 215 10*3/MM3 (ref 140–450)
PMV BLD AUTO: 11.2 FL (ref 6–12)
POTASSIUM SERPL-SCNC: 3.8 MMOL/L (ref 3.5–5.2)
PROT SERPL-MCNC: 7 G/DL (ref 6–8.5)
RBC # BLD AUTO: 4.39 10*6/MM3 (ref 3.77–5.28)
SODIUM SERPL-SCNC: 138 MMOL/L (ref 136–145)
TIBC SERPL-MCNC: 340 MCG/DL (ref 298–536)
TRANSFERRIN SERPL-MCNC: 228 MG/DL (ref 200–360)
WBC # BLD AUTO: 6.15 10*3/MM3 (ref 3.4–10.8)

## 2020-11-06 PROCEDURE — 82728 ASSAY OF FERRITIN: CPT

## 2020-11-06 PROCEDURE — 80053 COMPREHEN METABOLIC PANEL: CPT

## 2020-11-06 PROCEDURE — 85025 COMPLETE CBC W/AUTO DIFF WBC: CPT

## 2020-11-06 PROCEDURE — 83540 ASSAY OF IRON: CPT

## 2020-11-06 PROCEDURE — 84466 ASSAY OF TRANSFERRIN: CPT

## 2020-11-06 PROCEDURE — 36415 COLL VENOUS BLD VENIPUNCTURE: CPT

## 2020-11-11 NOTE — PROGRESS NOTES
"[facility] HEMATOLOGY AND ONCOLOGY  [address]  [place] [address] [phone]    Patient Name: Carlotta Dupont  Encounter Date: 11/13/2020  YOB: 1950  Patient Number: [id number]      REASON FOR VISIT: Ms. Carlotta Dupont is a 70-year-old female who returns in follow-up of anemia with iron deficiency. It as been 22 months since last receipt of IV Injectafer. She is here alone (usually with her spouse Hung). History is obtained from the patient who is considered reliable.     DIAGNOSTIC ABNORMALITIES:   1. Review of labs from the referring office dating back to 01/12/2018 includes a CBC showing a hemoglobin of 11.4, hematocrit 37.4, MCV 82.2 (81 - 99), MCH 25.1, MCHC 30 (each depressed), RDW 15.6% (elevated). CMP notable for hyperglycemia and BUN of 21 (elevated), otherwise normal with GFR greater than 60, calcium 10.1, total protein 7.6, and normal liver enzymes.   2. Labs, 02/03/2018: Hemoglobin 11.2, hematocrit 37.2, MCV 82.7, platelets 257,000, WBC 6.8. Serum iron 20, \"low iron saturation,\" ferritin 8.5. She prescribed ferrous sulfate 1 p.o. daily 02/03/2018 which she did not start due to prior intolerance, \"Bone and joint pains when I took it while I was pregnant.\" Her last colonoscopy was 12/2014 by Dr. Ozuna with 3-year followup. Stools for fecal occult blood x3 were said to be (+).   3. Labs, 02/22/2018: Hemoglobin 11.2, hematocrit 37.5, MCV 82.6, platelets 278,000, WBC 6.8. Ferritin 7.7. BUN 26, creatinine 0.6 (GFR greater than 60).  4. EGD, 03/01/2018: Normal examined duodenum. Normal stomach. Z-line regular, 38 cm from the incisors. No specimens collected.  5. Colonoscopy, 03/01/2018: The entire examined colon is normal on direct and retroflexion views. No specimens collected. Repeat 3 years.  6. Labs, 03/19/2018: Hemoglobin 12.2, hematocrit 35.5, MCV 81.5, platelets 315,000, WBC 7.7 with a normal differential. CMP " notable for glucose 157, otherwise normal with BUN 18, creatinine 0.7 (GFR 88.7), calcium 10.4, total protein 8.3, normal liver enzymes. Serum iron 30, saturation 6.22%, ferritin 5.2, TIBC 482.  7. M2A Capsule - Date capsule was swallowed: 04/20/2018. Date capsule was read : 04/28/2018. RESULTS: Gastric passage time: 1 hour 55 minutes. Small bowel passage time: 2 hours 39 minutes. The capsule was clearly seen in the colon. Conclusion: 1 small AVM was noted at 1 hour 59 minutes and 38 seconds. Several small areas of the lymphangitic ectasia noted. The capsule was seen freely passing into the cecum. There was no activity noted on this exam.    PREVIOUS INTERVENTIONS:   1. Injectafer 750 mg IV weekly x2, 03/23/2018 through 03/30/2018 (1500 mg); 10/12/2018 (750 mg); 01/11/2019 (750 mg)        Problem List Items Addressed This Visit     None        Oncology/Hematology History    No history exists.       PAST MEDICAL HISTORY:  ALLERGIES:  Allergies   Allergen Reactions   • Zovirax [Acyclovir] Rash and Provider Review Needed     Other reaction(s): Other (See Comments)  PhX  PhX   • Meperidine Provider Review Needed     halluncinations  halluncinations   • Propranolol Provider Review Needed     Other reaction(s): Other (See Comments)  Bad dreams  unknown  Bad dreams  unknown  Bad dreams   • Sertraline Hcl Provider Review Needed     hallucinations  hallucinations   • Vesicare [Solifenacin Succinate] Provider Review Needed     Severe constipation   • Solifenacin Provider Review Needed     Other reaction(s): Other (See Comments)  Phx  Phx  Couldn't urinate     CURRENT MEDICATIONS:  Outpatient Encounter Medications as of 11/13/2020   Medication Sig Dispense Refill   • albuterol sulfate  (90 Base) MCG/ACT inhaler   5   • ALPRAZolam (XANAX) 0.25 MG tablet Take  by mouth.     • aspirin 81 MG tablet Take 81 mg by mouth.     • aspirin-acetaminophen-caffeine (EXCEDRIN MIGRAINE) 250-250-65 MG per tablet Take 1 tablet by mouth.      • calcium carbonate (TUMS) 500 MG chewable tablet Chew 1 tablet Daily.     • conjugated estrogens (PREMARIN) 0.625 MG/GM vaginal cream 1gm twice a week (this replaces Estradiol cream per insurance 4/10/18)     • famotidine (PEPCID) 10 MG tablet Take 10 mg by mouth At Night As Needed for Heartburn.     • hydrochlorothiazide (MICROZIDE) 12.5 MG capsule Take 12.5 mg by mouth Every Morning.     • losartan (COZAAR) 100 MG tablet TAKE ONE TABLET BY MOUTH EVERY DAY     • metFORMIN (GLUCOPHAGE) 500 MG tablet Take 1,000 mg by mouth 2 (Two) Times a Day With Meals. 500m g in am and 1000mg at night     • montelukast (SINGULAIR) 10 MG tablet Take 10 mg by mouth Every Night.     • Multiple Vitamins-Minerals (PRESERVISION AREDS PO) Take  by mouth.     • rosuvastatin (CRESTOR) 5 MG tablet Take 1/2 tablet on Mon, Wed, Fri     • verapamil SR (CALAN-SR) 240 MG CR tablet TAKE ONE TABLET BY MOUTH TWICE A DAY     • vitamin D (ERGOCALCIFEROL) 27259 units capsule capsule Take 50,000 Units by mouth Every 7 (Seven) Days.       No facility-administered encounter medications on file as of 11/13/2020.      ADULT ILLNESSES:   Iron deficiency anemia (disorder) ( ICD-10:D50.9 ;Iron deficiency anemia, unspecified   Elevated liver function test ( ICD-10:R94.5 ;Abnormal results of liver function studies   Fatigue ( ICD-10:R53.83 ;Other fatigue   Fatty liver ( ICD-10:K76.0 ;Fatty (change of) liver, not elsewhere classified   Gallbladder calculus (disorder) ( ICD-10:K80.20 ;Calculus of gallbladder without cholecystitis without obstruction   Gastroesophageal reflux disease without esophagitis (disorder) ( ICD-10:K21.9 ;Gastro-esophageal reflux disease without esophagitis   Hyperlipidemia ( ICD-10:E78.5 ;Hyperlipidemia, unspecified   Hypertension ( ICD-10:I10 ;Essential (primary) hypertension   Lumbar disc degenerative disease ( ICD-10:M51.36 ;Other intervertebral disc degeneration, lumbar region   Migraine ( ICD-10:G43.909 ;Migraine, unspecified, not  "intractable, without status migrainosus   Mixed hyperlipidemia ( ICD-10:E78.2 ;Mixed hyperlipidemia   Obstructive sleep apnea ( ICD-10:G47.33 ;Obstructive sleep apnea (adult) (pediatric)   Sacroiliitis ( ICD-10:M46.1 ;Sacroiliitis, not elsewhere classified   Trigeminal neuralgia ( ICD-10:G50.0 ;Trigeminal neuralgia   Type 2 diabetes ( ICD-10:E11.9 ;Type 2 diabetes mellitus without complications   Vitamin D deficiency (disorder) ( ICD-10:E55.9 ;Vitamin D deficiency, unspecified    SURGERIES:   Mastoidectomy, 05/17/2018. 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 Perez/Dr. Oconnor)   Colonoscopy, 03/01/2018. The entire examined colon is normal on direct and retroflexion views. No specimens collected. Repeat 3 years   Cystoscopy, 09/072018 ([facility]). No urethral lesions. No tumors, stones, or other mucosal lesions in the bladder. Ureteral orifices were orthotopic and normal in their appearance   EGD, 03/01/2018. Normal examined duodenum. Normal stomach. Z-line regular, 38 cm from the incisors. No specimens collected.   Decompression of trigeminal nerve (V) (procedure), Note: microvascular, at the [facility], 05/12/2017   Biopsy of breast, x3, 1969, 1974, 1980   Vaginal and bladder repair, 07/03/2012   Hysterectomy without BSO, 1976   Colonoscopic polypectomy: Colonoscopy, 2014. \"3 polyps.\" Dr. Ozuna  Sacrocolpopexy and Macroplastique, 7/3/2012      ADULT ILLNESSES:  Patient Active Problem List   Diagnosis Code   • Iron deficiency anemia D50.9   • Heme positive stool R19.5   • BRBPR (bright red blood per rectum) K62.5   • NSAID long-term use Z79.1   • Gastroesophageal reflux disease K21.9   • Hx of adenomatous colonic polyps Z86.010   • Nonsmoker Z78.9   • Controlled type 2 diabetes mellitus without complication, without long-term current use of insulin (CMS/[facility]) E11.9   • HTN (hypertension), benign I10   • BMI 25.0-25.9,adult Z68.25   • Superficial " postoperative wound infection T81.49XA   • Trigeminal neuralgia G50.0   • Open scalp wound S01.00XA   • Wound dehiscence T81.30XA   • Mastoiditis, chronic, left H70.12   • Asthma J45.909   • Calculus of gallbladder K80.20   • Fatty liver K76.0   • Lumbar disc disease with radiculopathy M51.16   • Migraine without aura, not intractable G43.009   • Mixed hyperlipidemia E78.2   • Hill's metatarsalgia G57.60   • Plantar fasciitis M72.2   • Obstructive sleep apnea G47.33   • Nodule of vagina N89.9   • Prolapse of vaginal wall N81.10   • Pseudophakia Z96.1   • Tear film insufficiency H04.129   • Vitamin D deficiency E55.9     SURGERIES:  Past Surgical History:   Procedure Laterality Date   • APPENDECTOMY     • BLADDER REPAIR      had vaginal repair at the same time   • BREAST BIOPSY Bilateral     benign   • CAPSULE ENDOSCOPY N/A 4/20/2018    Procedure: CAPSULE ENDOSCOPY M2A;  Surgeon: Garrett Ozuna MD;  Location: [facility] ENDOSCOPY;  Service: Gastroenterology   • COLONOSCOPY N/A 3/1/2018    Procedure: COLONOSCOPY WITH ANESTHESIA;  Surgeon: Garrett Ozuna MD;  Location: [facility] ENDOSCOPY;  Service:    • COLONOSCOPY W/ POLYPECTOMY  12/31/2014    Tubular adenomatous polyp at 80 cm, Hyperplastic polyp rectum repeat exam in 3 years   • ENDOSCOPY N/A 3/1/2018    Procedure: ESOPHAGOGASTRODUODENOSCOPY WITH ANESTHESIA;  Surgeon: Garrett Ozuna MD;  Location: [facility] ENDOSCOPY;  Service:    • FLAP HEAD/NECK Left 5/17/2018    Procedure: POSSIBLE STERNOCLEIDOMASTOID FLAP;  Surgeon: Kadeem Oconnor MD;  Location: [facility] OR;  Service: ENT   • HYSTERECTOMY     • MASTOIDECTOMY Left 5/17/2018    Procedure: Wound exploration with possible mastoidectomy; possible sternocleidomastoid flap.  Please schedule with Dr. Perez.;  Surgeon: Kadeem Oconnor MD;  Location: [facility] OR;  Service: ENT   • TRIGEMINAL NERVE DECOMPRESSION       HEALTH MAINTENANCE ITEMS:  Health Maintenance Due   Topic Date Due   • ZOSTER VACCINE (1 of 2) 09/12/2000   •  "Pneumococcal Vaccine 65+ (1 of 1 - PPSV23) 09/12/2015   • ANNUAL WELLNESS VISIT  09/28/2017   • DIABETIC FOOT EXAM  09/28/2017   • DIABETIC EYE EXAM  09/28/2017   • INFLUENZA VACCINE  08/01/2020       <no information>  Last Completed Colonoscopy       Status Date      COLONOSCOPY Done 3/1/2018 COLONOSCOPY     Patient has more history with this topic...          There is no immunization history on file for this patient.  Last Completed Mammogram       Status Date      MAMMOGRAM Done 10/15/2020 MAMMO SCREENING DIGITAL TOMOSYNTHESIS BILATERAL W CAD     Patient has more history with this topic...            FAMILY HISTORY:  Family History   Problem Relation Age of Onset   • Breast cancer Sister    • Breast cancer Mother    • No Known Problems Father    • No Known Problems Brother    • No Known Problems Daughter    • No Known Problems Son    • No Known Problems Maternal Grandmother    • No Known Problems Paternal Grandmother    • No Known Problems Maternal Aunt    • No Known Problems Paternal Aunt    • Breast cancer Niece    • Colon cancer Neg Hx    • Colon polyps Neg Hx    • BRCA 1/2 Neg Hx    • Endometrial cancer Neg Hx    • Ovarian cancer Neg Hx      SOCIAL HISTORY:  Social History     Socioeconomic History   • Marital status:      Spouse name: Not on file   • Number of children: Not on file   • Years of education: Not on file   • Highest education level: Not on file   Tobacco Use   • Smoking status: Never Smoker   • Smokeless tobacco: Never Used   Substance and Sexual Activity   • Alcohol use: Yes     Comment: Rare   • Drug use: No   • Sexual activity: Defer       REVIEW OF SYSTEMS:  Constitutional:   The patient's appetite is good but energy is low to fair. \"I'm still my mother's primary care giver.\" Says she has help with 12 hour shift caregivers.  She has regained 3 pounds (had lost 8 pounds at her prior visit)  since her last visit.  She has no fevers, chills, or drenching night sweats. Her sleep habits " "seem appropriate with a posture pedic bed that allows her to sleep semi-recumbent.  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 Perez and Dr. Oconnor). Says repeat MRI sometime 11/2018 and 12/2018 after antibiotics (Dr. Rivers) showed clearing of the mastoid infection.  Says massage therapy has helped.  She has no ear pains, sinus symptoms, sore throat, nosebleeds, or sore tongue. She has no headaches. She denies any hoarseness, change in voice quality, or hemoptysis.   Ocular:   She reports no eye pain, significant change in visual acuity, double vision, with occasional blurry vision, left eye (OS). Had blepharoplasties last 08/2020 per Dr. Fernandes.  Respiratory:   She reports no chronic cough, significant shortness of breathing, phlegm production, or unexplained chest wall pain. Has sleep apnea but still does not tolerate mask for CPAP.  Cardiovascular:   She reports no exertional chest pain, chest pressure, or chest heaviness. She reports no claudication. She reports no palpitations or symptomatic orthostasis.  Gastrointestinal:   She reports no pica, dysphagia, nausea, vomiting, postprandial abdominal pain, bloating, cramping, change in bowel habits, or discoloration of the stool. She reports no rectal bleeding. She reports no constipation or diarrhea. EGD and colonoscopy last 03/01/2018.  Genitourinary:   She reports no urinary burning, frequency, dribbling, or discoloration. She reports no difficulty controlling her bladder. She has no need to urinate frequently through the night.   Musculoskeletal:   She reports no new arthralgias or myalgias with less frequent bouts of nighttime leg pains/cramping, chronic, \"90% better.\" Says back pains have been quiescent. Says she still uses Aleve at bedtime 1x/week (from 3-4x/week).  Extremities:   She " "reports no trouble with fluid retention or significant leg swelling.  Endocrine:   She reports no problems with excess thirst, excessive urination, vasomotor instability.  Heme/Lymphatic:   She reports no unexplained bleeding, bruising, petechial rashes, or swollen glands.  Skin:   She reports no itching, rashes, or lesions which won't heal.  Neuro:   She reports no loss of consciousness, seizures, fainting spells, or dizziness. She reports no weakness of face, arms, or legs. She has no difficulty with speech. She has no tremors. Has paresthesias of the soles of her feet.  She has residual left-sided facial numbness.   Psych:         She denies depression nor anxiety currently. She reports no mood swings.  Again says she is fatigued and stressed due to being the primary caregiver of her 90 + year old mother.         VITAL SIGNS: /88   Pulse 87   Temp 96.6 °F (35.9 °C)   Resp 16   Ht 172.7 cm (68\")   Wt 76.2 kg (168 lb 1.6 oz)   SpO2 96%   Breastfeeding No   BMI 25.56 kg/m² Body surface area is 1.9 meters squared.  Pain Score    11/13/20 1045   PainSc: 0-No pain         PHYSICAL EXAMINATION:   General:   She is a pleasant, cooperative otherwise well-developed, well-nourished, and modestly-kept elderly female who is comfortable at rest. She arrived in the exam room ambulatory. She appears to be her stated age. Her skin color is normal (previously slightly pale).  Head/Neck:   The patient is anicteric and atraumatic. She is wearing a surgical mask.  The trachea is midline. The neck is supple without evidence of jugular venous distention or cervical adenopathy. The left mastoid is not swollen and much less tender.  Eyes:   The pupils are equal, round, and reactive to light. The extraocular movements are full. There is no scleral jaundice or erythema.   Chest:   The respiratory efforts are normal and unhindered. The chest is clear to auscultation and percussion. There are no wheezes, rhonchi, rales, or " asymmetry of breath sounds.  Cardiovascular:   The patient has a regular cardiac rate and rhythm without murmurs, rubs, or gallops. The peripheral pulses are equal and full.  Abdomen:   The belly is soft and slightly globose. There is no rebound or guarding. There is no organomegaly, mass-effect, or tenderness. Bowel sounds are active and of normal character.  Extremities:   There is no evidence of cyanosis, clubbing, or edema.  Rheumatologic:   There is no overt evidence of rheumatoid deformities of the hands. There is no sausaging of the fingers. There is no sign of active synovitis. The gait is normal.  Cutaneous:   There are no overt rashes, disseminated lesions, purpura, or petechiae.   Lymphatics:   There is no evidence of adenopathy in the cervical, supraclavicular, axillary areas.   Neurologic:   The patient is alert, oriented, cooperative, and pleasant. She is appropriately conversant. She ambulated into the exam room without assistance and transferred from chair to exam table unaided. There is no overt dysfunction of the motor, sensory, cerebellar systems.  Psych:   Mood and affect are appropriate for circumstance. Eye contact is appropriate. Normal judgement and decision making.         LABS    Lab Results - Last 18 Months   Lab Units 11/06/20  0737 08/25/20  0848 05/12/20  0806 05/08/20  0746 03/13/20  0730 11/13/19  0647 11/08/19  0739 06/21/19  0824   HEMOGLOBIN g/dL 13.8 14.4 13.9 13.6 15.0 14.2 14.4 14.6   HEMATOCRIT % 39.9 42.8 41.7 39.7 43.8 43.9 41.9 41.9   MCV fL 90.9 93.2 95.2 90.6 91.3 95.4 91.3 92.1   WBC 10*3/mm3 6.15 5.7 6.9 6.41 6.57 7.3 7.33 6.26   RDW % 12.4 12.9 12.6 12.3 12.5 12.6 12.6 12.4   MPV fL 11.2 12.0 11.9 11.7 11.3 12.0 11.6 11.7   PLATELETS 10*3/mm3 215 202 222 206 230 229 200 196   IMM GRAN % % 0.2  --   --  0.2 0.2  --  0.5 0.3   NEUTROS ABS 10*3/mm3 3.46  --   --  3.70 3.85  --  4.72 3.98   LYMPHS ABS 10*3/mm3 1.94  --   --  1.77 1.96  --  1.77 1.48   MONOS ABS 10*3/mm3  0.47  --   --  0.48 0.49  --  0.51 0.49   EOS ABS 10*3/mm3 0.21  --   --  0.39 0.22  --  0.23 0.24   BASOS ABS 10*3/mm3 0.06  --   --  0.06 0.04  --  0.06 0.05   IMMATURE GRANS (ABS) 10*3/mm3 0.01  --   --  0.01 0.01  --  0.04 0.02   NRBC /100 WBC 0.0  --   --  0.0 0.0  --  0.0 0.0       Lab Results - Last 18 Months   Lab Units 11/06/20  0737 08/25/20  0848 05/12/20  0806 05/08/20  0746 03/13/20  0730 11/13/19  0647 11/08/19  0739 06/21/19  0824   GLUCOSE mg/dL 150* 129* 135* 135* 210* 137* 160* 162*   SODIUM mmol/L 138 139 140 137 141 141 139 140   POTASSIUM mmol/L 3.8 4.0 4.0 3.9 3.7 4.1 3.7 3.9   TOTAL CO2 mmol/L  --  27 26  --   --  24  --   --    CO2 mmol/L 27.0  --   --  26.0 25.0  --  29.0 26.0   CHLORIDE mmol/L 100 99 98 98 101 99 100 100   ANION GAP mmol/L 11.0 13 16 13.0 15.0 18 10.0 14.0*   CREATININE mg/dL 0.58 0.5 0.5 0.53* 0.66 0.6 0.57 0.65   BUN mg/dL 22 20 22 21 25* 22 25* 25*   BUN / CREAT RATIO  37.9*  --   --  39.6* 37.9*  --  43.9* 38.5*   CALCIUM mg/dL 9.8 10.1 9.7 9.5 10.0 10.0 9.8 9.9   EGFR IF NONAFRICN AM mL/min/1.73 103 >60 >60 114 89 >60 105 91   ALK PHOS U/L 70 74 69 65 88 78 72 85   TOTAL PROTEIN g/dL 7.0 7.2 7.2 7.1 7.8 7.6 6.9 7.7   ALT (SGPT) U/L 25 24 28 31 41* 26 24 75*   AST (SGOT) U/L 19 18 19 23 24 21 18 55*   BILIRUBIN mg/dL 0.5 0.6 0.5 0.4 0.6 0.6 0.4 0.9   ALBUMIN g/dL 4.60 4.9 4.9 4.70 4.90 4.9 4.60 4.90   GLOBULIN gm/dL 2.4  --   --  2.4 2.9  --  2.3 2.8       No results for input(s): MSPIKE, KAPPALAMB, IGLFLC, URICACID, FREEKAPPAL, CEA, LDH, REFLABREPO in the last 07201 hours.    Lab Results - Last 18 Months   Lab Units 11/06/20  0737 08/25/20  0848 05/08/20  0746 03/13/20  0730 11/13/19  0647 11/08/19  0739 06/21/19  0825 06/21/19  0824   IRON mcg/dL 71  --  69 74 101 74  --  126   TIBC mcg/dL 340  --  311 337  --  314  --  294   IRON SATURATION % 21  --  22 22  --  24  --  43   FERRITIN ng/mL 296.60*  --  349.70* 446.80* 487.5* 407.70*  --  548.00*   TSH uIU/mL  --   1.470  --   --  1.740  --  1.220  --        ASSESSMENT:   1. Normocytic/borderline microcytic anemia with profound iron deficiency. Hemoglobin 13.8; MCV 90.9, 11/06/2020 (prior range: Hemoglobin 11.2 - 14.4; MCV 81.5 - 92.1). Resolved since last receipt of Injectafer.   2. Fatigue related to #1. Subjectively improved.   3. Profound iron deficiency with a ferritin of 2.7 on 03/03/2018.   a. Last EGD and colonoscopy 03/01/2018 (above). Both unrevealing.   b. M2A Capsule - Date capsule was swallowed: 04/20/2018. Date capsule was read : 04/28/2018. RESULTS: Gastric passage time: 1 hour 55 minutes. Small bowel passage time: 2 hours 39 minutes. The capsule was clearly seen in the colon. Conclusion: 1 small AVM was noted at 1 hour 59 minutes and 38 seconds. Several small areas of the lymphangitic ectasia noted. The capsule was seen freely passing into the cecum. There was no activity noted on this exam.   c. Abnormal CT abdomen/enterography, 05/15/2018 at [facility]: Impression: 3.9 cm focus of mass-like enhancement involving the vagina, proximal urethra, and bladder dome. Unsure if this reflects neoplasm or post surgical change; however, neoplastic process arising from the vagina certainly not excluded by CT. Recommend direct visualization. No suspicious focal lesions identified in the bowel. No suspicious adenopathy in the abdomen or pelvis. Hepatic steatosis.         d. Seen by Urology (Dr. Mae Esquivel) at [place], 08/04/2019. Stable exam, MRI and cystoscopic findings highly suggestive of the nodule representing the Macroplastique implant.  Discussed the risks of excision including recurrent stress incontinence and fistula.  Continue observation.  Continue transvaginal estrogen.  RTC 6 months..     4. Insulin-requiring type 2 diabetes. Dr. Pelayo.  5. Hyperlipidemia. Remains on Crestor  6. Trigeminal neuralgia of the left side of the face, lingering symptoms since trigeminal decompression procedure.  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   7. Gastroesophageal reflux disease with esophagitis. On Pepcid.  8. Vitamin D deficiency. On weekly ergocalciferol.  9. Lumbar disk disease. Variable back pains.  Uses NSAIDs sparingly.  10. Elevated LFTs, NOS. Crestor held, hep profile and liver US ordered on 03/22. Liver US, 03/27/2019: 1. Hepatic steatosis. 2. Limited visualization of the pancreas. Hepatitis profile, 06/28/2019 negative.  Normal since 11/08/2019        11. Fatigue of caregiver.  Primary caregiver of her 90 year old mother. \"Better since I've gotten caregivers to help.\"    RECOMMENDATIONS:   1.  Re: Apprised of the labs from 11/06/2020 (above) noting the normal MCV and normal Hgb, hyperglycemia, normal calcium, normal AST/ALT with otherwise normal CMP, repleted ferritin (> 100) normal Fe sat (greater than 20%), normal serum iron (from previously severe iron deficiency).   2.  Previously reviewed report of capsule endoscopy on 04/20/2018 (above). 1 AVM and lymphangitic ectasia noted.   3. Previously reviewed reports of EGD and colonoscopy, 03/01/2018 (above).   4. The rationale and potential toxicities of IV iron (anaphylaxis included) previously discussed at length. She will agree to therapy as indicated.   5.  Continue currently identified medications.   6.  Continue management per primary care and other specialists.   7.  Return to the [facility] office in 24 weeks with pre-office CMP, CBC, serum iron, iron saturation, ferritin.    MEDICAL DECISION MAKING: Moderate Complexity   AMOUNT OF DATA: Moderate     I spent 14 total minutes, face-to-face, caring for Carlotta kohli.  Greater than 50% of this time involved counseling and/or coordination of care as documented within this note regarding the patient's illness(es), pros and cons of various treatment options, instructions and/or risk reduction.    cc: Misty Pelayo MD "         Garrett Ozuna MD

## 2020-11-13 ENCOUNTER — OFFICE VISIT (OUTPATIENT)
Dept: ONCOLOGY | Facility: CLINIC | Age: 70
End: 2020-11-13

## 2020-11-13 VITALS
BODY MASS INDEX: 25.48 KG/M2 | HEART RATE: 87 BPM | TEMPERATURE: 96.6 F | HEIGHT: 68 IN | DIASTOLIC BLOOD PRESSURE: 88 MMHG | SYSTOLIC BLOOD PRESSURE: 138 MMHG | WEIGHT: 168.1 LBS | RESPIRATION RATE: 16 BRPM | OXYGEN SATURATION: 96 %

## 2020-11-13 DIAGNOSIS — D50.9 IRON DEFICIENCY ANEMIA, UNSPECIFIED IRON DEFICIENCY ANEMIA TYPE: Primary | ICD-10-CM

## 2020-11-13 PROCEDURE — 99213 OFFICE O/P EST LOW 20 MIN: CPT | Performed by: INTERNAL MEDICINE

## 2020-11-20 RX ORDER — BLOOD SUGAR DIAGNOSTIC
STRIP MISCELLANEOUS
Qty: 100 STRIP | Refills: 3 | Status: SHIPPED | OUTPATIENT
Start: 2020-11-20 | End: 2021-07-06

## 2020-12-07 ENCOUNTER — TELEPHONE (OUTPATIENT)
Dept: GASTROENTEROLOGY | Facility: CLINIC | Age: 70
End: 2020-12-07

## 2020-12-07 NOTE — TELEPHONE ENCOUNTER
Patients  called to see if she would need a in person visit prior to scheduling her colonoscopy. She has a hx of anemia which is followed by Dr. Hsieh.

## 2020-12-07 NOTE — TELEPHONE ENCOUNTER
Last saw you in 2018 for anemia and heme positive stool. She is due for her 3 yr colonoscopy in March. Anemia is stable with Dr. Hsieh and she also see Dr. Pelayo frequently. HX diabetes (takes metformin) no CKD or HTN, takes 81MG aspirin.

## 2020-12-17 ENCOUNTER — PREP FOR SURGERY (OUTPATIENT)
Dept: GASTROENTEROLOGY | Facility: CLINIC | Age: 70
End: 2020-12-17

## 2020-12-17 DIAGNOSIS — Z86.010 HISTORY OF COLON POLYPS: Primary | ICD-10-CM

## 2020-12-21 RX ORDER — SODIUM, POTASSIUM,MAG SULFATES 17.5-3.13G
SOLUTION, RECONSTITUTED, ORAL ORAL
Qty: 2 BOTTLE | Refills: 0 | Status: SHIPPED | OUTPATIENT
Start: 2020-12-21 | End: 2021-04-16 | Stop reason: HOSPADM

## 2020-12-23 PROBLEM — Z86.010 HISTORY OF COLON POLYPS: Status: ACTIVE | Noted: 2020-12-23

## 2020-12-23 PROBLEM — Z86.0100 HISTORY OF COLON POLYPS: Status: ACTIVE | Noted: 2020-12-23

## 2020-12-28 ENCOUNTER — APPOINTMENT (OUTPATIENT)
Dept: CARDIOLOGY | Facility: HOSPITAL | Age: 70
End: 2020-12-28

## 2020-12-28 ENCOUNTER — HOSPITAL ENCOUNTER (EMERGENCY)
Facility: HOSPITAL | Age: 70
Discharge: HOME | End: 2020-12-28
Attending: EMERGENCY MEDICINE | Admitting: EMERGENCY MEDICINE

## 2020-12-28 ENCOUNTER — APPOINTMENT (OUTPATIENT)
Dept: CT IMAGING | Facility: HOSPITAL | Age: 70
End: 2020-12-28

## 2020-12-28 ENCOUNTER — APPOINTMENT (OUTPATIENT)
Dept: GENERAL RADIOLOGY | Facility: HOSPITAL | Age: 70
End: 2020-12-28

## 2020-12-28 VITALS
TEMPERATURE: 98.8 F | SYSTOLIC BLOOD PRESSURE: 145 MMHG | RESPIRATION RATE: 17 BRPM | WEIGHT: 165 LBS | HEART RATE: 64 BPM | HEIGHT: 68 IN | BODY MASS INDEX: 25.01 KG/M2 | OXYGEN SATURATION: 95 % | DIASTOLIC BLOOD PRESSURE: 75 MMHG

## 2020-12-28 DIAGNOSIS — E04.1 LEFT THYROID NODULE: ICD-10-CM

## 2020-12-28 DIAGNOSIS — R91.1 RIGHT LOWER LOBE PULMONARY NODULE: ICD-10-CM

## 2020-12-28 DIAGNOSIS — R07.9 CHEST PAIN, UNSPECIFIED TYPE: Primary | ICD-10-CM

## 2020-12-28 LAB
ALBUMIN SERPL-MCNC: 4.5 G/DL (ref 3.5–5.2)
ALBUMIN/GLOB SERPL: 1.9 G/DL
ALP SERPL-CCNC: 69 U/L (ref 39–117)
ALT SERPL W P-5'-P-CCNC: 24 U/L (ref 1–33)
ANION GAP SERPL CALCULATED.3IONS-SCNC: 11 MMOL/L (ref 5–15)
AST SERPL-CCNC: 17 U/L (ref 1–32)
BASOPHILS # BLD AUTO: 0.05 10*3/MM3 (ref 0–0.2)
BASOPHILS NFR BLD AUTO: 0.7 % (ref 0–1.5)
BH CV STRESS BP STAGE 1: NORMAL
BH CV STRESS DURATION MIN STAGE 1: 2
BH CV STRESS DURATION SEC STAGE 1: 56
BH CV STRESS GRADE STAGE 1: 10
BH CV STRESS HR STAGE 1: 144
BH CV STRESS METS STAGE 1: 5
BH CV STRESS PROTOCOL 1: NORMAL
BH CV STRESS RECOVERY BP: NORMAL MMHG
BH CV STRESS RECOVERY HR: 96 BPM
BH CV STRESS SPEED STAGE 1: 1.7
BH CV STRESS STAGE 1: 1
BILIRUB SERPL-MCNC: 0.4 MG/DL (ref 0–1.2)
BUN SERPL-MCNC: 20 MG/DL (ref 8–23)
BUN/CREAT SERPL: 43.5 (ref 7–25)
CALCIUM SPEC-SCNC: 9.6 MG/DL (ref 8.6–10.5)
CHLORIDE SERPL-SCNC: 103 MMOL/L (ref 98–107)
CO2 SERPL-SCNC: 26 MMOL/L (ref 22–29)
CREAT SERPL-MCNC: 0.46 MG/DL (ref 0.57–1)
DEPRECATED RDW RBC AUTO: 41 FL (ref 37–54)
EOSINOPHIL # BLD AUTO: 0.23 10*3/MM3 (ref 0–0.4)
EOSINOPHIL NFR BLD AUTO: 3.4 % (ref 0.3–6.2)
ERYTHROCYTE [DISTWIDTH] IN BLOOD BY AUTOMATED COUNT: 12.6 % (ref 12.3–15.4)
GFR SERPL CREATININE-BSD FRML MDRD: 134 ML/MIN/1.73
GLOBULIN UR ELPH-MCNC: 2.4 GM/DL
GLUCOSE SERPL-MCNC: 151 MG/DL (ref 65–99)
HCT VFR BLD AUTO: 41 % (ref 34–46.6)
HGB BLD-MCNC: 14.8 G/DL (ref 12–15.9)
HOLD SPECIMEN: NORMAL
HOLD SPECIMEN: NORMAL
IMM GRANULOCYTES # BLD AUTO: 0.03 10*3/MM3 (ref 0–0.05)
IMM GRANULOCYTES NFR BLD AUTO: 0.4 % (ref 0–0.5)
LYMPHOCYTES # BLD AUTO: 1.96 10*3/MM3 (ref 0.7–3.1)
LYMPHOCYTES NFR BLD AUTO: 29.3 % (ref 19.6–45.3)
MAXIMAL PREDICTED HEART RATE: 150 BPM
MCH RBC QN AUTO: 32.1 PG (ref 26.6–33)
MCHC RBC AUTO-ENTMCNC: 36.1 G/DL (ref 31.5–35.7)
MCV RBC AUTO: 88.9 FL (ref 79–97)
MONOCYTES # BLD AUTO: 0.47 10*3/MM3 (ref 0.1–0.9)
MONOCYTES NFR BLD AUTO: 7 % (ref 5–12)
NEUTROPHILS NFR BLD AUTO: 3.95 10*3/MM3 (ref 1.7–7)
NEUTROPHILS NFR BLD AUTO: 59.2 % (ref 42.7–76)
NRBC BLD AUTO-RTO: 0 /100 WBC (ref 0–0.2)
PERCENT MAX PREDICTED HR: 96 %
PLATELET # BLD AUTO: 218 10*3/MM3 (ref 140–450)
PMV BLD AUTO: 11.5 FL (ref 6–12)
POTASSIUM SERPL-SCNC: 3.6 MMOL/L (ref 3.5–5.2)
PROT SERPL-MCNC: 6.9 G/DL (ref 6–8.5)
RBC # BLD AUTO: 4.61 10*6/MM3 (ref 3.77–5.28)
SARS-COV-2 RNA PNL SPEC NAA+PROBE: NOT DETECTED
SODIUM SERPL-SCNC: 140 MMOL/L (ref 136–145)
STRESS BASELINE BP: NORMAL MMHG
STRESS BASELINE HR: 69 BPM
STRESS PERCENT HR: 113 %
STRESS POST ESTIMATED WORKLOAD: 5 METS
STRESS POST EXERCISE DUR MIN: 2 MIN
STRESS POST EXERCISE DUR SEC: 56 SEC
STRESS POST PEAK BP: NORMAL MMHG
STRESS POST PEAK HR: 144 BPM
STRESS TARGET HR: 128 BPM
TROPONIN T SERPL-MCNC: <0.01 NG/ML (ref 0–0.03)
TROPONIN T SERPL-MCNC: <0.01 NG/ML (ref 0–0.03)
WBC # BLD AUTO: 6.69 10*3/MM3 (ref 3.4–10.8)
WHOLE BLOOD HOLD SPECIMEN: NORMAL
WHOLE BLOOD HOLD SPECIMEN: NORMAL

## 2020-12-28 PROCEDURE — 93010 ELECTROCARDIOGRAM REPORT: CPT | Performed by: INTERNAL MEDICINE

## 2020-12-28 PROCEDURE — 93350 STRESS TTE ONLY: CPT

## 2020-12-28 PROCEDURE — 0 IOPAMIDOL PER 1 ML: Performed by: EMERGENCY MEDICINE

## 2020-12-28 PROCEDURE — 71275 CT ANGIOGRAPHY CHEST: CPT

## 2020-12-28 PROCEDURE — 84484 ASSAY OF TROPONIN QUANT: CPT | Performed by: EMERGENCY MEDICINE

## 2020-12-28 PROCEDURE — 93005 ELECTROCARDIOGRAM TRACING: CPT | Performed by: EMERGENCY MEDICINE

## 2020-12-28 PROCEDURE — 93017 CV STRESS TEST TRACING ONLY: CPT

## 2020-12-28 PROCEDURE — 80053 COMPREHEN METABOLIC PANEL: CPT | Performed by: EMERGENCY MEDICINE

## 2020-12-28 PROCEDURE — 71045 X-RAY EXAM CHEST 1 VIEW: CPT

## 2020-12-28 PROCEDURE — 93352 ADMIN ECG CONTRAST AGENT: CPT | Performed by: INTERNAL MEDICINE

## 2020-12-28 PROCEDURE — 87635 SARS-COV-2 COVID-19 AMP PRB: CPT | Performed by: EMERGENCY MEDICINE

## 2020-12-28 PROCEDURE — 25010000002 PERFLUTREN 6.52 MG/ML SUSPENSION: Performed by: INTERNAL MEDICINE

## 2020-12-28 PROCEDURE — 93018 CV STRESS TEST I&R ONLY: CPT | Performed by: INTERNAL MEDICINE

## 2020-12-28 PROCEDURE — 99284 EMERGENCY DEPT VISIT MOD MDM: CPT

## 2020-12-28 PROCEDURE — 85025 COMPLETE CBC W/AUTO DIFF WBC: CPT | Performed by: EMERGENCY MEDICINE

## 2020-12-28 PROCEDURE — 93350 STRESS TTE ONLY: CPT | Performed by: INTERNAL MEDICINE

## 2020-12-28 RX ORDER — ASPIRIN 81 MG/1
324 TABLET, CHEWABLE ORAL ONCE
Status: COMPLETED | OUTPATIENT
Start: 2020-12-28 | End: 2020-12-28

## 2020-12-28 RX ORDER — MORPHINE SULFATE 2 MG/ML
2 INJECTION, SOLUTION INTRAMUSCULAR; INTRAVENOUS ONCE
Status: DISCONTINUED | OUTPATIENT
Start: 2020-12-28 | End: 2020-12-28 | Stop reason: HOSPADM

## 2020-12-28 RX ORDER — ONDANSETRON 2 MG/ML
4 INJECTION INTRAMUSCULAR; INTRAVENOUS ONCE
Status: DISCONTINUED | OUTPATIENT
Start: 2020-12-28 | End: 2020-12-28 | Stop reason: HOSPADM

## 2020-12-28 RX ORDER — SODIUM CHLORIDE 0.9 % (FLUSH) 0.9 %
10 SYRINGE (ML) INJECTION AS NEEDED
Status: DISCONTINUED | OUTPATIENT
Start: 2020-12-28 | End: 2020-12-28 | Stop reason: HOSPADM

## 2020-12-28 RX ADMIN — PERFLUTREN 8.48 MG: 6.52 INJECTION, SUSPENSION INTRAVENOUS at 12:28

## 2020-12-28 RX ADMIN — IOPAMIDOL 100 ML: 755 INJECTION, SOLUTION INTRAVENOUS at 10:54

## 2020-12-28 RX ADMIN — ASPIRIN 324 MG: 81 TABLET, CHEWABLE ORAL at 08:54

## 2020-12-29 ENCOUNTER — OFFICE VISIT (OUTPATIENT)
Dept: INTERNAL MEDICINE | Age: 70
End: 2020-12-29
Payer: MEDICARE

## 2020-12-29 VITALS
DIASTOLIC BLOOD PRESSURE: 80 MMHG | BODY MASS INDEX: 24.14 KG/M2 | OXYGEN SATURATION: 96 % | SYSTOLIC BLOOD PRESSURE: 120 MMHG | HEART RATE: 84 BPM | WEIGHT: 163 LBS | HEIGHT: 69 IN

## 2020-12-29 DIAGNOSIS — I10 ESSENTIAL HYPERTENSION: Chronic | ICD-10-CM

## 2020-12-29 DIAGNOSIS — E04.1 THYROID NODULE GREATER THAN OR EQUAL TO 1.5 CM IN DIAMETER INCIDENTALLY NOTED ON IMAGING STUDY: ICD-10-CM

## 2020-12-29 DIAGNOSIS — E78.2 MIXED HYPERLIPIDEMIA: Chronic | ICD-10-CM

## 2020-12-29 DIAGNOSIS — R53.83 FATIGUE, UNSPECIFIED TYPE: ICD-10-CM

## 2020-12-29 DIAGNOSIS — M79.602 LEFT ARM PAIN: Primary | ICD-10-CM

## 2020-12-29 DIAGNOSIS — E11.9 TYPE 2 DIABETES MELLITUS WITHOUT COMPLICATION, WITHOUT LONG-TERM CURRENT USE OF INSULIN (HCC): Chronic | ICD-10-CM

## 2020-12-29 LAB
HBA1C MFR BLD: 6.1 %
QT INTERVAL: 380 MS
QT INTERVAL: 412 MS
QTC INTERVAL: 438 MS
QTC INTERVAL: 444 MS

## 2020-12-29 PROCEDURE — 99214 OFFICE O/P EST MOD 30 MIN: CPT | Performed by: INTERNAL MEDICINE

## 2020-12-29 PROCEDURE — 3044F HG A1C LEVEL LT 7.0%: CPT | Performed by: INTERNAL MEDICINE

## 2020-12-29 PROCEDURE — G8427 DOCREV CUR MEDS BY ELIG CLIN: HCPCS | Performed by: INTERNAL MEDICINE

## 2020-12-29 PROCEDURE — G8420 CALC BMI NORM PARAMETERS: HCPCS | Performed by: INTERNAL MEDICINE

## 2020-12-29 PROCEDURE — 1123F ACP DISCUSS/DSCN MKR DOCD: CPT | Performed by: INTERNAL MEDICINE

## 2020-12-29 PROCEDURE — G8484 FLU IMMUNIZE NO ADMIN: HCPCS | Performed by: INTERNAL MEDICINE

## 2020-12-29 PROCEDURE — 83036 HEMOGLOBIN GLYCOSYLATED A1C: CPT | Performed by: INTERNAL MEDICINE

## 2020-12-29 PROCEDURE — 4004F PT TOBACCO SCREEN RCVD TLK: CPT | Performed by: INTERNAL MEDICINE

## 2020-12-29 PROCEDURE — 4040F PNEUMOC VAC/ADMIN/RCVD: CPT | Performed by: INTERNAL MEDICINE

## 2020-12-29 PROCEDURE — 1090F PRES/ABSN URINE INCON ASSESS: CPT | Performed by: INTERNAL MEDICINE

## 2020-12-29 PROCEDURE — 2022F DILAT RTA XM EVC RTNOPTHY: CPT | Performed by: INTERNAL MEDICINE

## 2020-12-29 PROCEDURE — 3017F COLORECTAL CA SCREEN DOC REV: CPT | Performed by: INTERNAL MEDICINE

## 2020-12-29 PROCEDURE — G8400 PT W/DXA NO RESULTS DOC: HCPCS | Performed by: INTERNAL MEDICINE

## 2020-12-29 SDOH — ECONOMIC STABILITY: TRANSPORTATION INSECURITY
IN THE PAST 12 MONTHS, HAS THE LACK OF TRANSPORTATION KEPT YOU FROM MEDICAL APPOINTMENTS OR FROM GETTING MEDICATIONS?: NOT ASKED

## 2020-12-29 SDOH — ECONOMIC STABILITY: FOOD INSECURITY: WITHIN THE PAST 12 MONTHS, YOU WORRIED THAT YOUR FOOD WOULD RUN OUT BEFORE YOU GOT MONEY TO BUY MORE.: NEVER TRUE

## 2020-12-29 SDOH — ECONOMIC STABILITY: FOOD INSECURITY: WITHIN THE PAST 12 MONTHS, THE FOOD YOU BOUGHT JUST DIDN'T LAST AND YOU DIDN'T HAVE MONEY TO GET MORE.: NEVER TRUE

## 2020-12-29 SDOH — ECONOMIC STABILITY: TRANSPORTATION INSECURITY
IN THE PAST 12 MONTHS, HAS LACK OF TRANSPORTATION KEPT YOU FROM MEETINGS, WORK, OR FROM GETTING THINGS NEEDED FOR DAILY LIVING?: NOT ASKED

## 2020-12-29 NOTE — PROGRESS NOTES
Chief Complaint   Patient presents with    Fatigue     she went to [facility] ER on 12/28 for chest pain    Diabetes       HPI: Patient is here for an assessment she does not feel well awakened [phone] with chest pain left arm pain went to the ER. We reviewed that assessment and evaluation still not feeling well also under a lot of stress.   12/28 awakened     Past Medical History:   Diagnosis Date    Calculus of gallbladder 8/20/2017    Chronic asthma without complication 0/03/8230    Essential hypertension 8/21/2017    Fatty liver 8/20/2017    Gastroesophageal reflux disease without esophagitis 8/20/2017    Lumbar disc disease     L5-S1    Migraine without aura, not intractable 8/20/2017    Mixed hyperlipidemia 8/20/2017    Obstructive sleep apnea 8/20/2017    Sacroiliitis (HCC)     Trigeminal neuralgia of left side of face 8/20/2017    Type 2 diabetes mellitus without complication ([facility] [address] 5/82/3887    Venous insufficiency     Vitamin D deficiency        Past Surgical History:   Procedure Laterality Date    EYE LID SURGERY Bilateral 2020    eye lids raised    HYSTERECTOMY, TOTAL ABDOMINAL      No BSO       Family History   Problem Relation Age of Onset    High Blood Pressure Mother     High Blood Pressure Father     Prostate Cancer Father     Diabetes Father         Type 2    Breast Cancer Sister 47    Diabetes Sister         Type 2       Social History     Socioeconomic History    Marital status:      Spouse name: Kayleen Le Number of children: 2    Years of education: 15    Highest education level: Not on file   Occupational History    Occupation: retired    Social Needs    Financial resource strain: Not hard at all   [place]oren[place]-Torsten insecurity     Worry: Never true     Inability: Never true    Transportation needs     Medical: Not on file     Non-medical: Not on file   Tobacco Use    Smoking status: Never Smoker    Smokeless tobacco: Never Used   Substance and Sexual Activity    Alcohol use: No    Drug use: No    Sexual activity: Yes     Partners: Male   Lifestyle    Physical activity     Days per week: Not on file     Minutes per session: Not on file    Stress: Not on file   Relationships    Social connections     Talks on phone: Not on file     Gets together: Not on file     Attends [employer] service: Not on file     Active member of club or organization: Not on file     Attends meetings of clubs or organizations: Not on file     Relationship status: Not on file    Intimate partner violence     Fear of current or ex partner: Not on file     Emotionally abused: Not on file     Physically abused: Not on file     Forced sexual activity: Not on file   Other Topics Concern    Not on file   Social History Narrative    Not on file       Allergies   Allergen Reactions    Inderal [Propranolol] Other (See Comments)     Bad dreams  unknown    Solifenacin Succinate Other (See Comments)     Severe constipation    Demerol Hcl [Meperidine]      halluncinations    Vesicare [Solifenacin]      Couldn't urinate    Zoloft [Sertraline Hcl]      hallucinations    Zovirax [Acyclovir] Hives and Other (See Comments)     PhX       Current Outpatient Medications   Medication Sig Dispense Refill    ONETOUCH ULTRA strip TEST DAILY AS NEEDED 100 strip 3    vitamin D (ERGOCALCIFEROL) 1.25 MG (98971 UT) CAPS capsule TAKE 1 CAPSULE BY MOUTH ONCE A WEEK 12 capsule 3    montelukast (SINGULAIR) 10 MG tablet TAKE 1 TABLET BY MOUTH DAILY 90 tablet 3    verapamil (CALAN SR) 240 MG extended release tablet TAKE ONE TABLET BY MOUTH TWICE A  tablet 3    SOFT TOUCH LANCETS MISC USE TWO TIMES DAILY 100 each 3    metFORMIN (GLUCOPHAGE) 500 MG tablet 2 tablets po bid 360 tablet 3    rosuvastatin (CRESTOR) 5 MG tablet Take 1/2 tablet on Mon, Wed, Fri 36 tablet 3    ALPRAZolam (XANAX) 0.25 MG tablet Take 0.25 mg by mouth nightly as needed for Sleep.       aspirin-acetaminophen-caffeine (EXCEDRIN MIGRAINE) 250-250-65 MG per tablet Take 1 tablet by mouth      esomeprazole (NEXIUM) 20 MG delayed release capsule Take 20 mg by mouth every morning (before breakfast)      losartan (COZAAR) 100 MG tablet Take 1 tablet by mouth daily 90 tablet 3    hydrochlorothiazide (MICROZIDE) 12.5 MG capsule Take 1 capsule by mouth daily 90 capsule 3    conjugated estrogens (PREMARIN) 0.625 MG/GM vaginal cream 1gm twice a week (this replaces Estradiol cream per insurance 4/10/18) 1 Tube 3    aspirin 81 MG tablet Take 81 mg by mouth daily      Multiple Vitamins-Minerals (PRESERVISION/LUTEIN) CAPS Take 1 capsule by mouth 2 times daily      calcium carbonate (TUMS) 500 MG chewable tablet Take 1 tablet by mouth daily       No current facility-administered medications for this visit. Review of Systems   Constitutional: Positive for fatigue. Negative for chills and fever. HENT: Negative for congestion and sinus pressure. Facial pain. Eyes: Negative for discharge and redness. Respiratory: Positive for shortness of breath. Negative for cough. Cardiovascular: Positive for chest pain. Negative for palpitations and leg swelling. Gastrointestinal: Negative for abdominal distention and abdominal pain. Endocrine: Positive for heat intolerance. Genitourinary: Negative for dysuria, frequency and urgency. Musculoskeletal: Positive for arthralgias. Negative for back pain. Skin: Negative for rash and wound. Neurological: Negative for dizziness and light-headedness. Psychiatric/Behavioral: Positive for dysphoric mood and sleep disturbance. The patient is nervous/anxious.          Stressors       /80   Pulse 84   Ht 5' 9\" (1.753 m)   Wt 163 lb (73.9 kg)   SpO2 96%   BMI 24.07 kg/m²   BP Readings from Last 7 Encounters:   12/29/20 120/80   08/31/20 (!) 138/90   05/18/20 120/80   11/18/19 130/80   06/19/19 (!) 138/90   01/14/19 (!) 164/92   08/27/18 130/82     Wt Readings from Last 7 Encounters:   12/29/20 163 lb (73.9 kg)   08/31/20 163 lb (73.9 kg)   05/18/20 163 lb (73.9 kg)   11/18/19 170 lb (77.1 kg)   06/19/19 180 lb (81.6 kg)   01/14/19 181 lb (82.1 kg)   08/27/18 178 lb 9.6 oz (81 kg)     BMI Readings from Last 7 Encounters:   12/29/20 24.07 kg/m²   08/31/20 24.07 kg/m²   05/18/20 24.07 kg/m²   11/18/19 25.10 kg/m²   06/19/19 26.58 kg/m²   01/14/19 27.52 kg/m²   08/27/18 27.16 kg/m²     Resp Readings from Last 7 Encounters:   01/14/19 18   01/08/18 16   08/21/17 20       Physical Exam  Constitutional:       General: She is not in acute distress. Appearance: Normal appearance. She is well-developed. HENT:      Right Ear: External ear normal. Tympanic membrane is not injected. Left Ear: External ear normal. Tympanic membrane is not injected. Mouth/Throat:      Pharynx: No oropharyngeal exudate. Eyes:      General: No scleral icterus. Conjunctiva/sclera: Conjunctivae normal.   Neck:      Musculoskeletal: Neck supple. Thyroid: No thyroid mass or thyromegaly. Vascular: No carotid bruit. Cardiovascular:      Rate and Rhythm: Normal rate and regular rhythm. Heart sounds: S1 normal and S2 normal. No murmur. No S3 or S4 sounds. Pulmonary:      Effort: Pulmonary effort is normal. No respiratory distress. Breath sounds: Normal breath sounds. No wheezing or rales. Abdominal:      General: Bowel sounds are normal. There is no distension. Palpations: Abdomen is soft. There is no mass. Tenderness: There is no abdominal tenderness. Lymphadenopathy:      Cervical: No cervical adenopathy. Upper Body:      Right upper body: No supraclavicular adenopathy. Left upper body: No supraclavicular adenopathy. Skin:     Findings: No rash. Neurological:      Mental Status: She is alert and oriented to person, place, and time. Cranial Nerves: No cranial nerve deficit.          Results for orders placed or performed in visit on 12/29/20   POCT glycosylated hemoglobin (Hb A1C)   Result Value Ref Range    Hemoglobin A1C 6.1 %       ASSESSMENT/ PLAN:  1. Left arm pain  Patient with worrisome left arm pain diaphoresis that sent her to the emergency room that evaluation is reviewed while there they did labs CTA chest and stress echo. EKG sites previous MI stress echo was negative no evidence of abnormal echo reported. We will follow if symptoms recur. 2. 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 Need to further assess her health and medical problems and make sure everything else is okay. 3. Thyroid nodule greater than or equal to 1.5 cm in diameter incidentally noted on imaging study  We are going to get an ultrasound of the thyroid to better assess there is it least 1 nodule seen incidentally on chest CT we will follow-up  - US THYROID; Future  - ID SONO GUIDE NEEDLE BIOPSY    4. Type 2 diabetes mellitus without complication, without long-term current use of insulin (HCC)  Hemoglobin A1c is 6.1 doing well with current meds current plan of care  - POCT glycosylated hemoglobin (Hb A1C)    5. Essential hypertension  Blood pressure appears to have been in control continue current plan of care    6. Mixed hyperlipidemia  Patient on a low-dose statin-- reassess     Addendum: Ultrasound showed 4 nodules 3 above 1.1 cm one was 3.1 cm I am going to refer her to ENT. Patient's  also let me know she still having diaphoresis and just not feeling that well were going to check her thyroid and were going to refer her to cardiology.

## 2020-12-30 ENCOUNTER — HOSPITAL ENCOUNTER (OUTPATIENT)
Dept: ULTRASOUND IMAGING | Facility: HOSPITAL | Age: 70
Discharge: HOME | End: 2020-12-30
Admitting: INTERNAL MEDICINE

## 2020-12-30 ENCOUNTER — TRANSCRIBE ORDERS (OUTPATIENT)
Dept: ADMINISTRATIVE | Facility: HOSPITAL | Age: 70
End: 2020-12-30

## 2020-12-30 DIAGNOSIS — E04.1 THYROID NODULE GREATER THAN OR EQUAL TO 1.5 CM IN DIAMETER INCIDENTALLY NOTED ON IMAGING STUDY: Primary | ICD-10-CM

## 2020-12-30 DIAGNOSIS — E04.1 THYROID NODULE GREATER THAN OR EQUAL TO 1.5 CM IN DIAMETER INCIDENTALLY NOTED ON IMAGING STUDY: ICD-10-CM

## 2020-12-30 PROCEDURE — 76536 US EXAM OF HEAD AND NECK: CPT

## 2021-01-05 DIAGNOSIS — R94.31 ABNORMAL EKG: ICD-10-CM

## 2021-01-05 DIAGNOSIS — E04.1 THYROID NODULE GREATER THAN OR EQUAL TO 1.5 CM IN DIAMETER INCIDENTALLY NOTED ON IMAGING STUDY: Primary | ICD-10-CM

## 2021-01-05 ASSESSMENT — ENCOUNTER SYMPTOMS
EYE DISCHARGE: 0
SHORTNESS OF BREATH: 1
ABDOMINAL DISTENTION: 0
COUGH: 0
SINUS PRESSURE: 0
BACK PAIN: 0
ABDOMINAL PAIN: 0
EYE REDNESS: 0

## 2021-01-06 DIAGNOSIS — E11.9 TYPE 2 DIABETES MELLITUS WITHOUT COMPLICATION, WITHOUT LONG-TERM CURRENT USE OF INSULIN (HCC): Chronic | ICD-10-CM

## 2021-01-06 DIAGNOSIS — E04.1 THYROID NODULE GREATER THAN OR EQUAL TO 1.5 CM IN DIAMETER INCIDENTALLY NOTED ON IMAGING STUDY: ICD-10-CM

## 2021-01-06 LAB
ALBUMIN SERPL-MCNC: 4.8 G/DL (ref 3.5–5.2)
ALP BLD-CCNC: 75 U/L (ref 35–104)
ALT SERPL-CCNC: 26 U/L (ref 5–33)
ANION GAP SERPL CALCULATED.3IONS-SCNC: 11 MMOL/L (ref 7–19)
AST SERPL-CCNC: 17 U/L (ref 5–32)
BILIRUB SERPL-MCNC: 0.4 MG/DL (ref 0.2–1.2)
BUN BLDV-MCNC: 22 MG/DL (ref 8–23)
CALCIUM SERPL-MCNC: 9.9 MG/DL (ref 8.8–10.2)
CHLORIDE BLD-SCNC: 99 MMOL/L (ref 98–111)
CO2: 29 MMOL/L (ref 22–29)
CREAT SERPL-MCNC: 0.6 MG/DL (ref 0.5–0.9)
GFR AFRICAN AMERICAN: >59
GFR NON-AFRICAN AMERICAN: >60
GLUCOSE BLD-MCNC: 167 MG/DL (ref 74–109)
HBA1C MFR BLD: 6.4 % (ref 4–6)
HCT VFR BLD CALC: 43.3 % (ref 37–47)
HEMOGLOBIN: 14.5 G/DL (ref 12–16)
MCH RBC QN AUTO: 31.3 PG (ref 27–31)
MCHC RBC AUTO-ENTMCNC: 33.5 G/DL (ref 33–37)
MCV RBC AUTO: 93.3 FL (ref 81–99)
PDW BLD-RTO: 12.9 % (ref 11.5–14.5)
PLATELET # BLD: 221 K/UL (ref 130–400)
PMV BLD AUTO: 11.9 FL (ref 9.4–12.3)
POTASSIUM SERPL-SCNC: 4.3 MMOL/L (ref 3.5–5)
RBC # BLD: 4.64 M/UL (ref 4.2–5.4)
SODIUM BLD-SCNC: 139 MMOL/L (ref 136–145)
T4 FREE: 1.47 NG/DL (ref 0.93–1.7)
TOTAL PROTEIN: 7.3 G/DL (ref 6.6–8.7)
TSH SERPL DL<=0.05 MIU/L-ACNC: 1.11 UIU/ML (ref 0.27–4.2)
WBC # BLD: 7 K/UL (ref 4.8–10.8)

## 2021-01-11 ENCOUNTER — TRANSCRIBE ORDERS (OUTPATIENT)
Dept: ADMINISTRATIVE | Facility: HOSPITAL | Age: 71
End: 2021-01-11

## 2021-01-11 ENCOUNTER — HOSPITAL ENCOUNTER (OUTPATIENT)
Dept: GENERAL RADIOLOGY | Facility: HOSPITAL | Age: 71
Discharge: HOME | End: 2021-01-11
Admitting: INTERNAL MEDICINE

## 2021-01-11 ENCOUNTER — OFFICE VISIT (OUTPATIENT)
Dept: INTERNAL MEDICINE | Age: 71
End: 2021-01-11
Payer: MEDICARE

## 2021-01-11 VITALS
SYSTOLIC BLOOD PRESSURE: 124 MMHG | WEIGHT: 163 LBS | OXYGEN SATURATION: 97 % | BODY MASS INDEX: 24.14 KG/M2 | HEIGHT: 69 IN | DIASTOLIC BLOOD PRESSURE: 78 MMHG | HEART RATE: 70 BPM

## 2021-01-11 DIAGNOSIS — I10 ESSENTIAL HYPERTENSION: Chronic | ICD-10-CM

## 2021-01-11 DIAGNOSIS — R20.0 LEFT ARM NUMBNESS: ICD-10-CM

## 2021-01-11 DIAGNOSIS — Z65.9 OTHER SOCIAL STRESSOR: ICD-10-CM

## 2021-01-11 DIAGNOSIS — E11.9 TYPE 2 DIABETES MELLITUS WITHOUT COMPLICATION, WITHOUT LONG-TERM CURRENT USE OF INSULIN (HCC): ICD-10-CM

## 2021-01-11 DIAGNOSIS — M79.622 LEFT UPPER ARM PAIN: Primary | ICD-10-CM

## 2021-01-11 DIAGNOSIS — R94.31 ABNORMAL EKG: ICD-10-CM

## 2021-01-11 PROCEDURE — G8420 CALC BMI NORM PARAMETERS: HCPCS | Performed by: INTERNAL MEDICINE

## 2021-01-11 PROCEDURE — 1090F PRES/ABSN URINE INCON ASSESS: CPT | Performed by: INTERNAL MEDICINE

## 2021-01-11 PROCEDURE — 72050 X-RAY EXAM NECK SPINE 4/5VWS: CPT

## 2021-01-11 PROCEDURE — G8400 PT W/DXA NO RESULTS DOC: HCPCS | Performed by: INTERNAL MEDICINE

## 2021-01-11 PROCEDURE — 4040F PNEUMOC VAC/ADMIN/RCVD: CPT | Performed by: INTERNAL MEDICINE

## 2021-01-11 PROCEDURE — G8484 FLU IMMUNIZE NO ADMIN: HCPCS | Performed by: INTERNAL MEDICINE

## 2021-01-11 PROCEDURE — 1123F ACP DISCUSS/DSCN MKR DOCD: CPT | Performed by: INTERNAL MEDICINE

## 2021-01-11 PROCEDURE — 73060 X-RAY EXAM OF HUMERUS: CPT

## 2021-01-11 PROCEDURE — 3017F COLORECTAL CA SCREEN DOC REV: CPT | Performed by: INTERNAL MEDICINE

## 2021-01-11 PROCEDURE — G8427 DOCREV CUR MEDS BY ELIG CLIN: HCPCS | Performed by: INTERNAL MEDICINE

## 2021-01-11 PROCEDURE — 2022F DILAT RTA XM EVC RTNOPTHY: CPT | Performed by: INTERNAL MEDICINE

## 2021-01-11 PROCEDURE — 3044F HG A1C LEVEL LT 7.0%: CPT | Performed by: INTERNAL MEDICINE

## 2021-01-11 PROCEDURE — 4004F PT TOBACCO SCREEN RCVD TLK: CPT | Performed by: INTERNAL MEDICINE

## 2021-01-11 PROCEDURE — 99214 OFFICE O/P EST MOD 30 MIN: CPT | Performed by: INTERNAL MEDICINE

## 2021-01-11 ASSESSMENT — PATIENT HEALTH QUESTIONNAIRE - PHQ9
2. FEELING DOWN, DEPRESSED OR HOPELESS: 0
1. LITTLE INTEREST OR PLEASURE IN DOING THINGS: 0

## 2021-01-11 NOTE — PROGRESS NOTES
Chief Complaint   Patient presents with    Follow-up     4 months    Diabetes    Fatigue       HPI: Patient is here today to follow-up recent issues with a lot of stress and then having some episodes of anginal type symptoms left arm discomfort and some chest discomfort diaphoresis. Overall she is better she still having some left arm discomfort.     Past Medical History:   Diagnosis Date    Calculus of gallbladder 8/20/2017    Chronic asthma without complication 4/90/1764    Essential hypertension 8/21/2017    Fatty liver 8/20/2017    Gastroesophageal reflux disease without esophagitis 8/20/2017    Lumbar disc disease     L5-S1    Migraine without aura, not intractable 8/20/2017    Mixed hyperlipidemia 8/20/2017    Obstructive sleep apnea 8/20/2017    Sacroiliitis (HCC)     Trigeminal neuralgia of left side of face 8/20/2017    [facility] 7/51/9702    Venous insufficiency     Vitamin D deficiency        Past Surgical History:   Procedure Laterality Date    EYE LID SURGERY Bilateral 2020    eye lids raised    HYSTERECTOMY, TOTAL ABDOMINAL      No BSO       Family History   Problem Relation Age of Onset    High Blood Pressure Mother     High Blood Pressure Father     Prostate Cancer Father     Diabetes Father         Type 2    Breast Cancer Sister 47    Diabetes Sister         Type 2       Social History     Socioeconomic History    Marital status:      Spouse name: Leander Hubbard Number of children: 2    Years of education: 15    Highest education level: Not on file   Occupational History    Occupation: retired    Social Needs    Financial resource strain: Not hard at all   [place]-Tor[place] insecurity     Worry: Never true     Inability: Never true    Transportation needs     Medical: Not on file     Non-medical: Not on file   Tobacco Use    Smoking status: Never Smoker    Smokeless tobacco: Never Used   Substance and Sexual Activity    Alcohol use: No    Drug use: No    Sexual activity: Yes     Partners: Male   Lifestyle    Physical activity     Days per week: Not on file     Minutes per session: Not on file    Stress: Not on file   Relationships    Social connections     Talks on phone: Not on file     Gets together: Not on file     Attends [employer] service: Not on file     Active member of club or organization: Not on file     Attends meetings of clubs or organizations: Not on file     Relationship status: Not on file    Intimate partner violence     Fear of current or ex partner: Not on file     Emotionally abused: Not on file     Physically abused: Not on file     Forced sexual activity: Not on file   Other Topics Concern    Not on file   Social History Narrative    Not on file       Allergies   Allergen Reactions    Inderal [Propranolol] Other (See Comments)     Bad dreams  unknown    Solifenacin Succinate Other (See Comments)     Severe constipation    Demerol Hcl [Meperidine]      halluncinations    Vesicare [Solifenacin]      Couldn't urinate    Zoloft [Sertraline Hcl]      hallucinations    Zovirax [Acyclovir] Hives and Other (See Comments)     PhX       Current Outpatient Medications   Medication Sig Dispense Refill    ONETOUCH ULTRA strip TEST DAILY AS NEEDED 100 strip 3    vitamin D (ERGOCALCIFEROL) 1.25 MG (43098 UT) CAPS capsule TAKE 1 CAPSULE BY MOUTH ONCE A WEEK 12 capsule 3    montelukast (SINGULAIR) 10 MG tablet TAKE 1 TABLET BY MOUTH DAILY 90 tablet 3    verapamil (CALAN SR) 240 MG extended release tablet TAKE ONE TABLET BY MOUTH TWICE A  tablet 3    SOFT TOUCH LANCETS MISC USE TWO TIMES DAILY 100 each 3    metFORMIN (GLUCOPHAGE) 500 MG tablet 2 tablets po bid 360 tablet 3    rosuvastatin (CRESTOR) 5 MG tablet Take 1/2 tablet on Mon, Wed, Fri 36 tablet 3    ALPRAZolam (XANAX) 0.25 MG tablet Take 0.25 mg by mouth nightly as needed for Sleep.       aspirin-acetaminophen-caffeine (EXCEDRIN MIGRAINE) No edema. Lymphadenopathy:      Cervical: No cervical adenopathy. Skin:     Findings: No rash. Psychiatric:      Comments: Tired         Results for orders placed or performed in visit on 01/06/21   T4, Free   Result Value Ref Range    T4 Free 1.47 0.93 - 1.70 ng/dL   TSH without Reflex   Result Value Ref Range    TSH 1.110 0.270 - 4.200 uIU/mL   Comprehensive Metabolic Panel   Result Value Ref Range    Sodium 139 136 - 145 mmol/L    Potassium 4.3 3.5 - 5.0 mmol/L    Chloride 99 98 - 111 mmol/L    CO2 29 22 - 29 mmol/L    Anion Gap 11 7 - 19 mmol/L    Glucose 167 (H) 74 - 109 mg/dL    BUN 22 8 - 23 mg/dL    CREATININE 0.6 0.5 - 0.9 mg/dL    GFR Non-African American >60 >60    GFR African American >59 >59    Calcium 9.9 8.8 - 10.2 mg/dL    Total Protein 7.3 6.6 - 8.7 g/dL    Alb 4.8 3.5 - 5.2 g/dL    Total Bilirubin 0.4 0.2 - 1.2 mg/dL    Alkaline Phosphatase 75 35 - 104 U/L    ALT 26 5 - 33 U/L    AST 17 5 - 32 U/L   CBC   Result Value Ref Range    WBC 7.0 4.8 - 10.8 K/uL    RBC 4.64 4.20 - 5.40 M/uL    Hemoglobin 14.5 12.0 - 16.0 g/dL    Hematocrit 43.3 37.0 - 47.0 %    MCV 93.3 81.0 - 99.0 fL    MCH 31.3 (H) 27.0 - 31.0 pg    MCHC 33.5 33.0 - 37.0 g/dL    RDW 12.9 11.5 - 14.5 %    Platelets 314 755 - 747 K/uL    MPV 11.9 9.4 - 12.3 fL   Hemoglobin A1C   Result Value Ref Range    Hemoglobin A1C 6.4 (H) 4.0 - 6.0 %       ASSESSMENT/ PLAN:  1. Left upper arm pain  We are going to get an x-ray of her left upper arm and cervical spine xray I think she may have more radicular symptoms that may be causing this left arm discomfort she also has some neck pain she has laid on that side and had it hurt worse at times. We are going to assess if this could be musculoskeletal so we can direct her plan of care. We reviewed the echo. We reviewed Dr. Jesse Story note. And agree no other ischemic work-up needed.   When x-rays are back we will reassess our plan we will also be looking for the echo that Dr. Yanelis Claderon has

## 2021-01-12 ENCOUNTER — OFFICE VISIT (OUTPATIENT)
Dept: CARDIOLOGY | Facility: CLINIC | Age: 71
End: 2021-01-12

## 2021-01-12 VITALS
OXYGEN SATURATION: 98 % | HEIGHT: 68 IN | WEIGHT: 164 LBS | BODY MASS INDEX: 24.86 KG/M2 | HEART RATE: 78 BPM | DIASTOLIC BLOOD PRESSURE: 80 MMHG | SYSTOLIC BLOOD PRESSURE: 142 MMHG

## 2021-01-12 DIAGNOSIS — I10 HTN (HYPERTENSION), BENIGN: ICD-10-CM

## 2021-01-12 DIAGNOSIS — R07.89 CHEST PAIN, ATYPICAL: Primary | ICD-10-CM

## 2021-01-12 DIAGNOSIS — E11.9 CONTROLLED TYPE 2 DIABETES MELLITUS WITHOUT COMPLICATION, WITHOUT LONG-TERM CURRENT USE OF INSULIN (HCC): ICD-10-CM

## 2021-01-12 DIAGNOSIS — R94.31 ABNORMAL ECG: ICD-10-CM

## 2021-01-12 PROCEDURE — 93000 ELECTROCARDIOGRAM COMPLETE: CPT | Performed by: INTERNAL MEDICINE

## 2021-01-12 PROCEDURE — 99204 OFFICE O/P NEW MOD 45 MIN: CPT | Performed by: INTERNAL MEDICINE

## 2021-01-12 NOTE — PROGRESS NOTES
[facility] - CARDIOLOGY  New Patient Initial Outpatient Eva[facility]    Primary Care Physician: Misty Rivers MD    Subjective     Chief Complaint: chest pain    History of Present Illness  70-year-old female with history of hypertension, hyperlipidemia, type 2 diabetes mellitus, who was referred to me for urgent evaluation of chest pain by Dr. Rivers.  By way of review, she woke up with chest discomfort characterized as heaviness with associated diaphoresis and left arm pain (characterized as a heaviness) on 12/28/2020. Pain was persistent and moderately severe, but did not worsen with activity (she got up and did laundry and other things around the house).  It was easing up on its own by the time she went to the ER around 7:30am that morning, and eventually went away on its own.  Since then, she's not had any further episodes of chest discomfort, but has continued to have a localized spot in the upper left arm that aches intermittently and hurts to the touch. She says this is different than the arm pain she had the night of chest pain. 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 Overall, has been more generally sleepy.  When up, she's not limited her activity level - she's still been doing housework and not getting SOA or having pain.    Review of Systems   Constitution: Positive for malaise/fatigue and night sweats.   HENT: Negative.    Eyes: Negative.    Cardiovascular: Positive for near-syncope and palpitations.   Respiratory: Positive for snoring.    Endocrine: Negative.    Hematologic/Lymphatic: Negative.    Skin: Negative.    Musculoskeletal: Negative.    Gastrointestinal: Negative.    Genitourinary: Negative.    Neurological: Negative.    Psychiatric/Behavioral: Negative.    Allergic/Immunologic: Negative.    Otherwise complete ROS reviewed and negative except as  mentioned in the HPI.  I have reviewed the Review of Systems as provided above.       Past Medical History:   Past Medical History:   Diagnosis Date   • Acid reflux    • Asthma    • Diabetes mellitus (CMS/HCC)     Type 2   • Fistula of mastoid, left    • GERD (gastroesophageal reflux disease)    • History of transfusion    • Hx of colonic polyps    • Hyperlipidemia    • Hypertension    • Iron deficiency anemia    • Mastoid pain, left    • Numbness     left side of face   • Other osteomyelitis, other site (CMS/HCC)    • PONV (postoperative nausea and vomiting)    • Sensorineural hearing loss    • Sleep apnea     does not use machine   • Trigeminal neuralgia    • Vitamin D deficiency        Past Surgical History:  Past Surgical History:   Procedure Laterality Date   • APPENDECTOMY     • BLADDER REPAIR      had vaginal repair at the same time   • BREAST BIOPSY Bilateral     benign   • CAPSULE ENDOSCOPY N/A 4/20/2018    Procedure: CAPSULE ENDOSCOPY M2A;  Surgeon: Garrett Ozuna MD;  Location: [facility] ENDOSCOPY;  Service: Gastroenterology   • COLONOSCOPY N/A 3/1/2018    Procedure: COLONOSCOPY WITH ANESTHESIA;  Surgeon: Garrett Ozuna MD;  Location: [facility] ENDOSCOPY;  Service:    • COLONOSCOPY W/ POLYPECTOMY  12/31/2014    Tubular adenomatous polyp at 80 cm, Hyperplastic polyp rectum repeat exam in 3 years   • ENDOSCOPY N/A 3/1/2018    Procedure: ESOPHAGOGASTRODUODENOSCOPY WITH ANESTHESIA;  Surgeon: Garrett Ozuna MD;  Location: [facility] ENDOSCOPY;  Service:    • FLAP HEAD/NECK Left 5/17/2018    Procedure: POSSIBLE STERNOCLEIDOMASTOID FLAP;  Surgeon: Kadeem Oconnor MD;  Location: [facility] OR;  Service: ENT   • HYSTERECTOMY     • MASTOIDECTOMY Left 5/17/2018    Procedure: Wound exploration with possible mastoidectomy; possible sternocleidomastoid flap.  Please schedule with Dr. Perez.;  Surgeon: Kadeem Oconnor MD;  Location: [facility] OR;  Service: ENT   • TRIGEMINAL NERVE DECOMPRESSION         Family History: family history  includes Breast cancer in her mother, niece, and sister; Heart disease in her father; No Known Problems in her brother, daughter, maternal aunt, maternal grandmother, paternal aunt, paternal grandmother, and son.    Social History:  reports that she has never smoked. She has never used smokeless tobacco. She reports current alcohol use. She reports that she does not use drugs.    Medications:  Prior to Admission medications    Medication Sig Start Date End Date Taking? Authorizing Provider   albuterol sulfate  (90 Base) MCG/ACT inhaler As Needed. 10/25/19  Yes Jose Martin Kirk MD   ALPRAZolam (XANAX) 0.25 MG tablet Take  by mouth.   Yes Jose Martin Kirk MD   aspirin 81 MG tablet Take 81 mg by mouth.   Yes Jose Martin Kirk MD   aspirin-acetaminophen-caffeine (EXCEDRIN MIGRAINE) 250-250-65 MG per tablet Take 1 tablet by mouth As Needed.   Yes Jose Martin Kirk MD   calcium carbonate (TUMS) 500 MG chewable tablet Chew 1 tablet Daily.   Yes Jose Martin Kirk MD   conjugated estrogens (PREMARIN) 0.625 MG/GM vaginal cream 1gm twice a week (this replaces Estradiol cream per insurance 4/10/18) 1/14/19  Yes Jose Martin Kirk MD   esomeprazole (nexIUM) 20 MG capsule Take 20 mg by mouth Every Morning Before Breakfast.   Yes Jose Martin Kirk MD   famotidine (PEPCID) 10 MG tablet Take 10 mg by mouth At Night As Needed for Heartburn.   Yes Jose Martin Kirk MD   hydrochlorothiazide (MICROZIDE) 12.5 MG capsule Take 12.5 mg by mouth Every Morning.   Yes Jose Martin Kirk MD   losartan (COZAAR) 100 MG tablet TAKE ONE TABLET BY MOUTH EVERY DAY 1/5/18  Yes Jose Martin Kirk MD   metFORMIN (GLUCOPHAGE) 500 MG tablet Take 1,000 mg by mouth 2 (Two) Times a Day With Meals. 500m g in am and 1000mg at night 11/20/17  Yes Jose Martin Kirk MD   Multiple Vitamins-Minerals (PRESERVISION AREDS PO) Take  by mouth.   Yes Jose Martin Kirk MD   rosuvastatin (CRESTOR) 5 MG tablet Take 1/2  "tablet on Mon, Wed, Fri   Yes Provider, MD Jose Martin   verapamil SR (CALAN-SR) 240 MG CR tablet TAKE ONE TABLET BY MOUTH TWICE A DAY 11/17/17  Yes ProviderJose Martin MD   vitamin D (ERGOCALCIFEROL) 34157 units capsule capsule Take 50,000 Units by mouth Every 7 (Seven) Days. 11/20/17  Yes ProviderJose Martin MD   sodium-potassium-magnesium sulfates (Suprep Bowel Prep Kit) 17.5-3.13-1.6 GM/177ML solution oral solution As Directed by Office 12/21/20   Ayala Hooker, CHRISTIAN     Allergies:  Allergies   Allergen Reactions   • Zovirax [Acyclovir] Rash and Provider Review Needed     Other reaction(s): Other (See Comments)  PhX  PhX   • Meperidine Provider Review Needed     halluncinations  halluncinations   • Propranolol Provider Review Needed     Other reaction(s): Other (See Comments)  Bad dreams  unknown  Bad dreams  unknown  Bad dreams   • Sertraline Hcl Provider Review Needed     hallucinations  hallucinations   • Vesicare [Solifenacin Succinate] Provider Review Needed     Severe constipation   • Solifenacin Provider Review Needed     Other reaction(s): Other (See Comments)  Phx  Phx  Couldn't urinate       Objective     Vital Signs: /80   Pulse 78   Ht 172.7 cm (68\")   Wt 74.4 kg (164 lb)   SpO2 98%   BMI 24.94 kg/m²     Vitals signs and nursing note reviewed.   Constitutional:       General: Not in acute distress.     Appearance: Not in distress.   Neck:      Vascular: No JVD. JVD normal.   Pulmonary:      Effort: Pulmonary effort is normal.      Breath sounds: Normal breath sounds.   Cardiovascular:      Normal rate. Regular rhythm.      Murmurs: There is no murmur.   Pulses:     Intact distal pulses.   Edema:     Peripheral edema absent.   Skin:     General: Skin is warm and dry.   Neurological:      Mental Status: Alert, oriented to person, place, and time and oriented to person, place and time.         Results Reviewed:      ECG 12 Lead    Date/Time: 1/12/2021 4:12 PM  Performed by: Patricia" Jack GABRIEL MD  Authorized by: Jack Gomez MD   Comparison: compared with previous ECG from 12/28/2020  Comparison to previous ECG: R wave progression across the precordium has lost any transition  Rhythm: sinus rhythm  Rate: normal  BPM: 78  Q waves: III and aVF    Other findings comments: Cannot rule out anterolateral infarct, age undetermined    Clinical impression: abnormal EKG              Lab Results   Component Value Date    CHOL 142 06/21/2019    TRIG 57 08/25/2020    HDL 41 (L) 08/25/2020    LDLHDL 3.14 06/21/2019     Lab Results   Component Value Date    HGBA1C 6.4 (H) 01/06/2021   Labs reviewed (from 8/25/2020): A1c 6.3%, Total cholesterol 133, HDL 41, LDL 81    CTA chest reviewed, performed 12/20/2020: No evidence of pulmonary embolism    Results for orders placed during the hospital encounter of 12/28/20   Adult Stress Echo W/ Cont or Stress Agent if Necessary Per Protocol    Narrative · Low risk for ischemia      I independently reviewed the images from the stress test, including both EKGs and the echocardiographic images, and agree with the assessment that it was low risk for ischemia.  I saw no ECG changes, and normal wall motion response to exercise.      ECGs independently reviewed, from 2018: Had inferior Q waves on this ECG    Assessment / Plan        Problem List Items Addressed This Visit        Other    Controlled type 2 diabetes mellitus without complication, without long-term current use of insulin (CMS/[facility])    Overview     Hold the am of procedure         HTN (hypertension), benign    Overview     cont the am of procedure         Abnormal ECG      Other Visit Diagnoses     Chest pain, atypical    -  Primary    Relevant Orders    Adult Transthoracic Echo Complete W/ Cont if Necessary Per Protocol        1.  Atypical chest pain: Has not recurred since trip to the ER.  Had thorough and complete evaluation in the emergency department, which excluded myocardial infarction.  Stress test  performed was low risk for ischemia.  Since symptoms have not recurred, so I do not think any further ischemic evaluation is warranted at this point in time.    2.  Abnormal EKG: Inferior Q waves have been present on this patient's EKG dating back till at least 2018.  EKG in the ER in fact, did not appear significantly different whatsoever compared to the one from 2018.  However, today's EKG does show changes across the precordial leads; namely, the loss of R wave transition.  While this could be secondary to difference in lead positioning, I would like to investigate with an echocardiogram.  -Echocardiogram to be performed in the hospital    3.  Essential hypertension: Reasonably well-controlled at present.  Continue current medications.    Follow-up to be determined based upon echocardiogram.  If EF is not normal or regional wall motion abnormalities are noted, may need to discuss repeating ischemic evaluation (i.e. possible cardiac catheterization in light of recent low-grade stress echocardiogram).  If EF is normal, no further treatment or follow-up will be necessary we will continue to recommend aggressive primary prevention.      Jack Gomez MD   01/12/21   16:16 CST

## 2021-01-13 ENCOUNTER — TRANSCRIBE ORDERS (OUTPATIENT)
Dept: ADMINISTRATIVE | Facility: HOSPITAL | Age: 71
End: 2021-01-13

## 2021-01-13 DIAGNOSIS — M54.12 CERVICAL RADICULOPATHY: Primary | ICD-10-CM

## 2021-01-14 ENCOUNTER — HOSPITAL ENCOUNTER (OUTPATIENT)
Dept: MRI IMAGING | Facility: HOSPITAL | Age: 71
Discharge: HOME | End: 2021-01-14
Admitting: INTERNAL MEDICINE

## 2021-01-14 DIAGNOSIS — M54.12 CERVICAL RADICULOPATHY: ICD-10-CM

## 2021-01-14 PROCEDURE — 72141 MRI NECK SPINE W/O DYE: CPT

## 2021-01-16 ENCOUNTER — IMMUNIZATION (OUTPATIENT)
Dept: VACCINE CLINIC | Facility: HOSPITAL | Age: 71
End: 2021-01-16

## 2021-01-16 PROCEDURE — 91301 HC SARSCO02 VAC 100MCG/0.5ML IM: CPT | Performed by: OBSTETRICS & GYNECOLOGY

## 2021-01-16 PROCEDURE — 0011A: CPT | Performed by: OBSTETRICS & GYNECOLOGY

## 2021-01-20 ENCOUNTER — OFFICE VISIT (OUTPATIENT)
Dept: OTOLARYNGOLOGY | Facility: CLINIC | Age: 71
End: 2021-01-20

## 2021-01-20 VITALS
TEMPERATURE: 97.3 F | SYSTOLIC BLOOD PRESSURE: 130 MMHG | HEIGHT: 68 IN | WEIGHT: 167.8 LBS | HEART RATE: 97 BPM | DIASTOLIC BLOOD PRESSURE: 80 MMHG | BODY MASS INDEX: 25.43 KG/M2

## 2021-01-20 DIAGNOSIS — E04.2 NONTOXIC MULTINODULAR GOITER: ICD-10-CM

## 2021-01-20 DIAGNOSIS — E04.1 THYROID NODULE: Primary | ICD-10-CM

## 2021-01-20 PROCEDURE — 99214 OFFICE O/P EST MOD 30 MIN: CPT | Performed by: OTOLARYNGOLOGY

## 2021-01-20 NOTE — PROGRESS NOTES
Kadeem Gamboa MD     Chief Complaint   Patient presents with   • Thyroid Problem        HPI   Carlotta Dupont is a  70 y.o.  female who presents for evaluation of thyroid problems. She has had findings of thyroid nodules. This was found on workup for an unrelated condition. The patient denies a history of radiation exposure in the past. The patient denies a family history of thyroid malignancy.       History     Last Reviewed by Kadeem Gamboa MD on 1/20/2021 at  9:20 AM    Sections Reviewed    Tobacco, Family, Medical, Surgical      Problem list reviewed by Kadeem Gamboa MD on 1/20/2021 at  9:20 AM  Medicines reviewed by Kadeem Gamboa MD on 1/20/2021 at  9:20 AM  Allergies reviewed by Kadeem Gamboa MD on 1/20/2021 at  9:20 AM        Vital Signs:  Temp:  [97.3 °F (36.3 °C)] 97.3 °F (36.3 °C)  Heart Rate:  [97] 97  BP: (130)/(80) 130/80  Physical Exam  Vitals signs reviewed.   Constitutional:       Appearance: Normal appearance. She is well-developed.   HENT:      Head: Normocephalic and atraumatic.      Right Ear: External ear normal.      Left Ear: External ear normal.      Nose: Nose normal.   Eyes:      Extraocular Movements: Extraocular movements intact.      Conjunctiva/sclera: Conjunctivae normal.   Neck:      Musculoskeletal: Normal range of motion.      Comments: The thyroid nodules not well palpated  Pulmonary:      Effort: Pulmonary effort is normal. No respiratory distress.      Breath sounds: No stridor.   Musculoskeletal: Normal range of motion.   Skin:     Findings: No rash.   Neurological:      General: No focal deficit present.      Mental Status: She is alert.      Cranial Nerves: No cranial nerve deficit.   Psychiatric:         Behavior: Behavior normal.         Thought Content: Thought content normal.         Judgment: Judgment normal.          RESULTS REVIEW:    I have reviewed the patients old records in the chart.  The following results/records were  reviewed:   US Thyroid (12/30/2020 11:38) measurements of the thyroid are within normal limits.  There were measured thyroid nodules.  The largest is on the left upper pole 3.1 cm mostly solid hyperechoic with smooth margins and no evidence of calcifications TI-RADS 3.  There are no other nodules greater than 1.5 cm     Lab Results - Last 18 Months   Lab Units 01/06/21  0750 08/25/20  0848 11/13/19  0647   TSH uIU/mL 1.110 1.470 1.740   FREE T4 ng/dL 1.47  --   --             Assessment:    1. Thyroid nodule    2. Nontoxic multinodular goiter            Plan:      Medical and surgical options were discussed including observation vs ultrasound guided needle biopsy vs thyroidectomy. Risks, benefits and alternatives were discussed and questions were answered. After considering the options, the patient decided to proceed with fine needle aspiration    Patient Instructions    Call or return to clinic if increasing size of nodule or thyroid, trouble swallowing, noisy breathing, racing heartrate, or neck mass.            Orders Placed This Encounter   Procedures   • US Guided Thyroid Biopsy        Return in about 6 weeks (around 3/3/2021) for follow up video visit.       Kadeem Gamboa MD  01/20/21   09:25 CST

## 2021-01-20 NOTE — PATIENT INSTRUCTIONS
Patient Instructions    Call or return to clinic if increasing size of nodule or thyroid, trouble swallowing, noisy breathing, racing heartrate, or neck mass.             CONTACT INFORMATION:  The main office phone number is [phone]. For emergencies after hours and on weekends, this number will convert over to our answering service and the on call provider will answer. Please try to keep non emergent phone calls/ questions to office hours 9am-5pm Monday through Friday.     [facility]  As an alternative, you can sign up and use the Epic MyChart system for more direct and quicker access for non emergent questions/ problems.  [facility] allows you to send messages to your doctor, view your test results, renew your prescriptions, schedule appointments, and more. To sign up, go to [employer] and click on the Sign Up Now link in the New User? box. Enter your [facility] Activation Code exactly as it appears below along with the last four digits of your Social Security Number and your Date of Birth () to complete the sign-up process. If you do not sign up before the expiration date, you must request a new code.    [facility] Activation Code: Activation code not generated  Current [facility] Status: Active    If you have questions, you can email [email or web address] or call [phone] to talk to our [facility] staff. Remember, [facility] is NOT to be used for urgent needs. For medical emergencies, dial 911.

## 2021-01-24 PROBLEM — Z65.9 OTHER SOCIAL STRESSOR: Status: ACTIVE | Noted: 2021-01-24

## 2021-01-24 PROBLEM — R94.31 ABNORMAL EKG: Status: ACTIVE | Noted: 2021-01-24

## 2021-02-01 ENCOUNTER — HOSPITAL ENCOUNTER (OUTPATIENT)
Dept: ULTRASOUND IMAGING | Facility: HOSPITAL | Age: 71
Discharge: HOME | End: 2021-02-01
Admitting: OTOLARYNGOLOGY

## 2021-02-01 DIAGNOSIS — E04.1 THYROID NODULE: ICD-10-CM

## 2021-02-01 DIAGNOSIS — E04.2 NONTOXIC MULTINODULAR GOITER: ICD-10-CM

## 2021-02-01 PROCEDURE — 88112 CYTOPATH CELL ENHANCE TECH: CPT | Performed by: OTOLARYNGOLOGY

## 2021-02-01 PROCEDURE — 76942 ECHO GUIDE FOR BIOPSY: CPT

## 2021-02-01 PROCEDURE — 88333 PATH CONSLTJ SURG CYTO XM 1: CPT | Performed by: OTOLARYNGOLOGY

## 2021-02-01 PROCEDURE — 88172 CYTP DX EVAL FNA 1ST EA SITE: CPT | Performed by: OTOLARYNGOLOGY

## 2021-02-02 ENCOUNTER — HOSPITAL ENCOUNTER (OUTPATIENT)
Dept: CARDIOLOGY | Facility: HOSPITAL | Age: 71
Discharge: HOME | End: 2021-02-02
Admitting: INTERNAL MEDICINE

## 2021-02-02 VITALS
BODY MASS INDEX: 25.31 KG/M2 | SYSTOLIC BLOOD PRESSURE: 130 MMHG | HEIGHT: 68 IN | DIASTOLIC BLOOD PRESSURE: 80 MMHG | WEIGHT: 167 LBS

## 2021-02-02 DIAGNOSIS — R07.89 CHEST PAIN, ATYPICAL: ICD-10-CM

## 2021-02-02 PROCEDURE — 93306 TTE W/DOPPLER COMPLETE: CPT | Performed by: INTERNAL MEDICINE

## 2021-02-02 PROCEDURE — 93306 TTE W/DOPPLER COMPLETE: CPT

## 2021-02-03 LAB
BH CV ECHO MEAS - AO MAX PG (FULL): 4.4 MMHG
BH CV ECHO MEAS - AO MAX PG: 7.5 MMHG
BH CV ECHO MEAS - AO MEAN PG (FULL): 2 MMHG
BH CV ECHO MEAS - AO MEAN PG: 4 MMHG
BH CV ECHO MEAS - AO ROOT AREA (BSA CORRECTED): 1.8
BH CV ECHO MEAS - AO ROOT AREA: 9.1 CM^2
BH CV ECHO MEAS - AO ROOT DIAM: 3.4 CM
BH CV ECHO MEAS - AO V2 MAX: 137 CM/SEC
BH CV ECHO MEAS - AO V2 MEAN: 90.2 CM/SEC
BH CV ECHO MEAS - AO V2 VTI: 29.7 CM
BH CV ECHO MEAS - AVA(I,A): 2.2 CM^2
BH CV ECHO MEAS - AVA(I,D): 2.2 CM^2
BH CV ECHO MEAS - AVA(V,A): 2 CM^2
BH CV ECHO MEAS - AVA(V,D): 2 CM^2
BH CV ECHO MEAS - BSA(HAYCOCK): 1.9 M^2
BH CV ECHO MEAS - BSA: 1.9 M^2
BH CV ECHO MEAS - BZI_BMI: 25.4 KILOGRAMS/M^2
BH CV ECHO MEAS - BZI_METRIC_HEIGHT: 172.7 CM
BH CV ECHO MEAS - BZI_METRIC_WEIGHT: 75.8 KG
BH CV ECHO MEAS - EDV(CUBED): 96.1 ML
BH CV ECHO MEAS - EDV(MOD-SP4): 50.1 ML
BH CV ECHO MEAS - EDV(TEICH): 96.3 ML
BH CV ECHO MEAS - EF(CUBED): 83.7 %
BH CV ECHO MEAS - EF(MOD-SP4): 57.7 %
BH CV ECHO MEAS - EF(TEICH): 76.8 %
BH CV ECHO MEAS - ESV(CUBED): 15.6 ML
BH CV ECHO MEAS - ESV(MOD-SP4): 21.2 ML
BH CV ECHO MEAS - ESV(TEICH): 22.3 ML
BH CV ECHO MEAS - FS: 45.4 %
BH CV ECHO MEAS - IVS/LVPW: 1.3
BH CV ECHO MEAS - IVSD: 1.4 CM
BH CV ECHO MEAS - LA DIMENSION: 3.6 CM
BH CV ECHO MEAS - LA/AO: 1.1
BH CV ECHO MEAS - LAT PEAK E' VEL: 7.3 CM/SEC
BH CV ECHO MEAS - LV DIASTOLIC VOL/BSA (35-75): 26.5 ML/M^2
BH CV ECHO MEAS - LV MASS(C)D: 170.8 GRAMS
BH CV ECHO MEAS - LV MASS(C)DI: 90.2 GRAMS/M^2
BH CV ECHO MEAS - LV MAX PG: 3.1 MMHG
BH CV ECHO MEAS - LV MEAN PG: 2 MMHG
BH CV ECHO MEAS - LV SYSTOLIC VOL/BSA (12-30): 11.2 ML/M^2
BH CV ECHO MEAS - LV V1 MAX: 87.9 CM/SEC
BH CV ECHO MEAS - LV V1 MEAN: 62.8 CM/SEC
BH CV ECHO MEAS - LV V1 VTI: 21.2 CM
BH CV ECHO MEAS - LVIDD: 4.6 CM
BH CV ECHO MEAS - LVIDS: 2.5 CM
BH CV ECHO MEAS - LVLD AP4: 6.6 CM
BH CV ECHO MEAS - LVLS AP4: 5.5 CM
BH CV ECHO MEAS - LVOT AREA (M): 3.1 CM^2
BH CV ECHO MEAS - LVOT AREA: 3.1 CM^2
BH CV ECHO MEAS - LVOT DIAM: 2 CM
BH CV ECHO MEAS - LVPWD: 1.1 CM
BH CV ECHO MEAS - MED PEAK E' VEL: 6.7 CM/SEC
BH CV ECHO MEAS - MV A MAX VEL: 89.2 CM/SEC
BH CV ECHO MEAS - MV DEC SLOPE: 281 CM/SEC^2
BH CV ECHO MEAS - MV DEC TIME: 0.21 SEC
BH CV ECHO MEAS - MV E MAX VEL: 69.8 CM/SEC
BH CV ECHO MEAS - MV E/A: 0.78
BH CV ECHO MEAS - MV P1/2T MAX VEL: 75.6 CM/SEC
BH CV ECHO MEAS - MV P1/2T: 78.8 MSEC
BH CV ECHO MEAS - MVA P1/2T LCG: 2.9 CM^2
BH CV ECHO MEAS - MVA(P1/2T): 2.8 CM^2
BH CV ECHO MEAS - SI(AO): 142.4 ML/M^2
BH CV ECHO MEAS - SI(CUBED): 42.5 ML/M^2
BH CV ECHO MEAS - SI(LVOT): 35.2 ML/M^2
BH CV ECHO MEAS - SI(MOD-SP4): 15.3 ML/M^2
BH CV ECHO MEAS - SI(TEICH): 39.1 ML/M^2
BH CV ECHO MEAS - SV(AO): 269.7 ML
BH CV ECHO MEAS - SV(CUBED): 80.4 ML
BH CV ECHO MEAS - SV(LVOT): 66.6 ML
BH CV ECHO MEAS - SV(MOD-SP4): 28.9 ML
BH CV ECHO MEAS - SV(TEICH): 74 ML
BH CV ECHO MEASUREMENTS AVERAGE E/E' RATIO: 9.97
LEFT ATRIUM VOLUME INDEX: 32 ML/M2
MAXIMAL PREDICTED HEART RATE: 150 BPM
STRESS TARGET HR: 128 BPM

## 2021-02-06 LAB
CYTO UR: NORMAL
LAB AP CASE REPORT: NORMAL
LAB AP CLINICAL INFORMATION: NORMAL
LAB AP DIAGNOSIS COMMENT: NORMAL
Lab: NORMAL
PATH REPORT.FINAL DX SPEC: NORMAL
PATH REPORT.GROSS SPEC: NORMAL

## 2021-02-13 ENCOUNTER — IMMUNIZATION (OUTPATIENT)
Dept: VACCINE CLINIC | Facility: HOSPITAL | Age: 71
End: 2021-02-13

## 2021-02-13 PROCEDURE — 0012A: CPT | Performed by: OBSTETRICS & GYNECOLOGY

## 2021-02-13 PROCEDURE — 91301 HC SARSCO02 VAC 100MCG/0.5ML IM: CPT | Performed by: OBSTETRICS & GYNECOLOGY

## 2021-03-08 ENCOUNTER — OFFICE VISIT (OUTPATIENT)
Dept: INTERNAL MEDICINE | Age: 71
End: 2021-03-08
Payer: MEDICARE

## 2021-03-08 VITALS
OXYGEN SATURATION: 97 % | WEIGHT: 163 LBS | SYSTOLIC BLOOD PRESSURE: 114 MMHG | HEIGHT: 69 IN | DIASTOLIC BLOOD PRESSURE: 70 MMHG | HEART RATE: 90 BPM | BODY MASS INDEX: 24.14 KG/M2

## 2021-03-08 DIAGNOSIS — I10 ESSENTIAL HYPERTENSION: Chronic | ICD-10-CM

## 2021-03-08 DIAGNOSIS — M47.22 OSTEOARTHRITIS OF SPINE WITH RADICULOPATHY, CERVICAL REGION: ICD-10-CM

## 2021-03-08 DIAGNOSIS — M51.16 LUMBAR DISC DISEASE WITH RADICULOPATHY: Chronic | ICD-10-CM

## 2021-03-08 DIAGNOSIS — E78.2 MIXED HYPERLIPIDEMIA: Chronic | ICD-10-CM

## 2021-03-08 DIAGNOSIS — E55.9 VITAMIN D DEFICIENCY: Chronic | ICD-10-CM

## 2021-03-08 DIAGNOSIS — D50.8 OTHER IRON DEFICIENCY ANEMIA: ICD-10-CM

## 2021-03-08 DIAGNOSIS — E11.9 TYPE 2 DIABETES MELLITUS WITHOUT COMPLICATION, WITHOUT LONG-TERM CURRENT USE OF INSULIN (HCC): Primary | Chronic | ICD-10-CM

## 2021-03-08 PROBLEM — E04.1 THYROID NODULE: Status: ACTIVE | Noted: 2021-01-20

## 2021-03-08 PROBLEM — E04.2 NONTOXIC MULTINODULAR GOITER: Status: ACTIVE | Noted: 2021-01-20

## 2021-03-08 PROCEDURE — G8420 CALC BMI NORM PARAMETERS: HCPCS | Performed by: INTERNAL MEDICINE

## 2021-03-08 PROCEDURE — 1090F PRES/ABSN URINE INCON ASSESS: CPT | Performed by: INTERNAL MEDICINE

## 2021-03-08 PROCEDURE — G8484 FLU IMMUNIZE NO ADMIN: HCPCS | Performed by: INTERNAL MEDICINE

## 2021-03-08 PROCEDURE — G8427 DOCREV CUR MEDS BY ELIG CLIN: HCPCS | Performed by: INTERNAL MEDICINE

## 2021-03-08 PROCEDURE — 1123F ACP DISCUSS/DSCN MKR DOCD: CPT | Performed by: INTERNAL MEDICINE

## 2021-03-08 PROCEDURE — G8400 PT W/DXA NO RESULTS DOC: HCPCS | Performed by: INTERNAL MEDICINE

## 2021-03-08 PROCEDURE — 4040F PNEUMOC VAC/ADMIN/RCVD: CPT | Performed by: INTERNAL MEDICINE

## 2021-03-08 PROCEDURE — 4004F PT TOBACCO SCREEN RCVD TLK: CPT | Performed by: INTERNAL MEDICINE

## 2021-03-08 PROCEDURE — 3017F COLORECTAL CA SCREEN DOC REV: CPT | Performed by: INTERNAL MEDICINE

## 2021-03-08 PROCEDURE — 2022F DILAT RTA XM EVC RTNOPTHY: CPT | Performed by: INTERNAL MEDICINE

## 2021-03-08 PROCEDURE — 3044F HG A1C LEVEL LT 7.0%: CPT | Performed by: INTERNAL MEDICINE

## 2021-03-08 PROCEDURE — 99214 OFFICE O/P EST MOD 30 MIN: CPT | Performed by: INTERNAL MEDICINE

## 2021-03-08 NOTE — PROGRESS NOTES
Chief Complaint   Patient presents with    Follow-up     6 weeks - left arm pain    Diabetes       HPI: Patient is here today to follow-up diabetes hypertension recent stressors had a cardiac work-up that was fortunately okay she is had some issues with left arm pain that are probably coming from her neck. Stress is getting better. Past Medical History:   Diagnosis Date    Calculus of gallbladder 8/20/2017    Chronic asthma without complication 6/98/5040    Essential hypertension 8/21/2017    Fatty liver 8/20/2017    Gastroesophageal reflux disease without esophagitis 8/20/2017    Lumbar disc disease     L5-S1    Migraine without aura, not intractable 8/20/2017    Mixed hyperlipidemia 8/20/2017    Obstructive sleep apnea 8/20/2017    Sacroiliitis (HCC)     Trigeminal neuralgia of left side of face 8/20/2017    Type [facility] complication [facility] 5/51/5422    Venous insufficiency     Vitamin D deficiency        Past Surgical History:   Procedure Laterality Date    EYE LID SURGERY Bilateral 2020    eye lids raised    HYSTERECTOMY, TOTAL ABDOMINAL      No BSO       Family History   Problem Relation Age of Onset    High Blood Pressure Mother     High Blood Pressure Father     Prostate Cancer Father     Diabetes Father         Type 2    Breast Cancer Sister 47    Diabetes Sister         Type 2       Social History     Socioeconomic History    Marital status:      Spouse name: Latisha Grant Number of children: 2    Years of education: 15    Highest education level: Not on file   Occupational History    Occupation: retired    Social Needs    Financial resource strain: Not hard at all   [place] insecurity     Worry: Never true     Inability: Never true    Transportation needs     Medical: Not on file     Non-medical: Not on file   Tobacco Use    Smoking status: Never Smoker    Smokeless tobacco: Never Used   Substance and Sexual Activity    Alcohol use:  No  Drug use: No    Sexual activity: Yes     Partners: Male   Lifestyle    Physical activity     Days per week: Not on file     Minutes per session: Not on file    Stress: Not on file   Relationships    Social connections     Talks on phone: Not on file     Gets together: Not on file     Attends [employer] service: Not on file     Active member of club or organization: Not on file     Attends meetings of clubs or organizations: Not on file     Relationship status: Not on file    Intimate partner violence     Fear of current or ex partner: Not on file     Emotionally abused: Not on file     Physically abused: Not on file     Forced sexual activity: Not on file   Other Topics Concern    Not on file   Social History Narrative    Not on file       Allergies   Allergen Reactions    Inderal [Propranolol] Other (See Comments)     Bad dreams  unknown    Solifenacin Succinate Other (See Comments)     Severe constipation    Demerol Hcl [Meperidine]      halluncinations    Vesicare [Solifenacin]      Couldn't urinate    Zoloft [Sertraline Hcl]      hallucinations    Zovirax [Acyclovir] Hives and Other (See Comments)     PhX       Current Outpatient Medications   Medication Sig Dispense Refill    hydroCHLOROthiazide (MICROZIDE) 12.5 MG capsule TAKE 1 CAPSULE BY MOUTH DAILY 90 capsule 3    ONETOUCH ULTRA strip TEST DAILY AS NEEDED 100 strip 3    vitamin D (ERGOCALCIFEROL) 1.25 MG (55369 UT) CAPS capsule TAKE 1 CAPSULE BY MOUTH ONCE A WEEK 12 capsule 3    montelukast (SINGULAIR) 10 MG tablet TAKE 1 TABLET BY MOUTH DAILY 90 tablet 3    verapamil (CALAN SR) 240 MG extended release tablet TAKE ONE TABLET BY MOUTH TWICE A  tablet 3    SOFT TOUCH LANCETS MISC USE TWO TIMES DAILY 100 each 3    metFORMIN (GLUCOPHAGE) 500 MG tablet 2 tablets po bid 360 tablet 3    rosuvastatin (CRESTOR) 5 MG tablet Take 1/2 tablet on Mon, Wed, Fri 36 tablet 3    ALPRAZolam (XANAX) 0.25 MG tablet Take 0.25 mg by mouth nightly as needed for Sleep.  aspirin-acetaminophen-caffeine (EXCEDRIN MIGRAINE) 250-250-65 MG per tablet Take 1 tablet by mouth      esomeprazole (NEXIUM) 20 MG delayed release capsule Take 20 mg by mouth every morning (before breakfast)      losartan (COZAAR) 100 MG tablet Take 1 tablet by mouth daily 90 tablet 3    conjugated estrogens (PREMARIN) 0.625 MG/GM vaginal cream 1gm twice a week (this replaces Estradiol cream per insurance 4/10/18) 1 Tube 3    aspirin 81 MG tablet Take 81 mg by mouth daily      Multiple Vitamins-Minerals (PRESERVISION/LUTEIN) CAPS Take 1 capsule by mouth 2 times daily      calcium carbonate (TUMS) 500 MG chewable tablet Take 1 tablet by mouth daily       No current facility-administered medications for this visit. Review of Systems    /70   Pulse 90   Ht 5' 9\" (1.753 m)   Wt 163 lb (73.9 kg)   SpO2 97%   BMI 24.07 kg/m²   BP Readings from Last 7 Encounters:   03/08/21 114/70   01/11/21 124/78   12/29/20 120/80   08/31/20 (!) 138/90   05/18/20 120/80   11/18/19 130/80   06/19/19 (!) 138/90     Wt Readings from Last 7 Encounters:   03/08/21 163 lb (73.9 kg)   01/11/21 163 lb (73.9 kg)   12/29/20 163 lb (73.9 kg)   08/31/20 163 lb (73.9 kg)   05/18/20 163 lb (73.9 kg)   11/18/19 170 lb (77.1 kg)   06/19/19 180 lb (81.6 kg)     BMI Readings from Last 7 Encounters:   03/08/21 24.07 kg/m²   01/11/21 24.07 kg/m²   12/29/20 24.07 kg/m²   08/31/20 24.07 kg/m²   05/18/20 24.07 kg/m²   11/18/19 25.10 kg/m²   06/19/19 26.58 kg/m²     Resp Readings from Last 7 Encounters:   01/14/19 18   01/08/18 16   08/21/17 20       Physical Exam  Constitutional:       General: She is not in acute distress. Eyes:      General: No scleral icterus. Neck:      Musculoskeletal: Neck supple. Cardiovascular:      Heart sounds: Normal heart sounds. Pulmonary:      Breath sounds: Normal breath sounds. Musculoskeletal:      Right lower leg: No edema. Left lower leg: No edema. Lymphadenopathy:      Cervical: No cervical adenopathy. Skin:     Findings: No rash. Neurological:      General: No focal deficit present. Psychiatric:         Mood and Affect: Mood normal.         Results for orders placed or performed in visit on 01/06/21   T4, Free   Result Value Ref Range    T4 Free 1.47 0.93 - 1.70 ng/dL   TSH without Reflex   Result Value Ref Range    TSH 1.110 0.270 - 4.200 uIU/mL   Comprehensive Metabolic Panel   Result Value Ref Range    Sodium 139 136 - 145 mmol/L    Potassium 4.3 3.5 - 5.0 mmol/L    Chloride 99 98 - 111 mmol/L    CO2 29 22 - 29 mmol/L    Anion Gap 11 7 - 19 mmol/L    Glucose 167 (H) 74 - 109 mg/dL    BUN 22 8 - 23 mg/dL    CREATININE 0.6 0.5 - 0.9 mg/dL    GFR Non-African American >60 >60    GFR African American >59 >59    Calcium 9.9 8.8 - 10.2 mg/dL    Total Protein 7.3 6.6 - 8.7 g/dL    Albumin 4.8 3.5 - 5.2 g/dL    Total Bilirubin 0.4 0.2 - 1.2 mg/dL    Alkaline Phosphatase 75 35 - 104 U/L    ALT 26 5 - 33 U/L    AST 17 5 - 32 U/L   CBC   Result Value Ref Range    WBC 7.0 4.8 - 10.8 K/uL    RBC 4.64 4.20 - 5.40 M/uL    Hemoglobin 14.5 12.0 - 16.0 g/dL    Hematocrit 43.3 37.0 - 47.0 %    MCV 93.3 81.0 - 99.0 fL    MCH 31.3 (H) 27.0 - 31.0 pg    MCHC 33.5 33.0 - 37.0 g/dL    RDW 12.9 11.5 - 14.5 %    Platelets 606 031 - 148 K/uL    MPV 11.9 9.4 - 12.3 fL   Hemoglobin A1C   Result Value Ref Range    Hemoglobin A1C 6.4 (H) 4.0 - 6.0 %       ASSESSMENT/ PLAN:  1. Type 2 diabetes mellitus without complication, without long-term current use of insulin (HCC)  Diabetes is in very good control she is stable with her medicines we are continuing to follow she is watching a diabetic healthy diet continue to monitor  - Hemoglobin A1C; Future  - TSH without Reflex; Future  - Lipid Panel; Future  - Microalbumin / Creatinine Urine Ratio; Future    2. Essential hypertension  Her blood pressure has been stable with the current medical regimen and plan of care    3.  Other iron deficiency anemia  Anemia not an issue at current resolved  - CBC; Future  - Comprehensive Metabolic Panel; Future    4. Vitamin D deficiency  Vitamin D levels replace vitamin D to help with immune system bone density etc. general wellbeing.  - Vitamin D 25 Hydroxy; Future    5. Mixed hyperlipidemia  Continue statin therapy and monitor some intolerance to statins doing okay with this low-dose Crestor    6. Lumbar djd with radiculopathy- stable and monitor patient closely    7. Cervical djd with radiculopathy- stable and doing better we will can have her do some other therapy if not better she is improved. Recommend the COVID-19 vaccine and she has had both doses. Next visit we will do a diabetic foot exam she is up-to-date with bone density and colon cancer screening at [facility] we will link into her chart.

## 2021-03-16 RX ORDER — HYDROCHLOROTHIAZIDE 12.5 MG/1
12.5 CAPSULE, GELATIN COATED ORAL DAILY
Qty: 90 CAPSULE | Refills: 3 | Status: SHIPPED | OUTPATIENT
Start: 2021-03-16 | End: 2022-03-10

## 2021-03-21 PROBLEM — M47.22 OSTEOARTHRITIS OF SPINE WITH RADICULOPATHY, CERVICAL REGION: Status: ACTIVE | Noted: 2021-03-21

## 2021-03-26 RX ORDER — LOSARTAN POTASSIUM 100 MG/1
100 TABLET ORAL DAILY
Qty: 90 TABLET | Refills: 3 | Status: SHIPPED | OUTPATIENT
Start: 2021-03-26

## 2021-04-07 DIAGNOSIS — J01.10 ACUTE NON-RECURRENT FRONTAL SINUSITIS: Primary | ICD-10-CM

## 2021-04-07 RX ORDER — BUDESONIDE 0.5 MG/2ML
1 INHALANT ORAL 2 TIMES DAILY
Qty: 60 AMPULE | Refills: 3 | Status: SHIPPED | OUTPATIENT
Start: 2021-04-07

## 2021-04-07 RX ORDER — METHYLPREDNISOLONE 4 MG/1
TABLET ORAL
Qty: 1 KIT | Refills: 0 | Status: SHIPPED | OUTPATIENT
Start: 2021-04-07 | End: 2021-04-13

## 2021-04-07 RX ORDER — AZITHROMYCIN 250 MG/1
250 TABLET, FILM COATED ORAL SEE ADMIN INSTRUCTIONS
Qty: 6 TABLET | Refills: 0 | Status: SHIPPED | OUTPATIENT
Start: 2021-04-07 | End: 2021-04-12

## 2021-04-07 RX ORDER — ALBUTEROL SULFATE 2.5 MG/3ML
2.5 SOLUTION RESPIRATORY (INHALATION) EVERY 6 HOURS PRN
Qty: 60 EACH | Refills: 3 | Status: SHIPPED | OUTPATIENT
Start: 2021-04-07

## 2021-04-12 ENCOUNTER — TRANSCRIBE ORDERS (OUTPATIENT)
Dept: LAB | Facility: HOSPITAL | Age: 71
End: 2021-04-12

## 2021-04-12 DIAGNOSIS — Z01.818 PREOPERATIVE TESTING: Primary | ICD-10-CM

## 2021-04-13 ENCOUNTER — LAB (OUTPATIENT)
Dept: LAB | Facility: HOSPITAL | Age: 71
End: 2021-04-13

## 2021-04-13 LAB — SARS-COV-2 ORF1AB RESP QL NAA+PROBE: NOT DETECTED

## 2021-04-13 PROCEDURE — C9803 HOPD COVID-19 SPEC COLLECT: HCPCS | Performed by: INTERNAL MEDICINE

## 2021-04-13 PROCEDURE — U0004 COV-19 TEST NON-CDC HGH THRU: HCPCS | Performed by: INTERNAL MEDICINE

## 2021-04-13 PROCEDURE — U0005 INFEC AGEN DETEC AMPLI PROBE: HCPCS | Performed by: INTERNAL MEDICINE

## 2021-04-16 ENCOUNTER — ANESTHESIA (OUTPATIENT)
Dept: GASTROENTEROLOGY | Facility: HOSPITAL | Age: 71
End: 2021-04-16

## 2021-04-16 ENCOUNTER — HOSPITAL ENCOUNTER (OUTPATIENT)
Facility: HOSPITAL | Age: 71
Setting detail: HOSPITAL OUTPATIENT SURGERY
Discharge: HOME | End: 2021-04-16
Attending: INTERNAL MEDICINE | Admitting: INTERNAL MEDICINE

## 2021-04-16 ENCOUNTER — ANESTHESIA EVENT (OUTPATIENT)
Dept: GASTROENTEROLOGY | Facility: HOSPITAL | Age: 71
End: 2021-04-16

## 2021-04-16 VITALS
BODY MASS INDEX: 23.7 KG/M2 | SYSTOLIC BLOOD PRESSURE: 124 MMHG | WEIGHT: 160 LBS | HEART RATE: 76 BPM | HEIGHT: 69 IN | RESPIRATION RATE: 16 BRPM | OXYGEN SATURATION: 98 % | DIASTOLIC BLOOD PRESSURE: 81 MMHG | TEMPERATURE: 97.2 F

## 2021-04-16 DIAGNOSIS — Z86.010 HISTORY OF COLON POLYPS: ICD-10-CM

## 2021-04-16 LAB — GLUCOSE BLDC GLUCOMTR-MCNC: 135 MG/DL (ref 70–130)

## 2021-04-16 PROCEDURE — 88305 TISSUE EXAM BY PATHOLOGIST: CPT | Performed by: INTERNAL MEDICINE

## 2021-04-16 PROCEDURE — 25010000002 PROPOFOL 10 MG/ML EMULSION: Performed by: NURSE ANESTHETIST, CERTIFIED REGISTERED

## 2021-04-16 PROCEDURE — 82962 GLUCOSE BLOOD TEST: CPT

## 2021-04-16 PROCEDURE — 45385 COLONOSCOPY W/LESION REMOVAL: CPT | Performed by: INTERNAL MEDICINE

## 2021-04-16 RX ORDER — SODIUM CHLORIDE 9 MG/ML
500 INJECTION, SOLUTION INTRAVENOUS CONTINUOUS PRN
Status: DISCONTINUED | OUTPATIENT
Start: 2021-04-16 | End: 2021-04-16 | Stop reason: HOSPADM

## 2021-04-16 RX ORDER — PROPOFOL 10 MG/ML
VIAL (ML) INTRAVENOUS AS NEEDED
Status: DISCONTINUED | OUTPATIENT
Start: 2021-04-16 | End: 2021-04-16 | Stop reason: SURG

## 2021-04-16 RX ORDER — LIDOCAINE HYDROCHLORIDE 10 MG/ML
0.5 INJECTION, SOLUTION EPIDURAL; INFILTRATION; INTRACAUDAL; PERINEURAL ONCE AS NEEDED
Status: CANCELLED | OUTPATIENT
Start: 2021-04-16

## 2021-04-16 RX ORDER — SODIUM CHLORIDE 0.9 % (FLUSH) 0.9 %
10 SYRINGE (ML) INJECTION AS NEEDED
Status: DISCONTINUED | OUTPATIENT
Start: 2021-04-16 | End: 2021-04-16 | Stop reason: HOSPADM

## 2021-04-16 RX ORDER — LIDOCAINE HYDROCHLORIDE 20 MG/ML
INJECTION, SOLUTION EPIDURAL; INFILTRATION; INTRACAUDAL; PERINEURAL AS NEEDED
Status: DISCONTINUED | OUTPATIENT
Start: 2021-04-16 | End: 2021-04-16 | Stop reason: SURG

## 2021-04-16 RX ADMIN — SODIUM CHLORIDE 500 ML: 9 INJECTION, SOLUTION INTRAVENOUS at 07:11

## 2021-04-16 RX ADMIN — LIDOCAINE HYDROCHLORIDE 50 MG: 20 INJECTION, SOLUTION EPIDURAL; INFILTRATION; INTRACAUDAL; PERINEURAL at 08:01

## 2021-04-16 RX ADMIN — PROPOFOL 300 MG: 10 INJECTION, EMULSION INTRAVENOUS at 08:01

## 2021-04-16 NOTE — ANESTHESIA PREPROCEDURE EVALUATION
Anesthesia Evaluation     Patient summary reviewed   no history of anesthetic complications:  NPO Solid Status: > 8 hours             Airway   Mallampati: II  Dental      Pulmonary    (+) sleep apnea,   Cardiovascular   Exercise tolerance: good (4-7 METS)    (+) hypertension, hyperlipidemia,       Neuro/Psych- negative ROS  GI/Hepatic/Renal/Endo    (+)  GERD,  diabetes mellitus,     Musculoskeletal     (+) chronic pain (trigeminal neuralgia),   Abdominal    Substance History      OB/GYN          Other                        Anesthesia Plan    ASA 2     MAC       Anesthetic plan, all risks, benefits, and alternatives have been provided, discussed and informed consent has been obtained with: patient.

## 2021-04-16 NOTE — H&P
[facility] Gastroenterology  Pre Procedure History & Physical    Chief Complaint:   History of polyps    Subjective     HPI:   Here for colonoscopy.  History of polyps    Past Medical History:   Past Medical History:   Diagnosis Date   • Acid reflux    • Arthritis    • Asthma    • Diabetes mellitus (CMS/HCC)     Type 2   • Fistula of mastoid, left    • GERD (gastroesophageal reflux disease)    • History of transfusion    • Hx of colonic polyps    • Hyperlipidemia    • Hypertension    • Iron deficiency anemia    • Mastoid pain, left    • Numbness     left side of face   • Other osteomyelitis, other site (CMS/HCC)    • PONV (postoperative nausea and vomiting)    • Sensorineural hearing loss    • Sleep apnea     does not use machine   • Trigeminal neuralgia    • Vitamin D deficiency        Past Surgical History:  Past Surgical History:   Procedure Laterality Date   • APPENDECTOMY     • BLADDER REPAIR      had vaginal repair at the same time   • BREAST BIOPSY Bilateral     benign   • CAPSULE ENDOSCOPY N/A 4/20/2018    Procedure: CAPSULE ENDOSCOPY M2A;  Surgeon: Garrett Ozuna MD;  Location: [facility] ENDOSCOPY;  Service: Gastroenterology   • COLONOSCOPY N/A 3/1/2018    Procedure: COLONOSCOPY WITH ANESTHESIA;  Surgeon: Garrett Ozuna MD;  Location: [facility] ENDOSCOPY;  Service:    • COLONOSCOPY W/ POLYPECTOMY  12/31/2014    Tubular adenomatous polyp at 80 cm, Hyperplastic polyp rectum repeat exam in 3 years   • ENDOSCOPY N/A 3/1/2018    Procedure: ESOPHAGOGASTRODUODENOSCOPY WITH ANESTHESIA;  Surgeon: Garrett Ozuna MD;  Location: [facility] ENDOSCOPY;  Service:    • FLAP HEAD/NECK Left 5/17/2018    Procedure: POSSIBLE STERNOCLEIDOMASTOID FLAP;  Surgeon: Kadeem Oconnor MD;  Location: [facility] OR;  Service: ENT   • HYSTERECTOMY     • MASTOIDECTOMY Left 5/17/2018    Procedure: Wound exploration with possible mastoidectomy; possible sternocleidomastoid flap.  Please schedule with Dr. Perez.;  Surgeon: Kadeem Oconnor MD;   Location: [facility] OR;  Service: ENT   • TRIGEMINAL NERVE DECOMPRESSION         Family History:  Family History   Problem Relation Age of Onset   • Breast cancer Sister    • Breast cancer Mother    • Heart disease Father    • No Known Problems Brother    • No Known Problems Daughter    • No Known Problems Son    • No Known Problems Maternal Grandmother    • No Known Problems Paternal Grandmother    • No Known Problems Maternal Aunt    • No Known Problems Paternal Aunt    • Breast cancer Niece    • Colon cancer Neg Hx    • Colon polyps Neg Hx    • BRCA 1/2 Neg Hx    • Endometrial cancer Neg Hx    • Ovarian cancer Neg Hx        Social History:   reports that she has never smoked. She has never used smokeless tobacco. She reports current alcohol use. She reports that she does not use drugs.    Medications:   Prior to Admission medications    Medication Sig Start Date End Date Taking? Authorizing Provider   aspirin 81 MG tablet Take 81 mg by mouth.   Yes Jose Maritn Kirk MD   calcium carbonate (TUMS) 500 MG chewable tablet Chew 1 tablet Daily.   Yes Jose Martin Kirk MD   esomeprazole (nexIUM) 20 MG capsule Take 20 mg by mouth Every Morning Before Breakfast.   Yes Jose Martin Kirk MD   famotidine (PEPCID) 10 MG tablet Take 10 mg by mouth At Night As Needed for Heartburn.   Yes Jose Martin Kirk MD   hydrochlorothiazide (MICROZIDE) 12.5 MG capsule Take 12.5 mg by mouth Every Morning.   Yes Jose Martin Kirk MD   losartan (COZAAR) 100 MG tablet TAKE ONE TABLET BY MOUTH EVERY DAY 1/5/18  Yes Jose Martin Kirk MD   metFORMIN (GLUCOPHAGE) 500 MG tablet Take 1,000 mg by mouth 2 (Two) Times a Day With Meals. 500m g in am and 1000mg at night 11/20/17  Yes Jose Martin Kirk MD   Multiple Vitamins-Minerals (PRESERVISION AREDS PO) Take  by mouth.   Yes Jose Martin Kirk MD   sodium-potassium-magnesium sulfates (Suprep Bowel Prep Kit) 17.5-3.13-1.6 GM/177ML solution oral solution As Directed by  "Office 12/21/20  Yes Ayala Hooker APRN   verapamil SR (CALAN-SR) 240 MG CR tablet TAKE ONE TABLET BY MOUTH TWICE A DAY 11/17/17  Yes Jose Martin Kirk MD   albuterol sulfate  (90 Base) MCG/ACT inhaler As Needed. 10/25/19   Jose Martin Kirk MD   ALPRAZolam (XANAX) 0.25 MG tablet Take  by mouth.    Jose Martin Kirk MD   aspirin-acetaminophen-caffeine (EXCEDRIN MIGRAINE) 250-250-65 MG per tablet Take 1 tablet by mouth As Needed.    Jose Martin Kirk MD   conjugated estrogens (PREMARIN) 0.625 MG/GM vaginal cream 1gm twice a week (this replaces Estradiol cream per insurance 4/10/18) 1/14/19   Jose Martin Kirk MD   rosuvastatin (CRESTOR) 5 MG tablet Take 1/2 tablet on Mon, Wed, Fri    Jose Martin Kirk MD   vitamin D (ERGOCALCIFEROL) 50221 units capsule capsule Take 50,000 Units by mouth Every 7 (Seven) Days. 11/20/17   Jose Martin Kirk MD       Allergies:  Zovirax [acyclovir], Meperidine, Propranolol, Sertraline hcl, Vesicare [solifenacin succinate], and Solifenacin    Objective     Blood pressure 130/72, pulse 75, temperature 97.2 °F (36.2 °C), temperature source Temporal, resp. rate 18, height 174 cm (68.5\"), weight 72.6 kg (160 lb), SpO2 95 %, not currently breastfeeding.    Physical Exam   Constitutional: Pt is oriented to person, place, and in no distress.   HENT: Mouth/Throat: Oropharynx is clear.   Cardiovascular: Normal rate, regular rhythm.    Pulmonary/Chest: Effort normal. No respiratory distress. No  wheezes.   Abdominal: Soft. Non-distended.  Skin: Skin is warm and dry.   Psychiatric: Mood, memory, affect and judgment appear normal.     Assessment/Plan     Diagnosis:  History of polyps    Anticipated Surgical Procedure:    Proceed with colonoscopy as scheduled    The following major R/B/A were discussed with the patient, however the list is not all inclusive . Risk:  Bleeding (immediate and delayed), perforation (rupture or tear), reaction to medication, missed " lesion/cancer, pain during the procedure, infection, need for surgery, need for ostomy, need for mechanical ventilation (breathing machine), death.  Benefits: removal of polyp/tissue, burn/clip/or inject to stop bleeding, removal of foreign body, dilate any stricture.  Alternatives: Xray or CT, surgery, do nothing with associated risk   The patient was given time to ask question and received explanation, and agrees to proceed as per History and Physical.   No guarantee given or expressed.    EMR Dragon/transcription disclaimer: Much of this encounter note is an electronic transcription/translation of spoken language to printed text.  The electronic translation of spoken language may permit erroneous, or at times, nonsensical words or phrases to be inadvertently transcribed.  Although I have reviewed the note for such errors, some may still exist.    Garrett Ozuna MD  07:57 CDT  4/16/2021

## 2021-04-16 NOTE — ANESTHESIA POSTPROCEDURE EVALUATION
Patient: Carlotta Dupont    Procedure Summary     Date: 04/16/21 Room / Location: [facility] ENDOSCOPY 5 / [facility] ENDOSCOPY    Anesthesia Start: 0758 Anesthesia Stop: 0820    Procedure: COLONOSCOPY WITH ANESTHESIA (N/A ) Diagnosis:       History of colon polyps      (History of colon polyps [Z86.010])    Surgeons: Garrett Ozuna MD Provider: Miles Stone CRNA    Anesthesia Type: MAC ASA Status: 2          Anesthesia Type: MAC    Vitals  Vitals Value Taken Time   /81 04/16/21 0835   Temp     Pulse 76 04/16/21 0835   Resp 16 04/16/21 0835   SpO2 98 % 04/16/21 0835           Post Anesthesia Care and Evaluation    Patient location during evaluation: PHASE II  Patient participation: complete - patient participated  Level of consciousness: awake  Pain management: adequate  Airway patency: patent  Anesthetic complications: No anesthetic complications  Respiratory status: acceptable  Hydration status: acceptable

## 2021-04-20 LAB
CYTO UR: NORMAL
LAB AP CASE REPORT: NORMAL
LAB AP DIAGNOSIS COMMENT: NORMAL
PATH REPORT.FINAL DX SPEC: NORMAL
PATH REPORT.GROSS SPEC: NORMAL

## 2021-05-04 ENCOUNTER — OFFICE VISIT (OUTPATIENT)
Dept: INTERNAL MEDICINE | Age: 71
End: 2021-05-04
Payer: MEDICARE

## 2021-05-04 VITALS
HEART RATE: 86 BPM | DIASTOLIC BLOOD PRESSURE: 74 MMHG | SYSTOLIC BLOOD PRESSURE: 124 MMHG | WEIGHT: 163 LBS | BODY MASS INDEX: 24.07 KG/M2 | OXYGEN SATURATION: 96 %

## 2021-05-04 DIAGNOSIS — M25.559 HIP PAIN: ICD-10-CM

## 2021-05-04 DIAGNOSIS — M54.41 ACUTE RIGHT-SIDED LOW BACK PAIN WITH RIGHT-SIDED SCIATICA: Primary | ICD-10-CM

## 2021-05-04 DIAGNOSIS — E11.9 TYPE 2 DIABETES MELLITUS WITHOUT COMPLICATION, WITHOUT LONG-TERM CURRENT USE OF INSULIN (HCC): Chronic | ICD-10-CM

## 2021-05-04 DIAGNOSIS — M54.31 SCIATICA OF RIGHT SIDE: ICD-10-CM

## 2021-05-04 PROCEDURE — G8427 DOCREV CUR MEDS BY ELIG CLIN: HCPCS | Performed by: INTERNAL MEDICINE

## 2021-05-04 PROCEDURE — 3044F HG A1C LEVEL LT 7.0%: CPT | Performed by: INTERNAL MEDICINE

## 2021-05-04 PROCEDURE — G8400 PT W/DXA NO RESULTS DOC: HCPCS | Performed by: INTERNAL MEDICINE

## 2021-05-04 PROCEDURE — G8420 CALC BMI NORM PARAMETERS: HCPCS | Performed by: INTERNAL MEDICINE

## 2021-05-04 PROCEDURE — 4040F PNEUMOC VAC/ADMIN/RCVD: CPT | Performed by: INTERNAL MEDICINE

## 2021-05-04 PROCEDURE — 1123F ACP DISCUSS/DSCN MKR DOCD: CPT | Performed by: INTERNAL MEDICINE

## 2021-05-04 PROCEDURE — 3017F COLORECTAL CA SCREEN DOC REV: CPT | Performed by: INTERNAL MEDICINE

## 2021-05-04 PROCEDURE — 99213 OFFICE O/P EST LOW 20 MIN: CPT | Performed by: INTERNAL MEDICINE

## 2021-05-04 PROCEDURE — 2022F DILAT RTA XM EVC RTNOPTHY: CPT | Performed by: INTERNAL MEDICINE

## 2021-05-04 PROCEDURE — 1090F PRES/ABSN URINE INCON ASSESS: CPT | Performed by: INTERNAL MEDICINE

## 2021-05-04 PROCEDURE — 4004F PT TOBACCO SCREEN RCVD TLK: CPT | Performed by: INTERNAL MEDICINE

## 2021-05-04 RX ORDER — KETOROLAC TROMETHAMINE 10 MG/1
10 TABLET, FILM COATED ORAL
Qty: 90 TABLET | Refills: 0 | Status: SHIPPED | OUTPATIENT
Start: 2021-05-04 | End: 2022-04-12

## 2021-05-04 NOTE — PROGRESS NOTES
Male   Lifestyle    Physical activity     Days per week: Not on file     Minutes per session: Not on file    Stress: Not on file   Relationships    Social connections     Talks on phone: Not on file     Gets together: Not on file     Attends [employer] service: Not on file     Active member of club or organization: Not on file     Attends meetings of clubs or organizations: Not on file     Relationship status: Not on file    Intimate partner violence     Fear of current or ex partner: Not on file     Emotionally abused: Not on file     Physically abused: Not on file     Forced sexual activity: Not on file   Other Topics Concern    Not on file   Social History Narrative    Not on file       Allergies   Allergen Reactions    Inderal [Propranolol] Other (See Comments)     Bad dreams  unknown    Solifenacin Succinate Other (See Comments)     Severe constipation    Demerol Hcl [Meperidine]      halluncinations    Vesicare [Solifenacin]      Couldn't urinate    Zoloft [Sertraline Hcl]      hallucinations    Zovirax [Acyclovir] Hives and Other (See Comments)     PhX       Current Outpatient Medications   Medication Sig Dispense Refill    ketorolac (TORADOL) 10 MG tablet Take 1 tablet by mouth 3 times daily (with meals) 90 tablet 0    albuterol (PROVENTIL) (2.5 MG/3ML) 0.083% nebulizer solution Take 3 mLs by nebulization every 6 hours as needed for Wheezing 60 each 3    budesonide (PULMICORT) 0.5 MG/2ML nebulizer suspension Take 2 mLs by nebulization 2 times daily 60 ampule 3    losartan (COZAAR) 100 MG tablet TAKE 1 TABLET BY MOUTH DAILY 90 tablet 3    hydroCHLOROthiazide (MICROZIDE) 12.5 MG capsule TAKE 1 CAPSULE BY MOUTH DAILY 90 capsule 3    ONETOUCH ULTRA strip TEST DAILY AS NEEDED 100 strip 3    vitamin D (ERGOCALCIFEROL) 1.25 MG (77814 UT) CAPS capsule TAKE 1 CAPSULE BY MOUTH ONCE A WEEK 12 capsule 3    montelukast (SINGULAIR) 10 MG tablet TAKE 1 TABLET BY MOUTH DAILY 90 tablet 3    verapamil General: No scleral icterus. Neck:      Musculoskeletal: Neck supple. Cardiovascular:      Heart sounds: Normal heart sounds. Pulmonary:      Breath sounds: Normal breath sounds. Lymphadenopathy:      Cervical: No cervical adenopathy. Skin:     Findings: No rash. Neurological:      Comments: Pain with straight leg raise on the right. Some limitation of range of motion of the hip some discomfort with that. Results for orders placed or performed in visit on 01/06/21   T4, Free   Result Value Ref Range    T4 Free 1.47 0.93 - 1.70 ng/dL   TSH without Reflex   Result Value Ref Range    TSH 1.110 0.270 - 4.200 uIU/mL   Comprehensive Metabolic Panel   Result Value Ref Range    Sodium 139 136 - 145 mmol/L    Potassium 4.3 3.5 - 5.0 mmol/L    Chloride 99 98 - 111 mmol/L    CO2 29 22 - 29 mmol/L    Anion Gap 11 7 - 19 mmol/L    Glucose 167 (H) 74 - 109 mg/dL    BUN 22 8 - 23 mg/dL    CREATININE 0.6 0.5 - 0.9 mg/dL    GFR Non-African American >60 >60    GFR African American >59 >59    Calcium 9.9 8.8 - 10.2 mg/dL    Total Protein 7.3 6.6 - 8.7 g/dL    Albumin 4.8 3.5 - 5.2 g/dL    Total Bilirubin 0.4 0.2 - 1.2 mg/dL    Alkaline Phosphatase 75 35 - 104 U/L    ALT 26 5 - 33 U/L    AST 17 5 - 32 U/L   CBC   Result Value Ref Range    WBC 7.0 4.8 - 10.8 K/uL    RBC 4.64 4.20 - 5.40 M/uL    Hemoglobin 14.5 12.0 - 16.0 g/dL    Hematocrit 43.3 37.0 - 47.0 %    MCV 93.3 81.0 - 99.0 fL    MCH 31.3 (H) 27.0 - 31.0 pg    MCHC 33.5 33.0 - 37.0 g/dL    RDW 12.9 11.5 - 14.5 %    Platelets 290 356 - 811 K/uL    MPV 11.9 9.4 - 12.3 fL   Hemoglobin A1C   Result Value Ref Range    Hemoglobin A1C 6.4 (H) 4.0 - 6.0 %       ASSESSMENT/ PLAN:  1. Acute right-sided low back pain with right-sided sciatica  Severe sciatica pain down the leg needs to go on with an MRI and referral to Dr. Yahir Noyola we also called in Toradol she is taken this with some good benefit at home has some Toradol leftover from a procedure.   - MRI LUMBAR SPINE WO CONTRAST; Future  - ketorolac (TORADOL) 10 MG tablet; Take 1 tablet by mouth 3 times daily (with meals)  Dispense: 90 tablet; Refill: 0  - External Referral To Neurosurgery    2. Hip pain  Pain really seems to be coming from her back we will x-ray her hips but also proceed with MRI of the lumbar spine  - ketorolac (TORADOL) 10 MG tablet; Take 1 tablet by mouth 3 times daily (with meals)  Dispense: 90 tablet; Refill: 0  - External Referral To Neurosurgery    3. Sciatica of right side  Plan as above  - MRI LUMBAR SPINE WO CONTRAST; Future  - ketorolac (TORADOL) 10 MG tablet; Take 1 tablet by mouth 3 times daily (with meals)  Dispense: 90 tablet;  Refill: 0  - External Referral To Neurosurgery

## 2021-05-05 ENCOUNTER — TRANSCRIBE ORDERS (OUTPATIENT)
Dept: ADMINISTRATIVE | Facility: HOSPITAL | Age: 71
End: 2021-05-05

## 2021-05-05 DIAGNOSIS — M54.31 SCIATICA OF RIGHT SIDE: ICD-10-CM

## 2021-05-05 DIAGNOSIS — M54.50 ACUTE BILATERAL LOW BACK PAIN WITHOUT SCIATICA: Primary | ICD-10-CM

## 2021-05-05 PROBLEM — Z96.1 PRESENCE OF INTRAOCULAR LENS: Status: ACTIVE | Noted: 2021-03-31

## 2021-05-07 ENCOUNTER — LAB (OUTPATIENT)
Dept: LAB | Facility: HOSPITAL | Age: 71
End: 2021-05-07

## 2021-05-07 ENCOUNTER — TRANSCRIBE ORDERS (OUTPATIENT)
Dept: ADMINISTRATIVE | Facility: HOSPITAL | Age: 71
End: 2021-05-07

## 2021-05-07 ENCOUNTER — HOSPITAL ENCOUNTER (OUTPATIENT)
Dept: GENERAL RADIOLOGY | Facility: HOSPITAL | Age: 71
Discharge: HOME | End: 2021-05-07
Admitting: INTERNAL MEDICINE

## 2021-05-07 DIAGNOSIS — M25.559 HIP PAIN: Primary | ICD-10-CM

## 2021-05-07 DIAGNOSIS — M25.559 HIP PAIN: ICD-10-CM

## 2021-05-07 DIAGNOSIS — M54.50 ACUTE BILATERAL LOW BACK PAIN WITHOUT SCIATICA: ICD-10-CM

## 2021-05-07 DIAGNOSIS — D50.9 IRON DEFICIENCY ANEMIA, UNSPECIFIED IRON DEFICIENCY ANEMIA TYPE: ICD-10-CM

## 2021-05-07 LAB
ALBUMIN SERPL-MCNC: 4.4 G/DL (ref 3.5–5.2)
ALBUMIN/GLOB SERPL: 1.8 G/DL
ALP SERPL-CCNC: 65 U/L (ref 39–117)
ALT SERPL W P-5'-P-CCNC: 28 U/L (ref 1–33)
ANION GAP SERPL CALCULATED.3IONS-SCNC: 11 MMOL/L (ref 5–15)
AST SERPL-CCNC: 21 U/L (ref 1–32)
BASOPHILS # BLD AUTO: 0.06 10*3/MM3 (ref 0–0.2)
BASOPHILS NFR BLD AUTO: 1.2 % (ref 0–1.5)
BILIRUB SERPL-MCNC: 0.5 MG/DL (ref 0–1.2)
BUN SERPL-MCNC: 20 MG/DL (ref 8–23)
BUN/CREAT SERPL: 29.9 (ref 7–25)
CALCIUM SPEC-SCNC: 9.7 MG/DL (ref 8.6–10.5)
CHLORIDE SERPL-SCNC: 101 MMOL/L (ref 98–107)
CO2 SERPL-SCNC: 25 MMOL/L (ref 22–29)
CREAT SERPL-MCNC: 0.67 MG/DL (ref 0.57–1)
DEPRECATED RDW RBC AUTO: 41.7 FL (ref 37–54)
EOSINOPHIL # BLD AUTO: 0.27 10*3/MM3 (ref 0–0.4)
EOSINOPHIL NFR BLD AUTO: 5.3 % (ref 0.3–6.2)
ERYTHROCYTE [DISTWIDTH] IN BLOOD BY AUTOMATED COUNT: 12.7 % (ref 12.3–15.4)
FERRITIN SERPL-MCNC: 223.3 NG/ML (ref 13–150)
GFR SERPL CREATININE-BSD FRML MDRD: 87 ML/MIN/1.73
GLOBULIN UR ELPH-MCNC: 2.4 GM/DL
GLUCOSE SERPL-MCNC: 145 MG/DL (ref 65–99)
HCT VFR BLD AUTO: 38.9 % (ref 34–46.6)
HGB BLD-MCNC: 13.2 G/DL (ref 12–15.9)
IMM GRANULOCYTES # BLD AUTO: 0.02 10*3/MM3 (ref 0–0.05)
IMM GRANULOCYTES NFR BLD AUTO: 0.4 % (ref 0–0.5)
IRON 24H UR-MRATE: 77 MCG/DL (ref 37–145)
IRON SATN MFR SERPL: 22 % (ref 20–50)
LYMPHOCYTES # BLD AUTO: 1.56 10*3/MM3 (ref 0.7–3.1)
LYMPHOCYTES NFR BLD AUTO: 30.6 % (ref 19.6–45.3)
MCH RBC QN AUTO: 30.8 PG (ref 26.6–33)
MCHC RBC AUTO-ENTMCNC: 33.9 G/DL (ref 31.5–35.7)
MCV RBC AUTO: 90.7 FL (ref 79–97)
MONOCYTES # BLD AUTO: 0.45 10*3/MM3 (ref 0.1–0.9)
MONOCYTES NFR BLD AUTO: 8.8 % (ref 5–12)
NEUTROPHILS NFR BLD AUTO: 2.73 10*3/MM3 (ref 1.7–7)
NEUTROPHILS NFR BLD AUTO: 53.7 % (ref 42.7–76)
NRBC BLD AUTO-RTO: 0 /100 WBC (ref 0–0.2)
PLATELET # BLD AUTO: 196 10*3/MM3 (ref 140–450)
PMV BLD AUTO: 11.2 FL (ref 6–12)
POTASSIUM SERPL-SCNC: 4.1 MMOL/L (ref 3.5–5.2)
PROT SERPL-MCNC: 6.8 G/DL (ref 6–8.5)
RBC # BLD AUTO: 4.29 10*6/MM3 (ref 3.77–5.28)
SODIUM SERPL-SCNC: 137 MMOL/L (ref 136–145)
TIBC SERPL-MCNC: 349 MCG/DL (ref 298–536)
TRANSFERRIN SERPL-MCNC: 234 MG/DL (ref 200–360)
WBC # BLD AUTO: 5.09 10*3/MM3 (ref 3.4–10.8)

## 2021-05-07 PROCEDURE — 80053 COMPREHEN METABOLIC PANEL: CPT

## 2021-05-07 PROCEDURE — 82728 ASSAY OF FERRITIN: CPT

## 2021-05-07 PROCEDURE — 84466 ASSAY OF TRANSFERRIN: CPT

## 2021-05-07 PROCEDURE — 83540 ASSAY OF IRON: CPT

## 2021-05-07 PROCEDURE — 85025 COMPLETE CBC W/AUTO DIFF WBC: CPT

## 2021-05-07 PROCEDURE — 73523 X-RAY EXAM HIPS BI 5/> VIEWS: CPT

## 2021-05-07 PROCEDURE — 36415 COLL VENOUS BLD VENIPUNCTURE: CPT

## 2021-05-08 NOTE — PROGRESS NOTES
"[facility] HEMATOLOGY AND ONCOLOGY  [address]  [place] [address] [phone]    Patient Name: Carlotta Dupont  Encounter Date: 05/14/2021  YOB: 1950  Patient Number: [id number]      REASON FOR VISIT: Ms. Carlotta Dupont is a 70-year-old female who returns in follow-up of anemia with iron deficiency. It as been 28 months since last receipt of IV Injectafer. She is here alone (usually with her spouse Hung).     DIAGNOSTIC ABNORMALITIES:   1. Review of labs from the referring office dating back to 01/12/2018 includes a CBC showing a hemoglobin of 11.4, hematocrit 37.4, MCV 82.2 (81 - 99), MCH 25.1, MCHC 30 (each depressed), RDW 15.6% (elevated). CMP notable for hyperglycemia and BUN of 21 (elevated), otherwise normal with GFR greater than 60, calcium 10.1, total protein 7.6, and normal liver enzymes.   2. Labs, 02/03/2018: Hemoglobin 11.2, hematocrit 37.2, MCV 82.7, platelets 257,000, WBC 6.8. Serum iron 20, \"low iron saturation,\" ferritin 8.5. She prescribed ferrous sulfate 1 p.o. daily 02/03/2018 which she did not start due to prior intolerance, \"Bone and joint pains when I took it while I was pregnant.\" Her last colonoscopy was 12/2014 by Dr. Ozuna with 3-year followup. Stools for fecal occult blood x3 were said to be (+).   3. Labs, 02/22/2018: Hemoglobin 11.2, hematocrit 37.5, MCV 82.6, platelets 278,000, WBC 6.8. Ferritin 7.7. BUN 26, creatinine 0.6 (GFR greater than 60).  4. EGD, 03/01/2018: Normal examined duodenum. Normal stomach. Z-line regular, 38 cm from the incisors. No specimens collected.  5. Colonoscopy, 03/01/2018: The entire examined colon is normal on direct and retroflexion views. No specimens collected. Repeat 3 years.  6. Labs, 03/19/2018: Hemoglobin 12.2, hematocrit 35.5, MCV 81.5, platelets 315,000, WBC 7.7 with a normal differential. CMP notable for glucose 157, otherwise normal with BUN 18, " creatinine 0.7 (GFR 88.7), calcium 10.4, total protein 8.3, normal liver enzymes. Serum iron 30, saturation 6.22%, ferritin 5.2, TIBC 482.  7. M2A Capsule - Date capsule was swallowed: 04/20/2018. Date capsule was read : 04/28/2018. RESULTS: Gastric passage time: 1 hour 55 minutes. Small bowel passage time: 2 hours 39 minutes. The capsule was clearly seen in the colon. Conclusion: 1 small AVM was noted at 1 hour 59 minutes and 38 seconds. Several small areas of the lymphangitic ectasia noted. The capsule was seen freely passing into the cecum. There was no activity noted on this exam.    PREVIOUS INTERVENTIONS:   1. Injectafer 750 mg IV weekly x2, 03/23/2018 through 03/30/2018 (1500 mg); 10/12/2018 (750 mg); 01/11/2019 (750 mg)        Problem List Items Addressed This Visit     None        Oncology/Hematology History    No history exists.       PAST MEDICAL HISTORY:  ALLERGIES:  Allergies   Allergen Reactions   • Zovirax [Acyclovir] Rash and Provider Review Needed     Other reaction(s): Other (See Comments)  PhX  PhX   • Meperidine Provider Review Needed     halluncinations  halluncinations   • Propranolol Provider Review Needed     Other reaction(s): Other (See Comments)  Bad dreams  unknown  Bad dreams  unknown  Bad dreams   • Sertraline Hcl Provider Review Needed     hallucinations  hallucinations   • Vesicare [Solifenacin Succinate] Provider Review Needed     Severe constipation   • Solifenacin Provider Review Needed     Other reaction(s): Other (See Comments)  Phx  Phx  Couldn't urinate     CURRENT MEDICATIONS:  Outpatient Encounter Medications as of 5/14/2021   Medication Sig Dispense Refill   • albuterol sulfate  (90 Base) MCG/ACT inhaler As Needed.  5   • ALPRAZolam (XANAX) 0.25 MG tablet Take  by mouth.     • aspirin 81 MG tablet Take 81 mg by mouth.     • aspirin-acetaminophen-caffeine (EXCEDRIN MIGRAINE) 250-250-65 MG per tablet Take 1 tablet by mouth As Needed.     • calcium carbonate (TUMS) 500  MG chewable tablet Chew 1 tablet Daily.     • conjugated estrogens (PREMARIN) 0.625 MG/GM vaginal cream 1gm twice a week (this replaces Estradiol cream per insurance 4/10/18)     • esomeprazole (nexIUM) 20 MG capsule Take 20 mg by mouth Every Morning Before Breakfast.     • famotidine (PEPCID) 10 MG tablet Take 10 mg by mouth At Night As Needed for Heartburn.     • hydrochlorothiazide (MICROZIDE) 12.5 MG capsule Take 12.5 mg by mouth Every Morning.     • ketorolac (TORADOL) 10 MG tablet      • losartan (COZAAR) 100 MG tablet TAKE ONE TABLET BY MOUTH EVERY DAY     • metFORMIN (GLUCOPHAGE) 500 MG tablet Take 1,000 mg by mouth 2 (Two) Times a Day With Meals. 500m g in am and 1000mg at night     • Multiple Vitamins-Minerals (PRESERVISION AREDS PO) Take  by mouth.     • rosuvastatin (CRESTOR) 5 MG tablet Take 1/2 tablet on Mon, Wed, Fri     • verapamil SR (CALAN-SR) 240 MG CR tablet TAKE ONE TABLET BY MOUTH TWICE A DAY     • vitamin D (ERGOCALCIFEROL) 26915 units capsule capsule Take 50,000 Units by mouth Every 7 (Seven) Days.       No facility-administered encounter medications on file as of 5/14/2021.     ADULT ILLNESSES:   Iron deficiency anemia (disorder) ( ICD-10:D50.9 ;Iron deficiency anemia, unspecified   Elevated liver function test ( ICD-10:R94.5 ;Abnormal results of liver function studies   Fatigue ( ICD-10:R53.83 ;Other fatigue   Fatty liver ( ICD-10:K76.0 ;Fatty (change of) liver, not elsewhere classified   Gallbladder calculus (disorder) ( ICD-10:K80.20 ;Calculus of gallbladder without cholecystitis without obstruction   Gastroesophageal reflux disease without esophagitis (disorder) ( ICD-10:K21.9 ;Gastro-esophageal reflux disease without esophagitis   Hyperlipidemia ( ICD-10:E78.5 ;Hyperlipidemia, unspecified   Hypertension ( ICD-10:I10 ;Essential (primary) hypertension   Lumbar disc degenerative disease ( ICD-10:M51.36 ;Other intervertebral disc degeneration, lumbar region   Migraine ( ICD-10:G43.909  ";Migraine, unspecified, not intractable, without status migrainosus   Mixed hyperlipidemia ( ICD-10:E78.2 ;Mixed hyperlipidemia   Obstructive sleep apnea ( ICD-10:G47.33 ;Obstructive sleep apnea (adult) (pediatric)   Sacroiliitis ( ICD-10:M46.1 ;Sacroiliitis, not elsewhere classified   Trigeminal neuralgia ( ICD-10:G50.0 ;Trigeminal neuralgia   Type 2 diabetes ( ICD-10:E11.9 ;Type 2 diabetes mellitus without complications   Vitamin D deficiency (disorder) ( ICD-10:E55.9 ;Vitamin D deficiency, unspecified    SURGERIES:   Mastoidectomy, 05/17/2018. 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 Perez/Dr. Oconnor)   Colonoscopy, 03/01/2018. The entire examined colon is normal on direct and retroflexion views. No specimens collected. Repeat 3 years   Cystoscopy, 09/072018 ([facility]). No urethral lesions. No tumors, stones, or other mucosal lesions in the bladder. Ureteral orifices were orthotopic and normal in their appearance   EGD, 03/01/2018. Normal examined duodenum. Normal stomach. Z-line regular, 38 cm from the incisors. No specimens collected.   Decompression of trigeminal nerve (V) (procedure), Note: microvascular, at the [facility], 05/12/2017   Biopsy of breast, x3, 1969, 1974, 1980   Vaginal and bladder repair, 07/03/2012   Hysterectomy without BSO, 1976   Colonoscopic polypectomy: Colonoscopy, 2014. \"3 polyps.\" Dr. Ozuna  Sacrocolpopexy and Macroplastique, 7/3/2012      ADULT ILLNESSES:  Patient Active Problem List   Diagnosis Code   • Iron deficiency anemia D50.9   • Heme positive stool R19.5   • BRBPR (bright red blood per rectum) K62.5   • NSAID long-term use Z79.1   • Gastroesophageal reflux disease K21.9   • Hx of adenomatous colonic polyps Z86.010   • Nonsmoker Z78.9   • Controlled type 2 diabetes mellitus without complication, without long-term current use of insulin (CMS/[facility]) E11.9   • HTN (hypertension), benign I10   • BMI " 25.0-25.9,adult Z68.25   • Superficial postoperative wound infection T81.49XA   • Trigeminal neuralgia G50.0   • Open scalp wound S01.00XA   • Wound dehiscence T81.30XA   • Mastoiditis, chronic, left H70.12   • Asthma J45.909   • Calculus of gallbladder K80.20   • Fatty liver K76.0   • Lumbar disc disease with radiculopathy M51.16   • Migraine without aura, not intractable G43.009   • Mixed hyperlipidemia E78.2   • Hill's metatarsalgia G57.60   • Plantar fasciitis M72.2   • Obstructive sleep apnea G47.33   • Nodule of vagina N89.9   • Prolapse of vaginal wall N81.10   • Pseudophakia Z96.1   • Tear film insufficiency H04.129   • Vitamin D deficiency E55.9   • History of colon polyps Z86.010   • Abnormal ECG R94.31   • Thyroid nodule E04.1   • Nontoxic multinodular goiter E04.2   • Osteoarthritis of spine with radiculopathy, cervical region M47.22   • Presence of intraocular lens Z96.1     SURGERIES:  Past Surgical History:   Procedure Laterality Date   • APPENDECTOMY     • BLADDER REPAIR      had vaginal repair at the same time   • BREAST BIOPSY Bilateral     benign   • CAPSULE ENDOSCOPY N/A 4/20/2018    Procedure: CAPSULE ENDOSCOPY M2A;  Surgeon: Garrett Ozuna MD;  Location: [facility] ENDOSCOPY;  Service: Gastroenterology   • COLONOSCOPY N/A 3/1/2018    Procedure: COLONOSCOPY WITH ANESTHESIA;  Surgeon: Garrett Ozuna MD;  Location: [facility] ENDOSCOPY;  Service:    • COLONOSCOPY N/A 4/16/2021    Procedure: COLONOSCOPY WITH ANESTHESIA;  Surgeon: Garrett Ozuna MD;  Location: [facility] ENDOSCOPY;  Service: Gastroenterology;  Laterality: N/A;  pre: hx colon polyps  post: polyp  Misty Pelayo MD   • COLONOSCOPY W/ POLYPECTOMY  12/31/2014    Tubular adenomatous polyp at 80 cm, Hyperplastic polyp rectum repeat exam in 3 years   • ENDOSCOPY N/A 3/1/2018    Procedure: ESOPHAGOGASTRODUODENOSCOPY WITH ANESTHESIA;  Surgeon: Garrett Ozuna MD;  Location: [facility] ENDOSCOPY;  Service:    • FLAP HEAD/NECK Left 5/17/2018     Procedure: POSSIBLE STERNOCLEIDOMASTOID FLAP;  Surgeon: Kadeem Oconnor MD;  Location:  [facility] OR;  Service: ENT   • HYSTERECTOMY     • MASTOIDECTOMY Left 5/17/2018    Procedure: Wound exploration with possible mastoidectomy; possible sternocleidomastoid flap.  Please schedule with Dr. Perez.;  Surgeon: Kadeem Oconnor MD;  Location:  [facility] OR;  Service: ENT   • TRIGEMINAL NERVE DECOMPRESSION       HEALTH MAINTENANCE ITEMS:  Health Maintenance Due   Topic Date Due   • ZOSTER VACCINE (1 of 2) Never done   • Pneumococcal Vaccine 65+ (1 of 1 - PPSV23) Never done   • DIABETIC FOOT EXAM  Never done   • DIABETIC EYE EXAM  01/16/2021   • DXA SCAN  02/13/2021       <no information>  Last Completed Colonoscopy     This patient has no relevant Health Maintenance data.        Immunization History   Administered Date(s) Administered   • COVID-19 (MODERNA) 01/16/2021, 02/13/2021     Last Completed Mammogram          MAMMOGRAM (Every 2 Years) Next due on 10/15/2022    10/15/2020  Mammo Screening Digital Tomosynthesis Bilateral With CAD    09/30/2019  Mammo Screening Digital Tomosynthesis Bilateral With CAD    08/16/2018  Mammo screening digital tomosynthesis bilateral w CAD    06/12/2017  Mammo screening digital tomosynthesis bilateral w CAD    06/06/2016  Mammo screening bilateral w CAD    Only the first 5 history entries have been loaded, but more history exists.                  FAMILY HISTORY:  Family History   Problem Relation Age of Onset   • Breast cancer Sister    • Breast cancer Mother    • Heart disease Father    • No Known Problems Brother    • No Known Problems Daughter    • No Known Problems Son    • No Known Problems Maternal Grandmother    • No Known Problems Paternal Grandmother    • No Known Problems Maternal Aunt    • No Known Problems Paternal Aunt    • Breast cancer Niece    • Colon cancer Neg Hx    • Colon polyps Neg Hx    • BRCA 1/2 Neg Hx    • Endometrial cancer Neg Hx    • Ovarian cancer Neg Hx       SOCIAL HISTORY:  Social History     Socioeconomic History   • Marital status:      Spouse name: Not on file   • Number of children: Not on file   • Years of education: Not on file   • Highest education level: Not on file   Tobacco Use   • Smoking status: Never Smoker   • Smokeless tobacco: Never Used   Vaping Use   • Vaping Use: Never used   Substance and Sexual Activity   • Alcohol use: Yes     Comment: Rare   • Drug use: No   • Sexual activity: Defer       REVIEW OF SYSTEMS:  Constitutional:   The patient's appetite is good but energy remains low to fair. 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  She has lost 1 pound (had gained 3 pounds at her prior visit)  since her last visit.  She has no fevers, chills, or drenching night sweats. Her sleep habits have been disrupted by back pains for the past few months.  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 Perez and Dr. Oconnor). Says repeat MRI sometime 11/2018 and 12/2018 after antibiotics (Dr. Rivers) showed clearing of the mastoid infection.  Says she has learned to live with it.  She has no ear pains, sinus symptoms, sore throat, nosebleeds, or sore tongue. She has no headaches. She denies any hoarseness, nor change in voice quality.   Ocular:   She reports no eye pain, significant change in visual acuity, double vision, with occasional blurry vision, left eye (OS). Had blepharoplasties last 08/2020 per Dr. Fernandes.  Respiratory:   She reports no chronic cough, significant shortness of breathing, phlegm production, or unexplained chest wall pain. Has sleep apnea but still does not tolerate mask for CPAP.  Cardiovascular:   She reports no exertional chest pain, chest pressure, or chest heaviness. She reports no claudication.  "She reports no palpitations or symptomatic orthostasis.  Gastrointestinal:   She reports no pica, dysphagia, nausea, vomiting, postprandial abdominal pain, bloating, cramping, change in bowel habits, or discoloration of the stool. She reports no rectal bleeding. She reports no constipation or diarrhea. EGD and colonoscopy last 03/01/2018.  Genitourinary:   She reports no urinary burning, frequency, dribbling, or discoloration. She reports no difficulty controlling her bladder. She has no need to urinate frequently through the night.   Musculoskeletal:   She reports lower back pains with right leg radiculitis.  Has had an L-spine MRI today and has been referred to neurosurgery.  She has no new arthralgias or myalgias with less frequent bouts of nighttime leg pains/cramping. Says she has been prescribed Toradol hs and Aleve with Tylenol as needed.  Extremities:   She reports no trouble with fluid retention or significant leg swelling.  Endocrine:   She reports no problems with excess thirst, excessive urination, vasomotor instability.  Heme/Lymphatic:   She reports no unexplained bleeding, bruising, petechial rashes, or swollen glands.  Skin:   She reports no itching, rashes, or lesions which won't heal.  Neuro:   She reports no loss of consciousness, seizures, fainting spells, or dizziness. She reports no weakness of face, arms, or legs. She has no difficulty with speech. She has no tremors. Has paresthesias of the soles of her feet.  She has residual left-sided facial numbness.   Psych:         She denies depression nor anxiety currently. She reports no mood swings.        VITAL SIGNS: /74   Pulse 80   Temp 96.9 °F (36.1 °C)   Resp 16   Ht 174 cm (68.5\")   Wt 70.8 kg (156 lb 1.6 oz)   SpO2 97%   Breastfeeding No   BMI 23.39 kg/m² Body surface area is 1.85 meters squared.  Pain Score    05/14/21 1013   PainSc:   6   PainLoc: Back         PHYSICAL EXAMINATION:   General:   She is a pleasant, cooperative " otherwise well-developed, well-nourished, and modestly-kept elderly female who is comfortable at rest. She arrived in the exam room ambulatory with antalgic gate. She appears to be her stated age. Her skin color is normal (previously slightly pale).  Head/Neck:   The patient is anicteric and atraumatic. She is wearing a surgical mask.  The trachea is midline. The neck is supple without evidence of jugular venous distention or cervical adenopathy. The left mastoid is not swollen and much less tender.  Eyes:   The pupils are equal, round, and reactive to light. The extraocular movements are full. There is no scleral jaundice or erythema.   Chest:   The respiratory efforts are normal and unhindered. The chest is clear to auscultation and percussion. There are no wheezes, rhonchi, rales, or asymmetry of breath sounds.  Cardiovascular:   The patient has a regular cardiac rate and rhythm without murmurs, rubs, or gallops. The peripheral pulses are equal and full.  Abdomen:   The belly is soft and slightly globose. There is no rebound or guarding. There is no organomegaly, mass-effect, or tenderness. Bowel sounds are active and of normal character.  Extremities:   There is no evidence of cyanosis, clubbing, or edema.  Rheumatologic:   There is no overt evidence of rheumatoid deformities of the hands. There is no sausaging of the fingers. There is no sign of active synovitis. The gait is slow and antalgic, favoring the right leg/hip  Cutaneous:   There are no overt rashes, disseminated lesions, purpura, or petechiae.   Lymphatics:   There is no evidence of adenopathy in the cervical, supraclavicular, axillary areas.   Neurologic:   The patient is alert, oriented, cooperative, and pleasant. She is appropriately conversant. She ambulated into the exam room without assistance and transferred from chair to exam table unaided. There is no overt dysfunction of the motor, sensory, cerebellar systems.  Psych:   Mood and affect are  appropriate for circumstance. Eye contact is appropriate. Normal judgement and decision making.         LABS    Lab Results - Last 18 Months   Lab Units 05/11/21  0700 05/07/21  0730 01/06/21  0750 12/28/20  0844 11/06/20  0737 08/25/20  0848 05/08/20  0746 03/13/20  0730 03/13/20  0730   HEMOGLOBIN g/dL 14.0 13.2 14.5 14.8 13.8 14.4 13.6   < > 15.0   HEMATOCRIT % 41.9 38.9 43.3 41.0 39.9 42.8 39.7   < > 43.8   MCV fL 92.9 90.7 93.3 88.9 90.9 93.2 90.6   < > 91.3   WBC K/uL 6.4 5.09 7.0 6.69 6.15 5.7 6.41   < > 6.57   RDW % 12.6 12.7 12.9 12.6 12.4 12.9 12.3   < > 12.5   MPV fL 11.4 11.2 11.9 11.5 11.2 12.0 11.7   < > 11.3   PLATELETS K/uL 215 196 221 218 215 202 206   < > 230   IMM GRAN % %  --  0.4  --  0.4 0.2  --  0.2  --  0.2   NEUTROS ABS 10*3/mm3  --  2.73  --  3.95 3.46  --  3.70  --  3.85   LYMPHS ABS 10*3/mm3  --  1.56  --  1.96 1.94  --  1.77  --  1.96   MONOS ABS 10*3/mm3  --  0.45  --  0.47 0.47  --  0.48  --  0.49   EOS ABS 10*3/mm3  --  0.27  --  0.23 0.21  --  0.39  --  0.22   BASOS ABS 10*3/mm3  --  0.06  --  0.05 0.06  --  0.06  --  0.04   IMMATURE GRANS (ABS) 10*3/mm3  --  0.02  --  0.03 0.01  --  0.01  --  0.01   NRBC /100 WBC  --  0.0  --  0.0 0.0  --  0.0  --  0.0    < > = values in this interval not displayed.       Lab Results - Last 18 Months   Lab Units 05/11/21  0700 05/07/21  0730 01/06/21  0750 12/28/20  0921 11/06/20  0737 08/25/20  0848 05/08/20  0746 03/13/20  0730 03/13/20  0730   GLUCOSE mg/dL 122* 145* 167* 151* 150* 129* 135*   < > 210*   SODIUM mmol/L 142 137 139 140 138 139 137   < > 141   POTASSIUM mmol/L 4.0 4.1 4.3 3.6 3.8 4.0 3.9   < > 3.7   TOTAL CO2 mmol/L 26  --  29  --   --  27  --    < >  --    CO2 mmol/L  --  25.0  --  26.0 27.0  --  26.0  --  25.0   CHLORIDE mmol/L 103 101 99 103 100 99 98   < > 101   ANION GAP mmol/L 13 11.0 11 11.0 11.0 13 13.0   < > 15.0   CREATININE mg/dL 0.6 0.67 0.6 0.46* 0.58 0.5 0.53*   < > 0.66   BUN mg/dL 22 20 22 20 22 20 21   < > 25*   BUN  / CREAT RATIO   --  29.9*  --  43.5* 37.9*  --  39.6*  --  37.9*   CALCIUM mg/dL 10.1 9.7 9.9 9.6 9.8 10.1 9.5   < > 10.0   EGFR IF NONAFRICN AM  >60 87 >60 134 103 >60 114   < > 89   ALK PHOS U/L 68 65 75 69 70 74 65   < > 88   TOTAL PROTEIN g/dL 7.2 6.8 7.3 6.9 7.0 7.2 7.1   < > 7.8   ALT (SGPT) U/L 26 28 26 24 25 24 31   < > 41*   AST (SGOT) U/L 18 21 17 17 19 18 23   < > 24   BILIRUBIN mg/dL 0.6 0.5 0.4 0.4 0.5 0.6 0.4   < > 0.6   ALBUMIN g/dL 4.7 4.40 4.8 4.50 4.60 4.9 4.70   < > 4.90   GLOBULIN gm/dL  --  2.4  --  2.4 2.4  --  2.4  --  2.9    < > = values in this interval not displayed.       No results for input(s): MSPIKE, KAPPALAMB, IGLFLC, URICACID, FREEKAPPAL, CEA, LDH, REFLABREPO in the last 55611 hours.    Lab Results - Last 18 Months   Lab Units 05/11/21  0700 05/07/21  0730 01/06/21  0750 11/06/20  0737 08/25/20  0848 05/08/20  0746 03/13/20  0730   IRON mcg/dL  --  77  --  71  --  69 74   TIBC mcg/dL  --  349  --  340  --  311 337   IRON SATURATION %  --  22  --  21  --  22 22   FERRITIN ng/mL  --  223.30*  --  296.60*  --  349.70* 446.80*   TSH uIU/mL 1.040  --  1.110  --  1.470  --   --        ASSESSMENT:   1. Normocytic/borderline microcytic anemia with profound iron deficiency.   --Hemoglobin 13.2; MCV 90.7, 05/07/2021 (prior range: Hemoglobin 11.2 - 14.4; MCV 81.5 - 92.1). Resolved since last receipt of Injectafer.   2. Fatigue related to #1. Subjectively improved.   3. Profound iron deficiency with a ferritin of 2.7 on 03/03/2018.   a. Last EGD and colonoscopy 03/01/2018 (above). Both unrevealing.   b. M2A Capsule - Date capsule was swallowed: 04/20/2018. Date capsule was read : 04/28/2018. RESULTS: Gastric passage time: 1 hour 55 minutes. Small bowel passage time: 2 hours 39 minutes. The capsule was clearly seen in the colon. Conclusion: 1 small AVM was noted at 1 hour 59 minutes and 38 seconds. Several small areas of the lymphangitic ectasia noted. The capsule was seen freely passing into the  cecum. There was no activity noted on this exam.   c. Abnormal CT abdomen/enterography, 05/15/2018 at [facility]: Impression: 3.9 cm focus of mass-like enhancement involving the vagina, proximal urethra, and bladder dome. Unsure if this reflects neoplasm or post surgical change; however, neoplastic process arising from the vagina certainly not excluded by CT. Recommend direct visualization. No suspicious focal lesions identified in the bowel. No suspicious adenopathy in the abdomen or pelvis. Hepatic steatosis.         d. Seen by Urology (Dr. Mae Esquivel) at [place], 08/04/2019. Stable exam, MRI and cystoscopic findings highly suggestive of the nodule representing the Macroplastique implant.  Discussed the risks of excision including recurrent stress incontinence and fistula.  Continue observation.  Continue transvaginal estrogen.  RTC 6 months..     4. Insulin-requiring type 2 diabetes. Dr. Pelayo.  5. Hyperlipidemia. Remains on Crestor  6. Trigeminal neuralgia of the left side of the face, lingering symptoms since trigeminal decompression procedure. 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   7. Gastroesophageal reflux disease with esophagitis. On Pepcid.  8. Vitamin D deficiency. On weekly ergocalciferol.  9. Lumbar disk disease. Worsening pains  --05/14/2021-Lumbar MRI with abnormalities at multiple levels  10. Elevated LFTs, NOS. Crestor held, hep profile and liver US ordered on 03/22. Liver US, 03/27/2019: 1. Hepatic steatosis. 2. Limited visualization of the pancreas. Hepatitis profile, 06/28/2019 negative.  Normal since 11/08/2019            RECOMMENDATIONS:   1.  Re: Apprised of the labs from 05/07/2021 (above) noting the normal MCV and normal Hgb, hyperglycemia, normal calcium, normal AST/ALT with otherwise normal CMP, repleted ferritin (> 100) normal Fe sat (greater than 20%), normal serum iron  (from previously severe iron deficiency).   2.  Apprised of MRI findings.  Has appointment with neurosurgery  3.  Previously reviewed report of capsule endoscopy on 04/20/2018 (above). 1 AVM and lymphangitic ectasia noted.   4.  Previously reviewed reports of EGD and colonoscopy, 03/01/2018 (above).   5. The rationale and potential toxicities of IV iron (anaphylaxis included) previously discussed at length. She will agree to therapy as indicated.   6.  Continue currently identified medications.   7.  Continue management per primary care and other specialists.   8.  Return to the [place] office in 24 weeks with pre-office CMP, CBC, serum iron, iron saturation, ferritin.    MEDICAL DECISION MAKING: Moderate complexity   AMOUNT OF DATA: Limited    I spent 30 total minutes, face-to-face, caring for Carlotta kohli.  Greater than 50% of this time involved counseling and/or coordination of care as documented within this note regarding the patient's illness(es), pros and cons of various treatment options, instructions and/or risk reduction.    cc: MD Garrett Borrego MD

## 2021-05-11 DIAGNOSIS — E11.9 TYPE 2 DIABETES MELLITUS WITHOUT COMPLICATION, WITHOUT LONG-TERM CURRENT USE OF INSULIN (HCC): Chronic | ICD-10-CM

## 2021-05-11 DIAGNOSIS — D50.8 OTHER IRON DEFICIENCY ANEMIA: ICD-10-CM

## 2021-05-11 DIAGNOSIS — E55.9 VITAMIN D DEFICIENCY: Chronic | ICD-10-CM

## 2021-05-11 LAB
ALBUMIN SERPL-MCNC: 4.7 G/DL (ref 3.5–5.2)
ALP BLD-CCNC: 68 U/L (ref 35–104)
ALT SERPL-CCNC: 26 U/L (ref 5–33)
ANION GAP SERPL CALCULATED.3IONS-SCNC: 13 MMOL/L (ref 7–19)
AST SERPL-CCNC: 18 U/L (ref 5–32)
BILIRUB SERPL-MCNC: 0.6 MG/DL (ref 0.2–1.2)
BUN BLDV-MCNC: 22 MG/DL (ref 8–23)
CALCIUM SERPL-MCNC: 10.1 MG/DL (ref 8.8–10.2)
CHLORIDE BLD-SCNC: 103 MMOL/L (ref 98–111)
CHOLESTEROL, TOTAL: 115 MG/DL (ref 160–199)
CO2: 26 MMOL/L (ref 22–29)
CREAT SERPL-MCNC: 0.6 MG/DL (ref 0.5–0.9)
CREATININE URINE: 279.3 MG/DL (ref 4.2–622)
GFR AFRICAN AMERICAN: >59
GFR NON-AFRICAN AMERICAN: >60
GLUCOSE BLD-MCNC: 122 MG/DL (ref 74–109)
HBA1C MFR BLD: 6.4 % (ref 4–6)
HCT VFR BLD CALC: 41.9 % (ref 37–47)
HDLC SERPL-MCNC: 37 MG/DL (ref 65–121)
HEMOGLOBIN: 14 G/DL (ref 12–16)
LDL CHOLESTEROL CALCULATED: 61 MG/DL
MCH RBC QN AUTO: 31 PG (ref 27–31)
MCHC RBC AUTO-ENTMCNC: 33.4 G/DL (ref 33–37)
MCV RBC AUTO: 92.9 FL (ref 81–99)
MICROALBUMIN UR-MCNC: 2.6 MG/DL (ref 0–19)
MICROALBUMIN/CREAT UR-RTO: 9.3 MG/G
PDW BLD-RTO: 12.6 % (ref 11.5–14.5)
PLATELET # BLD: 215 K/UL (ref 130–400)
PMV BLD AUTO: 11.4 FL (ref 9.4–12.3)
POTASSIUM SERPL-SCNC: 4 MMOL/L (ref 3.5–5)
RBC # BLD: 4.51 M/UL (ref 4.2–5.4)
SODIUM BLD-SCNC: 142 MMOL/L (ref 136–145)
TOTAL PROTEIN: 7.2 G/DL (ref 6.6–8.7)
TRIGL SERPL-MCNC: 85 MG/DL (ref 0–149)
TSH SERPL DL<=0.05 MIU/L-ACNC: 1.04 UIU/ML (ref 0.27–4.2)
VITAMIN D 25-HYDROXY: 52.6 NG/ML
WBC # BLD: 6.4 K/UL (ref 4.8–10.8)

## 2021-05-13 ENCOUNTER — TELEPHONE (OUTPATIENT)
Dept: INTERNAL MEDICINE | Age: 71
End: 2021-05-13

## 2021-05-14 ENCOUNTER — HOSPITAL ENCOUNTER (OUTPATIENT)
Dept: MRI IMAGING | Facility: HOSPITAL | Age: 71
Discharge: HOME | End: 2021-05-14
Admitting: INTERNAL MEDICINE

## 2021-05-14 ENCOUNTER — OFFICE VISIT (OUTPATIENT)
Dept: ONCOLOGY | Facility: CLINIC | Age: 71
End: 2021-05-14

## 2021-05-14 VITALS
RESPIRATION RATE: 16 BRPM | SYSTOLIC BLOOD PRESSURE: 122 MMHG | HEART RATE: 80 BPM | TEMPERATURE: 96.9 F | HEIGHT: 69 IN | OXYGEN SATURATION: 97 % | DIASTOLIC BLOOD PRESSURE: 74 MMHG | WEIGHT: 156.1 LBS | BODY MASS INDEX: 23.12 KG/M2

## 2021-05-14 DIAGNOSIS — M54.50 ACUTE BILATERAL LOW BACK PAIN WITHOUT SCIATICA: ICD-10-CM

## 2021-05-14 DIAGNOSIS — M54.31 SCIATICA OF RIGHT SIDE: ICD-10-CM

## 2021-05-14 DIAGNOSIS — D50.9 IRON DEFICIENCY ANEMIA, UNSPECIFIED IRON DEFICIENCY ANEMIA TYPE: Primary | ICD-10-CM

## 2021-05-14 PROBLEM — Z96.1 PRESENCE OF INTRAOCULAR LENS: Status: ACTIVE | Noted: 2021-03-31

## 2021-05-14 PROBLEM — M47.22 OSTEOARTHRITIS OF SPINE WITH RADICULOPATHY, CERVICAL REGION: Status: ACTIVE | Noted: 2021-03-21

## 2021-05-14 PROCEDURE — 72148 MRI LUMBAR SPINE W/O DYE: CPT

## 2021-05-14 PROCEDURE — 99214 OFFICE O/P EST MOD 30 MIN: CPT | Performed by: INTERNAL MEDICINE

## 2021-05-14 RX ORDER — KETOROLAC TROMETHAMINE 10 MG/1
TABLET, FILM COATED ORAL
COMMUNITY
Start: 2021-05-04 | End: 2021-11-05

## 2021-05-15 DIAGNOSIS — E11.9 TYPE 2 DIABETES MELLITUS WITHOUT COMPLICATION, WITHOUT LONG-TERM CURRENT USE OF INSULIN (HCC): Chronic | ICD-10-CM

## 2021-05-17 ENCOUNTER — TELEPHONE (OUTPATIENT)
Dept: INTERNAL MEDICINE | Age: 71
End: 2021-05-17

## 2021-05-17 DIAGNOSIS — M51.16 LUMBAR DISC DISEASE WITH RADICULOPATHY: Primary | ICD-10-CM

## 2021-05-17 NOTE — TELEPHONE ENCOUNTER
We had sent referral the other day - -please sure gets done and thanks for putting in PT order-- she might want to call [place] office to be sure yes

## 2021-05-24 ENCOUNTER — HOSPITAL ENCOUNTER (OUTPATIENT)
Dept: GENERAL RADIOLOGY | Facility: HOSPITAL | Age: 71
Discharge: HOME | End: 2021-05-24
Admitting: NURSE PRACTITIONER

## 2021-05-24 ENCOUNTER — OFFICE VISIT (OUTPATIENT)
Dept: NEUROSURGERY | Facility: CLINIC | Age: 71
End: 2021-05-24

## 2021-05-24 VITALS
DIASTOLIC BLOOD PRESSURE: 70 MMHG | BODY MASS INDEX: 24.44 KG/M2 | WEIGHT: 165 LBS | SYSTOLIC BLOOD PRESSURE: 128 MMHG | HEIGHT: 69 IN

## 2021-05-24 DIAGNOSIS — M48.061 SPINAL STENOSIS OF LUMBAR REGION WITHOUT NEUROGENIC CLAUDICATION: ICD-10-CM

## 2021-05-24 DIAGNOSIS — M54.16 LUMBAR RADICULOPATHY: ICD-10-CM

## 2021-05-24 DIAGNOSIS — M51.37 DEGENERATION OF LUMBAR OR LUMBOSACRAL INTERVERTEBRAL DISC: ICD-10-CM

## 2021-05-24 DIAGNOSIS — Z78.9 NON-SMOKER: ICD-10-CM

## 2021-05-24 DIAGNOSIS — M51.26 LUMBAR DISC HERNIATION: ICD-10-CM

## 2021-05-24 DIAGNOSIS — M51.26 LUMBAR DISC HERNIATION: Primary | ICD-10-CM

## 2021-05-24 PROBLEM — M51.379 DEGENERATION OF LUMBAR OR LUMBOSACRAL INTERVERTEBRAL DISC: Status: ACTIVE | Noted: 2021-05-24

## 2021-05-24 PROCEDURE — 99204 OFFICE O/P NEW MOD 45 MIN: CPT | Performed by: NURSE PRACTITIONER

## 2021-05-24 PROCEDURE — 72114 X-RAY EXAM L-S SPINE BENDING: CPT

## 2021-05-24 RX ORDER — TRAMADOL HYDROCHLORIDE 50 MG/1
50 TABLET ORAL EVERY 8 HOURS PRN
Qty: 21 TABLET | Refills: 0 | Status: SHIPPED | OUTPATIENT
Start: 2021-05-24 | End: 2022-07-08 | Stop reason: ALTCHOICE

## 2021-05-24 NOTE — PATIENT INSTRUCTIONS
Advance Care Planning and Advance Directives     You make decisions on a daily basis - decisions about where you want to live, your career, your home, your life. Perhaps one of the most important decisions you face is your choice for future medical care. Take time to talk with your family and your healthcare team and start planning today.  Advance Care Planning is a process that can help you:  · Understand possible future healthcare decisions in light of your own experiences  · Reflect on those decision in light of your goals and values  · Discuss your decisions with those closest to you and the healthcare professionals that care for you  · Make a plan by creating a document that reflects your wishes    Surrogate Decision Maker  In the event of a medical emergency, which has left you unable to communicate or to make your own decisions, you would need someone to make decisions for you.  It is important to discuss your preferences for medical treatment with this person while you are in good health.     Qualities of a surrogate decision maker:  • Willing to take on this role and responsibility  • Knows what you want for future medical care  • Willing to follow your wishes even if they don't agree with them  • Able to make difficult medical decisions under stressful circumstances    Advance Directives  These are legal documents you can create that will guide your healthcare team and decision maker(s) when needed. These documents can be stored in the electronic medical record.    · Living Will - a legal document to guide your care if you have a terminal condition or a serious illness and are unable to communicate. States vary by statute in document names/types, but most forms may include one or more of the following:        -  Directions regarding life-prolonging treatments        -  Directions regarding artificially provided nutrition/hydration        -  Choosing a healthcare decision maker        -  Direction  regarding organ/tissue donation    · Durable Power of  for Healthcare - this document names an -in-fact to make medical decisions for you, but it may also allow this person to make personal and financial decisions for you. Please seek the advice of an  if you need this type of document.    **Advance Directives are not required and no one may discriminate against you if you do not sign one.    Medical Orders  Many states allow specific forms/orders signed by your physician to record your wishes for medical treatment in your current state of health. This form, signed in personal communication with your physician, addresses resuscitation and other medical interventions that you may or may not want.      For more information or to schedule a time with a [facility] Advance Care Planning Facilitator contact: [email or web address] or call [phone] and someone will contact you directly.

## 2021-05-25 ENCOUNTER — TELEPHONE (OUTPATIENT)
Dept: NEUROSURGERY | Facility: CLINIC | Age: 71
End: 2021-05-25

## 2021-05-25 NOTE — TELEPHONE ENCOUNTER
Called and discussed plan with the patient after reviewing imaging with Dr. Perez.  She is can continue with therapy for the time being and I will see her back in the office on [place] 15 for evaluation with Dr. Perez at [facility]

## 2021-06-15 ENCOUNTER — PREP FOR SURGERY (OUTPATIENT)
Dept: OTHER | Facility: HOSPITAL | Age: 71
End: 2021-06-15

## 2021-06-15 ENCOUNTER — OFFICE VISIT (OUTPATIENT)
Dept: INTERNAL MEDICINE | Age: 71
End: 2021-06-15
Payer: MEDICARE

## 2021-06-15 ENCOUNTER — OFFICE VISIT (OUTPATIENT)
Dept: NEUROSURGERY | Facility: CLINIC | Age: 71
End: 2021-06-15

## 2021-06-15 VITALS
SYSTOLIC BLOOD PRESSURE: 130 MMHG | BODY MASS INDEX: 24.53 KG/M2 | WEIGHT: 165.6 LBS | DIASTOLIC BLOOD PRESSURE: 82 MMHG | HEIGHT: 69 IN

## 2021-06-15 VITALS
BODY MASS INDEX: 24.29 KG/M2 | HEIGHT: 69 IN | HEART RATE: 90 BPM | OXYGEN SATURATION: 98 % | WEIGHT: 164 LBS | DIASTOLIC BLOOD PRESSURE: 70 MMHG | SYSTOLIC BLOOD PRESSURE: 112 MMHG

## 2021-06-15 DIAGNOSIS — M54.16 LUMBAR RADICULOPATHY: ICD-10-CM

## 2021-06-15 DIAGNOSIS — Z78.9 NON-SMOKER: ICD-10-CM

## 2021-06-15 DIAGNOSIS — M51.26 LUMBAR DISC HERNIATION: Primary | ICD-10-CM

## 2021-06-15 DIAGNOSIS — E55.9 VITAMIN D DEFICIENCY: ICD-10-CM

## 2021-06-15 DIAGNOSIS — E11.9 TYPE 2 DIABETES MELLITUS WITHOUT COMPLICATION, WITHOUT LONG-TERM CURRENT USE OF INSULIN (HCC): ICD-10-CM

## 2021-06-15 DIAGNOSIS — M51.16 LUMBAR DISC DISEASE WITH RADICULOPATHY: Primary | Chronic | ICD-10-CM

## 2021-06-15 PROCEDURE — 1123F ACP DISCUSS/DSCN MKR DOCD: CPT | Performed by: INTERNAL MEDICINE

## 2021-06-15 PROCEDURE — 3017F COLORECTAL CA SCREEN DOC REV: CPT | Performed by: INTERNAL MEDICINE

## 2021-06-15 PROCEDURE — 99213 OFFICE O/P EST LOW 20 MIN: CPT | Performed by: INTERNAL MEDICINE

## 2021-06-15 PROCEDURE — G8400 PT W/DXA NO RESULTS DOC: HCPCS | Performed by: INTERNAL MEDICINE

## 2021-06-15 PROCEDURE — 2022F DILAT RTA XM EVC RTNOPTHY: CPT | Performed by: INTERNAL MEDICINE

## 2021-06-15 PROCEDURE — 4004F PT TOBACCO SCREEN RCVD TLK: CPT | Performed by: INTERNAL MEDICINE

## 2021-06-15 PROCEDURE — 99213 OFFICE O/P EST LOW 20 MIN: CPT | Performed by: NURSE PRACTITIONER

## 2021-06-15 PROCEDURE — 4040F PNEUMOC VAC/ADMIN/RCVD: CPT | Performed by: INTERNAL MEDICINE

## 2021-06-15 PROCEDURE — G8427 DOCREV CUR MEDS BY ELIG CLIN: HCPCS | Performed by: INTERNAL MEDICINE

## 2021-06-15 PROCEDURE — 1090F PRES/ABSN URINE INCON ASSESS: CPT | Performed by: INTERNAL MEDICINE

## 2021-06-15 PROCEDURE — 3044F HG A1C LEVEL LT 7.0%: CPT | Performed by: INTERNAL MEDICINE

## 2021-06-15 PROCEDURE — G8420 CALC BMI NORM PARAMETERS: HCPCS | Performed by: INTERNAL MEDICINE

## 2021-06-15 NOTE — PROGRESS NOTES
Chief complaint:   Chief Complaint   Patient presents with   • Back Pain     Carlotta is returning for follow for her back pain for a lumbar disc herniation, she had done the therapy and it has helped a little but she is still dealing with pain.        Subjective     HPI:  This is a 70-year-old female patient who was referred to us by Dr. Misty Pelayo for back pain and right lower extremity pain.  She is here to be evaluated today.  The patient says that she has been dealing with this pain since the end of February 2021.  There is no accident or injury associated with this.  She said initially the pain started when they was driving out to [place] and she did well on the drive out there but then on the way back home she started having some pain initially on the left leg but this did resolve and started coming over into her right lower extremity and has been present since that time.  She does have intermittent back pain but there is no specific modifying factors for her back pain.  Her biggest complaint is pain that radiates into her right lower extremity that goes all the way to her foot.  This pain is constant.  She will occasionally get some left lower extremity pain.  She says the pain is worse whenever she is sitting or laying down and better when she is standing or walking.    She did try 2 sessions of chiropractic care without any improvement.  She has been taking NSAIDs with minimal improvement.  She is not done any pain management injections.  Rates her pain on a scale 0-10 at an 8.  She says it does interfere with actives of daily living.  She is right-hand dominant.  She is retired.  She is .  Denies any tobacco, alcohol, or illicit drug use.  The patient was getting continue working with physical therapy and she is here in follow-up today.  We also did send her for x-rays of the lumbar spine.  She is here in follow-up today.  She does state that she has made progress and that the pain in her leg  is no longer constant.  She says overall she feels like she may be about 70% improved compared to the last time that she was here.  She is done 7 sessions of physical therapy.  She does have more pain on the right leg than on the left.  The pain is intermittent and she states with try to do any kind of lifting bending or twisting that she does have returned and the pain in her right lower extremity.  She is not taking any medication on a consistent basis and does take NSAIDs occasionally    Review of Systems   Constitutional: Positive for activity change and fatigue.   HENT: Positive for ear pain, facial swelling, hearing loss and sneezing.    Gastrointestinal: Positive for constipation.   Musculoskeletal: Positive for arthralgias, back pain, gait problem and myalgias.   Neurological: Positive for weakness and numbness.   Psychiatric/Behavioral: Positive for sleep disturbance.   All other systems reviewed and are negative.       Past Medical History:   Diagnosis Date   • Acid reflux    • Arthritis    • Asthma    • Diabetes mellitus (CMS/HCC)     Type 2   • Fistula of mastoid, left    • GERD (gastroesophageal reflux disease)    • History of transfusion    • Hx of colonic polyps    • Hyperlipidemia    • Hypertension    • Iron deficiency anemia    • Mastoid pain, left    • Numbness     left side of face   • Other osteomyelitis, other site (CMS/HCC)    • PONV (postoperative nausea and vomiting)    • Sensorineural hearing loss    • Sleep apnea     does not use machine   • Trigeminal neuralgia    • Vitamin D deficiency      Past Surgical History:   Procedure Laterality Date   • APPENDECTOMY     • BLADDER REPAIR      had vaginal repair at the same time   • BREAST BIOPSY Bilateral     benign   • CAPSULE ENDOSCOPY N/A 4/20/2018    Procedure: CAPSULE ENDOSCOPY M2A;  Surgeon: Garrett Ozuna MD;  Location: [facility] ENDOSCOPY;  Service: Gastroenterology   • COLONOSCOPY N/A 3/1/2018    Procedure: COLONOSCOPY WITH ANESTHESIA;   Surgeon: Garrett Ozuna MD;  Location: [facility] ENDOSCOPY;  Service:    • COLONOSCOPY N/A 4/16/2021    Procedure: COLONOSCOPY WITH ANESTHESIA;  Surgeon: Garrett Ozuna MD;  Location: [facility] ENDOSCOPY;  Service: Gastroenterology;  Laterality: N/A;  pre: hx colon polyps  post: polyp  Misty Pelayo MD   • COLONOSCOPY W/ POLYPECTOMY  12/31/2014    Tubular adenomatous polyp at 80 cm, Hyperplastic polyp rectum repeat exam in 3 years   • ENDOSCOPY N/A 3/1/2018    Procedure: ESOPHAGOGASTRODUODENOSCOPY WITH ANESTHESIA;  Surgeon: Garrett Ozuna MD;  Location: [facility] ENDOSCOPY;  Service:    • FLAP HEAD/NECK Left 5/17/2018    Procedure: POSSIBLE STERNOCLEIDOMASTOID FLAP;  Surgeon: Kadeem Oconnor MD;  Location: [facility] OR;  Service: ENT   • HYSTERECTOMY     • MASTOIDECTOMY Left 5/17/2018    Procedure: Wound exploration with possible mastoidectomy; possible sternocleidomastoid flap.  Please schedule with Dr. Perez.;  Surgeon: Kadeem Oconnor MD;  Location: [facility] OR;  Service: ENT   • TRIGEMINAL NERVE DECOMPRESSION       Family History   Problem Relation Age of Onset   • Breast cancer Sister    • Breast cancer Mother    • Heart disease Father    • No Known Problems Brother    • No Known Problems Daughter    • No Known Problems Son    • No Known Problems Maternal Grandmother    • No Known Problems Paternal Grandmother    • No Known Problems Maternal Aunt    • No Known Problems Paternal Aunt    • Breast cancer Niece    • Colon cancer Neg Hx    • Colon polyps Neg Hx    • BRCA 1/2 Neg Hx    • Endometrial cancer Neg Hx    • Ovarian cancer Neg Hx      Social History     Tobacco Use   • Smoking status: Never Smoker   • Smokeless tobacco: Never Used   Vaping Use   • Vaping Use: Never used   Substance Use Topics   • Alcohol use: Yes     Comment: Rare   • Drug use: No     (Not in a hospital admission)    Allergies:  Zovirax [acyclovir], Meperidine, Propranolol, Sertraline hcl, Vesicare [solifenacin succinate], and  "Solifenacin    Objective      Vital Signs  /82   Ht 175.3 cm (69\")   Wt 75.1 kg (165 lb 9.6 oz)   BMI 24.45 kg/m²     Physical Exam  Constitutional:       Appearance: Normal appearance. She is well-developed.   HENT:      Head: Normocephalic.   Eyes:      General: Lids are normal.      Conjunctiva/sclera: Conjunctivae normal.      Pupils: Pupils are equal, round, and reactive to light.   Cardiovascular:      Rate and Rhythm: Normal rate and regular rhythm.   Pulmonary:      Effort: Pulmonary effort is normal.      Breath sounds: Normal breath sounds.   Musculoskeletal:         General: Normal range of motion.      Cervical back: Normal range of motion.   Skin:     General: Skin is warm.   Neurological:      Mental Status: She is alert and oriented to person, place, and time.      GCS: GCS eye subscore is 4. GCS verbal subscore is 5. GCS motor subscore is 6.      Cranial Nerves: No cranial nerve deficit.      Sensory: No sensory deficit.      Motor: Weakness present.      Gait: Gait abnormal.      Deep Tendon Reflexes: Reflexes are normal and symmetric. Reflexes normal.      Comments: Limp favoring the right lower extremity    Right EHL 3 out of 5  All other muscle groups of bilateral   Psychiatric:         Speech: Speech normal.         Behavior: Behavior normal.         Thought Content: Thought content normal.         Neurologic Exam     Mental Status   Oriented to person, place, and time.   Speech: speech is normal     Cranial Nerves     CN III, IV, VI   Pupils are equal, round, and reactive to light.      Results Review: X-rays of the lumbar spine that were done here at [facility] on May 24, 2021 shows a slight grade 1 spondylolisthesis at L3-4 but no significant changes in flexion or extension.  Disc degeneration noted at L4-5 and L5-S1.    MRI of the lumbar that was done here at [facility] on May 14, 2021 shows At L4-5 lumbar stenosis present with bilateral foraminal narrowing " with the right being worse than the left as well as a disc herniation on the right.  There is mild amount of degenerative scoliosis as well.  No fracture visualized.        Assessment/Plan: Dr. Perez did review the imaging and did come in and discussed this with the patient.  Due to the fact the patient is having improvements we want to give her more time with the therapy to see if she can continue making improvements.  If she does not like inference by the time we see her back in 4 weeks we will consider doing a microdiscectomy to help control her leg pain.  She is told to call us if any further problems or concerns.  I will reorder her physical therapy for her  I advised the patient to call and return sooner for new or worsening complaints of weakness, paresthesias, gait disturbances, or any additional concerns.  Treatment options discussed in detail with Carlotta and the patient voiced understanding.  Ms. Dupont agrees with this plan of care.     Patient is a nonsmoker  The patient's Body mass index is 24.45 kg/m².. BMI is within normal parameters. No follow-up required.  Advance Care Planning   ACP discussion was held with the patient during this visit. Patient does not have an advance directive, information provided.      Diagnoses and all orders for this visit:    1. Lumbar disc herniation (Primary)  -     Ambulatory Referral to Physical Therapy Evaluate and treat, Neuro; Strengthening, Stretching; Full weight bearing    2. Lumbar radiculopathy  -     Ambulatory Referral to Physical Therapy Evaluate and treat, Neuro; Strengthening, Stretching; Full weight bearing    3. Body mass index (BMI) of 24.0 to 24.9 in adult    4. Non-smoker          I discussed the patients findings and my recommendations with patient    Ward Moffett, CHRISTIAN  06/15/21  13:06 CDT

## 2021-06-15 NOTE — PROGRESS NOTES
Chief Complaint   Patient presents with    Follow-up     6 weeks    Back Pain       HPI: Patient is here today to follow-up recent issues with low back pain with radiculopathy we reviewed note from neurosurgery. Her pain is better but persistent. Patient has had a lot of stressors recently. Back pain also flared up with some recent long car travel. Otherwise she seems better no chest pain or dyspnea.     Past Medical History:   Diagnosis Date    Calculus of gallbladder 8/20/2017    Chronic asthma without complication 0/84/3120    Essential hypertension 8/21/2017    Fatty liver 8/20/2017    Gastroesophageal reflux disease without esophagitis 8/20/2017    Lumbar disc disease     L5-S1    Migraine without aura, not intractable 8/20/2017    Mixed hyperlipidemia 8/20/2017    Obstructive sleep apnea 8/20/2017    Sacroiliitis (HCC)     Trigeminal neuralgia of left side of face 8/20/2017    Type 2 diabetes mellitus without complication ([facility] [place] [facility] 2/83/6045    Venous insufficiency     Vitamin D deficiency        Past Surgical History:   Procedure Laterality Date    EYE LID SURGERY Bilateral 2020    eye lids raised    HYSTERECTOMY, TOTAL ABDOMINAL      No BSO       Family History   Problem Relation Age of Onset    High Blood Pressure Mother     High Blood Pressure Father     Prostate Cancer Father     Diabetes Father         Type 2    Breast Cancer Sister 47    Diabetes Sister         Type 2       Social History     Socioeconomic History    Marital status:      Spouse name: Saintclair Capers Number of children: 2    Years of education: 15    Highest education level: Not on file   Occupational History    Occupation: retired    Tobacco Use    Smoking status: Never Smoker    Smokeless tobacco: Never Used   Substance and Sexual Activity    Alcohol use: No    Drug use: No    Sexual activity: Yes     Partners: Male   Other Topics Concern    Not on file   Social History Narrative    Not on file     Social Determinants of Health     Financial Resource Strain: Low Risk     Difficulty of Paying Living Expenses: Not hard at all   Food Insecurity: No Food Insecurity    Worried About Running Out of Food in the Last Year: Never true    Apoorva of Food in the Last Year: Never true   Transportation Needs:     Lack of Transportation (Medical):  Lack of Transportation (Non-Medical):    Physical Activity:     Days of Exercise per Week:     Minutes of Exercise per Session:    Stress:     Feeling of Stress :    Social Connections:     Frequency of Communication with Friends and Family:     Frequency of Social Gatherings with Friends and Family:     Attends Yazidism Services:     Active Member of Clubs or Organizations:     Attends Club or Organization Meetings:     Marital Status:    Intimate Partner Violence:     Fear of Current or Ex-Partner:     Emotionally Abused:     Physically Abused:     Sexually Abused:         Allergies   Allergen Reactions    Inderal [Propranolol] Other (See Comments)     Bad dreams  unknown    Solifenacin Succinate Other (See Comments)     Severe constipation    Demerol Hcl [Meperidine]      halluncinations    Vesicare [Solifenacin]      Couldn't urinate    Zoloft [Sertraline Hcl]      hallucinations    Zovirax [Acyclovir] Hives and Other (See Comments)     PhX       Current Outpatient Medications   Medication Sig Dispense Refill    metFORMIN (GLUCOPHAGE) 500 MG tablet TAKE TWO TABLETS BY MOUTH TWICE A  tablet 3    ketorolac (TORADOL) 10 MG tablet Take 1 tablet by mouth 3 times daily (with meals) 90 tablet 0    albuterol (PROVENTIL) (2.5 MG/3ML) 0.083% nebulizer solution Take 3 mLs by nebulization every 6 hours as needed for Wheezing 60 each 3    budesonide (PULMICORT) 0.5 MG/2ML nebulizer suspension Take 2 mLs by nebulization 2 times daily 60 ampule 3    losartan (COZAAR) 100 MG tablet TAKE 1 TABLET BY MOUTH DAILY 90 tablet 3    hydroCHLOROthiazide (MICROZIDE) 12.5 MG capsule TAKE 1 CAPSULE BY MOUTH DAILY 90 capsule 3    ONETOUCH ULTRA strip TEST DAILY AS NEEDED 100 strip 3    vitamin D (ERGOCALCIFEROL) 1.25 MG (52284 UT) CAPS capsule TAKE 1 CAPSULE BY MOUTH ONCE A WEEK 12 capsule 3    montelukast (SINGULAIR) 10 MG tablet TAKE 1 TABLET BY MOUTH DAILY 90 tablet 3    verapamil (CALAN SR) 240 MG extended release tablet TAKE ONE TABLET BY MOUTH TWICE A  tablet 3    SOFT TOUCH LANCETS MISC USE TWO TIMES DAILY 100 each 3    rosuvastatin (CRESTOR) 5 MG tablet Take 1/2 tablet on Mon, Wed, Fri 36 tablet 3    ALPRAZolam (XANAX) 0.25 MG tablet Take 0.25 mg by mouth nightly as needed for Sleep.  aspirin-acetaminophen-caffeine (EXCEDRIN MIGRAINE) 250-250-65 MG per tablet Take 1 tablet by mouth      esomeprazole (NEXIUM) 20 MG delayed release capsule Take 20 mg by mouth every morning (before breakfast)      conjugated estrogens (PREMARIN) 0.625 MG/GM vaginal cream 1gm twice a week (this replaces Estradiol cream per insurance 4/10/18) 1 Tube 3    aspirin 81 MG tablet Take 81 mg by mouth daily      Multiple Vitamins-Minerals (PRESERVISION/LUTEIN) CAPS Take 1 capsule by mouth 2 times daily      calcium carbonate (TUMS) 500 MG chewable tablet Take 1 tablet by mouth daily       No current facility-administered medications for this visit.        Review of Systems    /70   Pulse 90   Ht 5' 9\" (1.753 m)   Wt 164 lb (74.4 kg)   SpO2 98%   BMI 24.22 kg/m²   BP Readings from Last 7 Encounters:   06/15/21 112/70   05/04/21 124/74   03/08/21 114/70   01/11/21 124/78   12/29/20 120/80   08/31/20 (!) 138/90   05/18/20 120/80     Wt Readings from Last 7 Encounters:   06/15/21 164 lb (74.4 kg)   05/04/21 163 lb (73.9 kg)   03/08/21 163 lb (73.9 kg)   01/11/21 163 lb (73.9 kg)   12/29/20 163 lb (73.9 kg)   08/31/20 163 lb (73.9 kg)   05/18/20 163 lb (73.9 kg)     BMI Readings from Last 7 Encounters:   06/15/21 24.22 kg/m²   05/04/21 24.07 kg/m²   03/08/21 24.07 kg/m²   01/11/21 24.07 kg/m²   12/29/20 24.07 kg/m²   08/31/20 24.07 kg/m²   05/18/20 24.07 kg/m²     Resp Readings from Last 7 Encounters:   01/14/19 18   01/08/18 16   08/21/17 20       Physical Exam  Constitutional:       General: She is not in acute distress. HENT:      Head: Normocephalic. Eyes:      General: No scleral icterus. Cardiovascular:      Heart sounds: Normal heart sounds. Pulmonary:      Breath sounds: Normal breath sounds. Musculoskeletal:      Cervical back: Neck supple. Lymphadenopathy:      Cervical: No cervical adenopathy. Skin:     Findings: No rash.    Psychiatric:      Comments: Mood is okay         Results for orders placed or performed in visit on 05/11/21   Microalbumin / Creatinine Urine Ratio   Result Value Ref Range    Microalbumin, Random Urine 2.60 0.00 - 19.00 mg/dL    Creatinine, Ur 279.3 4.2 - 622.0 mg/dL    Microalbumin Creatinine Ratio 9.3 mg/g   Vitamin D 25 Hydroxy   Result Value Ref Range    Vit D, 25-Hydroxy 52.6 >=30 ng/mL   Lipid Panel   Result Value Ref Range    Cholesterol, Total 115 (L) 160 - 199 mg/dL    Triglycerides 85 0 - 149 mg/dL    HDL 37 (L) 65 - 121 mg/dL    LDL Calculated 61 <100 mg/dL   TSH without Reflex   Result Value Ref Range    TSH 1.040 0.270 - 4.200 uIU/mL   Comprehensive Metabolic Panel   Result Value Ref Range    Sodium 142 136 - 145 mmol/L    Potassium 4.0 3.5 - 5.0 mmol/L    Chloride 103 98 - 111 mmol/L    CO2 26 22 - 29 mmol/L    Anion Gap 13 7 - 19 mmol/L    Glucose 122 (H) 74 - 109 mg/dL    BUN 22 8 - 23 mg/dL    CREATININE 0.6 0.5 - 0.9 mg/dL    GFR Non-African American >60 >60    GFR African American >59 >59    Calcium 10.1 8.8 - 10.2 mg/dL    Total Protein 7.2 6.6 - 8.7 g/dL    Albumin 4.7 3.5 - 5.2 g/dL    Total Bilirubin 0.6 0.2 - 1.2 mg/dL    Alkaline Phosphatase 68 35 - 104 U/L    ALT 26 5 - 33 U/L    AST 18 5 - 32 U/L   CBC   Result Value Ref Range    WBC 6.4 4.8 - 10.8 K/uL    RBC 4.51 4.20 - 5.40 M/uL Hemoglobin 14.0 12.0 - 16.0 g/dL    Hematocrit 41.9 37.0 - 47.0 %    MCV 92.9 81.0 - 99.0 fL    MCH 31.0 27.0 - 31.0 pg    MCHC 33.4 33.0 - 37.0 g/dL    RDW 12.6 11.5 - 14.5 %    Platelets 235 093 - 897 K/uL    MPV 11.4 9.4 - 12.3 fL   Hemoglobin A1C   Result Value Ref Range    Hemoglobin A1C 6.4 (H) 4.0 - 6.0 %       ASSESSMENT/ PLAN:  1. Lumbar disc disease with radiculopathy  I reviewed neurosurgery's note and their plan discussed with the patient she is continuing the current plan of care and will follow up with them possibility of surgery. 2. Type 2 diabetes mellitus without complication, without long-term current use of insulin ([facility] [address]  Diabetes in excellent control continue with current plan of care  - CBC; Future  - Comprehensive Metabolic Panel; Future  - Hemoglobin A1C; Future  - TSH without Reflex; Future  - Lipid Panel; Future    3. Vitamin D deficiency  Vitamin D will be followed  - Vitamin D 25 Hydroxy; Future       Chart, medications, labs, vaccines reviewed. Keep up to date with routine care and follow up. Call with any problems or complaints. Keep up to date with routine screening recomendations and vaccines.

## 2021-06-15 NOTE — PATIENT INSTRUCTIONS
Advance Care Planning and Advance Directives     You make decisions on a daily basis - decisions about where you want to live, your career, your home, your life. Perhaps one of the most important decisions you face is your choice for future medical care. Take time to talk with your family and your healthcare team and start planning today.  Advance Care Planning is a process that can help you:  · Understand possible future healthcare decisions in light of your own experiences  · Reflect on those decision in light of your goals and values  · Discuss your decisions with those closest to you and the healthcare professionals that care for you  · Make a plan by creating a document that reflects your wishes    Surrogate Decision Maker  In the event of a medical emergency, which has left you unable to communicate or to make your own decisions, you would need someone to make decisions for you.  It is important to discuss your preferences for medical treatment with this person while you are in good health.     Qualities of a surrogate decision maker:  • Willing to take on this role and responsibility  • Knows what you want for future medical care  • Willing to follow your wishes even if they don't agree with them  • Able to make difficult medical decisions under stressful circumstances    Advance Directives  These are legal documents you can create that will guide your healthcare team and decision maker(s) when needed. These documents can be stored in the electronic medical record.    · Living Will - a legal document to guide your care if you have a terminal condition or a serious illness and are unable to communicate. States vary by statute in document names/types, but most forms may include one or more of the following:        -  Directions regarding life-prolonging treatments        -  Directions regarding artificially provided nutrition/hydration        -  Choosing a healthcare decision maker        -  Direction  regarding organ/tissue donation    · Durable Power of  for Healthcare - this document names an -in-fact to make medical decisions for you, but it may also allow this person to make personal and financial decisions for you. Please seek the advice of an  if you need this type of document.    **Advance Directives are not required and no one may discriminate against you if you do not sign one.    Medical Orders  Many states allow specific forms/orders signed by your physician to record your wishes for medical treatment in your current state of health. This form, signed in personal communication with your physician, addresses resuscitation and other medical interventions that you may or may not want.      For more information or to schedule a time with a [facility] Advance Care Planning Facilitator contact: [email or web address] or call [phone] and someone will contact you directly.

## 2021-07-06 DIAGNOSIS — E11.9 TYPE 2 DIABETES MELLITUS WITHOUT COMPLICATION, WITHOUT LONG-TERM CURRENT USE OF INSULIN (HCC): Chronic | ICD-10-CM

## 2021-07-06 RX ORDER — ROSUVASTATIN CALCIUM 5 MG/1
TABLET, COATED ORAL
Qty: 18 TABLET | Refills: 3 | Status: SHIPPED | OUTPATIENT
Start: 2021-07-06 | End: 2022-06-01

## 2021-07-06 RX ORDER — BLOOD-GLUCOSE METER
EACH MISCELLANEOUS
Qty: 100 STRIP | Refills: 0 | Status: SHIPPED | OUTPATIENT
Start: 2021-07-06

## 2021-07-13 ENCOUNTER — OFFICE VISIT (OUTPATIENT)
Dept: NEUROSURGERY | Facility: CLINIC | Age: 71
End: 2021-07-13

## 2021-07-13 VITALS
SYSTOLIC BLOOD PRESSURE: 135 MMHG | BODY MASS INDEX: 25.24 KG/M2 | DIASTOLIC BLOOD PRESSURE: 78 MMHG | HEIGHT: 69 IN | WEIGHT: 170.4 LBS

## 2021-07-13 DIAGNOSIS — M51.26 LUMBAR DISC HERNIATION: ICD-10-CM

## 2021-07-13 DIAGNOSIS — M51.16 LUMBAR DISC DISEASE WITH RADICULOPATHY: ICD-10-CM

## 2021-07-13 DIAGNOSIS — Z78.9 NONSMOKER: ICD-10-CM

## 2021-07-13 DIAGNOSIS — E66.3 OVERWEIGHT WITH BODY MASS INDEX (BMI) 25.0-29.9: ICD-10-CM

## 2021-07-13 DIAGNOSIS — M51.37 DEGENERATION OF LUMBAR OR LUMBOSACRAL INTERVERTEBRAL DISC: Primary | ICD-10-CM

## 2021-07-13 DIAGNOSIS — M48.061 SPINAL STENOSIS OF LUMBAR REGION WITHOUT NEUROGENIC CLAUDICATION: ICD-10-CM

## 2021-07-13 PROCEDURE — 99213 OFFICE O/P EST LOW 20 MIN: CPT | Performed by: NURSE PRACTITIONER

## 2021-07-13 NOTE — PATIENT INSTRUCTIONS
"BMI for Adults  What is BMI?  Body mass index (BMI) is a number that is calculated from a person's weight and height. BMI can help estimate how much of a person's weight is composed of fat. BMI does not measure body fat directly. Rather, it is an alternative to procedures that directly measure body fat, which can be difficult and expensive.  BMI can help identify people who may be at higher risk for certain medical problems.  What are BMI measurements used for?  BMI is used as a screening tool to identify possible weight problems. It helps determine whether a person is obese, overweight, a healthy weight, or underweight.  BMI is useful for:  · Identifying a weight problem that may be related to a medical condition or may increase the risk for medical problems.  · Promoting changes, such as changes in diet and exercise, to help reach a healthy weight. BMI screening can be repeated to see if these changes are working.  How is BMI calculated?  BMI involves measuring your weight in relation to your height. Both height and weight are measured, and the BMI is calculated from those numbers. This can be done either in English ([place] or metric measurements. Note that charts and online BMI calculators are available to help you find your BMI quickly and easily without having to do these calculations yourself.  To calculate your BMI in English ([place] measurements:    1. Measure your weight in pounds (lb).  2. Multiply the number of pounds by 703.  ? For example, for a person who weighs 180 lb, multiply that number by 703, which equals 126,540.  3. Measure your height in inches. Then multiply that number by itself to get a measurement called \"inches squared.\"  ? For example, for a person who is 70 inches tall, the \"inches squared\" measurement is 70 inches x 70 inches, which equals 4,900 inches squared.  4. Divide the total from step 2 (number of lb x 703) by the total from step 3 (inches squared): 126,540 ÷ 4,900 = 25.8. This is " "your BMI.  To calculate your BMI in metric measurements:  1. Measure your weight in kilograms (kg).  2. Measure your height in meters (m). Then multiply that number by itself to get a measurement called \"meters squared.\"  ? For example, for a person who is 1.75 m tall, the \"meters squared\" measurement is 1.75 m x 1.75 m, which is equal to 3.1 meters squared.  3. Divide the number of kilograms (your weight) by the meters squared number. In this example: 70 ÷ 3.1 = 22.6. This is your BMI.  What do the results mean?  BMI charts are used to identify whether you are underweight, normal weight, overweight, or obese. The following guidelines will be used:  · Underweight: BMI less than 18.5.  · Normal weight: BMI between 18.5 and 24.9.  · Overweight: BMI between 25 and 29.9.  · Obese: BMI of 30 or above.  Keep these notes in mind:  · Weight includes both fat and muscle, so someone with a muscular build, such as an athlete, may have a BMI that is higher than 24.9. In cases like these, BMI is not an accurate measure of body fat.  · To determine if excess body fat is the cause of a BMI of 25 or higher, further assessments may need to be done by a health care provider.  · BMI is usually interpreted in the same way for men and women.  Where to find more information  For more information about BMI, including tools to quickly calculate your BMI, go to these websites:  · Centers for Disease Control and Prevention: www.cdc.gov  · American Heart Association: www.heart.org  · National Heart, Lung, and Blood [place]: www.nhlbi.nih.gov  Summary  · Body mass index (BMI) is a number that is calculated from a person's weight and height.  · BMI may help estimate how much of a person's weight is composed of fat. BMI can help identify those who may be at higher risk for certain medical problems.  · BMI can be measured using English measurements or metric measurements.  · BMI charts are used to identify whether you are underweight, normal " "weight, overweight, or obese.  This information is not intended to replace advice given to you by your health care provider. Make sure you discuss any questions you have with your health care provider.  Document Revised: 09/09/2020 Document Reviewed: 07/17/2020  [employer] Patient Education © 2021 [employer]      https://www.nhlbi.nih.gov/files/docs/public/heart/dash_brief.pdf\">   DASH Eating Plan  DASH stands for Dietary Approaches to Stop Hypertension. The DASH eating plan is a healthy eating plan that has been shown to:  · Reduce high blood pressure (hypertension).  · Reduce your risk for type 2 diabetes, heart disease, and stroke.  · Help with weight loss.  What are tips for following this plan?  Reading food labels  · Check food labels for the amount of salt (sodium) per serving. Choose foods with less than 5 percent of the Daily Value of sodium. Generally, foods with less than 300 milligrams (mg) of sodium per serving fit into this eating plan.  · To find whole grains, look for the word \"whole\" as the first word in the ingredient list.  Shopping  · Buy products labeled as \"low-sodium\" or \"no salt added.\"  · Buy fresh foods. Avoid canned foods and pre-made or frozen meals.  Cooking  · Avoid adding salt when cooking. Use salt-free seasonings or herbs instead of table salt or sea salt. Check with your health care provider or pharmacist before using salt substitutes.  · Do not riley foods. Cook foods using healthy methods such as baking, boiling, grilling, roasting, and broiling instead.  · Cook with heart-healthy oils, such as olive, canola, avocado, soybean, or sunflower oil.  Meal planning    · Eat a balanced diet that includes:  ? 4 or more servings of fruits and 4 or more servings of vegetables each day. Try to fill one-half of your plate with fruits and vegetables.  ? 6-8 servings of whole grains each day.  ? Less than 6 oz (170 g) of lean meat, poultry, or fish each day. A 3-oz (85-g) serving of meat is " about the same size as a deck of cards. One egg equals 1 oz (28 g).  ? 2-3 servings of low-fat dairy each day. One serving is 1 cup (237 mL).  ? 1 serving of nuts, seeds, or beans 5 times each week.  ? 2-3 servings of heart-healthy fats. Healthy fats called omega-3 fatty acids are found in foods such as walnuts, flaxseeds, fortified milks, and eggs. These fats are also found in cold-water fish, such as sardines, salmon, and mackerel.  · Limit how much you eat of:  ? Canned or prepackaged foods.  ? Food that is high in trans fat, such as some fried foods.  ? Food that is high in saturated fat, such as fatty meat.  ? Desserts and other sweets, sugary drinks, and other foods with added sugar.  ? Full-fat dairy products.  · Do not salt foods before eating.  · Do not eat more than 4 egg yolks a week.  · Try to eat at least 2 vegetarian meals a week.  · Eat more home-cooked food and less restaurant, buffet, and fast food.  Lifestyle  · When eating at a restaurant, ask that your food be prepared with less salt or no salt, if possible.  · If you drink alcohol:  ? Limit how much you use to:  § 0-1 drink a day for women who are not pregnant.  § 0-2 drinks a day for men.  ? Be aware of how much alcohol is in your drink. In the [place] one drink equals one 12 oz bottle of beer (355 mL), one 5 oz glass of wine (148 mL), or one 1½ oz glass of hard liquor (44 mL).  General information  · Avoid eating more than 2,300 mg of salt a day. If you have hypertension, you may need to reduce your sodium intake to 1,500 mg a day.  · Work with your health care provider to maintain a healthy body weight or to lose weight. Ask what an ideal weight is for you.  · Get at least 30 minutes of exercise that causes your heart to beat faster (aerobic exercise) most days of the week. Activities may include walking, swimming, or biking.  · Work with your health care provider or dietitian to adjust your eating plan to your individual calorie needs.  What  foods should I eat?  Fruits  All fresh, dried, or frozen fruit. Canned fruit in natural juice (without added sugar).  Vegetables  Fresh or frozen vegetables (raw, steamed, roasted, or grilled). Low-sodium or reduced-sodium tomato and vegetable juice. Low-sodium or reduced-sodium tomato sauce and tomato paste. Low-sodium or reduced-sodium canned vegetables.  Grains  Whole-grain or whole-wheat bread. Whole-grain or whole-wheat pasta. Brown rice. Oatmeal. Quinoa. Bulgur. Whole-grain and low-sodium cereals. Giselle bread. Low-fat, low-sodium crackers. Whole-wheat flour tortillas.  Meats and other proteins  Skinless chicken or turkey. Ground chicken or turkey. Pork with fat trimmed off. Fish and seafood. Egg whites. Dried beans, peas, or lentils. Unsalted nuts, nut butters, and seeds. Unsalted canned beans. Lean cuts of beef with fat trimmed off. Low-sodium, lean precooked or cured meat, such as sausages or meat loaves.  Dairy  Low-fat (1%) or fat-free (skim) milk. Reduced-fat, low-fat, or fat-free cheeses. Nonfat, low-sodium ricotta or cottage cheese. Low-fat or nonfat yogurt. Low-fat, low-sodium cheese.  Fats and oils  Soft margarine without trans fats. Vegetable oil. Reduced-fat, low-fat, or light mayonnaise and salad dressings (reduced-sodium). Canola, safflower, olive, avocado, soybean, and sunflower oils. Avocado.  Seasonings and condiments  Herbs. Spices. Seasoning mixes without salt.  Other foods  Unsalted popcorn and pretzels. Fat-free sweets.  The items listed above may not be a complete list of foods and beverages you can eat. Contact a dietitian for more information.  What foods should I avoid?  Fruits  Canned fruit in a light or heavy syrup. Fried fruit. Fruit in cream or butter sauce.  Vegetables  Creamed or fried vegetables. Vegetables in a cheese sauce. Regular canned vegetables (not low-sodium or reduced-sodium). Regular canned tomato sauce and paste (not low-sodium or reduced-sodium). Regular tomato and  vegetable juice (not low-sodium or reduced-sodium). Pickles. Olives.  Grains  Baked goods made with fat, such as croissants, muffins, or some breads. Dry pasta or rice meal packs.  Meats and other proteins  Fatty cuts of meat. Ribs. Fried meat. Mancilla. Bologna, salami, and other precooked or cured meats, such as sausages or meat loaves. Fat from the back of a pig (fatback). Bratwurst. Salted nuts and seeds. Canned beans with added salt. Canned or smoked fish. Whole eggs or egg yolks. Chicken or turkey with skin.  Dairy  Whole or 2% milk, cream, and half-and-half. Whole or full-fat cream cheese. Whole-fat or sweetened yogurt. Full-fat cheese. Nondairy creamers. Whipped toppings. Processed cheese and cheese spreads.  Fats and oils  Butter. Stick margarine. Lard. Shortening. Ghee. Mancilla fat. Tropical oils, such as coconut, palm kernel, or palm oil.  Seasonings and condiments  Onion salt, garlic salt, seasoned salt, table salt, and sea salt. Worcestershire sauce. Tartar sauce. Barbecue sauce. Teriyaki sauce. Soy sauce, including reduced-sodium. Steak sauce. Canned and packaged gravies. Fish sauce. Oyster sauce. Cocktail sauce. Store-bought horseradish. Ketchup. Mustard. Meat flavorings and tenderizers. Bouillon cubes. Hot sauces. Pre-made or packaged marinades. Pre-made or packaged taco seasonings. Relishes. Regular salad dressings.  Other foods  Salted popcorn and pretzels.  The items listed above may not be a complete list of foods and beverages you should avoid. Contact a dietitian for more information.  Where to find more information  · National Heart, Lung, and Blood [place]: www.nhlbi.nih.gov  · American Heart Association: www.heart.org  · Academy of Nutrition and Dietetics: www.eatright.org  · National Kidney Foundation: www.kidney.org  Summary  · The DASH eating plan is a healthy eating plan that has been shown to reduce high blood pressure (hypertension). It may also reduce your risk for type 2 diabetes, heart  disease, and stroke.  · When on the DASH eating plan, aim to eat more fresh fruits and vegetables, whole grains, lean proteins, low-fat dairy, and heart-healthy fats.  · With the DASH eating plan, you should limit salt (sodium) intake to 2,300 mg a day. If you have hypertension, you may need to reduce your sodium intake to 1,500 mg a day.  · Work with your health care provider or dietitian to adjust your eating plan to your individual calorie needs.  This information is not intended to replace advice given to you by your health care provider. Make sure you discuss any questions you have with your health care provider.  Document Revised: 11/20/2020 Document Reviewed: 11/20/2020  [employer] Patient Education © 2021 [employer]      Advance Care Planning and Advance Directives     You make decisions on a daily basis - decisions about where you want to live, your career, your home, your life. Perhaps one of the most important decisions you face is your choice for future medical care. Take time to talk with your family and your healthcare team and start planning today.  Advance Care Planning is a process that can help you:  · Understand possible future healthcare decisions in light of your own experiences  · Reflect on those decision in light of your goals and values  · Discuss your decisions with those closest to you and the healthcare professionals that care for you  · Make a plan by creating a document that reflects your wishes    Surrogate Decision Maker  In the event of a medical emergency, which has left you unable to communicate or to make your own decisions, you would need someone to make decisions for you.  It is important to discuss your preferences for medical treatment with this person while you are in good health.     Qualities of a surrogate decision maker:  • Willing to take on this role and responsibility  • Knows what you want for future medical care  • Willing to follow your wishes even if they don't  agree with them  • Able to make difficult medical decisions under stressful circumstances    Advance Directives  These are legal documents you can create that will guide your healthcare team and decision maker(s) when needed. These documents can be stored in the electronic medical record.    · Living Will - a legal document to guide your care if you have a terminal condition or a serious illness and are unable to communicate. States vary by statute in document names/types, but most forms may include one or more of the following:        -  Directions regarding life-prolonging treatments        -  Directions regarding artificially provided nutrition/hydration        -  Choosing a healthcare decision maker        -  Direction regarding organ/tissue donation    · Durable Power of  for Healthcare - this document names an -in-fact to make medical decisions for you, but it may also allow this person to make personal and financial decisions for you. Please seek the advice of an  if you need this type of document.    **Advance Directives are not required and no one may discriminate against you if you do not sign one.    Medical Orders  Many states allow specific forms/orders signed by your physician to record your wishes for medical treatment in your current state of health. This form, signed in personal communication with your physician, addresses resuscitation and other medical interventions that you may or may not want.      For more information or to schedule a time with a [facility] Advance Care Planning Facilitator contact: [email or web address] or call [phone] and someone will contact you directly.

## 2021-07-13 NOTE — PROGRESS NOTES
Chief complaint:   Chief Complaint   Patient presents with   • Back Pain     Dot is returning for follow up for her back pain.        Subjective     HPI: Is a 70-year-old female patient who we have been following for back pain and right lower extremity pain.  She has been dealing with this since the end of February 2021.  Patient has been going through an extended course of physical therapy and has made a lot of progress.  She said that she is able to get through her day-to-day activities but still does not do any kind of strenuous lifting bending or twisting.  She says overall she is about 75% better.  She says the biggest issue that she is having is whenever she is riding in a car for a long period of time she will have pain that goes into her legs bilaterally.  Denies any bowel or bladder incontinence.  She is not taking any medication consistently for the pain.  She will take an occasional NSAID.    Review of Systems   Musculoskeletal: Positive for back pain.   Neurological: Negative.         Past Medical History:   Diagnosis Date   • Acid reflux    • Arthritis    • Asthma    • Diabetes mellitus (CMS/HCC)     Type 2   • Fistula of mastoid, left    • GERD (gastroesophageal reflux disease)    • History of transfusion    • Hx of colonic polyps    • Hyperlipidemia    • Hypertension    • Iron deficiency anemia    • Mastoid pain, left    • Numbness     left side of face   • Other osteomyelitis, other site (CMS/HCC)    • PONV (postoperative nausea and vomiting)    • Sensorineural hearing loss    • Sleep apnea     does not use machine   • Trigeminal neuralgia    • Vitamin D deficiency      Past Surgical History:   Procedure Laterality Date   • APPENDECTOMY     • BLADDER REPAIR      had vaginal repair at the same time   • BREAST BIOPSY Bilateral     benign   • CAPSULE ENDOSCOPY N/A 4/20/2018    Procedure: CAPSULE ENDOSCOPY M2A;  Surgeon: Garrett Ozuna MD;  Location: [facility] ENDOSCOPY;  Service: Gastroenterology   •  COLONOSCOPY N/A 3/1/2018    Procedure: COLONOSCOPY WITH ANESTHESIA;  Surgeon: Garrett Ozuna MD;  Location: [facility] ENDOSCOPY;  Service:    • COLONOSCOPY N/A 4/16/2021    Procedure: COLONOSCOPY WITH ANESTHESIA;  Surgeon: Garrett Ozuna MD;  Location: [facility] ENDOSCOPY;  Service: Gastroenterology;  Laterality: N/A;  pre: hx colon polyps  post: polyp  Misty Pelayo MD   • COLONOSCOPY W/ POLYPECTOMY  12/31/2014    Tubular adenomatous polyp at 80 cm, Hyperplastic polyp rectum repeat exam in 3 years   • ENDOSCOPY N/A 3/1/2018    Procedure: ESOPHAGOGASTRODUODENOSCOPY WITH ANESTHESIA;  Surgeon: Garrett Ozuna MD;  Location: [facility] ENDOSCOPY;  Service:    • FLAP HEAD/NECK Left 5/17/2018    Procedure: POSSIBLE STERNOCLEIDOMASTOID FLAP;  Surgeon: Kadeem Oconnor MD;  Location: [facility] OR;  Service: ENT   • HYSTERECTOMY     • MASTOIDECTOMY Left 5/17/2018    Procedure: Wound exploration with possible mastoidectomy; possible sternocleidomastoid flap.  Please schedule with Dr. Perez.;  Surgeon: Kadeem Oconnor MD;  Location: [facility] OR;  Service: ENT   • TRIGEMINAL NERVE DECOMPRESSION       Family History   Problem Relation Age of Onset   • Breast cancer Sister    • Breast cancer Mother    • Heart disease Father    • No Known Problems Brother    • No Known Problems Daughter    • No Known Problems Son    • No Known Problems Maternal Grandmother    • No Known Problems Paternal Grandmother    • No Known Problems Maternal Aunt    • No Known Problems Paternal Aunt    • Breast cancer Niece    • Colon cancer Neg Hx    • Colon polyps Neg Hx    • BRCA 1/2 Neg Hx    • Endometrial cancer Neg Hx    • Ovarian cancer Neg Hx      Social History     Tobacco Use   • Smoking status: Never Smoker   • Smokeless tobacco: Never Used   Vaping Use   • Vaping Use: Never used   Substance Use Topics   • Alcohol use: Yes     Comment: Rare   • Drug use: No     (Not in a hospital admission)    Allergies:  Zovirax [acyclovir], Meperidine, Propranolol,  "Sertraline hcl, Vesicare [solifenacin succinate], and Solifenacin    Objective      Vital Signs  /78   Ht 175.3 cm (69\")   Wt 77.3 kg (170 lb 6.4 oz)   BMI 25.16 kg/m²     Physical Exam  Constitutional:       Appearance: She is well-developed.   HENT:      Head: Normocephalic.   Eyes:      Pupils: Pupils are equal, round, and reactive to light.   Pulmonary:      Effort: Pulmonary effort is normal.   Musculoskeletal:         General: Normal range of motion.      Cervical back: Normal range of motion.   Skin:     General: Skin is warm.   Neurological:      Mental Status: She is alert and oriented to person, place, and time.      GCS: GCS eye subscore is 4. GCS verbal subscore is 5. GCS motor subscore is 6.      Cranial Nerves: No cranial nerve deficit.      Sensory: No sensory deficit.      Gait: Gait normal.      Deep Tendon Reflexes: Reflexes are normal and symmetric.   Psychiatric:         Speech: Speech normal.         Behavior: Behavior normal.         Thought Content: Thought content normal.         Neurologic Exam     Mental Status   Oriented to person, place, and time.   Speech: speech is normal     Cranial Nerves     CN III, IV, VI   Pupils are equal, round, and reactive to light.      Imaging review: No new imaging        Assessment/Plan: Patient does want continue with therapy at this point.  She is going to continue with therapy for another 4 weeks and then we will have her come back and see Dr. Perez in 8 weeks to see how she is doing and progressing and make sure that she is satisfied with where she is at.  It was discussed about going to pain management and having an injection but she is concerned about having her blood sugars elevated.  We will see how she is doing at the next appointment and make further recommendations at that time.  Patient is a nonsmoker  The patient's Body mass index is 25.16 kg/m².. BMI is above normal parameters. Recommendations include: educational material and " nutrition counseling   Advance Care Planning   ACP discussion was held with the patient during this visit. Patient does not have an advance directive, information provided.      Diagnoses and all orders for this visit:    1. Degeneration of lumbar or lumbosacral intervertebral disc (Primary)  -     Ambulatory Referral to Physical Therapy Evaluate and treat (2 days a week for 2 weeks, then 1 day a week for 1 week. )    2. Lumbar disc disease with radiculopathy  -     Ambulatory Referral to Physical Therapy Evaluate and treat (2 days a week for 2 weeks, then 1 day a week for 1 week. )    3. Lumbar disc herniation  -     Ambulatory Referral to Physical Therapy Evaluate and treat (2 days a week for 2 weeks, then 1 day a week for 1 week. )    4. Spinal stenosis of lumbar region without neurogenic claudication    5. Nonsmoker    6. Overweight with body mass index (BMI) 25.0-29.9          I discussed the patients findings and my recommendations with patient    Ward Moffett, APRN  07/13/21  08:51 CDT

## 2021-08-09 RX ORDER — VERAPAMIL HYDROCHLORIDE 240 MG/1
TABLET, FILM COATED, EXTENDED RELEASE ORAL
Qty: 180 TABLET | Refills: 3 | Status: SHIPPED | OUTPATIENT
Start: 2021-08-09 | End: 2022-08-01

## 2021-08-21 DIAGNOSIS — E11.9 TYPE 2 DIABETES MELLITUS WITHOUT COMPLICATION, WITHOUT LONG-TERM CURRENT USE OF INSULIN (HCC): Chronic | ICD-10-CM

## 2021-08-23 RX ORDER — LANCETS 30 GAUGE
EACH MISCELLANEOUS
Qty: 100 EACH | Refills: 0 | Status: SHIPPED | OUTPATIENT
Start: 2021-08-23

## 2021-09-09 ENCOUNTER — OFFICE VISIT (OUTPATIENT)
Dept: NEUROSURGERY | Facility: CLINIC | Age: 71
End: 2021-09-09

## 2021-09-09 VITALS — BODY MASS INDEX: 25.12 KG/M2 | HEIGHT: 69 IN | WEIGHT: 169.6 LBS

## 2021-09-09 DIAGNOSIS — M48.061 SPINAL STENOSIS OF LUMBAR REGION WITHOUT NEUROGENIC CLAUDICATION: ICD-10-CM

## 2021-09-09 DIAGNOSIS — Z78.9 NONSMOKER: ICD-10-CM

## 2021-09-09 DIAGNOSIS — M54.16 LUMBAR RADICULOPATHY: ICD-10-CM

## 2021-09-09 DIAGNOSIS — M51.37 DEGENERATION OF LUMBAR OR LUMBOSACRAL INTERVERTEBRAL DISC: Primary | ICD-10-CM

## 2021-09-09 PROCEDURE — 99212 OFFICE O/P EST SF 10 MIN: CPT | Performed by: NEUROLOGICAL SURGERY

## 2021-09-09 RX ORDER — LANCETS 30 GAUGE
EACH MISCELLANEOUS
COMMUNITY
Start: 2021-08-23

## 2021-09-09 RX ORDER — MONTELUKAST SODIUM 10 MG/1
TABLET ORAL
COMMUNITY
Start: 2021-07-20

## 2021-09-09 RX ORDER — ESTRADIOL 0.1 MG/G
CREAM VAGINAL
COMMUNITY
Start: 2021-07-06

## 2021-09-09 RX ORDER — BLOOD-GLUCOSE METER
EACH MISCELLANEOUS
COMMUNITY
Start: 2021-07-06

## 2021-09-09 NOTE — PROGRESS NOTES
SUBJECTIVE:  Patient ID: Carlotta Dupont is a 70 y.o. female is here today for follow-up.    Chief Complaint: Back pain  Chief Complaint   Patient presents with   • BACK & LEG PAIN     patient is here for follow up to discuss MRI & xray results ([facility]); patient has completed therapy @ [facility] and states this really helped her (80%).       HPI  This 70-year-old female that we have seen in the past for trigeminal neuralgia and a revision of a retrosigmoid wound.  We have also recently been following her for 20% low back pain and 80% right lower extremity radicular pain due to a disc herniation at L4-5.  She is on a extensive course of physical therapy and is now doing home exercises.  She did do lumbar traction.  She says she is doing very well she is 80% improved with the leg pain and back pain and is very satisfied with her progress.  She still does get occasional pain in the right leg and back.  She also will occasionally get left leg pain with prolonged trips in the car.  She is occasionally getting left facial pain.  She does think that in general as the years go by the facial pain is worsening over time.    The following portions of the patient's history were reviewed and updated as appropriate: allergies, current medications, past family history, past medical history, past social history, past surgical history and problem list.    OBJECTIVE:    Review of Systems   HENT:        Left facial pain    Musculoskeletal: Positive for back pain.          Physical Exam  Eyes:      Extraocular Movements: EOM normal.      Pupils: Pupils are equal, round, and reactive to light.   Neurological:      Mental Status: She is oriented to person, place, and time.      Coordination: Finger-Nose-Finger Test normal.      Gait: Gait is intact.      Deep Tendon Reflexes: Strength normal.   Psychiatric:         Speech: Speech normal.         Neurologic Exam     Mental Status   Oriented to person, place, and time.   Attention: normal.    Speech: speech is normal   Level of consciousness: alert  Knowledge: good.     Cranial Nerves     CN II   Visual fields full to confrontation.     CN III, IV, VI   Pupils are equal, round, and reactive to light.  Extraocular motions are normal.     CN V   Facial sensation intact.     CN VII   Facial expression full, symmetric.     CN VIII   CN VIII normal.     CN IX, X   CN IX normal.   CN X normal.     CN XI   CN XI normal.     CN XII   CN XII normal.     Motor Exam   Muscle bulk: normal  Overall muscle tone: normal  Right arm pronator drift: absent  Left arm pronator drift: absent    Strength   Strength 5/5 throughout.     Sensory Exam   Light touch normal.   Pinprick normal.     Gait, Coordination, and Reflexes     Gait  Gait: normal    Coordination   Finger to nose coordination: normal    Tremor   Resting tremor: absent  Intention tremor: absent  Action tremor: absent    Reflexes   Reflexes 2+ except as noted.       Independent Review of Radiographic Studies:       ASSESSMENT/PLAN:  The patient has done well with conservative care.  Most of her back and leg pain have resolved.  Certainly at this point she does not require any surgical intervention.  We will see her in the future on an as-needed basis.      1. Degeneration of lumbar or lumbosacral intervertebral disc    2. Lumbar radiculopathy    3. Spinal stenosis of lumbar region without neurogenic claudication    4. Nonsmoker    5. BMI 25.0-25.9,adult        The patient's Body mass index is 25.05 kg/m².. BMI is above normal parameters. Recommendations include: educational material    Return if symptoms worsen or fail to improve.      Morro Perez MD

## 2021-09-13 ENCOUNTER — OFFICE VISIT (OUTPATIENT)
Dept: INTERNAL MEDICINE | Age: 71
End: 2021-09-13
Payer: MEDICARE

## 2021-09-13 ENCOUNTER — TRANSCRIBE ORDERS (OUTPATIENT)
Dept: ADMINISTRATIVE | Facility: HOSPITAL | Age: 71
End: 2021-09-13

## 2021-09-13 VITALS
DIASTOLIC BLOOD PRESSURE: 70 MMHG | HEIGHT: 69 IN | SYSTOLIC BLOOD PRESSURE: 118 MMHG | HEART RATE: 103 BPM | BODY MASS INDEX: 24.29 KG/M2 | WEIGHT: 164 LBS | OXYGEN SATURATION: 96 %

## 2021-09-13 DIAGNOSIS — Z12.31 SCREENING MAMMOGRAM, ENCOUNTER FOR: ICD-10-CM

## 2021-09-13 DIAGNOSIS — G50.0 TRIGEMINAL NEURALGIA OF LEFT SIDE OF FACE: Chronic | ICD-10-CM

## 2021-09-13 DIAGNOSIS — N60.12 FIBROCYSTIC BREAST CHANGES, BILATERAL: ICD-10-CM

## 2021-09-13 DIAGNOSIS — Z12.31 ENCOUNTER FOR SCREENING MAMMOGRAM FOR MALIGNANT NEOPLASM OF BREAST: Primary | ICD-10-CM

## 2021-09-13 DIAGNOSIS — Z00.00 MEDICARE ANNUAL WELLNESS VISIT, SUBSEQUENT: Primary | ICD-10-CM

## 2021-09-13 DIAGNOSIS — I10 ESSENTIAL HYPERTENSION: Chronic | ICD-10-CM

## 2021-09-13 DIAGNOSIS — M47.22 OSTEOARTHRITIS OF SPINE WITH RADICULOPATHY, CERVICAL REGION: ICD-10-CM

## 2021-09-13 DIAGNOSIS — N60.11 FIBROCYSTIC BREAST CHANGES, BILATERAL: ICD-10-CM

## 2021-09-13 DIAGNOSIS — E11.9 TYPE 2 DIABETES MELLITUS WITHOUT COMPLICATION, WITHOUT LONG-TERM CURRENT USE OF INSULIN (HCC): ICD-10-CM

## 2021-09-13 DIAGNOSIS — Z00.00 ROUTINE GENERAL MEDICAL EXAMINATION AT A HEALTH CARE FACILITY: ICD-10-CM

## 2021-09-13 PROCEDURE — 3044F HG A1C LEVEL LT 7.0%: CPT | Performed by: INTERNAL MEDICINE

## 2021-09-13 PROCEDURE — 4040F PNEUMOC VAC/ADMIN/RCVD: CPT | Performed by: INTERNAL MEDICINE

## 2021-09-13 PROCEDURE — 1123F ACP DISCUSS/DSCN MKR DOCD: CPT | Performed by: INTERNAL MEDICINE

## 2021-09-13 PROCEDURE — G0439 PPPS, SUBSEQ VISIT: HCPCS | Performed by: INTERNAL MEDICINE

## 2021-09-13 PROCEDURE — 3017F COLORECTAL CA SCREEN DOC REV: CPT | Performed by: INTERNAL MEDICINE

## 2021-09-13 ASSESSMENT — LIFESTYLE VARIABLES
AUDIT TOTAL SCORE: 1
HOW OFTEN DO YOU HAVE SIX OR MORE DRINKS ON ONE OCCASION: 0
HAVE YOU OR SOMEONE ELSE BEEN INJURED AS A RESULT OF YOUR DRINKING: 0
HOW OFTEN DURING THE LAST YEAR HAVE YOU BEEN UNABLE TO REMEMBER WHAT HAPPENED THE NIGHT BEFORE BECAUSE YOU HAD BEEN DRINKING: 0
HOW OFTEN DURING THE LAST YEAR HAVE YOU HAD A FEELING OF GUILT OR REMORSE AFTER DRINKING: 0
HOW OFTEN DURING THE LAST YEAR HAVE YOU FOUND THAT YOU WERE NOT ABLE TO STOP DRINKING ONCE YOU HAD STARTED: 0
AUDIT-C TOTAL SCORE: 1
HOW MANY STANDARD DRINKS CONTAINING ALCOHOL DO YOU HAVE ON A TYPICAL DAY: 0
HOW OFTEN DURING THE LAST YEAR HAVE YOU NEEDED AN ALCOHOLIC DRINK FIRST THING IN THE MORNING TO GET YOURSELF GOING AFTER A NIGHT OF HEAVY DRINKING: 0
HAS A RELATIVE, FRIEND, DOCTOR, OR ANOTHER HEALTH PROFESSIONAL EXPRESSED CONCERN ABOUT YOUR DRINKING OR SUGGESTED YOU CUT DOWN: 0
HOW OFTEN DO YOU HAVE A DRINK CONTAINING ALCOHOL: 1
HOW OFTEN DURING THE LAST YEAR HAVE YOU FAILED TO DO WHAT WAS NORMALLY EXPECTED FROM YOU BECAUSE OF DRINKING: 0

## 2021-09-13 ASSESSMENT — PATIENT HEALTH QUESTIONNAIRE - PHQ9
SUM OF ALL RESPONSES TO PHQ QUESTIONS 1-9: 0
1. LITTLE INTEREST OR PLEASURE IN DOING THINGS: 0
DEPRESSION UNABLE TO ASSESS: FUNCTIONAL CAPACITY MOTIVATION LIMITS ACCURACY
SUM OF ALL RESPONSES TO PHQ9 QUESTIONS 1 & 2: 0
2. FEELING DOWN, DEPRESSED OR HOPELESS: 0

## 2021-09-13 NOTE — PROGRESS NOTES
Chief Complaint   Patient presents with    Medicare AWV       HPI: Patient is here today for Medicare annual wellness visit and to follow-up diabetes hypertension lumbar DJD trigeminal neuralgia patient has had a lot of stressors doing better tired has back in neck pain at times overall doing okay stable with her trigeminal neuralgia    Past Medical History:   Diagnosis Date    Calculus of gallbladder 8/20/2017    Chronic asthma without complication 2/96/4759    Essential hypertension 8/21/2017    Fatty liver 8/20/2017    Gastroesophageal reflux disease without esophagitis 8/20/2017    Lumbar disc disease     L5-S1    Migraine without aura, not intractable 8/20/2017    Mixed hyperlipidemia 8/20/2017    Obstructive sleep apnea 8/20/2017    Sacroiliitis ([facility] [address]     Trigeminal neuralgia of left side of face 8/20/2017    Type 2 diabetes mellitus without complication ([facility] [address] 3/38/4763    Venous insufficiency     Vitamin D deficiency        Past Surgical History:   Procedure Laterality Date    EYE LID SURGERY Bilateral 2020    eye lids raised    HYSTERECTOMY, TOTAL ABDOMINAL      No BSO       Family History   Problem Relation Age of Onset    High Blood Pressure Mother     High Blood Pressure Father     Prostate Cancer Father     Diabetes Father         Type 2    Breast Cancer Sister 47    Diabetes Sister         Type 2       Social History     Socioeconomic History    Marital status:      Spouse name: Ned Monroe Number of children: 2    Years of education: 15    Highest education level: Not on file   Occupational History    Occupation: retired    Tobacco Use    Smoking status: Never Smoker    Smokeless tobacco: Never Used   Substance and Sexual Activity    Alcohol use: No    Drug use: No    Sexual activity: Yes     Partners: Male   Other Topics Concern    Not on file   Social History Narrative    Not on file     Social Determinants of Health     Financial Resource Strain: Low Risk     Difficulty of Paying Living Expenses: Not hard at all   Food Insecurity: No Food Insecurity    Worried About Running Out of Food in the Last Year: Never true    Apoorva of Food in the Last Year: Never true   Transportation Needs:     Lack of Transportation (Medical):  Lack of Transportation (Non-Medical):    Physical Activity:     Days of Exercise per Week:     Minutes of Exercise per Session:    Stress:     Feeling of Stress :    Social Connections:     Frequency of Communication with Friends and Family:     Frequency of Social Gatherings with Friends and Family:     Attends Yarsani Services:     Active Member of Clubs or Organizations:     Attends Club or Organization Meetings:     Marital Status:    Intimate Partner Violence:     Fear of Current or Ex-Partner:     Emotionally Abused:     Physically Abused:     Sexually Abused:         Allergies   Allergen Reactions    Inderal [Propranolol] Other (See Comments)     Bad dreams  unknown    Solifenacin Succinate Other (See Comments)     Severe constipation    Demerol Hcl [Meperidine]      halluncinations    Vesicare [Solifenacin]      Couldn't urinate    Zoloft [Sertraline Hcl]      hallucinations    Zovirax [Acyclovir] Hives and Other (See Comments)     PhX       Current Outpatient Medications   Medication Sig Dispense Refill    Sure Comfort Lancets 30G MISC TEST DAILY AS NEEDED 100 each 0    verapamil (CALAN SR) 240 MG extended release tablet TAKE ONE TABLET BY MOUTH TWICE A  tablet 3    rosuvastatin (CRESTOR) 5 MG tablet TAKE 1/2 TABLET ON MON, WED, FRI 18 tablet 3    GLUCOCARD EXPRESSION TEST strip TEST DAILY AS NEEDED 100 strip 0    metFORMIN (GLUCOPHAGE) 500 MG tablet TAKE TWO TABLETS BY MOUTH TWICE A  tablet 3    ketorolac (TORADOL) 10 MG tablet Take 1 tablet by mouth 3 times daily (with meals) 90 tablet 0    albuterol (PROVENTIL) (2.5 MG/3ML) 0.083% nebulizer solution Take 3 mLs by nebulization every 6 hours as needed for Wheezing 60 each 3    budesonide (PULMICORT) 0.5 MG/2ML nebulizer suspension Take 2 mLs by nebulization 2 times daily 60 ampule 3    losartan (COZAAR) 100 MG tablet TAKE 1 TABLET BY MOUTH DAILY 90 tablet 3    hydroCHLOROthiazide (MICROZIDE) 12.5 MG capsule TAKE 1 CAPSULE BY MOUTH DAILY 90 capsule 3    vitamin D (ERGOCALCIFEROL) 1.25 MG (85947 UT) CAPS capsule TAKE 1 CAPSULE BY MOUTH ONCE A WEEK 12 capsule 3    montelukast (SINGULAIR) 10 MG tablet TAKE 1 TABLET BY MOUTH DAILY 90 tablet 3    ALPRAZolam (XANAX) 0.25 MG tablet Take 0.25 mg by mouth nightly as needed for Sleep.  aspirin-acetaminophen-caffeine (EXCEDRIN MIGRAINE) 250-250-65 MG per tablet Take 1 tablet by mouth      esomeprazole (NEXIUM) 20 MG delayed release capsule Take 20 mg by mouth every morning (before breakfast)      conjugated estrogens (PREMARIN) 0.625 MG/GM vaginal cream 1gm twice a week (this replaces Estradiol cream per insurance 4/10/18) 1 Tube 3    aspirin 81 MG tablet Take 81 mg by mouth daily      Multiple Vitamins-Minerals (PRESERVISION/LUTEIN) CAPS Take 1 capsule by mouth 2 times daily      calcium carbonate (TUMS) 500 MG chewable tablet Take 1 tablet by mouth daily       No current facility-administered medications for this visit. Review of Systems   Constitutional: Positive for fatigue. Negative for chills and fever. HENT: Negative for congestion and sinus pressure. Facial pain. Eyes: Negative for discharge and redness. Respiratory: Negative for cough and shortness of breath. Cardiovascular: Negative for chest pain, palpitations and leg swelling. Gastrointestinal: Negative for abdominal distention and abdominal pain. Genitourinary: Negative for dysuria, frequency and urgency. Musculoskeletal: Positive for arthralgias. Negative for back pain. Skin: Negative for rash and wound. Neurological: Negative for dizziness and light-headedness.    Psychiatric/Behavioral: Negative for dysphoric mood and sleep disturbance. The patient is not nervous/anxious. Stressors       /70 (Site: Left Upper Arm, Position: Sitting, Cuff Size: Large Adult)   Pulse 103   Ht 5' 9\" (1.753 m)   Wt 164 lb (74.4 kg)   SpO2 96%   BMI 24.22 kg/m²   BP Readings from Last 7 Encounters:   09/13/21 118/70   06/15/21 112/70   05/04/21 124/74   03/08/21 114/70   01/11/21 124/78   12/29/20 120/80   08/31/20 (!) 138/90     Wt Readings from Last 7 Encounters:   09/13/21 164 lb (74.4 kg)   06/15/21 164 lb (74.4 kg)   05/04/21 163 lb (73.9 kg)   03/08/21 163 lb (73.9 kg)   01/11/21 163 lb (73.9 kg)   12/29/20 163 lb (73.9 kg)   08/31/20 163 lb (73.9 kg)     BMI Readings from Last 7 Encounters:   09/13/21 24.22 kg/m²   06/15/21 24.22 kg/m²   05/04/21 24.07 kg/m²   03/08/21 24.07 kg/m²   01/11/21 24.07 kg/m²   12/29/20 24.07 kg/m²   08/31/20 24.07 kg/m²     Resp Readings from Last 7 Encounters:   01/14/19 18   01/08/18 16   08/21/17 20       Physical Exam  Constitutional:       General: She is not in acute distress. Appearance: Normal appearance. She is well-developed. HENT:      Head: Normocephalic. Right Ear: External ear normal. Tympanic membrane is not injected. Left Ear: External ear normal. Tympanic membrane is not injected. Mouth/Throat:      Pharynx: No oropharyngeal exudate. Eyes:      General: No scleral icterus. Conjunctiva/sclera: Conjunctivae normal.   Neck:      Thyroid: No thyroid mass or thyromegaly. Vascular: No carotid bruit. Cardiovascular:      Rate and Rhythm: Normal rate and regular rhythm. Heart sounds: S1 normal and S2 normal. No murmur heard. No S3 or S4 sounds. Pulmonary:      Effort: Pulmonary effort is normal. No respiratory distress. Breath sounds: Normal breath sounds. No wheezing or rales. Abdominal:      General: Bowel sounds are normal. There is no distension. Palpations: Abdomen is soft. There is no mass. Tenderness: There is no abdominal tenderness. Musculoskeletal:      Cervical back: Neck supple. Right lower leg: No edema. Left lower leg: No edema. Lymphadenopathy:      Cervical: No cervical adenopathy. Upper Body:      Right upper body: No supraclavicular adenopathy. Left upper body: No supraclavicular adenopathy. Skin:     Findings: No rash. Neurological:      Mental Status: She is alert and oriented to person, place, and time. Cranial Nerves: No cranial nerve deficit.    Psychiatric:      Comments: Mood tired      Wrist exam with fibrocystic changes some increased density right lower quadrant    Results for orders placed or performed in visit on 09/08/21   Vitamin D 25 Hydroxy   Result Value Ref Range    Vit D, 25-Hydroxy 59.3 >=30 ng/mL   Lipid Panel   Result Value Ref Range    Cholesterol, Total 138 (L) 160 - 199 mg/dL    Triglycerides 82 0 - 149 mg/dL    HDL 38 (L) 65 - 121 mg/dL    LDL Calculated 84 <100 mg/dL   TSH without Reflex   Result Value Ref Range    TSH 1.030 0.270 - 4.200 uIU/mL   Hemoglobin A1C   Result Value Ref Range    Hemoglobin A1C 6.5 (H) 4.0 - 6.0 %   Comprehensive Metabolic Panel   Result Value Ref Range    Sodium 140 136 - 145 mmol/L    Potassium 4.1 3.5 - 5.0 mmol/L    Chloride 101 98 - 111 mmol/L    CO2 26 22 - 29 mmol/L    Anion Gap 13 7 - 19 mmol/L    Glucose 135 (H) 74 - 109 mg/dL    BUN 21 8 - 23 mg/dL    CREATININE 0.6 0.5 - 0.9 mg/dL    GFR Non-African American >60 >60    GFR African American >59 >59    Calcium 9.7 8.8 - 10.2 mg/dL    Total Protein 7.0 6.6 - 8.7 g/dL    Albumin 4.6 3.5 - 5.2 g/dL    Total Bilirubin 0.3 0.2 - 1.2 mg/dL    Alkaline Phosphatase 73 35 - 104 U/L    ALT 33 5 - 33 U/L    AST 20 5 - 32 U/L   CBC   Result Value Ref Range    WBC 5.4 4.8 - 10.8 K/uL    RBC 4.40 4.20 - 5.40 M/uL    Hemoglobin 13.9 12.0 - 16.0 g/dL    Hematocrit 42.0 37.0 - 47.0 %    MCV 95.5 81.0 - 99.0 fL    MCH 31.6 (H) 27.0 - 31.0 pg    MCHC 33.1 33.0 - 37.0 g/dL    RDW 12.4 11.5 - 14.5 %    Platelets 616 450 - 155 K/uL    MPV 11.6 9.4 - 12.3 fL       ASSESSMENT/ PLAN:  1. Medicare annual wellness visit, subsequent  Chart, medications, labs, vaccines reviewed. Keep up to date with routine care and follow up. Call with any problems or complaints. Keep up to date with routine screening recomendations and vaccines. 2. Type 2 diabetes mellitus without complication, without long-term current use of insulin ([facility] [address]  Diabetes is in very good control - continue with this plan of care and follow up - continue to monitor    - CBC; Future  - Comprehensive Metabolic Panel; Future  - Hemoglobin A1C; Future  - Lipid Panel; Future  - Microalbumin / Creatinine Urine Ratio; Future    3. Screening mammogram, encounter for  Changed her mammogram to a diagnostic mammogram with some right lower quadrant increased density. 4. Fibrocystic breast changes, bilateral  As above go ahead and get mammogram now with some right lower quadrant increased density will have them focus on this area    5. Essential hypertension  Blood pressure stable and controlled    6. Trigeminal neuralgia- stable    7. Osteoarthritis of spine with radiculopathy, cervical region--- patient is better overall. Medicare Annual Wellness Visit  Name: David Toribio Date: 2021   MRN: [id number] Sex: Female   Age: 70 y.o. Ethnicity: Non- / Non    : 1950 Race: White (non-)      Candace Sheikh is here for Medicare AWV    Screenings for behavioral, psychosocial and functional/safety risks, and cognitive dysfunction are all negative except as indicated below. These results, as well as other patient data from the [address] form, are documented in Flowsheets linked to this Encounter.     Allergies   Allergen Reactions    Inderal [Propranolol] Other (See Comments)     Bad dreams  unknown    Solifenacin Succinate Other (See Comments)     Severe constipation    Demerol Hcl [Meperidine]      halluncinations    Vesicare [Solifenacin]      Couldn't urinate    Zoloft [Sertraline Hcl]      hallucinations    Zovirax [Acyclovir] Hives and Other (See Comments)     PhX         Prior to Visit Medications    Medication Sig Taking? Authorizing Provider   Sure Comfort Lancets 30G MISC TEST DAILY AS NEEDED Yes Vicki Dietrich MD   verapamil (CALAN SR) 240 MG extended release tablet TAKE ONE TABLET BY MOUTH TWICE Keyur Abraham Yes Vicki Dietrich MD   rosuvastatin (CRESTOR) 5 MG tablet TAKE 1/2 TABLET ON MON, WED, FRI Yes Vicki Dietrich MD   GLUCOCARD EXPRESSION TEST strip TEST DAILY AS NEEDED Yes Vicki Dietrich MD   metFORMIN (GLUCOPHAGE) 500 MG tablet TAKE TWO TABLETS BY MOUTH TWICE A DAY Yes Vicki Dietrich MD   ketorolac (TORADOL) 10 MG tablet Take 1 tablet by mouth 3 times daily (with meals) Yes Vicki Dietrich MD   albuterol (PROVENTIL) (2.5 MG/3ML) 0.083% nebulizer solution Take 3 mLs by nebulization every 6 hours as needed for Wheezing Yes Vicki Dietrich MD   budesonide (PULMICORT) 0.5 MG/2ML nebulizer suspension Take 2 mLs by nebulization 2 times daily Yes Vicki Dietrich MD   losartan (COZAAR) 100 MG tablet TAKE 1 TABLET BY MOUTH DAILY Yes Vicki Dietrich MD   hydroCHLOROthiazide (MICROZIDE) 12.5 MG capsule TAKE 1 CAPSULE BY MOUTH DAILY Yes Vicki Dietrich MD   vitamin D (ERGOCALCIFEROL) 1.25 MG (38546 UT) CAPS capsule TAKE 1 CAPSULE BY MOUTH ONCE A WEEK Yes Vicki Dietrich MD   montelukast (SINGULAIR) 10 MG tablet TAKE 1 TABLET BY MOUTH DAILY Yes Vicki Dietrich MD   ALPRAZolam (XANAX) 0.25 MG tablet Take 0.25 mg by mouth nightly as needed for Sleep.  Yes Historical Provider, MD   aspirin-acetaminophen-caffeine (Norris Smack) [phone] MG per tablet Take 1 tablet by mouth Yes Historical Provider, MD   esomeprazole (NEXIUM) 20 MG delayed release capsule Take 20 mg by mouth every morning (before breakfast) Yes Historical Provider, MD   conjugated estrogens (PREMARIN) 0.625 MG/GM vaginal cream 1gm twice a week (this replaces Estradiol cream per insurance 4/10/18) Yes Tru Rivera MD   aspirin 81 MG tablet Take 81 mg by mouth daily Yes Historical Provider, MD   Multiple Vitamins-Minerals (PRESERVISION/LUTEIN) CAPS Take 1 capsule by mouth 2 times daily Yes Historical Provider, MD   calcium carbonate (TUMS) 500 MG chewable tablet Take 1 tablet by mouth daily Yes Historical Provider, MD   gabapentin (NEURONTIN) 100 MG capsule Take 1 capsule by mouth nightly for 90 days.   Junior Morillo MD         Past Medical History:   Diagnosis Date    Calculus of gallbladder 8/20/2017    Chronic asthma without complication 0/78/9305    Essential hypertension 8/21/2017    Fatty liver 8/20/2017    Gastroesophageal reflux disease without esophagitis 8/20/2017    Lumbar disc disease     L5-S1    Migraine without aura, not intractable 8/20/2017    Mixed hyperlipidemia 8/20/2017    Obstructive sleep apnea 8/20/2017    Sacroiliitis (HCC)     Trigeminal neuralgia of left side of face 8/20/2017    Type 2 diabetes mellitus without complication ([facility] [address] 8/12/8576    Venous insufficiency     Vitamin D deficiency        Past Surgical History:   Procedure Laterality Date    EYE LID SURGERY Bilateral 2020    eye lids raised    HYSTERECTOMY, TOTAL ABDOMINAL      No BSO         Family History   Problem Relation Age of Onset    High Blood Pressure Mother     High Blood Pressure Father     Prostate Cancer Father     Diabetes Father         Type 2    Breast Cancer Sister 47    Diabetes Sister         Type 2       CareTeam (Including outside providers/suppliers regularly involved in providing care):   Patient Care Team:  Junior Morillo MD as PCP - General (Internal Medicine)  Junior Morillo MD as PCP - [facility] Empaneled Provider    Wt Readings from Last 3 Encounters:   09/13/21 164 lb (74.4 kg)   06/15/21 164 lb (74.4 kg)   05/04/21 163 lb (73.9 kg)     Vitals:    09/13/21 1408   BP: 118/70   Site: Left Upper Arm Position: Sitting   Cuff Size: Large Adult   Pulse: 103   SpO2: 96%   Weight: 164 lb (74.4 kg)   Height: 5' 9\" (1.753 m)     Body mass index is 24.22 kg/m². Based upon direct observation of the patient, evaluation of cognition reveals no issues  Patient's complete Health Risk Assessment and screening values have been reviewed and are found in Flowsheets. The following problems were reviewed today and where indicated follow up appointments were made and/or referrals ordered. Positive Risk Factor Screenings with Interventions:     Fall Risk:  Timed Up and Go Test > 12 seconds? (Complete if either Fall Risk answers are Yes): (!) yes  2 or more falls in past year?: no  Fall with injury in past year?: no  Fall Risk Interventions:    · Caution against falling    Cognitive: Words recalled: 0 Words Recalled  Clock Drawing Test (CDT) Score: Normal  Total Score Interpretation: Positive Mini-Cog  Did the patient refuse to take the cognition test?: No  Cognitive Impairment Interventions:  · No cognitive concerns    Depression:  Depression Unable to Assess: Functional capacity motivation limits accuracy  PHQ-2 Score: 0  PHQ-9 Total Score: 0    Severity:1-4 = minimal depression, 5-9 = mild depression, 10-14 = moderate depression, 15-19 = moderately severe depression, 20-27 = severe depression        General Health and ACP:  General  In general, how would you say your health is?: Good  In the past 7 days, have you experienced any of the following?  New or Increased Pain, New or Increased Fatigue, Loneliness, Social Isolation, Stress or Anger?: None of These  Do you get the social and emotional support that you need?: Yes  Do you have a Living Will?: Yes  Advance Directives     Power of [facility] Will ACP-Advance Directive ACP-Power of     Not on File Not on File Not on File Not on File      General Health Risk Interventions:  · Pt has a living will    Health Habits/Nutrition:  Health Habits/Nutrition  Do you exercise for at least 20 minutes 2-3 times per week?: (!) No  Have you lost any weight without trying in the past 3 months?: No  Do you eat only one meal per day?: No  Have you seen the dentist within the past year?: Yes  Body mass index: 24.22  Health Habits/Nutrition Interventions:  · Healthy diabetic diet and f/u     Safety:  Safety  Do you have working smoke detectors?: Yes  Have all throw rugs been removed or fastened?: (!) No  Do you have non-slip mats or surfaces in all bathtubs/showers?: Yes  Do all of your stairways have a railing or banister?: Yes  Are your doorways, halls and stairs free of clutter?: Yes  Do you always fasten your seatbelt when you are in a car?: Yes  Safety Interventions:  · No issues     Personalized Preventive Plan   Current Health Maintenance Status  Immunization History   Administered Date(s) Administered    COVID-19, Moderna, PF, 100mcg/0.5mL 01/16/2021, 02/13/2021    Influenza, High Dose (Fluzone 65 yrs and older) 10/03/2016, 12/06/2017, 10/02/2018    Influenza, Triv, inactivated, subunit, adjuvanted, IM (Fluad 65 yrs and older) 11/18/2019    Pneumococcal Conjugate 13-valent ([id number]) 11/30/2015    Pneumococcal Polysaccharide ([id number]) 01/14/2019    Tdap (Boostrix, Adacel) 07/02/2019    Zoster Recombinant (Shingrix) 07/02/2019, 10/18/2019        Health Maintenance   Topic Date Due    Diabetic foot exam  Never done    Annual Wellness Visit (AWV)  Never done    Diabetic retinal exam  01/16/2021    DEXA (modify frequency per FRAX score)  02/13/2021    Flu vaccine (1) 09/01/2021    Diabetic microalbuminuria test  05/11/2022    A1C test (Diabetic or Prediabetic)  09/08/2022    Lipid screen  09/08/2022    Potassium monitoring  09/08/2022    Creatinine monitoring  09/08/2022    Breast cancer screen  09/15/2022    Colon cancer screen colonoscopy  04/16/2024    DTaP/Tdap/Td vaccine (2 - Td or Tdap) 07/02/2029    Shingles Vaccine  Completed    Pneumococcal 65+ years Vaccine  Completed    COVID-19 Vaccine  Completed    Hepatitis C screen  Completed    Hepatitis A vaccine  Aged Out    Hib vaccine  Aged Out    Meningococcal (ACWY) vaccine  Aged Out     Recommendations for [email or web address] Due: see orders and patient instructions/AVS.  . Recommended screening schedule for the next 5-10 years is provided to the patient in written form: see Patient Instructions/AVS.    Ritu Deluna was seen today for medicare awv. Diagnoses and all orders for this visit:    Medicare annual wellness visit, subsequent    Type 2 diabetes mellitus without complication, without long-term current use of insulin ([facility] [address]  -     CBC; Future  -     Comprehensive Metabolic Panel; Future  -     Hemoglobin A1C; Future  -     Lipid Panel; Future  -     Microalbumin / Creatinine Urine Ratio; Future    Screening mammogram, encounter for  -     Cancel: [facility] DIGITAL SCREEN W OR WO CAD BILATERAL; Future    Fibrocystic breast changes, bilateral  -     Cancel: [facility] DIGITAL DIAGNOSTIC AUGMENTED BILATERAL; Future  -     Cancel: US BREAST COMPLETE RIGHT;  Future    Essential hypertension    Trigeminal neuralgia of left side of face    Osteoarthritis of spine with radiculopathy, cervical region

## 2021-09-15 ENCOUNTER — HOSPITAL ENCOUNTER (OUTPATIENT)
Dept: ULTRASOUND IMAGING | Facility: HOSPITAL | Age: 71
Discharge: HOME | End: 2021-09-15

## 2021-09-15 ENCOUNTER — HOSPITAL ENCOUNTER (OUTPATIENT)
Dept: MAMMOGRAPHY | Facility: HOSPITAL | Age: 71
Discharge: HOME | End: 2021-09-15

## 2021-09-15 DIAGNOSIS — R92.2 BREAST DENSITY: Primary | ICD-10-CM

## 2021-09-15 DIAGNOSIS — N60.12 FIBROCYSTIC BREAST CHANGES, BILATERAL: ICD-10-CM

## 2021-09-15 DIAGNOSIS — N60.11 FIBROCYSTIC BREAST CHANGES, BILATERAL: ICD-10-CM

## 2021-09-15 PROCEDURE — 77066 DX MAMMO INCL CAD BI: CPT

## 2021-09-15 PROCEDURE — G0279 TOMOSYNTHESIS, MAMMO: HCPCS

## 2021-09-15 PROCEDURE — 76642 ULTRASOUND BREAST LIMITED: CPT

## 2021-09-16 ENCOUNTER — HOSPITAL ENCOUNTER (OUTPATIENT)
Dept: MAMMOGRAPHY | Facility: HOSPITAL | Age: 71
Discharge: HOME | End: 2021-09-16

## 2021-09-16 ENCOUNTER — HOSPITAL ENCOUNTER (OUTPATIENT)
Dept: ULTRASOUND IMAGING | Facility: HOSPITAL | Age: 71
Discharge: HOME | End: 2021-09-16

## 2021-09-16 DIAGNOSIS — R92.8 ABNORMAL MAMMOGRAM: ICD-10-CM

## 2021-09-16 PROCEDURE — 88112 CYTOPATH CELL ENHANCE TECH: CPT | Performed by: INTERNAL MEDICINE

## 2021-09-16 PROCEDURE — 76942 ECHO GUIDE FOR BIOPSY: CPT

## 2021-09-17 LAB
CYTO UR: NORMAL
LAB AP CASE REPORT: NORMAL
Lab: NORMAL
PATH REPORT.FINAL DX SPEC: NORMAL
PATH REPORT.GROSS SPEC: NORMAL

## 2021-09-25 ASSESSMENT — ENCOUNTER SYMPTOMS
EYE REDNESS: 0
SHORTNESS OF BREATH: 0
COUGH: 0
BACK PAIN: 0
EYE DISCHARGE: 0
ABDOMINAL PAIN: 0
SINUS PRESSURE: 0
ABDOMINAL DISTENTION: 0

## 2021-09-26 NOTE — PATIENT INSTRUCTIONS
Personalized Preventive Plan for Chloe Net - 9/13/2021  Medicare offers a range of preventive health benefits. Some of the tests and screenings are paid in full while other may be subject to a deductible, co-insurance, and/or copay. Some of these benefits include a comprehensive review of your medical history including lifestyle, illnesses that may run in your family, and various assessments and screenings as appropriate. After reviewing your medical record and screening and assessments performed today your provider may have ordered immunizations, labs, imaging, and/or referrals for you. A list of these orders (if applicable) as well as your Preventive Care list are included within your After Visit Summary for your review. Other Preventive Recommendations:    · A preventive eye exam performed by an eye specialist is recommended every 1-2 years to screen for glaucoma; cataracts, macular degeneration, and other eye disorders. · A preventive dental visit is recommended every 6 months. · Try to get at least 150 minutes of exercise per week or 10,000 steps per day on a pedometer . · Order or download the FREE \"Exercise & Physical Activity: Your Everyday Guide\" from [employer]. Call 7-403.863.2849 or search [employer] online. · You need 8022-1201 mg of calcium and 3137-6163 IU of vitamin D per day. It is possible to meet your calcium requirement with diet alone, but a vitamin D supplement is usually necessary to meet this goal.  · When exposed to the sun, use a sunscreen that protects against both UVA and UVB radiation with an SPF of 30 or greater. Reapply every 2 to 3 hours or after sweating, drying off with a towel, or swimming. · Always wear a seat belt when traveling in a car. Always wear a helmet when riding a bicycle or motorcycle.

## 2021-10-14 ENCOUNTER — NURSE ONLY (OUTPATIENT)
Dept: INTERNAL MEDICINE | Age: 71
End: 2021-10-14
Payer: MEDICARE

## 2021-10-14 DIAGNOSIS — Z23 FLU VACCINE NEED: Primary | ICD-10-CM

## 2021-10-14 PROCEDURE — G0008 ADMIN INFLUENZA VIRUS VAC: HCPCS | Performed by: INTERNAL MEDICINE

## 2021-10-14 PROCEDURE — 99999 PR OFFICE/OUTPT VISIT,PROCEDURE ONLY: CPT | Performed by: INTERNAL MEDICINE

## 2021-10-14 PROCEDURE — 90694 VACC AIIV4 NO PRSRV 0.5ML IM: CPT | Performed by: INTERNAL MEDICINE

## 2021-10-14 NOTE — PROGRESS NOTES
After obtaining consent, and per orders of Dr. Danny Denney, injection of Fluad given in Left deltoid by Charley Mast MA. Patient instructed to remain in clinic for 20 minutes afterwards, and to report any adverse reaction to me immediately.

## 2021-11-05 ENCOUNTER — LAB (OUTPATIENT)
Dept: LAB | Facility: HOSPITAL | Age: 71
End: 2021-11-05

## 2021-11-05 ENCOUNTER — HOSPITAL ENCOUNTER (OUTPATIENT)
Dept: GENERAL RADIOLOGY | Facility: HOSPITAL | Age: 71
Discharge: HOME | End: 2021-11-05
Admitting: NURSE PRACTITIONER

## 2021-11-05 ENCOUNTER — OFFICE VISIT (OUTPATIENT)
Dept: NEUROSURGERY | Facility: CLINIC | Age: 71
End: 2021-11-05

## 2021-11-05 VITALS
BODY MASS INDEX: 25.27 KG/M2 | SYSTOLIC BLOOD PRESSURE: 126 MMHG | HEIGHT: 69 IN | WEIGHT: 170.6 LBS | DIASTOLIC BLOOD PRESSURE: 78 MMHG

## 2021-11-05 DIAGNOSIS — E66.3 OVERWEIGHT WITH BODY MASS INDEX (BMI) OF 25 TO 25.9 IN ADULT: ICD-10-CM

## 2021-11-05 DIAGNOSIS — M54.12 CERVICAL RADICULOPATHY: Primary | ICD-10-CM

## 2021-11-05 DIAGNOSIS — D50.9 IRON DEFICIENCY ANEMIA, UNSPECIFIED IRON DEFICIENCY ANEMIA TYPE: ICD-10-CM

## 2021-11-05 DIAGNOSIS — Z78.9 NONSMOKER: ICD-10-CM

## 2021-11-05 LAB
ALBUMIN SERPL-MCNC: 4.8 G/DL (ref 3.5–5.2)
ALBUMIN/GLOB SERPL: 2 G/DL
ALP SERPL-CCNC: 75 U/L (ref 39–117)
ALT SERPL W P-5'-P-CCNC: 34 U/L (ref 1–33)
ANION GAP SERPL CALCULATED.3IONS-SCNC: 11 MMOL/L (ref 5–15)
AST SERPL-CCNC: 24 U/L (ref 1–32)
BASOPHILS # BLD AUTO: 0.06 10*3/MM3 (ref 0–0.2)
BASOPHILS NFR BLD AUTO: 1 % (ref 0–1.5)
BILIRUB SERPL-MCNC: 0.4 MG/DL (ref 0–1.2)
BUN SERPL-MCNC: 18 MG/DL (ref 8–23)
BUN/CREAT SERPL: 28.1 (ref 7–25)
CALCIUM SPEC-SCNC: 9.7 MG/DL (ref 8.6–10.5)
CHLORIDE SERPL-SCNC: 98 MMOL/L (ref 98–107)
CO2 SERPL-SCNC: 28 MMOL/L (ref 22–29)
CREAT SERPL-MCNC: 0.64 MG/DL (ref 0.57–1)
DEPRECATED RDW RBC AUTO: 42.3 FL (ref 37–54)
EOSINOPHIL # BLD AUTO: 0.29 10*3/MM3 (ref 0–0.4)
EOSINOPHIL NFR BLD AUTO: 4.9 % (ref 0.3–6.2)
ERYTHROCYTE [DISTWIDTH] IN BLOOD BY AUTOMATED COUNT: 12.8 % (ref 12.3–15.4)
FERRITIN SERPL-MCNC: 238.5 NG/ML (ref 13–150)
GFR SERPL CREATININE-BSD FRML MDRD: 91 ML/MIN/1.73
GLOBULIN UR ELPH-MCNC: 2.4 GM/DL
GLUCOSE SERPL-MCNC: 159 MG/DL (ref 65–99)
HCT VFR BLD AUTO: 42.7 % (ref 34–46.6)
HGB BLD-MCNC: 14.2 G/DL (ref 12–15.9)
IMM GRANULOCYTES # BLD AUTO: 0.02 10*3/MM3 (ref 0–0.05)
IMM GRANULOCYTES NFR BLD AUTO: 0.3 % (ref 0–0.5)
IRON 24H UR-MRATE: 74 MCG/DL (ref 37–145)
IRON SATN MFR SERPL: 21 % (ref 20–50)
LYMPHOCYTES # BLD AUTO: 2.13 10*3/MM3 (ref 0.7–3.1)
LYMPHOCYTES NFR BLD AUTO: 35.8 % (ref 19.6–45.3)
MCH RBC QN AUTO: 30.2 PG (ref 26.6–33)
MCHC RBC AUTO-ENTMCNC: 33.3 G/DL (ref 31.5–35.7)
MCV RBC AUTO: 90.9 FL (ref 79–97)
MONOCYTES # BLD AUTO: 0.53 10*3/MM3 (ref 0.1–0.9)
MONOCYTES NFR BLD AUTO: 8.9 % (ref 5–12)
NEUTROPHILS NFR BLD AUTO: 2.92 10*3/MM3 (ref 1.7–7)
NEUTROPHILS NFR BLD AUTO: 49.1 % (ref 42.7–76)
NRBC BLD AUTO-RTO: 0 /100 WBC (ref 0–0.2)
PLATELET # BLD AUTO: 214 10*3/MM3 (ref 140–450)
PMV BLD AUTO: 10.9 FL (ref 6–12)
POTASSIUM SERPL-SCNC: 4 MMOL/L (ref 3.5–5.2)
PROT SERPL-MCNC: 7.2 G/DL (ref 6–8.5)
RBC # BLD AUTO: 4.7 10*6/MM3 (ref 3.77–5.28)
SODIUM SERPL-SCNC: 137 MMOL/L (ref 136–145)
TIBC SERPL-MCNC: 352 MCG/DL (ref 298–536)
TRANSFERRIN SERPL-MCNC: 236 MG/DL (ref 200–360)
WBC # BLD AUTO: 5.95 10*3/MM3 (ref 3.4–10.8)

## 2021-11-05 PROCEDURE — 80053 COMPREHEN METABOLIC PANEL: CPT

## 2021-11-05 PROCEDURE — 85025 COMPLETE CBC W/AUTO DIFF WBC: CPT

## 2021-11-05 PROCEDURE — 73030 X-RAY EXAM OF SHOULDER: CPT

## 2021-11-05 PROCEDURE — 84466 ASSAY OF TRANSFERRIN: CPT

## 2021-11-05 PROCEDURE — 82728 ASSAY OF FERRITIN: CPT

## 2021-11-05 PROCEDURE — 99214 OFFICE O/P EST MOD 30 MIN: CPT | Performed by: NURSE PRACTITIONER

## 2021-11-05 PROCEDURE — 72052 X-RAY EXAM NECK SPINE 6/>VWS: CPT

## 2021-11-05 PROCEDURE — 83540 ASSAY OF IRON: CPT

## 2021-11-05 PROCEDURE — 36415 COLL VENOUS BLD VENIPUNCTURE: CPT

## 2021-11-05 RX ORDER — DICLOFENAC SODIUM 75 MG/1
75 TABLET, DELAYED RELEASE ORAL 2 TIMES DAILY
Qty: 60 TABLET | Refills: 2 | Status: SHIPPED | OUTPATIENT
Start: 2021-11-05 | End: 2022-07-08 | Stop reason: ALTCHOICE

## 2021-11-05 NOTE — PATIENT INSTRUCTIONS
"BMI for Adults  What is BMI?  Body mass index (BMI) is a number that is calculated from a person's weight and height. BMI can help estimate how much of a person's weight is composed of fat. BMI does not measure body fat directly. Rather, it is an alternative to procedures that directly measure body fat, which can be difficult and expensive.  BMI can help identify people who may be at higher risk for certain medical problems.  What are BMI measurements used for?  BMI is used as a screening tool to identify possible weight problems. It helps determine whether a person is obese, overweight, a healthy weight, or underweight.  BMI is useful for:  · Identifying a weight problem that may be related to a medical condition or may increase the risk for medical problems.  · Promoting changes, such as changes in diet and exercise, to help reach a healthy weight. BMI screening can be repeated to see if these changes are working.  How is BMI calculated?  BMI involves measuring your weight in relation to your height. Both height and weight are measured, and the BMI is calculated from those numbers. This can be done either in English ([place] or metric measurements. Note that charts and online BMI calculators are available to help you find your BMI quickly and easily without having to do these calculations yourself.  To calculate your BMI in English ([place] measurements:    1. Measure your weight in pounds (lb).  2. Multiply the number of pounds by 703.  ? For example, for a person who weighs 180 lb, multiply that number by 703, which equals 126,540.  3. Measure your height in inches. Then multiply that number by itself to get a measurement called \"inches squared.\"  ? For example, for a person who is 70 inches tall, the \"inches squared\" measurement is 70 inches x 70 inches, which equals 4,900 inches squared.  4. Divide the total from step 2 (number of lb x 703) by the total from step 3 (inches squared): 126,540 ÷ 4,900 = 25.8. This is " "your BMI.    To calculate your BMI in metric measurements:  1. Measure your weight in kilograms (kg).  2. Measure your height in meters (m). Then multiply that number by itself to get a measurement called \"meters squared.\"  ? For example, for a person who is 1.75 m tall, the \"meters squared\" measurement is 1.75 m x 1.75 m, which is equal to 3.1 meters squared.  3. Divide the number of kilograms (your weight) by the meters squared number. In this example: 70 ÷ 3.1 = 22.6. This is your BMI.  What do the results mean?  BMI charts are used to identify whether you are underweight, normal weight, overweight, or obese. The following guidelines will be used:  · Underweight: BMI less than 18.5.  · Normal weight: BMI between 18.5 and 24.9.  · Overweight: BMI between 25 and 29.9.  · Obese: BMI of 30 or above.  Keep these notes in mind:  · Weight includes both fat and muscle, so someone with a muscular build, such as an athlete, may have a BMI that is higher than 24.9. In cases like these, BMI is not an accurate measure of body fat.  · To determine if excess body fat is the cause of a BMI of 25 or higher, further assessments may need to be done by a health care provider.  · BMI is usually interpreted in the same way for men and women.  Where to find more information  For more information about BMI, including tools to quickly calculate your BMI, go to these websites:  · Centers for Disease Control and Prevention: www.cdc.gov  · American Heart Association: www.heart.org  · National Heart, Lung, and Blood [place]: www.nhlbi.nih.gov  Summary  · Body mass index (BMI) is a number that is calculated from a person's weight and height.  · BMI may help estimate how much of a person's weight is composed of fat. BMI can help identify those who may be at higher risk for certain medical problems.  · BMI can be measured using English measurements or metric measurements.  · BMI charts are used to identify whether you are underweight, normal " "weight, overweight, or obese.  This information is not intended to replace advice given to you by your health care provider. Make sure you discuss any questions you have with your health care provider.  Document Revised: 09/09/2020 Document Reviewed: 07/17/2020  [employer] Patient Education © 2021 [employer]      https://www.nhlbi.nih.gov/files/docs/public/heart/dash_brief.pdf\">   DASH Eating Plan  DASH stands for Dietary Approaches to Stop Hypertension. The DASH eating plan is a healthy eating plan that has been shown to:  · Reduce high blood pressure (hypertension).  · Reduce your risk for type 2 diabetes, heart disease, and stroke.  · Help with weight loss.  What are tips for following this plan?  Reading food labels  · Check food labels for the amount of salt (sodium) per serving. Choose foods with less than 5 percent of the Daily Value of sodium. Generally, foods with less than 300 milligrams (mg) of sodium per serving fit into this eating plan.  · To find whole grains, look for the word \"whole\" as the first word in the ingredient list.  Shopping  · Buy products labeled as \"low-sodium\" or \"no salt added.\"  · Buy fresh foods. Avoid canned foods and pre-made or frozen meals.  Cooking  · Avoid adding salt when cooking. Use salt-free seasonings or herbs instead of table salt or sea salt. Check with your health care provider or pharmacist before using salt substitutes.  · Do not riley foods. Cook foods using healthy methods such as baking, boiling, grilling, roasting, and broiling instead.  · Cook with heart-healthy oils, such as olive, canola, avocado, soybean, or sunflower oil.  Meal planning    · Eat a balanced diet that includes:  ? 4 or more servings of fruits and 4 or more servings of vegetables each day. Try to fill one-half of your plate with fruits and vegetables.  ? 6-8 servings of whole grains each day.  ? Less than 6 oz (170 g) of lean meat, poultry, or fish each day. A 3-oz (85-g) serving of meat is " about the same size as a deck of cards. One egg equals 1 oz (28 g).  ? 2-3 servings of low-fat dairy each day. One serving is 1 cup (237 mL).  ? 1 serving of nuts, seeds, or beans 5 times each week.  ? 2-3 servings of heart-healthy fats. Healthy fats called omega-3 fatty acids are found in foods such as walnuts, flaxseeds, fortified milks, and eggs. These fats are also found in cold-water fish, such as sardines, salmon, and mackerel.  · Limit how much you eat of:  ? Canned or prepackaged foods.  ? Food that is high in trans fat, such as some fried foods.  ? Food that is high in saturated fat, such as fatty meat.  ? Desserts and other sweets, sugary drinks, and other foods with added sugar.  ? Full-fat dairy products.  · Do not salt foods before eating.  · Do not eat more than 4 egg yolks a week.  · Try to eat at least 2 vegetarian meals a week.  · Eat more home-cooked food and less restaurant, buffet, and fast food.    Lifestyle  · When eating at a restaurant, ask that your food be prepared with less salt or no salt, if possible.  · If you drink alcohol:  ? Limit how much you use to:  § 0-1 drink a day for women who are not pregnant.  § 0-2 drinks a day for men.  ? Be aware of how much alcohol is in your drink. In the [place] one drink equals one 12 oz bottle of beer (355 mL), one 5 oz glass of wine (148 mL), or one 1½ oz glass of hard liquor (44 mL).  General information  · Avoid eating more than 2,300 mg of salt a day. If you have hypertension, you may need to reduce your sodium intake to 1,500 mg a day.  · Work with your health care provider to maintain a healthy body weight or to lose weight. Ask what an ideal weight is for you.  · Get at least 30 minutes of exercise that causes your heart to beat faster (aerobic exercise) most days of the week. Activities may include walking, swimming, or biking.  · Work with your health care provider or dietitian to adjust your eating plan to your individual calorie needs.  What  foods should I eat?  Fruits  All fresh, dried, or frozen fruit. Canned fruit in natural juice (without added sugar).  Vegetables  Fresh or frozen vegetables (raw, steamed, roasted, or grilled). Low-sodium or reduced-sodium tomato and vegetable juice. Low-sodium or reduced-sodium tomato sauce and tomato paste. Low-sodium or reduced-sodium canned vegetables.  Grains  Whole-grain or whole-wheat bread. Whole-grain or whole-wheat pasta. Brown rice. Oatmeal. Quinoa. Bulgur. Whole-grain and low-sodium cereals. Giselle bread. Low-fat, low-sodium crackers. Whole-wheat flour tortillas.  Meats and other proteins  Skinless chicken or turkey. Ground chicken or turkey. Pork with fat trimmed off. Fish and seafood. Egg whites. Dried beans, peas, or lentils. Unsalted nuts, nut butters, and seeds. Unsalted canned beans. Lean cuts of beef with fat trimmed off. Low-sodium, lean precooked or cured meat, such as sausages or meat loaves.  Dairy  Low-fat (1%) or fat-free (skim) milk. Reduced-fat, low-fat, or fat-free cheeses. Nonfat, low-sodium ricotta or cottage cheese. Low-fat or nonfat yogurt. Low-fat, low-sodium cheese.  Fats and oils  Soft margarine without trans fats. Vegetable oil. Reduced-fat, low-fat, or light mayonnaise and salad dressings (reduced-sodium). Canola, safflower, olive, avocado, soybean, and sunflower oils. Avocado.  Seasonings and condiments  Herbs. Spices. Seasoning mixes without salt.  Other foods  Unsalted popcorn and pretzels. Fat-free sweets.  The items listed above may not be a complete list of foods and beverages you can eat. Contact a dietitian for more information.  What foods should I avoid?  Fruits  Canned fruit in a light or heavy syrup. Fried fruit. Fruit in cream or butter sauce.  Vegetables  Creamed or fried vegetables. Vegetables in a cheese sauce. Regular canned vegetables (not low-sodium or reduced-sodium). Regular canned tomato sauce and paste (not low-sodium or reduced-sodium). Regular tomato and  vegetable juice (not low-sodium or reduced-sodium). Pickles. Olives.  Grains  Baked goods made with fat, such as croissants, muffins, or some breads. Dry pasta or rice meal packs.  Meats and other proteins  Fatty cuts of meat. Ribs. Fried meat. Mancilla. Bologna, salami, and other precooked or cured meats, such as sausages or meat loaves. Fat from the back of a pig (fatback). Bratwurst. Salted nuts and seeds. Canned beans with added salt. Canned or smoked fish. Whole eggs or egg yolks. Chicken or turkey with skin.  Dairy  Whole or 2% milk, cream, and half-and-half. Whole or full-fat cream cheese. Whole-fat or sweetened yogurt. Full-fat cheese. Nondairy creamers. Whipped toppings. Processed cheese and cheese spreads.  Fats and oils  Butter. Stick margarine. Lard. Shortening. Ghee. Mancilla fat. Tropical oils, such as coconut, palm kernel, or palm oil.  Seasonings and condiments  Onion salt, garlic salt, seasoned salt, table salt, and sea salt. Worcestershire sauce. Tartar sauce. Barbecue sauce. Teriyaki sauce. Soy sauce, including reduced-sodium. Steak sauce. Canned and packaged gravies. Fish sauce. Oyster sauce. Cocktail sauce. Store-bought horseradish. Ketchup. Mustard. Meat flavorings and tenderizers. Bouillon cubes. Hot sauces. Pre-made or packaged marinades. Pre-made or packaged taco seasonings. Relishes. Regular salad dressings.  Other foods  Salted popcorn and pretzels.  The items listed above may not be a complete list of foods and beverages you should avoid. Contact a dietitian for more information.  Where to find more information  · National Heart, Lung, and Blood [place]: www.nhlbi.nih.gov  · American Heart Association: www.heart.org  · Academy of Nutrition and Dietetics: www.eatright.org  · National Kidney Foundation: www.kidney.org  Summary  · The DASH eating plan is a healthy eating plan that has been shown to reduce high blood pressure (hypertension). It may also reduce your risk for type 2 diabetes, heart  disease, and stroke.  · When on the DASH eating plan, aim to eat more fresh fruits and vegetables, whole grains, lean proteins, low-fat dairy, and heart-healthy fats.  · With the DASH eating plan, you should limit salt (sodium) intake to 2,300 mg a day. If you have hypertension, you may need to reduce your sodium intake to 1,500 mg a day.  · Work with your health care provider or dietitian to adjust your eating plan to your individual calorie needs.  This information is not intended to replace advice given to you by your health care provider. Make sure you discuss any questions you have with your health care provider.  Document Revised: 11/20/2020 Document Reviewed: 11/20/2020  [employer] Patient Education © 2021 [employer]      Advance Care Planning and Advance Directives     You make decisions on a daily basis - decisions about where you want to live, your career, your home, your life. Perhaps one of the most important decisions you face is your choice for future medical care. Take time to talk with your family and your healthcare team and start planning today.  Advance Care Planning is a process that can help you:  · Understand possible future healthcare decisions in light of your own experiences  · Reflect on those decision in light of your goals and values  · Discuss your decisions with those closest to you and the healthcare professionals that care for you  · Make a plan by creating a document that reflects your wishes    Surrogate Decision Maker  In the event of a medical emergency, which has left you unable to communicate or to make your own decisions, you would need someone to make decisions for you.  It is important to discuss your preferences for medical treatment with this person while you are in good health.     Qualities of a surrogate decision maker:  • Willing to take on this role and responsibility  • Knows what you want for future medical care  • Willing to follow your wishes even if they don't  agree with them  • Able to make difficult medical decisions under stressful circumstances    Advance Directives  These are legal documents you can create that will guide your healthcare team and decision maker(s) when needed. These documents can be stored in the electronic medical record.    · Living Will - a legal document to guide your care if you have a terminal condition or a serious illness and are unable to communicate. States vary by statute in document names/types, but most forms may include one or more of the following:        -  Directions regarding life-prolonging treatments        -  Directions regarding artificially provided nutrition/hydration        -  Choosing a healthcare decision maker        -  Direction regarding organ/tissue donation    · Durable Power of  for Healthcare - this document names an -in-fact to make medical decisions for you, but it may also allow this person to make personal and financial decisions for you. Please seek the advice of an  if you need this type of document.    **Advance Directives are not required and no one may discriminate against you if you do not sign one.    Medical Orders  Many states allow specific forms/orders signed by your physician to record your wishes for medical treatment in your current state of health. This form, signed in personal communication with your physician, addresses resuscitation and other medical interventions that you may or may not want.      For more information or to schedule a time with a [facility] Advance Care Planning Facilitator contact: [email or web address] or call [phone] and someone will contact you directly.

## 2021-11-05 NOTE — PROGRESS NOTES
Chief complaint:   Chief Complaint   Patient presents with   • Arm Pain     Complaints of bilateral arm pain, reports rt arm is much worse than left.    Complaints of neck weakness, no reports of neck pain.         Subjective     HPI: This is a 71-year-old female patient who we have seen for trigeminal neuralgia with revision of a retrosigmoid wound.  The we have also been seeing her for back pain and lower extremity pain.  The patient at her last appointment was doing very well with her back pain issues.  She comes in today with complaint of arm pain.  She says this has been going on since August.  She said it did get significantly worse a week ago.  She says she will very intermittently have pain in her left arm but her right arm is the biggest issue.  She has some intermittent neck pain.  Nothing really makes her neck pain worse and it is better with massage.  She has constant pain in her right arm starting around the shoulder area going all the way down into her hand.  This pain is constant.  Is worse with certain positions and better when she is not using her arm.  Denies any bowel or bladder incontinence.  She is right-hand dominant.  Denies any tobacco, alcohol, or illicit drug use.  Rates her pain on a scale 0-10 at an 8.  She says it does interfere with actives of daily living.  She also does have numbness and tingling in her right arm as well    Review of Systems   Constitutional: Positive for activity change.   Musculoskeletal: Positive for neck pain. Negative for back pain.   Neurological: Positive for numbness. Negative for weakness.   Psychiatric/Behavioral: Negative.    All other systems reviewed and are negative.       Past Medical History:   Diagnosis Date   • Acid reflux    • Arthritis    • Asthma    • Diabetes mellitus (HCC)     Type 2   • Fistula of mastoid, left    • GERD (gastroesophageal reflux disease)    • History of transfusion    • Hx of colonic polyps    • Hyperlipidemia    •  Hypertension    • Iron deficiency anemia    • Mastoid pain, left    • Numbness     left side of face   • Other osteomyelitis, other site (HCC)    • PONV (postoperative nausea and vomiting)    • Sensorineural hearing loss    • Sleep apnea     does not use machine   • Trigeminal neuralgia    • Vitamin D deficiency      Past Surgical History:   Procedure Laterality Date   • APPENDECTOMY     • BLADDER REPAIR      had vaginal repair at the same time   • BREAST BIOPSY Bilateral     benign   • CAPSULE ENDOSCOPY N/A 4/20/2018    Procedure: CAPSULE ENDOSCOPY M2A;  Surgeon: Garrett Ozuna MD;  Location: [facility] ENDOSCOPY;  Service: Gastroenterology   • COLONOSCOPY N/A 3/1/2018    Procedure: COLONOSCOPY WITH ANESTHESIA;  Surgeon: Garrett Ozuna MD;  Location: [facility] ENDOSCOPY;  Service:    • COLONOSCOPY N/A 4/16/2021    Procedure: COLONOSCOPY WITH ANESTHESIA;  Surgeon: Garrett Ozuna MD;  Location: [facility] ENDOSCOPY;  Service: Gastroenterology;  Laterality: N/A;  pre: hx colon polyps  post: polyp  Misty Pelayo MD   • COLONOSCOPY W/ POLYPECTOMY  12/31/2014    Tubular adenomatous polyp at 80 cm, Hyperplastic polyp rectum repeat exam in 3 years   • ENDOSCOPY N/A 3/1/2018    Procedure: ESOPHAGOGASTRODUODENOSCOPY WITH ANESTHESIA;  Surgeon: Garrett Ozuna MD;  Location: [facility] ENDOSCOPY;  Service:    • FLAP HEAD/NECK Left 5/17/2018    Procedure: POSSIBLE STERNOCLEIDOMASTOID FLAP;  Surgeon: Kdaeem Oconnor MD;  Location: [facility] OR;  Service: ENT   • HYSTERECTOMY     • MASTOIDECTOMY Left 5/17/2018    Procedure: Wound exploration with possible mastoidectomy; possible sternocleidomastoid flap.  Please schedule with Dr. Perez.;  Surgeon: Kadeem Oconnor MD;  Location: [facility] OR;  Service: ENT   • TRIGEMINAL NERVE DECOMPRESSION     • US GUIDED CYST ASPIRATION BREAST N/A 9/16/2021     Family History   Problem Relation Age of Onset   • Breast cancer Sister    • Breast cancer Mother    • Heart disease Father    • No Known Problems  "Brother    • No Known Problems Daughter    • No Known Problems Son    • No Known Problems Maternal Grandmother    • No Known Problems Paternal Grandmother    • No Known Problems Maternal Aunt    • No Known Problems Paternal Aunt    • Breast cancer Niece    • Colon cancer Neg Hx    • Colon polyps Neg Hx    • BRCA 1/2 Neg Hx    • Endometrial cancer Neg Hx    • Ovarian cancer Neg Hx      Social History     Tobacco Use   • Smoking status: Never Smoker   • Smokeless tobacco: Never Used   Vaping Use   • Vaping Use: Never used   Substance Use Topics   • Alcohol use: Yes     Comment: Rare   • Drug use: No     (Not in a hospital admission)    Allergies:  Zovirax [acyclovir], Meperidine, Propranolol, Sertraline hcl, Vesicare [solifenacin succinate], and Solifenacin    Objective      Vital Signs  /78 (BP Location: Right arm, Patient Position: Sitting, Cuff Size: Large Adult)   Ht 175.3 cm (69\") Comment: pt reports  Wt 77.4 kg (170 lb 9.6 oz)   Breastfeeding No   BMI 25.19 kg/m²     Physical Exam  Constitutional:       Appearance: Normal appearance. She is well-developed.   HENT:      Head: Normocephalic.   Eyes:      General: Lids are normal.      Extraocular Movements: EOM normal.      Conjunctiva/sclera: Conjunctivae normal.      Pupils: Pupils are equal, round, and reactive to light.   Cardiovascular:      Rate and Rhythm: Normal rate and regular rhythm.   Pulmonary:      Effort: Pulmonary effort is normal.      Breath sounds: Normal breath sounds.   Musculoskeletal:         General: Normal range of motion.      Cervical back: Normal range of motion.      Comments: Right arm pain   Skin:     General: Skin is warm.   Neurological:      Mental Status: She is alert and oriented to person, place, and time.      GCS: GCS eye subscore is 4. GCS verbal subscore is 5. GCS motor subscore is 6.      Cranial Nerves: No cranial nerve deficit.      Sensory: No sensory deficit.      Gait: Gait is intact.      Deep Tendon " Reflexes: Strength normal and reflexes are normal and symmetric. Reflexes normal.   Psychiatric:         Speech: Speech normal.         Behavior: Behavior normal.         Thought Content: Thought content normal.         Neurologic Exam     Mental Status   Oriented to person, place, and time.   Attention: normal. Concentration: normal.   Speech: speech is normal   Level of consciousness: alert  Normal comprehension.     Cranial Nerves     CN II   Visual fields full to confrontation.     CN III, IV, VI   Pupils are equal, round, and reactive to light.  Extraocular motions are normal.     CN V   Facial sensation intact.     CN VII   Facial expression full, symmetric.     CN VIII   CN VIII normal.     CN IX, X   CN IX normal.   CN X normal.     CN XI   CN XI normal.     CN XII   CN XII normal.     Motor Exam   Muscle bulk: normal    Strength   Strength 5/5 throughout.     Sensory Exam   Light touch normal.     Gait, Coordination, and Reflexes     Gait  Gait: normal    Reflexes   Reflexes 2+ except as noted.       Imaging review: No imaging        Assessment/Plan: I am going to send the patient for x-rays of the cervical spine as well as the right shoulder.  We will also set the patient up to do an MRI of the cervical spine.  I will follow-up with her once the imaging is completed and make further recommendation after that.  Her questions and concerns were addressed.  I will also start her on diclofenac to see this will help with the pain that she is experiencing.  Should the pain persist we can look into starting her on a Medrol Dosepak but she wants to hold off at this point and see if we can find the problem as she does have diabetes  Patient is a nonsmoker  The patient's Body mass index is 25.19 kg/m².. BMI is above normal parameters. Recommendations include: educational material and nutrition counseling  Advance Care Planning   ACP discussion was held with the patient during this visit. Patient does not have an  advance directive, information provided.      Diagnoses and all orders for this visit:    1. Cervical radiculopathy (Primary)  -     XR Spine Cervical Complete With Obli Flex Ext  -     MRI Cervical Spine Without Contrast; Future  -     XR Shoulder 2+ View Right    2. Overweight with body mass index (BMI) of 25 to 25.9 in adult    3. Nonsmoker    Other orders  -     diclofenac (VOLTAREN) 75 MG EC tablet; Take 1 tablet by mouth 2 (Two) Times a Day.  Dispense: 60 tablet; Refill: 2          I discussed the patients findings and my recommendations with patient    Ward Moffett, APRN  11/05/21  10:28 CDT

## 2021-11-10 ENCOUNTER — TELEPHONE (OUTPATIENT)
Dept: NEUROSURGERY | Facility: CLINIC | Age: 71
End: 2021-11-10

## 2021-11-10 ENCOUNTER — HOSPITAL ENCOUNTER (OUTPATIENT)
Dept: MRI IMAGING | Facility: HOSPITAL | Age: 71
Discharge: HOME | End: 2021-11-10
Admitting: NURSE PRACTITIONER

## 2021-11-10 DIAGNOSIS — M54.12 CERVICAL RADICULOPATHY: ICD-10-CM

## 2021-11-10 DIAGNOSIS — M25.511 ACUTE PAIN OF RIGHT SHOULDER: Primary | ICD-10-CM

## 2021-11-10 PROCEDURE — 72141 MRI NECK SPINE W/O DYE: CPT

## 2021-11-10 NOTE — TELEPHONE ENCOUNTER
Call patient with results of the MRI of the cervical spine.  There is 2 areas where there is narrowing but it is off to the left side and her pain issues are on the right.  The x-ray results showed AC joint hypertrophy of the right shoulder that had a prominent osteophyte formation that could be causing subacromial impingement.  We will proceed with an MRI of the right shoulder and possibly make a referral to orthopedics depending on the results of this MRI

## 2021-11-12 ENCOUNTER — HOSPITAL ENCOUNTER (OUTPATIENT)
Dept: MRI IMAGING | Facility: HOSPITAL | Age: 71
Discharge: HOME | End: 2021-11-12
Admitting: NURSE PRACTITIONER

## 2021-11-12 DIAGNOSIS — M25.511 ACUTE PAIN OF RIGHT SHOULDER: ICD-10-CM

## 2021-11-12 PROCEDURE — 73221 MRI JOINT UPR EXTREM W/O DYE: CPT

## 2021-11-15 ENCOUNTER — TELEPHONE (OUTPATIENT)
Dept: NEUROSURGERY | Facility: CLINIC | Age: 71
End: 2021-11-15

## 2021-11-15 DIAGNOSIS — M25.511 ACUTE PAIN OF RIGHT SHOULDER: Primary | ICD-10-CM

## 2021-11-15 NOTE — TELEPHONE ENCOUNTER
Called and gave MRI of the shoulder to the patient.  The patient would like a referral to orthopedics.  I will make referral to Dr. Zion Quintanilla for further evaluation.

## 2021-12-03 ENCOUNTER — OFFICE VISIT (OUTPATIENT)
Dept: ONCOLOGY | Facility: CLINIC | Age: 71
End: 2021-12-03

## 2021-12-03 VITALS
TEMPERATURE: 97.4 F | DIASTOLIC BLOOD PRESSURE: 86 MMHG | HEIGHT: 69 IN | SYSTOLIC BLOOD PRESSURE: 134 MMHG | OXYGEN SATURATION: 97 % | HEART RATE: 89 BPM | BODY MASS INDEX: 24.63 KG/M2 | WEIGHT: 166.3 LBS | RESPIRATION RATE: 16 BRPM

## 2021-12-03 DIAGNOSIS — D50.9 IRON DEFICIENCY ANEMIA, UNSPECIFIED IRON DEFICIENCY ANEMIA TYPE: Primary | ICD-10-CM

## 2021-12-03 PROCEDURE — 99214 OFFICE O/P EST MOD 30 MIN: CPT | Performed by: INTERNAL MEDICINE

## 2021-12-03 RX ORDER — CHOLECALCIFEROL (VITAMIN D3) 125 MCG
5 CAPSULE ORAL
COMMUNITY

## 2021-12-07 RX ORDER — ERGOCALCIFEROL 1.25 MG/1
50000 CAPSULE ORAL WEEKLY
Qty: 12 CAPSULE | Refills: 3 | Status: SHIPPED | OUTPATIENT
Start: 2021-12-07

## 2022-01-13 RX ORDER — MONTELUKAST SODIUM 10 MG/1
TABLET ORAL
Qty: 90 TABLET | Refills: 1 | Status: SHIPPED | OUTPATIENT
Start: 2022-01-13 | End: 2022-07-26

## 2022-01-13 NOTE — TELEPHONE ENCOUNTER
Catalino Gilmore called requesting a refill of the below medication which has been pended for you:     Requested Prescriptions     Pending Prescriptions Disp Refills    montelukast (SINGULAIR) 10 MG tablet [Pharmacy Med Name: MONTELUKAST SOD 10 MG TAB 10 Tablet] 90 tablet 1     Sig: TAKE ONE TABLET BY MOUTH EVERY DAY       Last Appointment Date: 9/13/2021  Next Appointment Date: Visit date not found    Allergies   Allergen Reactions    Inderal [Propranolol] Other (See Comments)     Bad dreams  unknown    Solifenacin Succinate Other (See Comments)     Severe constipation    Demerol Hcl [Meperidine]      halluncinations    Vesicare [Solifenacin]      Couldn't urinate    Zoloft [Sertraline Hcl]      hallucinations    Zovirax [Acyclovir] Hives and Other (See Comments)     PhX

## 2022-03-10 RX ORDER — HYDROCHLOROTHIAZIDE 12.5 MG/1
12.5 CAPSULE, GELATIN COATED ORAL DAILY
Qty: 90 CAPSULE | Refills: 3 | Status: SHIPPED | OUTPATIENT
Start: 2022-03-10

## 2022-03-29 DIAGNOSIS — E11.9 TYPE 2 DIABETES MELLITUS WITHOUT COMPLICATION, WITHOUT LONG-TERM CURRENT USE OF INSULIN (HCC): ICD-10-CM

## 2022-03-29 LAB
ALBUMIN SERPL-MCNC: 4.8 G/DL (ref 3.5–5.2)
ALP BLD-CCNC: 71 U/L (ref 35–104)
ALT SERPL-CCNC: 52 U/L (ref 5–33)
ANION GAP SERPL CALCULATED.3IONS-SCNC: 11 MMOL/L (ref 7–19)
AST SERPL-CCNC: 32 U/L (ref 5–32)
BILIRUB SERPL-MCNC: 0.4 MG/DL (ref 0.2–1.2)
BUN BLDV-MCNC: 20 MG/DL (ref 8–23)
CALCIUM SERPL-MCNC: 10.1 MG/DL (ref 8.8–10.2)
CHLORIDE BLD-SCNC: 103 MMOL/L (ref 98–111)
CHOLESTEROL, TOTAL: 141 MG/DL (ref 160–199)
CO2: 29 MMOL/L (ref 22–29)
CREAT SERPL-MCNC: 0.7 MG/DL (ref 0.5–0.9)
CREATININE URINE: 81 MG/DL (ref 4.2–622)
GFR AFRICAN AMERICAN: >59
GFR NON-AFRICAN AMERICAN: >60
GLUCOSE BLD-MCNC: 165 MG/DL (ref 74–109)
HBA1C MFR BLD: 7 % (ref 4–6)
HCT VFR BLD CALC: 43.6 % (ref 37–47)
HDLC SERPL-MCNC: 35 MG/DL (ref 65–121)
HEMOGLOBIN: 13.8 G/DL (ref 12–16)
LDL CHOLESTEROL CALCULATED: 85 MG/DL
MCH RBC QN AUTO: 30.7 PG (ref 27–31)
MCHC RBC AUTO-ENTMCNC: 31.7 G/DL (ref 33–37)
MCV RBC AUTO: 97.1 FL (ref 81–99)
MICROALBUMIN UR-MCNC: <1.2 MG/DL (ref 0–19)
MICROALBUMIN/CREAT UR-RTO: NORMAL MG/G
PDW BLD-RTO: 12.7 % (ref 11.5–14.5)
PLATELET # BLD: 228 K/UL (ref 130–400)
PMV BLD AUTO: 11.3 FL (ref 9.4–12.3)
POTASSIUM SERPL-SCNC: 5.1 MMOL/L (ref 3.5–5)
RBC # BLD: 4.49 M/UL (ref 4.2–5.4)
SODIUM BLD-SCNC: 143 MMOL/L (ref 136–145)
TOTAL PROTEIN: 6.8 G/DL (ref 6.6–8.7)
TRIGL SERPL-MCNC: 105 MG/DL (ref 0–149)
WBC # BLD: 6 K/UL (ref 4.8–10.8)

## 2022-04-12 ENCOUNTER — OFFICE VISIT (OUTPATIENT)
Dept: INTERNAL MEDICINE | Age: 72
End: 2022-04-12
Payer: MEDICARE

## 2022-04-12 VITALS
HEIGHT: 69 IN | DIASTOLIC BLOOD PRESSURE: 88 MMHG | OXYGEN SATURATION: 97 % | HEART RATE: 88 BPM | BODY MASS INDEX: 25.03 KG/M2 | SYSTOLIC BLOOD PRESSURE: 130 MMHG | WEIGHT: 169 LBS

## 2022-04-12 DIAGNOSIS — E11.9 TYPE 2 DIABETES MELLITUS WITHOUT COMPLICATION, WITHOUT LONG-TERM CURRENT USE OF INSULIN (HCC): Primary | ICD-10-CM

## 2022-04-12 DIAGNOSIS — I10 ESSENTIAL HYPERTENSION: Chronic | ICD-10-CM

## 2022-04-12 DIAGNOSIS — Z65.9 OTHER SOCIAL STRESSOR: ICD-10-CM

## 2022-04-12 DIAGNOSIS — E78.2 MIXED HYPERLIPIDEMIA: Chronic | ICD-10-CM

## 2022-04-12 DIAGNOSIS — F41.9 ANXIETY: ICD-10-CM

## 2022-04-12 PROCEDURE — G8427 DOCREV CUR MEDS BY ELIG CLIN: HCPCS | Performed by: INTERNAL MEDICINE

## 2022-04-12 PROCEDURE — G8420 CALC BMI NORM PARAMETERS: HCPCS | Performed by: INTERNAL MEDICINE

## 2022-04-12 PROCEDURE — 1036F TOBACCO NON-USER: CPT | Performed by: INTERNAL MEDICINE

## 2022-04-12 PROCEDURE — G8400 PT W/DXA NO RESULTS DOC: HCPCS | Performed by: INTERNAL MEDICINE

## 2022-04-12 PROCEDURE — 3051F HG A1C>EQUAL 7.0%<8.0%: CPT | Performed by: INTERNAL MEDICINE

## 2022-04-12 PROCEDURE — 3017F COLORECTAL CA SCREEN DOC REV: CPT | Performed by: INTERNAL MEDICINE

## 2022-04-12 PROCEDURE — 2022F DILAT RTA XM EVC RTNOPTHY: CPT | Performed by: INTERNAL MEDICINE

## 2022-04-12 PROCEDURE — 1090F PRES/ABSN URINE INCON ASSESS: CPT | Performed by: INTERNAL MEDICINE

## 2022-04-12 PROCEDURE — 1123F ACP DISCUSS/DSCN MKR DOCD: CPT | Performed by: INTERNAL MEDICINE

## 2022-04-12 PROCEDURE — 4040F PNEUMOC VAC/ADMIN/RCVD: CPT | Performed by: INTERNAL MEDICINE

## 2022-04-12 PROCEDURE — 99213 OFFICE O/P EST LOW 20 MIN: CPT | Performed by: INTERNAL MEDICINE

## 2022-04-12 SDOH — ECONOMIC STABILITY: FOOD INSECURITY: WITHIN THE PAST 12 MONTHS, THE FOOD YOU BOUGHT JUST DIDN'T LAST AND YOU DIDN'T HAVE MONEY TO GET MORE.: NEVER TRUE

## 2022-04-12 SDOH — ECONOMIC STABILITY: FOOD INSECURITY: WITHIN THE PAST 12 MONTHS, YOU WORRIED THAT YOUR FOOD WOULD RUN OUT BEFORE YOU GOT MONEY TO BUY MORE.: NEVER TRUE

## 2022-04-12 ASSESSMENT — SOCIAL DETERMINANTS OF HEALTH (SDOH): HOW HARD IS IT FOR YOU TO PAY FOR THE VERY BASICS LIKE FOOD, HOUSING, MEDICAL CARE, AND HEATING?: NOT HARD AT ALL

## 2022-04-12 NOTE — PROGRESS NOTES
Chief Complaint   Patient presents with    6 Month Follow-Up    Arthritis    Diabetes       HPI: Patient is here today to follow-up diabetes and other medical issues she is having problems with her right shoulder going to have to have that repaired under a lot of stress. She is taking a few things she was not last time I saw her. Magnesium 400mg and melatonin 5mg diclofenac 75mg once a day. KT tape also helping her shoulder.     Past Medical History:   Diagnosis Date    Calculus of gallbladder 8/20/2017    Chronic asthma without complication 0/78/3193    Essential hypertension 8/21/2017    Fatty liver 8/20/2017    Gastroesophageal reflux disease without esophagitis 8/20/2017    Lumbar disc disease     L5-S1    Migraine without aura, not intractable 8/20/2017    Mixed hyperlipidemia 8/20/2017    Obstructive sleep apnea 8/20/2017    Sacroiliitis (HCC)     Trigeminal neuralgia of left side of face 8/20/2017    Type 2 diabetes mellitus without complication ([facility] [address] 3/02/2919    Venous insufficiency     Vitamin D deficiency        Past Surgical History:   Procedure Laterality Date    EYE LID SURGERY Bilateral 2020    eye lids raised    HYSTERECTOMY, TOTAL ABDOMINAL      No BSO       Family History   Problem Relation Age of Onset    High Blood Pressure Mother     High Blood Pressure Father     Prostate Cancer Father     Diabetes Father         Type 2    Breast Cancer Sister 47    Diabetes Sister         Type 2       Social History     Socioeconomic History    Marital status:      Spouse name: Glenys Medel Number of children: 2    Years of education: 15    Highest education level: Not on file   Occupational History    Occupation: retired    Tobacco Use    Smoking status: Never Smoker    Smokeless tobacco: Never Used   Substance and Sexual Activity    Alcohol use: No    Drug use: No    Sexual activity: Yes     Partners: Male   Other Topics Concern    Not on file   Social History Narrative    Not on file     Social Determinants of Health     Financial Resource Strain: Low Risk     Difficulty of Paying Living Expenses: Not hard at all   Food Insecurity: No Food Insecurity    Worried About Running Out of Food in the Last Year: Never true    [address] in the Last Year: Never true   Transportation Needs:     Lack of Transportation (Medical): Not on file    Lack of Transportation (Non-Medical): Not on file   Physical Activity:     Days of Exercise per Week: Not on file    Minutes of Exercise per Session: Not on file   Stress:     Feeling of Stress : Not on file   Social Connections:     Frequency of Communication with Friends and Family: Not on file    Frequency of Social Gatherings with Friends and Family: Not on file    Attends Muslim Services: Not on file    Active Member of [address] [place] [address] Parents R People or Organizations: Not on file    Attends Club or Organization Meetings: Not on file    Marital Status: Not on file   Intimate Partner Violence:     Fear of Current or Ex-Partner: Not on file    Emotionally Abused: Not on file    Physically Abused: Not on file    Sexually Abused: Not on file   Housing Stability:     Unable to Pay for Housing in the Last Year: Not on file    Number of [place] in the Last Year: Not on file    Unstable Housing in the Last Year: Not on file       Allergies   Allergen Reactions    Inderal [Propranolol] Other (See Comments)     Bad dreams  unknown    Solifenacin Succinate Other (See Comments)     Severe constipation    Demerol Hcl [Meperidine]      halluncinations    Vesicare [Solifenacin]      Couldn't urinate    Zoloft [Sertraline Hcl]      hallucinations    Zovirax [Acyclovir] Hives and Other (See Comments)     PhX       Current Outpatient Medications   Medication Sig Dispense Refill    ALPRAZolam (XANAX) 0.25 MG tablet Take 1 tablet by mouth daily as needed for Anxiety for up to 60 days.  30 tablet 1    hydroCHLOROthiazide (MICROZIDE) 12.5 MG capsule TAKE 1 CAPSULE BY MOUTH DAILY 90 capsule 3    montelukast (SINGULAIR) 10 MG tablet TAKE ONE TABLET BY MOUTH EVERY DAY 90 tablet 1    vitamin D (ERGOCALCIFEROL) 1.25 MG (41126 UT) CAPS capsule TAKE 1 CAPSULE BY MOUTH ONCE A WEEK 12 capsule 3    Sure Comfort Lancets 30G MISC TEST DAILY AS NEEDED 100 each 0    verapamil (CALAN SR) 240 MG extended release tablet TAKE ONE TABLET BY MOUTH TWICE A  tablet 3    rosuvastatin (CRESTOR) 5 MG tablet TAKE 1/2 TABLET ON MON, WED, FRI 18 tablet 3    GLUCOCARD EXPRESSION TEST strip TEST DAILY AS NEEDED 100 strip 0    metFORMIN (GLUCOPHAGE) 500 MG tablet TAKE TWO TABLETS BY MOUTH TWICE A  tablet 3    albuterol (PROVENTIL) (2.5 MG/3ML) 0.083% nebulizer solution Take 3 mLs by nebulization every 6 hours as needed for Wheezing 60 each 3    budesonide (PULMICORT) 0.5 MG/2ML nebulizer suspension Take 2 mLs by nebulization 2 times daily 60 ampule 3    losartan (COZAAR) 100 MG tablet TAKE 1 TABLET BY MOUTH DAILY 90 tablet 3    aspirin-acetaminophen-caffeine (EXCEDRIN MIGRAINE) 250-250-65 MG per tablet Take 1 tablet by mouth      esomeprazole (NEXIUM) 20 MG delayed release capsule Take 20 mg by mouth every morning (before breakfast)      conjugated estrogens (PREMARIN) 0.625 MG/GM vaginal cream 1gm twice a week (this replaces Estradiol cream per insurance 4/10/18) 1 Tube 3    aspirin 81 MG tablet Take 81 mg by mouth daily      Multiple Vitamins-Minerals (PRESERVISION/LUTEIN) CAPS Take 1 capsule by mouth 2 times daily      calcium carbonate (TUMS) 500 MG chewable tablet Take 1 tablet by mouth daily       No current facility-administered medications for this visit.        Review of Systems    /88   Pulse 88   Ht 5' 9\" (1.753 m)   Wt 169 lb (76.7 kg)   SpO2 97%   BMI 24.96 kg/m²   BP Readings from Last 7 Encounters:   04/12/22 130/88   09/13/21 118/70   06/15/21 112/70   05/04/21 124/74   03/08/21 114/70   01/11/21 124/78   12/29/20 120/80 Wt Readings from Last 7 Encounters:   04/12/22 169 lb (76.7 kg)   09/13/21 164 lb (74.4 kg)   06/15/21 164 lb (74.4 kg)   05/04/21 163 lb (73.9 kg)   03/08/21 163 lb (73.9 kg)   01/11/21 163 lb (73.9 kg)   12/29/20 163 lb (73.9 kg)     BMI Readings from Last 7 Encounters:   04/12/22 24.96 kg/m²   09/13/21 24.22 kg/m²   06/15/21 24.22 kg/m²   05/04/21 24.07 kg/m²   03/08/21 24.07 kg/m²   01/11/21 24.07 kg/m²   12/29/20 24.07 kg/m²     Resp Readings from Last 7 Encounters:   01/14/19 18   01/08/18 16   08/21/17 20       Physical Exam  Constitutional:       General: She is not in acute distress. HENT:      Head: Normocephalic. Eyes:      General: No scleral icterus. Cardiovascular:      Heart sounds: Normal heart sounds. Pulmonary:      Breath sounds: Normal breath sounds. Musculoskeletal:      Cervical back: Neck supple. Lymphadenopathy:      Cervical: No cervical adenopathy. Skin:     Findings: No rash.    Psychiatric:         Mood and Affect: Mood normal.         Results for orders placed or performed in visit on 03/29/22   Microalbumin / Creatinine Urine Ratio   Result Value Ref Range    Microalbumin, Random Urine <1.20 0.00 - 19.00 mg/dL    Creatinine, Ur 81.0 4.2 - 622.0 mg/dL    Microalbumin Creatinine Ratio see below mg/g   Lipid Panel   Result Value Ref Range    Cholesterol, Total 141 (L) 160 - 199 mg/dL    Triglycerides 105 0 - 149 mg/dL    HDL 35 (L) 65 - 121 mg/dL    LDL Calculated 85 <100 mg/dL   Hemoglobin A1C   Result Value Ref Range    Hemoglobin A1C 7.0 (H) 4.0 - 6.0 %   Comprehensive Metabolic Panel   Result Value Ref Range    Sodium 143 136 - 145 mmol/L    Potassium 5.1 (H) 3.5 - 5.0 mmol/L    Chloride 103 98 - 111 mmol/L    CO2 29 22 - 29 mmol/L    Anion Gap 11 7 - 19 mmol/L    Glucose 165 (H) 74 - 109 mg/dL    BUN 20 8 - 23 mg/dL    CREATININE 0.7 0.5 - 0.9 mg/dL    GFR Non-African American >60 >60    GFR African American >59 >59    Calcium 10.1 8.8 - 10.2 mg/dL    Total Protein 6.8 6.6 - 8.7 g/dL    Albumin 4.8 3.5 - 5.2 g/dL    Total Bilirubin 0.4 0.2 - 1.2 mg/dL    Alkaline Phosphatase 71 35 - 104 U/L    ALT 52 (H) 5 - 33 U/L    AST 32 5 - 32 U/L   CBC   Result Value Ref Range    WBC 6.0 4.8 - 10.8 K/uL    RBC 4.49 4.20 - 5.40 M/uL    Hemoglobin 13.8 12.0 - 16.0 g/dL    Hematocrit 43.6 37.0 - 47.0 %    MCV 97.1 81.0 - 99.0 fL    MCH 30.7 27.0 - 31.0 pg    MCHC 31.7 (L) 33.0 - 37.0 g/dL    RDW 12.7 11.5 - 14.5 %    Platelets 193 649 - 454 K/uL    MPV 11.3 9.4 - 12.3 fL       ASSESSMENT/ PLAN:  1. Type 2 diabetes mellitus without complication, without long-term current use of insulin (HCC)  We reviewed labs and discussed repeat labs next visit continue with current meds current plan of care low-carb diet healthy diet stress reduction if possible  - CBC; Future  - Comprehensive Metabolic Panel; Future  - Hemoglobin A1C; Future    2. Anxiety  As needed Xanax prescribed she is to be cautious with its use let us know if feeling more depressed or feels like needs different medical regimen or counseling.  - ALPRAZolam (XANAX) 0.25 MG tablet; Take 1 tablet by mouth daily as needed for Anxiety for up to 60 days. Dispense: 30 tablet; Refill: 1    3. 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 4. Mixed hyperlipidemia  Continue to monitor her lipid panel continue with current medical regimen    5.  Essential hypertension  Blood pressure stable and follow

## 2022-04-13 RX ORDER — ALPRAZOLAM 0.25 MG/1
0.25 TABLET ORAL DAILY PRN
Qty: 30 TABLET | Refills: 1 | Status: SHIPPED | OUTPATIENT
Start: 2022-04-13 | End: 2022-06-12

## 2022-05-11 DIAGNOSIS — E11.9 TYPE 2 DIABETES MELLITUS WITHOUT COMPLICATION, WITHOUT LONG-TERM CURRENT USE OF INSULIN (HCC): Chronic | ICD-10-CM

## 2022-05-17 ENCOUNTER — TELEPHONE (OUTPATIENT)
Dept: ONCOLOGY | Facility: CLINIC | Age: 72
End: 2022-05-17

## 2022-05-17 NOTE — TELEPHONE ENCOUNTER
----- Message from Roselyn Reddy sent at 5/17/2022  9:22 AM CDT -----  Regarding: rescheduling    Caller: bebo tompkins    Relationship to patient: self/ wife to second patient    Best call back number: [phone]    Chief complaint: PATIENT AND PATIENT  WHO IS ALSO A PATIENT NEEDS TO RESCHEDULE BOTH PATIENT'S APPTS FROM 5/27/22 LAB AND 6/3/22 FU.    Type of visit: LAB AND FU X2    Requested date: WILL NEED TO BE A Friday  AFTER 5/27/22    If rescheduling, when is the original appointment: 5/27/22 AND 6/3/22

## 2022-05-27 ENCOUNTER — APPOINTMENT (OUTPATIENT)
Dept: LAB | Facility: HOSPITAL | Age: 72
End: 2022-05-27

## 2022-06-01 RX ORDER — ROSUVASTATIN CALCIUM 5 MG/1
TABLET, COATED ORAL
Qty: 18 TABLET | Refills: 3 | Status: SHIPPED | OUTPATIENT
Start: 2022-06-01 | End: 2022-09-02

## 2022-06-01 RX ORDER — ROSUVASTATIN CALCIUM 5 MG/1
TABLET, COATED ORAL
Qty: 18 TABLET | Refills: 0 | Status: CANCELLED | OUTPATIENT
Start: 2022-06-01

## 2022-06-02 ENCOUNTER — TELEPHONE (OUTPATIENT)
Dept: ONCOLOGY | Facility: CLINIC | Age: 72
End: 2022-06-02

## 2022-06-02 NOTE — TELEPHONE ENCOUNTER
Caller: Carlotta Dupont    Relationship: Self        What was the call regarding:    AND HER BOTH HAVE APPTS LAB 06/03  AND THEN F/U 06/10 TO SEE DR BAILON      HAS TESTED POSITIVE FOR COVID YESTERDAY AND SHOWING SYMPTOMS    [place] IS NOT SHOWING SYMPTOMS AND STILL NEGATIVE     NEEDING TO RESCHEDULE APPTS. REQUEST TO SPEAK WITH JOE MOURA TRANSFERRED TO  JOE AT THE  TO FURTHER ASSIST.

## 2022-06-03 ENCOUNTER — APPOINTMENT (OUTPATIENT)
Dept: LAB | Facility: HOSPITAL | Age: 72
End: 2022-06-03

## 2022-06-16 RX ORDER — ESTRADIOL 0.1 MG/G
CREAM VAGINAL
Qty: 42.5 G | Refills: 3 | Status: SHIPPED | OUTPATIENT
Start: 2022-06-16 | End: 2022-06-24

## 2022-06-23 DIAGNOSIS — D50.9 IRON DEFICIENCY ANEMIA, UNSPECIFIED IRON DEFICIENCY ANEMIA TYPE: Primary | ICD-10-CM

## 2022-06-24 ENCOUNTER — LAB (OUTPATIENT)
Dept: LAB | Facility: HOSPITAL | Age: 72
End: 2022-06-24

## 2022-06-24 ENCOUNTER — HOSPITAL ENCOUNTER (OUTPATIENT)
Dept: PREADMISSION TESTING | Age: 72
Discharge: HOME | End: 2022-06-28
Payer: MEDICARE

## 2022-06-24 ENCOUNTER — HOSPITAL ENCOUNTER (OUTPATIENT)
Dept: GENERAL RADIOLOGY | Age: 72
Discharge: HOME | End: 2022-06-24
Payer: MEDICARE

## 2022-06-24 VITALS — BODY MASS INDEX: 24.44 KG/M2 | WEIGHT: 165 LBS | HEIGHT: 69 IN

## 2022-06-24 DIAGNOSIS — D50.9 IRON DEFICIENCY ANEMIA, UNSPECIFIED IRON DEFICIENCY ANEMIA TYPE: ICD-10-CM

## 2022-06-24 LAB
ABO/RH: NORMAL
ALBUMIN SERPL-MCNC: 4.7 G/DL (ref 3.5–5.2)
ALBUMIN SERPL-MCNC: 4.8 G/DL (ref 3.5–5.2)
ALBUMIN/GLOB SERPL: 2 G/DL
ALP BLD-CCNC: 84 U/L (ref 35–104)
ALP SERPL-CCNC: 81 U/L (ref 39–117)
ALT SERPL W P-5'-P-CCNC: 50 U/L (ref 1–33)
ALT SERPL-CCNC: 53 U/L (ref 5–33)
ANION GAP SERPL CALCULATED.3IONS-SCNC: 13 MMOL/L (ref 5–15)
ANION GAP SERPL CALCULATED.3IONS-SCNC: 16 MMOL/L (ref 7–19)
ANTIBODY SCREEN: NORMAL
AST SERPL-CCNC: 33 U/L (ref 1–32)
AST SERPL-CCNC: 36 U/L (ref 5–32)
BASOPHILS # BLD AUTO: 0.05 10*3/MM3 (ref 0–0.2)
BASOPHILS NFR BLD AUTO: 0.8 % (ref 0–1.5)
BILIRUB SERPL-MCNC: 0.4 MG/DL (ref 0.2–1.2)
BILIRUB SERPL-MCNC: 0.4 MG/DL (ref 0–1.2)
BILIRUBIN URINE: NEGATIVE
BLOOD, URINE: NEGATIVE
BUN BLDV-MCNC: 17 MG/DL (ref 8–23)
BUN SERPL-MCNC: 21 MG/DL (ref 8–23)
BUN/CREAT SERPL: 31.8 (ref 7–25)
CALCIUM SERPL-MCNC: 9.9 MG/DL (ref 8.8–10.2)
CALCIUM SPEC-SCNC: 9.9 MG/DL (ref 8.6–10.5)
CHLORIDE BLD-SCNC: 100 MMOL/L (ref 98–111)
CHLORIDE SERPL-SCNC: 100 MMOL/L (ref 98–107)
CLARITY: CLEAR
CO2 SERPL-SCNC: 24 MMOL/L (ref 22–29)
CO2: 22 MMOL/L (ref 22–29)
COLOR: YELLOW
CREAT SERPL-MCNC: 0.66 MG/DL (ref 0.57–1)
CREAT SERPL-MCNC: 0.8 MG/DL (ref 0.5–0.9)
DEPRECATED RDW RBC AUTO: 42.6 FL (ref 37–54)
EGFRCR SERPLBLD CKD-EPI 2021: 93.9 ML/MIN/1.73
EOSINOPHIL # BLD AUTO: 0.23 10*3/MM3 (ref 0–0.4)
EOSINOPHIL NFR BLD AUTO: 3.9 % (ref 0.3–6.2)
ERYTHROCYTE [DISTWIDTH] IN BLOOD BY AUTOMATED COUNT: 12.4 % (ref 12.3–15.4)
FERRITIN SERPL-MCNC: 258.7 NG/ML (ref 13–150)
GFR AFRICAN AMERICAN: >59
GFR NON-AFRICAN AMERICAN: >60
GLOBULIN UR ELPH-MCNC: 2.4 GM/DL
GLUCOSE BLD-MCNC: 207 MG/DL (ref 74–109)
GLUCOSE SERPL-MCNC: 151 MG/DL (ref 65–99)
GLUCOSE URINE: NEGATIVE MG/DL
HCT VFR BLD AUTO: 42 % (ref 34–46.6)
HGB BLD-MCNC: 13.8 G/DL (ref 12–15.9)
IMM GRANULOCYTES # BLD AUTO: 0.02 10*3/MM3 (ref 0–0.05)
IMM GRANULOCYTES NFR BLD AUTO: 0.3 % (ref 0–0.5)
INR BLD: 0.87 (ref 0.88–1.18)
IRON 24H UR-MRATE: 56 MCG/DL (ref 37–145)
IRON SATN MFR SERPL: 15 % (ref 20–50)
KETONES, URINE: NEGATIVE MG/DL
LEUKOCYTE ESTERASE, URINE: NEGATIVE
LYMPHOCYTES # BLD AUTO: 1.79 10*3/MM3 (ref 0.7–3.1)
LYMPHOCYTES NFR BLD AUTO: 30.1 % (ref 19.6–45.3)
MCH RBC QN AUTO: 30.7 PG (ref 26.6–33)
MCHC RBC AUTO-ENTMCNC: 32.9 G/DL (ref 31.5–35.7)
MCV RBC AUTO: 93.3 FL (ref 79–97)
MONOCYTES # BLD AUTO: 0.47 10*3/MM3 (ref 0.1–0.9)
MONOCYTES NFR BLD AUTO: 7.9 % (ref 5–12)
MRSA SCREEN RT-PCR: NOT DETECTED
NEUTROPHILS NFR BLD AUTO: 3.38 10*3/MM3 (ref 1.7–7)
NEUTROPHILS NFR BLD AUTO: 57 % (ref 42.7–76)
NITRITE, URINE: NEGATIVE
NRBC BLD AUTO-RTO: 0 /100 WBC (ref 0–0.2)
PH UA: 5 (ref 5–8)
PLATELET # BLD AUTO: 207 10*3/MM3 (ref 140–450)
PMV BLD AUTO: 11.4 FL (ref 6–12)
POTASSIUM SERPL-SCNC: 3.8 MMOL/L (ref 3.5–5)
POTASSIUM SERPL-SCNC: 4.1 MMOL/L (ref 3.5–5.2)
PROT SERPL-MCNC: 7.2 G/DL (ref 6–8.5)
PROTEIN UA: NEGATIVE MG/DL
PROTHROMBIN TIME: 11.7 SEC (ref 12–14.6)
RBC # BLD AUTO: 4.5 10*6/MM3 (ref 3.77–5.28)
SODIUM BLD-SCNC: 138 MMOL/L (ref 136–145)
SODIUM SERPL-SCNC: 137 MMOL/L (ref 136–145)
SPECIFIC GRAVITY UA: 1.01 (ref 1–1.03)
TIBC SERPL-MCNC: 383 MCG/DL (ref 298–536)
TOTAL PROTEIN: 7.3 G/DL (ref 6.6–8.7)
TRANSFERRIN SERPL-MCNC: 257 MG/DL (ref 200–360)
UROBILINOGEN, URINE: 0.2 E.U./DL
WBC NRBC COR # BLD: 5.94 10*3/MM3 (ref 3.4–10.8)

## 2022-06-24 PROCEDURE — 87641 MR-STAPH DNA AMP PROBE: CPT

## 2022-06-24 PROCEDURE — 83540 ASSAY OF IRON: CPT

## 2022-06-24 PROCEDURE — 86901 BLOOD TYPING SEROLOGIC RH(D): CPT

## 2022-06-24 PROCEDURE — 71046 X-RAY EXAM CHEST 2 VIEWS: CPT

## 2022-06-24 PROCEDURE — 82728 ASSAY OF FERRITIN: CPT

## 2022-06-24 PROCEDURE — 86900 BLOOD TYPING SEROLOGIC ABO: CPT

## 2022-06-24 PROCEDURE — 86850 RBC ANTIBODY SCREEN: CPT

## 2022-06-24 PROCEDURE — 71046 X-RAY EXAM CHEST 2 VIEWS: CPT | Performed by: RADIOLOGY

## 2022-06-24 PROCEDURE — 85025 COMPLETE CBC W/AUTO DIFF WBC: CPT

## 2022-06-24 PROCEDURE — 36415 COLL VENOUS BLD VENIPUNCTURE: CPT

## 2022-06-24 PROCEDURE — 80053 COMPREHEN METABOLIC PANEL: CPT

## 2022-06-24 PROCEDURE — 85610 PROTHROMBIN TIME: CPT

## 2022-06-24 PROCEDURE — 81003 URINALYSIS AUTO W/O SCOPE: CPT

## 2022-06-24 PROCEDURE — 84466 ASSAY OF TRANSFERRIN: CPT

## 2022-06-24 PROCEDURE — 93005 ELECTROCARDIOGRAM TRACING: CPT | Performed by: ORTHOPAEDIC SURGERY

## 2022-06-24 RX ORDER — CALCIUM CARBONATE 300MG(750)
1 TABLET,CHEWABLE ORAL NIGHTLY
COMMUNITY

## 2022-06-24 RX ORDER — CHOLECALCIFEROL (VITAMIN D3) 125 MCG
5 CAPSULE ORAL NIGHTLY PRN
COMMUNITY

## 2022-06-24 RX ORDER — ALPRAZOLAM 0.25 MG/1
0.12 TABLET ORAL NIGHTLY PRN
COMMUNITY

## 2022-06-25 LAB
EKG P AXIS: 27 DEGREES
EKG P-R INTERVAL: 192 MS
EKG Q-T INTERVAL: 420 MS
EKG QRS DURATION: 86 MS
EKG QTC CALCULATION (BAZETT): 444 MS
EKG T AXIS: 61 DEGREES

## 2022-06-25 PROCEDURE — 93010 ELECTROCARDIOGRAM REPORT: CPT | Performed by: INTERNAL MEDICINE

## 2022-06-28 PROBLEM — M12.811 RIGHT ROTATOR CUFF TEAR ARTHROPATHY: Status: ACTIVE | Noted: 2022-06-28

## 2022-06-28 PROBLEM — M75.101 RIGHT ROTATOR CUFF TEAR ARTHROPATHY: Status: ACTIVE | Noted: 2022-06-28

## 2022-06-29 NOTE — OP NOTE
Patient Name: Stacia Franco  MRN: [id number]  : 1950    Annmarie Garcia 89 of SURGERY: 2022    SURGEON: Sapna Mace MD    ASSISTANT: Hali Gavin PA-C, was used as an assistant during this procedure and was utilized for patient/limb positioning, wound closure, and postoperative dressing/sling application. PREOPERATIVE DIAGNOSIS: Right Shoulder Rotator Cuff Tear Arthropathy    POSTOPERATIVE DIAGNOSIS: Right Shoulder Rotator Cuff Tear Arthropathy    PROCEDURE PERFORMED: Right Reverse Total Shoulder Arthroplasty    IMPLANTS: [employer] Re[employer]                Baseplate: small                Glenosphere: 36 + 4                Poly: + 6                Cup: 36 neutral                               Stem: 8 pressfit    ANESTHESIA USED: General endotrachial anesthesia, interscalene block    OPERATIVE INDICATIONS: 70 y.o. female with progressive loss of function and increasing pain of the upper extremity due to a massive irreparable tear of the rotator cuff. Due to loss of function, and intact deltoid, and progressive pain, a reverse shoulder arthroplasty is planned to improve function and decrease pain. Surgical evaluation was discussed and the patient wished to proceed understanding risks, benefits, and alternatives. The surgical indications were to relieve pain, improve function, and prevent future disability in regards to the shoulder pathology dictated in the aboved diagnoses. Risks included, but were not limited to, that of anesthesia, bleeding, infection, pain, damage to local structures, postoperative dislocation, need for further surgery, failure of repair, stiffness, failure of implants, and loss of function. The patient has failed a combination of the following improve pain and function: physical therapy >12 wks, corticosteroid injections, NSAIDs, activity modification.      ESTIMATED BLOOD LOSS: 150 mL    DRAINS: none     COMPLICATIONS: none    SPECIMENS: none    FINDINGS: see op note    PROCEDURE in DETAIL:  The patient was seen in the preoperative holding room, once again the informed consent was reviewed with the patient and signed. The site of surgery was marked with the patient's agreement. After being transported to the operating room, a timeout was performed identifying the correct patient as well as the operative site. Dose appropriate IV antibiotics were given prior to incision. The patient was positioned in the beach chair position, all bony prominences were protected and a sterile prep and drape was performed. The surgical site was draped with ioban dressing. A deltopectoral approach to the shoulder joint was utilized as soft tissue was dissected down the level of the cephalic vein which was taken laterally along with the deltoid. The biceps tendon was located, tenodesed to the superior border of the pectoralis major tendon insertion. The rotator interval was opened. A tagging stitch was placed in the subscapularis and with progressive external rotation of the shoulder, the tendon and underlying capsule were peeled from the less tuberosity. The humeral head was dislocated from the glenoid and strategic retractors were placed to protect the surrounding soft tissue. The axillary nerve was palpated and protected, verified with a \"tug test.\"    Beginning a the apex of the humeral head, a starting reamer was introduced into the shaft of the humerus. The proximal humeral osteotomy guide was inserted and an osteotomy was performed and 135 degrees in 20 degrees of retroversion. A protective plate was placed on the osteotomy site and attention was turned to the glenoid. Again, strategic retractors were placed surround the glenoid, the axillary nerve was protected, and a complete capsulectomy was performed. The labrum was excised.   A guidepin was placed in the inferior-central aspect of the glenoid, following by a reaming the central peg hole and the surrounding bone. The baseplate was impacted. The central screw path was tapped and measured. A central locking screw was placed. Superior and inferior locking screws were inserted. The glenosphere was then impacted without complication. Attention was turned back to the humeral side where progressively sized broaches were inserted until a stable fit was achieved. A trial cup and polyethylenes were placed until range of motion and stability were adequate. The conjoint tendon showed increased tension. With traction of the shoulder, the entire scapula was translating without dissociation of the polyethylene. Trial implants were removed, final implants impacted, and the shoulder was once again reduced showing excellent stability and range of motion. The incision was thoroughly irrigated, followed by closure in layers. The skin was closed with adhesive glue. A sterile dressing and sling were placed. Counts were correct. My assistant was utilized to provide retraction of tissues such that implants could be placed, close the incision, and place the postop dressing and sling. The patient was awakened by anesthesia, transported to the recovery room in stable condition.     POSTOPERATIVE PLAN:  1) Discharge home once pain is controlled  2) Reverse total shoulder protocol     Electronically signed by Norm Rome MD on 6/30/2022 at 10:18 AM

## 2022-06-30 ENCOUNTER — APPOINTMENT (OUTPATIENT)
Dept: GENERAL RADIOLOGY | Age: 72
End: 2022-06-30
Attending: ORTHOPAEDIC SURGERY
Payer: MEDICARE

## 2022-06-30 ENCOUNTER — ANESTHESIA (OUTPATIENT)
Dept: OPERATING ROOM | Age: 72
End: 2022-06-30
Payer: MEDICARE

## 2022-06-30 ENCOUNTER — ANESTHESIA EVENT (OUTPATIENT)
Dept: OPERATING ROOM | Age: 72
End: 2022-06-30
Payer: MEDICARE

## 2022-06-30 ENCOUNTER — HOSPITAL ENCOUNTER (OUTPATIENT)
Age: 72
Setting detail: OUTPATIENT SURGERY
Discharge: HOME | End: 2022-06-30
Attending: ORTHOPAEDIC SURGERY | Admitting: ORTHOPAEDIC SURGERY
Payer: MEDICARE

## 2022-06-30 VITALS
BODY MASS INDEX: 24.44 KG/M2 | OXYGEN SATURATION: 94 % | WEIGHT: 165 LBS | DIASTOLIC BLOOD PRESSURE: 72 MMHG | SYSTOLIC BLOOD PRESSURE: 122 MMHG | HEART RATE: 75 BPM | TEMPERATURE: 97.7 F | RESPIRATION RATE: 16 BRPM | HEIGHT: 69 IN

## 2022-06-30 DIAGNOSIS — M75.101 RIGHT ROTATOR CUFF TEAR ARTHROPATHY: Primary | ICD-10-CM

## 2022-06-30 DIAGNOSIS — M12.811 RIGHT ROTATOR CUFF TEAR ARTHROPATHY: Primary | ICD-10-CM

## 2022-06-30 LAB
GLUCOSE BLD-MCNC: 166 MG/DL (ref 70–99)
PERFORMED ON: ABNORMAL

## 2022-06-30 PROCEDURE — 7100000001 HC PACU RECOVERY - ADDTL 15 MIN: Performed by: ORTHOPAEDIC SURGERY

## 2022-06-30 PROCEDURE — 73030 X-RAY EXAM OF SHOULDER: CPT | Performed by: RADIOLOGY

## 2022-06-30 PROCEDURE — C9290 INJ, BUPIVACAINE LIPOSOME: HCPCS | Performed by: ANESTHESIOLOGY

## 2022-06-30 PROCEDURE — 6360000002 HC RX W HCPCS: Performed by: ANESTHESIOLOGY

## 2022-06-30 PROCEDURE — 64415 NJX AA&/STRD BRCH PLXS IMG: CPT | Performed by: REGISTERED NURSE

## 2022-06-30 PROCEDURE — 2500000003 HC RX 250 WO HCPCS: Performed by: ANESTHESIOLOGY

## 2022-06-30 PROCEDURE — 6360000002 HC RX W HCPCS: Performed by: ORTHOPAEDIC SURGERY

## 2022-06-30 PROCEDURE — 7100000000 HC PACU RECOVERY - FIRST 15 MIN: Performed by: ORTHOPAEDIC SURGERY

## 2022-06-30 PROCEDURE — 73030 X-RAY EXAM OF SHOULDER: CPT

## 2022-06-30 PROCEDURE — 2580000003 HC RX 258: Performed by: REGISTERED NURSE

## 2022-06-30 PROCEDURE — 2720000010 HC SURG SUPPLY STERILE: Performed by: ORTHOPAEDIC SURGERY

## 2022-06-30 PROCEDURE — 2580000003 HC RX 258: Performed by: ORTHOPAEDIC SURGERY

## 2022-06-30 PROCEDURE — A4216 STERILE WATER/SALINE, 10 ML: HCPCS | Performed by: ANESTHESIOLOGY

## 2022-06-30 PROCEDURE — 3600000005 HC SURGERY LEVEL 5 BASE: Performed by: ORTHOPAEDIC SURGERY

## 2022-06-30 PROCEDURE — 7100000010 HC PHASE II RECOVERY - FIRST 15 MIN: Performed by: ORTHOPAEDIC SURGERY

## 2022-06-30 PROCEDURE — C1769 GUIDE WIRE: HCPCS | Performed by: ORTHOPAEDIC SURGERY

## 2022-06-30 PROCEDURE — 6360000002 HC RX W HCPCS

## 2022-06-30 PROCEDURE — 2500000003 HC RX 250 WO HCPCS: Performed by: REGISTERED NURSE

## 2022-06-30 PROCEDURE — 3700000001 HC ADD 15 MINUTES (ANESTHESIA): Performed by: ORTHOPAEDIC SURGERY

## 2022-06-30 PROCEDURE — 82947 ASSAY GLUCOSE BLOOD QUANT: CPT

## 2022-06-30 PROCEDURE — 3600000015 HC SURGERY LEVEL 5 ADDTL 15MIN: Performed by: ORTHOPAEDIC SURGERY

## 2022-06-30 PROCEDURE — 2709999900 HC NON-CHARGEABLE SUPPLY: Performed by: ORTHOPAEDIC SURGERY

## 2022-06-30 PROCEDURE — 2580000003 HC RX 258: Performed by: ANESTHESIOLOGY

## 2022-06-30 PROCEDURE — 6360000002 HC RX W HCPCS: Performed by: REGISTERED NURSE

## 2022-06-30 PROCEDURE — 7100000011 HC PHASE II RECOVERY - ADDTL 15 MIN: Performed by: ORTHOPAEDIC SURGERY

## 2022-06-30 PROCEDURE — C1776 JOINT DEVICE (IMPLANTABLE): HCPCS | Performed by: ORTHOPAEDIC SURGERY

## 2022-06-30 PROCEDURE — 3700000000 HC ANESTHESIA ATTENDED CARE: Performed by: ORTHOPAEDIC SURGERY

## 2022-06-30 DEVICE — SCREW BNE L30MM DIA4.5MM UNIV SHLDR TI PERIPH LOK UNIVERSE: Type: IMPLANTABLE DEVICE | Site: SHOULDER | Status: FUNCTIONAL

## 2022-06-30 DEVICE — IMPLANTABLE DEVICE: Type: IMPLANTABLE DEVICE | Site: SHOULDER | Status: FUNCTIONAL

## 2022-06-30 DEVICE — CUP HUM DIA36MM SHLDR NEUT SUT UNIVERS REVERS: Type: IMPLANTABLE DEVICE | Site: SHOULDER | Status: FUNCTIONAL

## 2022-06-30 DEVICE — STEM HUM SZ 8 SHLDR TI UNIVERS REVERS: Type: IMPLANTABLE DEVICE | Site: SHOULDER | Status: FUNCTIONAL

## 2022-06-30 DEVICE — SCREW BNE L25MM DIA6.5MM UNIV SHLDR CTRL UNIVERSE REVERS: Type: IMPLANTABLE DEVICE | Site: SHOULDER | Status: FUNCTIONAL

## 2022-06-30 DEVICE — SCREW BNE L42MM DIA4.5MM UNIV SHLDR TI PERIPH LOK UNIVERSE: Type: IMPLANTABLE DEVICE | Site: SHOULDER | Status: FUNCTIONAL

## 2022-06-30 DEVICE — LINER HUM SM DIA36MM +6MM OFFSET SHLDR UHMWPE UNIVERS: Type: IMPLANTABLE DEVICE | Site: SHOULDER | Status: FUNCTIONAL

## 2022-06-30 DEVICE — SPHERE GLEN SM DIA36MM +4MM OFFSET LAT SHLDR UNIVERS REVERS: Type: IMPLANTABLE DEVICE | Site: SHOULDER | Status: FUNCTIONAL

## 2022-06-30 RX ORDER — HYDROMORPHONE HYDROCHLORIDE 1 MG/ML
0.25 INJECTION, SOLUTION INTRAMUSCULAR; INTRAVENOUS; SUBCUTANEOUS EVERY 5 MIN PRN
Status: DISCONTINUED | OUTPATIENT
Start: 2022-06-30 | End: 2022-06-30 | Stop reason: HOSPADM

## 2022-06-30 RX ORDER — KETAMINE HYDROCHLORIDE 50 MG/ML
INJECTION, SOLUTION, CONCENTRATE INTRAMUSCULAR; INTRAVENOUS PRN
Status: DISCONTINUED | OUTPATIENT
Start: 2022-06-30 | End: 2022-06-30 | Stop reason: SDUPTHER

## 2022-06-30 RX ORDER — BUPIVACAINE HYDROCHLORIDE 5 MG/ML
INJECTION, SOLUTION EPIDURAL; INTRACAUDAL
Status: COMPLETED | OUTPATIENT
Start: 2022-06-30 | End: 2022-06-30

## 2022-06-30 RX ORDER — ONDANSETRON 4 MG/1
4 TABLET, FILM COATED ORAL EVERY 8 HOURS PRN
Qty: 10 TABLET | Refills: 0 | Status: SHIPPED | OUTPATIENT
Start: 2022-06-30

## 2022-06-30 RX ORDER — FENTANYL CITRATE 50 UG/ML
INJECTION, SOLUTION INTRAMUSCULAR; INTRAVENOUS
Status: COMPLETED
Start: 2022-06-30 | End: 2022-06-30

## 2022-06-30 RX ORDER — FENTANYL CITRATE 50 UG/ML
100 INJECTION, SOLUTION INTRAMUSCULAR; INTRAVENOUS ONCE
Status: COMPLETED | OUTPATIENT
Start: 2022-06-30 | End: 2022-06-30

## 2022-06-30 RX ORDER — SODIUM CHLORIDE, SODIUM LACTATE, POTASSIUM CHLORIDE, CALCIUM CHLORIDE 600; 310; 30; 20 MG/100ML; MG/100ML; MG/100ML; MG/100ML
INJECTION, SOLUTION INTRAVENOUS CONTINUOUS PRN
Status: DISCONTINUED | OUTPATIENT
Start: 2022-06-30 | End: 2022-06-30 | Stop reason: SDUPTHER

## 2022-06-30 RX ORDER — OXYCODONE AND ACETAMINOPHEN 7.5; 325 MG/1; MG/1
1 TABLET ORAL EVERY 6 HOURS PRN
Qty: 20 TABLET | Refills: 0 | Status: SHIPPED | OUTPATIENT
Start: 2022-06-30 | End: 2022-07-05

## 2022-06-30 RX ORDER — MIDAZOLAM HYDROCHLORIDE 1 MG/ML
2 INJECTION INTRAMUSCULAR; INTRAVENOUS ONCE
Status: DISCONTINUED | OUTPATIENT
Start: 2022-06-30 | End: 2022-06-30 | Stop reason: HOSPADM

## 2022-06-30 RX ORDER — ROCURONIUM BROMIDE 10 MG/ML
INJECTION, SOLUTION INTRAVENOUS PRN
Status: DISCONTINUED | OUTPATIENT
Start: 2022-06-30 | End: 2022-06-30 | Stop reason: SDUPTHER

## 2022-06-30 RX ORDER — PROPOFOL 10 MG/ML
INJECTION, EMULSION INTRAVENOUS PRN
Status: DISCONTINUED | OUTPATIENT
Start: 2022-06-30 | End: 2022-06-30 | Stop reason: SDUPTHER

## 2022-06-30 RX ORDER — HYDROMORPHONE HYDROCHLORIDE 1 MG/ML
0.5 INJECTION, SOLUTION INTRAMUSCULAR; INTRAVENOUS; SUBCUTANEOUS EVERY 5 MIN PRN
Status: DISCONTINUED | OUTPATIENT
Start: 2022-06-30 | End: 2022-06-30 | Stop reason: HOSPADM

## 2022-06-30 RX ORDER — DEXAMETHASONE SODIUM PHOSPHATE 10 MG/ML
INJECTION, SOLUTION INTRAMUSCULAR; INTRAVENOUS PRN
Status: DISCONTINUED | OUTPATIENT
Start: 2022-06-30 | End: 2022-06-30 | Stop reason: SDUPTHER

## 2022-06-30 RX ORDER — ONDANSETRON 2 MG/ML
4 INJECTION INTRAMUSCULAR; INTRAVENOUS
Status: DISCONTINUED | OUTPATIENT
Start: 2022-06-30 | End: 2022-06-30 | Stop reason: HOSPADM

## 2022-06-30 RX ORDER — ONDANSETRON 2 MG/ML
INJECTION INTRAMUSCULAR; INTRAVENOUS PRN
Status: DISCONTINUED | OUTPATIENT
Start: 2022-06-30 | End: 2022-06-30 | Stop reason: SDUPTHER

## 2022-06-30 RX ORDER — LIDOCAINE HYDROCHLORIDE 10 MG/ML
INJECTION, SOLUTION EPIDURAL; INFILTRATION; INTRACAUDAL; PERINEURAL PRN
Status: DISCONTINUED | OUTPATIENT
Start: 2022-06-30 | End: 2022-06-30 | Stop reason: SDUPTHER

## 2022-06-30 RX ORDER — FENTANYL CITRATE 50 UG/ML
INJECTION, SOLUTION INTRAMUSCULAR; INTRAVENOUS PRN
Status: DISCONTINUED | OUTPATIENT
Start: 2022-06-30 | End: 2022-06-30 | Stop reason: SDUPTHER

## 2022-06-30 RX ORDER — APREPITANT 40 MG/1
40 CAPSULE ORAL ONCE
Status: COMPLETED | OUTPATIENT
Start: 2022-06-30 | End: 2022-06-30

## 2022-06-30 RX ORDER — EPHEDRINE SULFATE 50 MG/ML
INJECTION, SOLUTION INTRAVENOUS PRN
Status: DISCONTINUED | OUTPATIENT
Start: 2022-06-30 | End: 2022-06-30 | Stop reason: SDUPTHER

## 2022-06-30 RX ADMIN — FENTANYL CITRATE 100 MCG: 50 INJECTION, SOLUTION INTRAMUSCULAR; INTRAVENOUS at 08:46

## 2022-06-30 RX ADMIN — PHENYLEPHRINE HYDROCHLORIDE 100 MCG: 10 INJECTION INTRAVENOUS at 09:37

## 2022-06-30 RX ADMIN — Medication 50 MG: at 09:33

## 2022-06-30 RX ADMIN — APREPITANT 40 MG: 40 CAPSULE ORAL at 07:59

## 2022-06-30 RX ADMIN — BUPIVACAINE 10 ML: 13.3 INJECTION, SUSPENSION, LIPOSOMAL INFILTRATION at 08:49

## 2022-06-30 RX ADMIN — CEFAZOLIN SODIUM 2000 MG: 1 INJECTION, POWDER, FOR SOLUTION INTRAMUSCULAR; INTRAVENOUS at 09:39

## 2022-06-30 RX ADMIN — EPHEDRINE SULFATE 20 MG: 50 INJECTION INTRAMUSCULAR; INTRAVENOUS; SUBCUTANEOUS at 10:00

## 2022-06-30 RX ADMIN — FAMOTIDINE 20 MG: 10 INJECTION INTRAVENOUS at 07:59

## 2022-06-30 RX ADMIN — BUPIVACAINE HYDROCHLORIDE 10 ML: 5 INJECTION, SOLUTION EPIDURAL; INTRACAUDAL; PERINEURAL at 08:49

## 2022-06-30 RX ADMIN — FENTANYL CITRATE 50 MCG: 50 INJECTION, SOLUTION INTRAMUSCULAR; INTRAVENOUS at 09:19

## 2022-06-30 RX ADMIN — DEXAMETHASONE SODIUM PHOSPHATE 8 MG: 10 INJECTION, SOLUTION INTRAMUSCULAR; INTRAVENOUS at 09:35

## 2022-06-30 RX ADMIN — ROCURONIUM BROMIDE 50 MG: 10 INJECTION, SOLUTION INTRAVENOUS at 09:25

## 2022-06-30 RX ADMIN — EPHEDRINE SULFATE 10 MG: 50 INJECTION INTRAMUSCULAR; INTRAVENOUS; SUBCUTANEOUS at 09:35

## 2022-06-30 RX ADMIN — FENTANYL CITRATE 100 MCG: 50 INJECTION, SOLUTION INTRAMUSCULAR; INTRAVENOUS at 09:25

## 2022-06-30 RX ADMIN — SODIUM CHLORIDE, SODIUM LACTATE, POTASSIUM CHLORIDE, AND CALCIUM CHLORIDE: 600; 310; 30; 20 INJECTION, SOLUTION INTRAVENOUS at 10:15

## 2022-06-30 RX ADMIN — PROPOFOL 140 MG: 10 INJECTION, EMULSION INTRAVENOUS at 09:25

## 2022-06-30 RX ADMIN — SUGAMMADEX 300 MG: 100 INJECTION, SOLUTION INTRAVENOUS at 10:30

## 2022-06-30 RX ADMIN — PHENYLEPHRINE HYDROCHLORIDE 100 MCG: 10 INJECTION INTRAVENOUS at 10:02

## 2022-06-30 RX ADMIN — LIDOCAINE HYDROCHLORIDE 50 MG: 10 INJECTION, SOLUTION EPIDURAL; INFILTRATION; INTRACAUDAL; PERINEURAL at 09:25

## 2022-06-30 RX ADMIN — SODIUM CHLORIDE, SODIUM LACTATE, POTASSIUM CHLORIDE, AND CALCIUM CHLORIDE: 600; 310; 30; 20 INJECTION, SOLUTION INTRAVENOUS at 09:09

## 2022-06-30 RX ADMIN — ONDANSETRON 4 MG: 2 INJECTION INTRAMUSCULAR; INTRAVENOUS at 09:35

## 2022-06-30 RX ADMIN — EPHEDRINE SULFATE 10 MG: 50 INJECTION INTRAMUSCULAR; INTRAVENOUS; SUBCUTANEOUS at 09:37

## 2022-06-30 ASSESSMENT — LIFESTYLE VARIABLES: SMOKING_STATUS: 0

## 2022-06-30 ASSESSMENT — PAIN - FUNCTIONAL ASSESSMENT: PAIN_FUNCTIONAL_ASSESSMENT: 0-10

## 2022-06-30 NOTE — ANESTHESIA PROCEDURE NOTES
Peripheral Block    Patient location during procedure: holding area  Reason for block: post-op pain management  Start time: 6/30/2022 8:49 AM  End time: 6/30/2022 8:51 AM  Staffing  Performed: anesthesiologist   Anesthesiologist: Deniz Gant MD  Preanesthetic Checklist  Completed: patient identified, IV checked, site marked, risks and benefits discussed, surgical/procedural consents, equipment checked, pre-op evaluation, timeout performed, anesthesia consent given, oxygen available, monitors applied/VS acknowledged, fire risk safety assessment completed and verbalized and blood product R/B/A discussed and consented  Peripheral Block   Patient position: supine  Prep: ChloraPrep  Provider prep: sterile gloves and mask  Patient monitoring: continuous pulse ox and frequent blood pressure checks  Block type: Brachial plexus  Interscalene  Laterality: right  Injection technique: single-shot  Guidance: ultrasound guided  Local infiltration: lidocaine  Local infiltration: lidocaine    Needle   Needle type: Quincke   Needle gauge: 20 G  Needle localization: ultrasound guidance  Needle length: 10 cm  Assessment   Injection assessment: negative aspiration for heme, no paresthesia on injection, local visualized surrounding nerve on ultrasound and no intravascular symptoms  Paresthesia pain: none  Slow fractionated injection: yes  Hemodynamics: stable  Real-time US image taken/store: yes  Outcomes: uncomplicated and patient tolerated procedure well    Medications Administered  Bupivacaine (MARCAINE) PF injection 0.5%, 10 mL  bupivacaine liposome (EXPAREL) injection 1.3%, 10 mL

## 2022-06-30 NOTE — PROGRESS NOTES
CLINICAL PHARMACY NOTE: MEDS TO BEDS    Total # of Prescriptions Filled: 2   The following medications were delivered to the patient:  · Ondansetron 4mg  · Oxycodone-acetaminophen 7.5-325mg    Additional Documentation:  The patients  paid with cash, the medications were handed to the patients  in the patients room in [facility].

## 2022-06-30 NOTE — ANESTHESIA POSTPROCEDURE EVALUATION
Department of Anesthesiology  Postprocedure Note    Patient: Rudy Montero  MRN: [id number]  YOB: 1950  Date of evaluation: 6/30/2022      Procedure Summary     Date: 06/30/22 Room / Location: [address]    Anesthesia Start: 9772 Anesthesia Stop: 1042    Procedure: RIGHT REVERSE SHOULDER TOTAL ARTHROPLASTY (Right ) Diagnosis:       Rotator cuff syndrome of right shoulder      (Rotator cuff syndrome of right shoulder [M75.101])    Surgeons: Vidhya Bland MD Responsible Provider: MEENU Gamboa CRNA    Anesthesia Type: general ASA Status: 2          Anesthesia Type: No value filed.     Rabia Phase I: Rabia Score: 6    Rabia Phase II:        Anesthesia Post Evaluation    Patient location during evaluation: PACU  Patient participation: complete - patient participated  Level of consciousness: sleepy but conscious  Pain score: 0  Airway patency: patent  Nausea & Vomiting: no nausea and no vomiting  Complications: no  Cardiovascular status: hemodynamically stable  Respiratory status: acceptable, room air and nonlabored ventilation  Hydration status: euvolemic

## 2022-06-30 NOTE — ANESTHESIA PRE PROCEDURE
Department of Anesthesiology  Preprocedure Note       Name:  Deborah Lundberg   Age:  70 y.o.  :  1950                                          MRN:  [id number]         Date:  2022      Surgeon: Gilmer Kennedy):  Terence Vaz MD    Procedure: Procedure(s):  RIGHT REVERSE SHOULDER TOTAL ARTHROPLASTY    Medications prior to admission:   Prior to Admission medications    Medication Sig Start Date End Date Taking? Authorizing Provider   ALPRAZolam (XANAX) 0.25 MG tablet Take 0.125 mg by mouth nightly as needed.     Historical Provider, MD   Magnesium 400 MG TABS Take 1 tablet by mouth nightly    Historical Provider, MD   melatonin 5 MG TABS tablet Take 5 mg by mouth nightly as needed    Historical Provider, MD   rosuvastatin (CRESTOR) 5 MG tablet TAKE 1/2 TABLET ON MONDAY [facility] AND FRIDAY  Patient taking differently: Take 2.5 mg by mouth three times a week TAKE 1/2 TABLET ON MONDAY [facility] AND 22   Georgian Kussmaul, MD   metFORMIN (GLUCOPHAGE) 500 MG tablet TAKE TWO TABLETS BY MOUTH TWICE A DAY  Patient taking differently: Take 1,000 mg by mouth 2 times daily (with meals) TAKE TWO TABLETS BY MOUTH TWICE A DAY 22   Georgian Kussmaul, MD   hydroCHLOROthiazide (MICROZIDE) 12.5 MG capsule TAKE 1 CAPSULE BY MOUTH DAILY 3/10/22   Georgian Kussmaul, MD   montelukast (SINGULAIR) 10 MG tablet TAKE ONE TABLET BY MOUTH EVERY DAY  Patient taking differently: Take 10 mg by mouth nightly  22   Georgian Kussmaul, MD   vitamin D (ERGOCALCIFEROL) 1.25 MG (12598 UT) CAPS capsule TAKE 1 CAPSULE BY MOUTH ONCE A WEEK 21   Georgian Kussmaul, MD   Sure Comfort Lancets 30G MISC TEST DAILY AS NEEDED 21   Georgian Kussmaul, MD   verapamil (CALAN SR) 240 MG extended release tablet TAKE ONE TABLET BY MOUTH TWICE A DAY  Patient taking differently: Take 240 mg by mouth in the morning and at bedtime  21   Georgian Kussmaul, MD   GLUCOCARD EXPRESSION TEST strip TEST DAILY AS NEEDED 21   Georgian Kussmaul, MD   ketorolac (TORADOL) 10 MG tablet Take 1 tablet by mouth 3 times daily (with meals) 5/4/21 4/12/22  Luzmaria Vazquez MD   albuterol (PROVENTIL) (2.5 MG/3ML) 0.083% nebulizer solution Take 3 mLs by nebulization every 6 hours as needed for Wheezing 4/7/21   Luzmaria Vazquez MD   budesonide (PULMICORT) 0.5 MG/2ML nebulizer suspension Take 2 mLs by nebulization 2 times daily 4/7/21   Luzmaria Vazquez MD   losartan (COZAAR) 100 MG tablet TAKE 1 TABLET BY MOUTH DAILY 3/26/21   Luzmaria Vazquez MD   gabapentin (NEURONTIN) 100 MG capsule Take 1 capsule by mouth nightly for 90 days. 11/18/19 5/18/20  Luzmaria Vazquez MD   esomeprazole (NEXIUM) 20 MG delayed release capsule Take 20 mg by mouth every morning (before breakfast)    Historical Provider, MD   conjugated estrogens (PREMARIN) 0.625 MG/GM vaginal cream 1gm twice a week (this replaces Estradiol cream per insurance 4/10/18) 1/14/19   Derek Mathews MD   aspirin 81 MG tablet Take 81 mg by mouth daily    Historical Provider, MD   Multiple Vitamins-Minerals (PRESERVISION/LUTEIN) CAPS Take 1 capsule by mouth daily     Historical Provider, MD   calcium carbonate (TUMS) 500 MG chewable tablet Take 1 tablet by mouth daily    Historical Provider, MD       Current medications:    Current Facility-Administered Medications   Medication Dose Route Frequency Provider Last Rate Last Admin    ceFAZolin (ANCEF) 1,000 mg in sterile water 10 mL IV syringe  1,000 mg IntraVENous Once Aniceto Mcnulty MD           Allergies:     Allergies   Allergen Reactions    Inderal [Propranolol] Other (See Comments)     Bad dreams  unknown    Solifenacin Succinate Other (See Comments)     Severe constipation    Demerol Hcl [Meperidine]      halluncinations    Vesicare [Solifenacin]      Couldn't urinate    Zoloft [Sertraline Hcl]      hallucinations    Zovirax [Acyclovir] Hives and Other (See Comments)     PhX       Problem List:    Patient Active Problem List   Diagnosis Code    Trigeminal neuralgia of left side of face G50.0    Mixed hyperlipidemia E78.2    Type 2 diabetes mellitus without complication (HCC) Q23.6    Chronic asthma without complication O55.910    Migraine without aura, not intractable G43.009    Fatty liver K76.0    Calculus of gallbladder K80.20    Gastroesophageal reflux disease without esophagitis K21.9    Obstructive sleep apnea G47.33    Vitamin D deficiency E55.9    Essential hypertension I10    Iron deficiency anemia D50.9    Superficial postoperative wound infection T81.49XA    Mastoiditis, chronic, left - post surgical H70.12    Lumbar disc disease with radiculopathy M51.16    Sciatica of left side M54.32    Nodule of vagina N89.9    Hx of adenomatous colonic polyps Z86.010    Plantar fasciitis M72.2    Pseudophakia Z96.1    Tear film insufficiency H04.129    Abnormal EKG R94.31    Other social stressor Z65.9    Nontoxic multinodular goiter E04.2    Thyroid nodule E04.1    Osteoarthritis of spine with radiculopathy, cervical region M47.22    Presence of intraocular lens Z96.1    Right rotator cuff tear arthropathy M75.101, M12.811       Past Medical History:        Diagnosis Date    Calculus of gallbladder 8/20/2017    Chronic asthma without complication 1/92/9290    Essential hypertension 8/21/2017    Fatty liver 8/20/2017    Gastroesophageal reflux disease without esophagitis 8/20/2017    Lumbar disc disease     L5-S1    Migraine without aura, not intractable 8/20/2017    Mixed hyperlipidemia 8/20/2017    Obstructive sleep apnea 8/20/2017    Sacroiliitis ([address]     Trigeminal neuralgia of left side of face 8/20/2017    Type 2 diabetes mellitus without complication ([address] 8/71/6281    Venous insufficiency     Vitamin D deficiency        Past Surgical History:        Procedure Laterality Date    EYE LID SURGERY Bilateral 2020    eye lids raised    TOTAL ABDOMINAL HYSTERECTOMY      No BSO       Social History:    Social History     Tobacco Use    Smoking status: Never Smoker  Smokeless tobacco: Never Used   Substance Use Topics    Alcohol use: No                                Counseling given: Not Answered      Vital Signs (Current): There were no vitals filed for this visit. BP Readings from Last 3 Encounters:   04/12/22 130/88   09/13/21 118/70   06/15/21 112/70       NPO Status:                                                                                 BMI:   Wt Readings from Last 3 Encounters:   06/24/22 165 lb (74.8 kg)   04/12/22 169 lb (76.7 kg)   09/13/21 164 lb (74.4 kg)     There is no height or weight on file to calculate BMI.    CBC:   Lab Results   Component Value Date/Time    WBC 6.0 03/29/2022 08:07 AM    RBC 4.49 03/29/2022 08:07 AM    HGB 13.8 03/29/2022 08:07 AM    HCT 43.6 03/29/2022 08:07 AM    MCV 97.1 03/29/2022 08:07 AM    RDW 12.7 03/29/2022 08:07 AM     03/29/2022 08:07 AM       CMP:   Lab Results   Component Value Date/Time     06/24/2022 02:24 PM    K 3.8 06/24/2022 02:24 PM     06/24/2022 02:24 PM    CO2 22 06/24/2022 02:24 PM    BUN 17 06/24/2022 02:24 PM    CREATININE 0.8 06/24/2022 02:24 PM    GFRAA >59 06/24/2022 02:24 PM    LABGLOM >60 06/24/2022 02:24 PM    GLUCOSE 207 06/24/2022 02:24 PM    PROT 7.3 06/24/2022 02:24 PM    CALCIUM 9.9 06/24/2022 02:24 PM    BILITOT 0.4 06/24/2022 02:24 PM    ALKPHOS 84 06/24/2022 02:24 PM    AST 36 06/24/2022 02:24 PM    ALT 53 06/24/2022 02:24 PM       POC Tests: No results for input(s): POCGLU, POCNA, POCK, POCCL, POCBUN, POCHEMO, POCHCT in the last 72 hours.     Coags:   Lab Results   Component Value Date/Time    PROTIME 11.7 06/24/2022 02:24 PM    INR 0.87 06/24/2022 02:24 PM       HCG (If Applicable): No results found for: PREGTESTUR, PREGSERUM, HCG, HCGQUANT     ABGs: No results found for: PHART, PO2ART, BFM2NWY, SDF5KEL, BEART, X0TVMXRB     Type & Screen (If Applicable):  No results found for: LABABO, LABRH    Drug/Infectious Status (If Applicable):  No results found for: HIV, HEPCAB    COVID-19 Screening (If Applicable): No results found for: COVID19        Anesthesia Evaluation  Patient summary reviewed no history of anesthetic complications:   Airway: Mallampati: I  TM distance: >3 FB   Neck ROM: full  Mouth opening: > = 3 FB   Dental: normal exam         Pulmonary:normal exam  breath sounds clear to auscultation  (+) sleep apnea (Sleep elevated due to microvascular decompression for trigeminal neuralgia): on noncompliant,  asthma:     (-) recent URI and not a current smoker                           Cardiovascular:  Exercise tolerance: good (>4 METS),   (+) hypertension:,     (-) pacemaker, past MI, CABG/stent and  angina    ECG reviewed  Rhythm: regular  Rate: normal           Beta Blocker:  Not on Beta Blocker         Neuro/Psych:   (+) headaches: migraine headaches,    (-) seizures, TIA and CVA           GI/Hepatic/Renal:   (+) GERD: well controlled, liver disease (Fatty Liver):,      (-) no renal disease       Endo/Other:    (+) DiabetesType II DM, , .    (-) hypothyroidism, hyperthyroidism               Abdominal:             Vascular:     - DVT. Other Findings:           Anesthesia Plan      general     ASA 2     (Interscalene nerve block  Preop famotidine, emend)  Induction: intravenous. MIPS: Postoperative opioids intended and Prophylactic antiemetics administered. Anesthetic plan and risks discussed with patient and spouse. Use of blood products discussed with patient and spouse whom.                      Cathryn Melissa MD   6/30/2022

## 2022-06-30 NOTE — BRIEF OP NOTE
Brief Postoperative Note      Patient: John Downs  YOB: 1950  MRN: [id number]    Date of Procedure: 6/30/2022    Pre-Op Diagnosis: Rotator cuff syndrome of right shoulder [M75.101]    Post-Op Diagnosis: Same       Procedure(s):  RIGHT REVERSE SHOULDER TOTAL ARTHROPLASTY    Surgeon(s):  Obed Castrejon MD    Assistant:   Assistant: Stan Haddad  Physician Assistant: Susan Ricks PA-C    Anesthesia: General    Estimated Blood Loss (mL): less than 503     Complications: None    Specimens:   * No specimens in log *    Implants:  Implant Name Type Inv.  Item Serial No.  Lot No. LRB No. Used Action   SPHERE CARISSA SM [id number] +4MM OFFSET LAT SHLDR UNIVERS REVERS - [id number]  SPHERE CARISSA SM [id number] +4MM OFFSET LAT SHLDR [employer]IVERS [employer]VERS  [employer]- [id number] Right 1 Implanted   IMPL BASEPLATE GLENOID SM REVERS PC - [id number] Shoulder/Arm/Wrist/Hand IMPL BASEPLATE GLENOID SM REVERS   [employer]-[facility] [id number] Right 1 Implanted   SCREW BNE L25MM DIA6.5MM UNIV SHLDR CTRL UNIVERSE REVERS - [id number]  SCREW BNE L25MM DIA6.5MM UNIV SHLDR CTRL UNIVERSE REVERS  [employer]- [id number] Right 1 Implanted   SCREW BNE L30MM DIA4.5MM UNIV SHLDR [place]  SCREW BNE L30MM DIA4.5MM UNIV SHLDR [place]  [employer]- [id number] Right 1 Implanted   SCREW BNE L42MM DIA4.5MM UNIV Devyn Bolder  SCREW BNE L42MM DIA4.5MM UNIV SHLDR [place]  [employer]- [id number] Right 1 Implanted   STEM HUM SZ 8 SHLDR TI UNIVERS REVERS - [id number]  STEM HUM SZ 8 SHLDR TI UNIVERS REVERS  [employer]- [id number] Right 1 Implanted   CUP HUM [id number] SHLDR NEUT SUT UNIVERS REVERS - [id number]  CUP HUM [id number] SHLDR NEUT SUT UNIVERS REVERS  [employer]- [id number] Right 1 Implanted   LINER HUM SM [id number] +6MM OFFSET SHLDR UHMWPE UNIVERS - [id number]  LINER HUM SM [id number] +6MM OFFSET SHLDR UHMWPE [employer]- [id number] Right 1 Implanted Drains: * No LDAs found *    Findings: see op note    Electronically signed by Moon Ring MD on 6/30/2022 at 10:16 AM

## 2022-07-03 NOTE — PROGRESS NOTES
"[facility]  [address]  [place] [place] [address] [phone]    Patient Name: Carlotta Dupont  Encounter Date: 07/08/2022  YOB: 1950  Patient Number: [id number]      REASON FOR VISIT: Ms. Carlotta Dupont is a 71-year-old female who returns in follow-up of anemia with iron deficiency. It as been 42 months since last receipt of IV Injectafer. She is here alone (usually with her spouse Hung).     I have reviewed the HPI and verified with the patient the accuracy of it. No changes to interval history since the information was documented. Denis Hsieh MD 07/08/22     DIAGNOSTIC ABNORMALITIES:   1. Review of labs from the referring office dating back to 01/12/2018 includes a CBC showing a hemoglobin of 11.4, hematocrit 37.4, MCV 82.2 (81 - 99), MCH 25.1, MCHC 30 (each depressed), RDW 15.6% (elevated). CMP notable for hyperglycemia and BUN of 21 (elevated), otherwise normal with GFR greater than 60, calcium 10.1, total protein 7.6, and normal liver enzymes.   2. Labs, 02/03/2018: Hemoglobin 11.2, hematocrit 37.2, MCV 82.7, platelets 257,000, WBC 6.8. Serum iron 20, \"low iron saturation,\" ferritin 8.5. She prescribed ferrous sulfate 1 p.o. daily 02/03/2018 which she did not start due to prior intolerance, \"Bone and joint pains when I took it while I was pregnant.\" Her last colonoscopy was 12/2014 by Dr. Ozuna with 3-year followup. Stools for fecal occult blood x3 were said to be (+).   3. Labs, 02/22/2018: Hemoglobin 11.2, hematocrit 37.5, MCV 82.6, platelets 278,000, WBC 6.8. Ferritin 7.7. BUN 26, creatinine 0.6 (GFR greater than 60).  4. EGD, 03/01/2018: Normal examined duodenum. Normal stomach. Z-line regular, 38 cm from the incisors. No specimens collected.  5. Colonoscopy, 03/01/2018: The entire examined colon is normal on direct and retroflexion views. No specimens collected. Repeat 3 years.  6. Labs, " 03/19/2018: Hemoglobin 12.2, hematocrit 35.5, MCV 81.5, platelets 315,000, WBC 7.7 with a normal differential. CMP notable for glucose 157, otherwise normal with BUN 18, creatinine 0.7 (GFR 88.7), calcium 10.4, total protein 8.3, normal liver enzymes. Serum iron 30, saturation 6.22%, ferritin 5.2, TIBC 482.  7. M2A Capsule - Date capsule was swallowed: 04/20/2018. Date capsule was read : 04/28/2018. RESULTS: Gastric passage time: 1 hour 55 minutes. Small bowel passage time: 2 hours 39 minutes. The capsule was clearly seen in the colon. Conclusion: 1 small AVM was noted at 1 hour 59 minutes and 38 seconds. Several small areas of the lymphangitic ectasia noted. The capsule was seen freely passing into the cecum. There was no activity noted on this exam.    PREVIOUS INTERVENTIONS:   1. Injectafer 750 mg IV weekly x2, 03/23/2018 through 03/30/2018 (1500 mg); 10/12/2018 (750 mg); 01/11/2019 (750 mg)        Problem List Items Addressed This Visit    None       Oncology/Hematology History    No history exists.       PAST MEDICAL HISTORY:  ALLERGIES:  Allergies   Allergen Reactions   • Zovirax [Acyclovir] Rash and Provider Review Needed     Other reaction(s): Other (See Comments)  PhX  PhX   • Meperidine Provider Review Needed     halluncinations  halluncinations   • Propranolol Provider Review Needed     Other reaction(s): Other (See Comments)  Bad dreams  unknown  Bad dreams  unknown  Bad dreams   • Sertraline Hcl Provider Review Needed     hallucinations  hallucinations   • Vesicare [Solifenacin Succinate] Provider Review Needed     Severe constipation   • Solifenacin Provider Review Needed     Other reaction(s): Other (See Comments)  Phx  Phx  Couldn't urinate     CURRENT MEDICATIONS:  Outpatient Encounter Medications as of 7/8/2022   Medication Sig Dispense Refill   • albuterol sulfate  (90 Base) MCG/ACT inhaler As Needed.  5   • ALPRAZolam (XANAX) 0.25 MG tablet Take  by mouth.     • aspirin 81 MG tablet Take  81 mg by mouth.     • aspirin-acetaminophen-caffeine (EXCEDRIN MIGRAINE) 250-250-65 MG per tablet Take 1 tablet by mouth As Needed.     • calcium carbonate (TUMS) 500 MG chewable tablet Chew 1 tablet Daily.     • conjugated estrogens (PREMARIN) 0.625 MG/GM vaginal cream 1gm twice a week (this replaces Estradiol cream per insurance 4/10/18)     • diclofenac (VOLTAREN) 75 MG EC tablet Take 1 tablet by mouth 2 (Two) Times a Day. 60 tablet 2   • esomeprazole (nexIUM) 20 MG capsule Take 20 mg by mouth Every Morning Before Breakfast.     • estradiol (ESTRACE) 0.1 MG/GM vaginal cream      • famotidine (PEPCID) 10 MG tablet Take 10 mg by mouth At Night As Needed for Heartburn.     • glucose blood (Glucocard Expression Test) test strip TEST DAILY AS NEEDED     • hydrochlorothiazide (MICROZIDE) 12.5 MG capsule Take 12.5 mg by mouth Every Morning.     • losartan (COZAAR) 100 MG tablet TAKE ONE TABLET BY MOUTH EVERY DAY     • melatonin 5 MG tablet tablet Take 5 mg by mouth.     • metFORMIN (GLUCOPHAGE) 500 MG tablet Take 1,000 mg by mouth 2 (Two) Times a Day With Meals. 500m g in am and 1000mg at night     • montelukast (SINGULAIR) 10 MG tablet      • Multiple Vitamins-Minerals (PRESERVISION AREDS PO) Take  by mouth.     • rosuvastatin (CRESTOR) 5 MG tablet Take 1/2 tablet on Mon, Wed, Fri     • Sure Comfort Lancets 30G misc      • traMADol (ULTRAM) 50 MG tablet Take 1 tablet by mouth Every 8 (Eight) Hours As Needed for Moderate Pain  or Severe Pain . 21 tablet 0   • verapamil SR (CALAN-SR) 240 MG CR tablet TAKE ONE TABLET BY MOUTH TWICE A DAY     • vitamin D (ERGOCALCIFEROL) 79577 units capsule capsule Take 50,000 Units by mouth Every 7 (Seven) Days.       No facility-administered encounter medications on file as of 7/8/2022.     ADULT ILLNESSES:   Iron deficiency anemia (disorder) ( ICD-10:D50.9 ;Iron deficiency anemia, unspecified   Elevated liver function test ( ICD-10:R94.5 ;Abnormal results of liver function studies    Fatigue ( ICD-10:R53.83 ;Other fatigue   Fatty liver ( ICD-10:K76.0 ;Fatty (change of) liver, not elsewhere classified   Gallbladder calculus (disorder) ( ICD-10:K80.20 ;Calculus of gallbladder without cholecystitis without obstruction   Gastroesophageal reflux disease without esophagitis (disorder) ( ICD-10:K21.9 ;Gastro-esophageal reflux disease without esophagitis   Hyperlipidemia ( ICD-10:E78.5 ;Hyperlipidemia, unspecified   Hypertension ( ICD-10:I10 ;Essential (primary) hypertension   Lumbar disc degenerative disease ( ICD-10:M51.36 ;Other intervertebral disc degeneration, lumbar region   Migraine ( ICD-10:G43.909 ;Migraine, unspecified, not intractable, without status migrainosus   Mixed hyperlipidemia ( ICD-10:E78.2 ;Mixed hyperlipidemia   Obstructive sleep apnea ( ICD-10:G47.33 ;Obstructive sleep apnea (adult) (pediatric)   Sacroiliitis ( ICD-10:M46.1 ;Sacroiliitis, not elsewhere classified   Trigeminal neuralgia ( ICD-10:G50.0 ;Trigeminal neuralgia   Type 2 diabetes ( ICD-10:E11.9 ;Type 2 diabetes mellitus without complications   Vitamin D deficiency (disorder) ( ICD-10:E55.9 ;Vitamin D deficiency, unspecified    SURGERIES:   Mastoidectomy, 05/17/2018. 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 Perez/Dr. Oconnor)   Colonoscopy, 03/01/2018. The entire examined colon is normal on direct and retroflexion views. No specimens collected. Repeat 3 years   Cystoscopy, 09/072018 ([facility]). No urethral lesions. No tumors, stones, or other mucosal lesions in the bladder. Ureteral orifices were orthotopic and normal in their appearance   EGD, 03/01/2018. Normal examined duodenum. Normal stomach. Z-line regular, 38 cm from the incisors. No specimens collected.   Decompression of trigeminal nerve (V) (procedure), Note: microvascular, at the [facility], 05/12/2017   Biopsy of breast, x3, 1969, 1974, 1980   Vaginal and bladder repair, 07/03/2012  "  Hysterectomy without BSO, 1976   Colonoscopic polypectomy: Colonoscopy, 2014. \"3 polyps.\" Dr. Ozuna  Sacrocolpopexy and Macroplastique, 7/3/2012  09/17/2021 -FNA left breast cyst, 3 o'clock position.  Negative for malignant cells.  Cyst contents and benign epithelial groups with apocrine metaplasia.  Right shoulder replacement, 06/30/2022-Dr. Quintanilla      ADULT ILLNESSES:  Patient Active Problem List   Diagnosis Code   • Iron deficiency anemia D50.9   • Heme positive stool R19.5   • BRBPR (bright red blood per rectum) K62.5   • NSAID long-term use Z79.1   • Gastroesophageal reflux disease K21.9   • Hx of adenomatous colonic polyps Z86.010   • Nonsmoker Z78.9   • Controlled type 2 diabetes mellitus without complication, without long-term current use of insulin (HCC) E11.9   • HTN (hypertension), benign I10   • BMI 25.0-25.9,adult Z68.25   • Superficial postoperative wound infection T81.49XA   • Trigeminal neuralgia G50.0   • Open scalp wound S01.00XA   • Wound dehiscence T81.30XA   • Mastoiditis, chronic, left H70.12   • Asthma J45.909   • Calculus of gallbladder K80.20   • Fatty liver K76.0   • Lumbar disc disease with radiculopathy M51.16   • Migraine without aura, not intractable G43.009   • Mixed hyperlipidemia E78.2   • Hill's metatarsalgia G57.60   • Plantar fasciitis M72.2   • Obstructive sleep apnea G47.33   • Nodule of vagina N89.9   • Prolapse of vaginal wall N81.10   • Pseudophakia Z96.1   • Tear film insufficiency H04.129   • Vitamin D deficiency E55.9   • History of colon polyps Z86.010   • Abnormal ECG R94.31   • Thyroid nodule E04.1   • Nontoxic multinodular goiter E04.2   • Osteoarthritis of spine with radiculopathy, cervical region M47.22   • Presence of intraocular lens Z96.1   • Lumbar disc herniation M51.26   • Degeneration of lumbar or lumbosacral intervertebral disc M51.37   • Lumbar stenosis M48.061   • Lumbar radiculopathy M54.16   • Overweight with body mass index (BMI) of 25 to 25.9 in " adult E66.3, Z68.25   • Non-smoker Z78.9   • Cervical radiculopathy M54.12     SURGERIES:  Past Surgical History:   Procedure Laterality Date   • APPENDECTOMY     • BLADDER REPAIR      had vaginal repair at the same time   • BREAST BIOPSY Bilateral     benign   • CAPSULE ENDOSCOPY N/A 4/20/2018    Procedure: CAPSULE ENDOSCOPY M2A;  Surgeon: Garrett Ozuna MD;  Location:  [facility];  Service: Gastroenterology   • COLONOSCOPY N/A 3/1/2018    Procedure: COLONOSCOPY WITH ANESTHESIA;  Surgeon: Garrett Ozuna MD;  Location:  [facility];  Service:    • COLONOSCOPY N/A 4/16/2021    Procedure: COLONOSCOPY WITH ANESTHESIA;  Surgeon: Garrett Ozuna MD;  Location:  [facility];  Service: Gastroenterology;  Laterality: N/A;  pre: hx colon polyps  post: polyp  Misty Pelayo MD   • COLONOSCOPY W/ POLYPECTOMY  12/31/2014    Tubular adenomatous polyp at 80 cm, Hyperplastic polyp rectum repeat exam in 3 years   • ENDOSCOPY N/A 3/1/2018    Procedure: ESOPHAGOGASTRODUODENOSCOPY WITH ANESTHESIA;  Surgeon: Garrett Ozuna MD;  Location:  [facility];  Service:    • FLAP HEAD/NECK Left 5/17/2018    Procedure: POSSIBLE STERNOCLEIDOMASTOID FLAP;  Surgeon: Kadeem Oconnor MD;  Location: [facility] OR;  Service: ENT   • HYSTERECTOMY     • MASTOIDECTOMY Left 5/17/2018    Procedure: Wound exploration with possible mastoidectomy; possible sternocleidomastoid flap.  Please schedule with Dr. Perez.;  Surgeon: Kadeem Oconnor MD;  Location: [facility] OR;  Service: ENT   • TRIGEMINAL NERVE DECOMPRESSION     • US GUIDED CYST ASPIRATION BREAST N/A 9/16/2021     HEALTH MAINTENANCE ITEMS:  Health Maintenance Due   Topic Date Due   • Pneumococcal Vaccine 65+ (1 - PCV) Never done   • DIABETIC FOOT EXAM  Never done   • DIABETIC EYE EXAM  01/16/2021   • DXA SCAN  02/13/2021       <no information>  Last Completed Colonoscopy          COLORECTAL CANCER SCREENING (COLONOSCOPY - Every 5 Years) Next due on 4/16/2026 04/16/2021  COLONOSCOPY     04/16/2021  Surgical Procedure: COLONOSCOPY    03/01/2018  Surgical Procedure: COLONOSCOPY    03/01/2018  COLONOSCOPY    12/31/2014  SCANNED - COLONOSCOPY              Immunization History   Administered Date(s) Administered   • COVID-19 (MODERNA) 1st, 2nd, 3rd Dose Only 01/16/2021, 02/13/2021     Last Completed Mammogram          MAMMOGRAM (Every 2 Years) Next due on 9/15/2023    09/15/2021  Mammo Diagnostic Digital Tomosynthesis Bilateral With CAD    10/15/2020  Mammo Screening Digital Tomosynthesis Bilateral With CAD    09/30/2019  Mammo Screening Digital Tomosynthesis Bilateral With CAD    08/16/2018  Mammo screening digital tomosynthesis bilateral w CAD    06/12/2017  Mammo screening digital tomosynthesis bilateral w CAD    Only the first 5 history entries have been loaded, but more history exists.                  FAMILY HISTORY:  Family History   Problem Relation Age of Onset   • Breast cancer Sister    • Breast cancer Mother    • Heart disease Father    • No Known Problems Brother    • No Known Problems Daughter    • No Known Problems Son    • No Known Problems Maternal Grandmother    • No Known Problems Paternal Grandmother    • No Known Problems Maternal Aunt    • No Known Problems Paternal Aunt    • Breast cancer Niece    • Colon cancer Neg Hx    • Colon polyps Neg Hx    • BRCA 1/2 Neg Hx    • Endometrial cancer Neg Hx    • Ovarian cancer Neg Hx      SOCIAL HISTORY:  Social History     Socioeconomic History   • Marital status:    Tobacco Use   • Smoking status: Never Smoker   • Smokeless tobacco: Never Used   Vaping Use   • Vaping Use: Never used   Substance and Sexual Activity   • Alcohol use: Yes     Comment: Rare   • Drug use: No   • Sexual activity: Defer       REVIEW OF SYSTEMS:  Constitutional:   The patient's appetite is good but energy remains low to fair since right shoulder surgery, 06/30/2022.  She has gained 2 pounds (in addition to 10 pounds at her prior visit)  since her last  "visit.  She has no fevers, chills, or drenching night sweats. Her sleep habits have been disrupted by post-op shoulder discomfort.  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 Perez and Dr. Oconnor). \"Still get twinges.\"  Says repeat MRI sometime 11/2018 and 12/2018 after antibiotics (Dr. Rivers) showed clearing of the mastoid infection.  Says she has learned to live with it.  She has no sore throat, nosebleeds, or sore tongue. She has no headaches. She denies any hoarseness, nor change in voice quality.   Ocular:   She reports no eye pain, significant change in visual acuity, double vision, with occasional blurry vision, left eye (OS). Had blepharoplasties last 08/2020 per Dr. Fernandes.  Respiratory:   She reports intermittent cough from asthma, but no significant shortness of breathing, phlegm production, or unexplained chest wall pain. Uses inhalers.  Has sleep apnea but still does not tolerate mask for CPAP due to the trigeminal irritation.  Cardiovascular:   She reports no exertional chest pain, chest pressure, or chest heaviness. She reports no claudication. She reports no palpitations or symptomatic orthostasis.  Gastrointestinal:   She reports no pica, dysphagia, nausea, vomiting, postprandial abdominal pain, bloating, cramping, change in bowel habits, or discoloration of the stool. She reports no rectal bleeding. She reports no constipation or diarrhea. EGD and colonoscopy last 03/01/2018.  Genitourinary:   She reports no urinary burning, frequency, dribbling, or discoloration. She reports no difficulty controlling her bladder. She has no need to urinate frequently through the night.   Musculoskeletal:   She reports improved lower back pains and right leg radiculitis with PT per neurosurgery.  Also with right shoulder pains.  Now followed by " "Dr. Quintanilla.  Shoulder replacement on 06/30/2022. 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  Says she has been using pain meds  Extremities:   She reports no trouble with fluid retention or significant leg swelling.  Endocrine:   She reports no problems with excess thirst, excessive urination, vasomotor instability.  Heme/Lymphatic:   She reports no unexplained bleeding, bruising, petechial rashes, or swollen glands.  Skin:   She reports no itching, rashes, or lesions which won't heal.  Neuro:   She reports no loss of consciousness, seizures, fainting spells, or dizziness. She reports no weakness of face, arms, or legs. She has no difficulty with speech. She has no tremors. Has paresthesias of the soles of her feet.  She has residual left-sided facial numbness.   Psych:         She denies depression nor anxiety currently. She reports no mood swings.        VITAL SIGNS: BP (!) 158/104 Comment: Dr. Hsieh was notified.  Pulse 82   Temp 97.7 °F (36.5 °C)   Resp 16   Ht 175.3 cm (69\")   Wt 76.5 kg (168 lb 11.2 oz)   SpO2 98%   Breastfeeding No   BMI 24.91 kg/m² Body surface area is 1.92 meters squared.  Pain Score    07/08/22 0909   PainSc: 0-No pain         PHYSICAL EXAMINATION:   General:   She is a pleasant, cooperative otherwise well-developed, well-nourished, and well-kept elderly female who is comfortable at rest. She arrived in the exam room ambulatory. She appears to be her stated age. Her skin color is normal (previously slightly pale).  Head/Neck:   The patient is anicteric and atraumatic. She is wearing a surgical mask.  The trachea is midline. The neck is supple without evidence of jugular venous distention or cervical adenopathy. The left mastoid is not swollen.  Eyes:   The pupils are equal, round, and reactive to light. The extraocular movements are full. There is no scleral jaundice or erythema.   Chest:   The respiratory efforts are " normal and unhindered. The chest is clear to auscultation and percussion. There are no wheezes, rhonchi, rales, or asymmetry of breath sounds.  Cardiovascular:   The patient has a regular cardiac rate and rhythm without murmurs, rubs, or gallops. The peripheral pulses are equal and full.  Abdomen:   The belly is soft and slightly globose. There is no rebound or guarding. There is no organomegaly, mass-effect, or tenderness. Bowel sounds are active and of normal character.  Extremities:   There is no evidence of cyanosis, clubbing, or edema.  Right shoulder immobilized in a sling  Rheumatologic:   There is no overt evidence of rheumatoid deformities of the hands. There is no sausaging of the fingers. There is no sign of active synovitis. The gait is slow and subtly antalgic, favoring the right leg/hip  Cutaneous:   There are no overt rashes, disseminated lesions, purpura, or petechiae.   Lymphatics:   There is no evidence of adenopathy in the cervical, supraclavicular, axillary areas.   Neurologic:   The patient is alert, oriented, cooperative, and pleasant. She is appropriately conversant. She ambulated into the exam room without assistance and transferred from chair to exam table unaided. There is no overt dysfunction of the motor, sensory, cerebellar systems.  Psych:   Mood and affect are appropriate for circumstance. Eye contact is appropriate. Normal judgement and decision making.         LABS    Lab Results - Last 18 Months   Lab Units 06/24/22  0707 03/29/22  0807 11/05/21  0709 09/08/21  0720 05/11/21  0700 05/07/21  0730   HEMOGLOBIN g/dL 13.8 13.8 14.2 13.9 14.0 13.2   HEMATOCRIT % 42.0 43.6 42.7 42.0 41.9 38.9   MCV fL 93.3 97.1 90.9 95.5 92.9 90.7   WBC 10*3/mm3 5.94 6.0 5.95 5.4 6.4 5.09   RDW % 12.4 12.7 12.8 12.4 12.6 12.7   MPV fL 11.4 11.3 10.9 11.6 11.4 11.2   PLATELETS 10*3/mm3 207 228 214 205 215 196   IMM GRAN % % 0.3  --  0.3  --   --  0.4   NEUTROS ABS 10*3/mm3 3.38  --  2.92  --   --  2.73    LYMPHS ABS 10*3/mm3 1.79  --  2.13  --   --  1.56   MONOS ABS 10*3/mm3 0.47  --  0.53  --   --  0.45   EOS ABS 10*3/mm3 0.23  --  0.29  --   --  0.27   BASOS ABS 10*3/mm3 0.05  --  0.06  --   --  0.06   IMMATURE GRANS (ABS) 10*3/mm3 0.02  --  0.02  --   --  0.02   NRBC /100 WBC 0.0  --  0.0  --   --  0.0       Lab Results - Last 18 Months   Lab Units 06/24/22  1424 06/24/22  0707 03/29/22  0807 11/05/21  0709 09/08/21  0720 05/11/21  0700 05/07/21  0730   GLUCOSE mg/dL 207* 151* 165* 159* 135* 122* 145*   SODIUM mmol/L 138 137 143 137 140 142 137   POTASSIUM mmol/L 3.8 4.1 5.1* 4.0 4.1 4.0 4.1   TOTAL CO2 mmol/L 22  --  29  --  26 26  --    CO2 mmol/L  --  24.0  --  28.0  --   --  25.0   CHLORIDE mmol/L 100 100 103 98 101 103 101   ANION GAP mmol/L 16 13.0 11 11.0 13 13 11.0   CREATININE mg/dL 0.8 0.66 0.7 0.64 0.6 0.6 0.67   BUN mg/dL 17 21 20 18 21 22 20   BUN / CREAT RATIO   --  31.8*  --  28.1*  --   --  29.9*   CALCIUM mg/dL 9.9 9.9 10.1 9.7 9.7 10.1 9.7   EGFR IF NONAFRICN AM  >60  --  >60 91 >60 >60 87   ALK PHOS U/L 84 81 71 75 73 68 65   TOTAL PROTEIN g/dL 7.3 7.2 6.8 7.2 7.0 7.2 6.8   ALT (SGPT) U/L 53* 50* 52* 34* 33 26 28   AST (SGOT) U/L 36* 33* 32 24 20 18 21   BILIRUBIN mg/dL 0.4 0.4 0.4 0.4 0.3 0.6 0.5   ALBUMIN g/dL 4.7 4.80 4.8 4.80 4.6 4.7 4.40   GLOBULIN gm/dL  --  2.4  --  2.4  --   --  2.4       No results for input(s): MSPIKE, KAPPALAMB, IGLFLC, URICACID, FREEKAPPAL, CEA, LDH, REFLABREPO in the last [id number] hours.    Lab Results - Last 18 Months   Lab Units 06/24/22  0707 11/05/21  0709 09/08/21  0720 05/11/21  0700 05/07/21  0730   IRON mcg/dL 56 74  --   --  77   TIBC mcg/dL 383 352  --   --  349   IRON SATURATION % 15* 21  --   --  22   FERRITIN ng/mL 258.70* 238.50*  --   --  223.30*   TSH uIU/mL  --   --  1.030 1.040  --        ASSESSMENT:   1. Normocytic/borderline microcytic anemia with profound iron deficiency.   --Hemoglobin 13.8; MCV 93.3, 06/24/2022 (prior range: Hemoglobin 11.2 -  14.4; MCV 81.5 - 92.1). Resolved since last receipt of Injectafer.   2. Fatigue related to #1. Subjectively improved.   3. Profound iron deficiency with a ferritin of 2.7 on 03/03/2018.   a. Last EGD and colonoscopy 03/01/2018 (above). Both unrevealing.   b. M2A Capsule - Date capsule was swallowed: 04/20/2018. Date capsule was read : 04/28/2018. RESULTS: Gastric passage time: 1 hour 55 minutes. Small bowel passage time: 2 hours 39 minutes. The capsule was clearly seen in the colon. Conclusion: 1 small AVM was noted at 1 hour 59 minutes and 38 seconds. Several small areas of the lymphangitic ectasia noted. The capsule was seen freely passing into the cecum. There was no activity noted on this exam.   c. Abnormal CT abdomen/enterography, 05/15/2018 at [facility]: Impression: 3.9 cm focus of mass-like enhancement involving the vagina, proximal urethra, and bladder dome. Unsure if this reflects neoplasm or post surgical change; however, neoplastic process arising from the vagina certainly not excluded by CT. Recommend direct visualization. No suspicious focal lesions identified in the bowel. No suspicious adenopathy in the abdomen or pelvis. Hepatic steatosis.   d. Seen by Urology (Dr. Mae Esquivel) at [place], 08/04/2019. Stable exam, MRI and cystoscopic findings highly suggestive of the nodule representing the Macroplastique implant.  Discussed the risks of excision including recurrent stress incontinence and fistula.  Continue observation.  Continue transvaginal estrogen.  RTC 6 months..     4. Insulin-requiring type 2 diabetes. Dr. Pelayo.  5. Hyperlipidemia. Remains on Crestor  6. Trigeminal neuralgia of the left side of the face, lingering symptoms since trigeminal decompression procedure. 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   7. Gastroesophageal reflux disease with esophagitis. On Pepcid.  8. Vitamin D  deficiency. On weekly ergocalciferol.  9.  Cervical and lumbar degenerative disk disease.  Symptomatic (pain).  --05/14/2021-Lumbar MRI with abnormalities at multiple levels  --11/10/2021-MRI C-spine-degenerative changes with neuroforaminal narrowing C5-6; C6-7.  No significant spinal canal stenosis.  10. Elevated LFTs, NOS. Crestor held, hep profile and liver US ordered on 03/22. Liver US, 03/27/2019: 1. Hepatic steatosis. 2. Limited visualization of the pancreas. Hepatitis profile, 06/28/2019 negative.    11.  Complex cystic and solid mass in the left breast on mammogram/left breast ultrasound, 09/15/2021  --09/16/2021 -ultrasound-guided FNA left breast cyst, 3 o'clock position.  Negative for malignant cells.  Cyst contents and benign epithelial groups with apocrine metaplasia.  12.  Right shoulder pain.    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    13.  Hypertension.  Needs recheck            RECOMMENDATIONS:   1.  Apprised of the labs from 06/24/2022 (above) noting the normal MCV and normal Hgb, hyperglycemia, normal calcium, trivially elevated ALT/AST with otherwise normal CMP, repleted ferritin (> 100), depressed Fe sat (15%; from 21; 22), normal serum iron (from previously severe iron deficiency).   2.  Previously apprised of mammogram, breast ultrasound on 09/15/2021 and ultrasound-guided FNA of the left breast cystic lesion, 09/16/2021 (above).  3.  The rationale and potential toxicities of IV iron (anaphylaxis included) previously discussed at length. She will agree to therapy as indicated.   4.  Recheck blood pressure.  If still elevated refer back to Dr. Pelayo (PCP)   5.  Continue currently identified medications.   6.  Continue management per primary care and other specialists.  Is followed by neurosurgery regarding her spinal degenerative disease and ortho for the right shoulder issues.  7.  Return to the [place] office in  24 weeks with pre-office CMP, CBC, serum iron, iron saturation, ferritin.    MEDICAL DECISION MAKING: Moderate complexity   AMOUNT OF DATA: Limited    I spent ~36 minutes caring for Carlotta on this date of service. This time includes time spent by me in the following activities: preparing for the visit, reviewing tests, performing a medically appropriate examination and/or evaluation, counseling and educating the patient/family/caregiver, ordering medications, tests, or procedures and documenting information in the medical record      cc: MD Garrett Borrego MD

## 2022-07-08 ENCOUNTER — OFFICE VISIT (OUTPATIENT)
Dept: ONCOLOGY | Facility: CLINIC | Age: 72
End: 2022-07-08

## 2022-07-08 VITALS
BODY MASS INDEX: 24.99 KG/M2 | HEIGHT: 69 IN | TEMPERATURE: 97.7 F | OXYGEN SATURATION: 98 % | WEIGHT: 168.7 LBS | RESPIRATION RATE: 16 BRPM | HEART RATE: 82 BPM | DIASTOLIC BLOOD PRESSURE: 96 MMHG | SYSTOLIC BLOOD PRESSURE: 144 MMHG

## 2022-07-08 DIAGNOSIS — D50.9 IRON DEFICIENCY ANEMIA, UNSPECIFIED IRON DEFICIENCY ANEMIA TYPE: Primary | ICD-10-CM

## 2022-07-08 PROCEDURE — 99214 OFFICE O/P EST MOD 30 MIN: CPT | Performed by: INTERNAL MEDICINE

## 2022-07-25 DIAGNOSIS — E11.9 TYPE 2 DIABETES MELLITUS WITHOUT COMPLICATION, WITHOUT LONG-TERM CURRENT USE OF INSULIN (HCC): ICD-10-CM

## 2022-07-25 LAB
ALBUMIN SERPL-MCNC: 4.6 G/DL (ref 3.5–5.2)
ALP BLD-CCNC: 85 U/L (ref 35–104)
ALT SERPL-CCNC: 29 U/L (ref 5–33)
ANION GAP SERPL CALCULATED.3IONS-SCNC: 10 MMOL/L (ref 7–19)
AST SERPL-CCNC: 22 U/L (ref 5–32)
BILIRUB SERPL-MCNC: 0.4 MG/DL (ref 0.2–1.2)
BUN BLDV-MCNC: 14 MG/DL (ref 8–23)
CALCIUM SERPL-MCNC: 10 MG/DL (ref 8.8–10.2)
CHLORIDE BLD-SCNC: 100 MMOL/L (ref 98–111)
CO2: 30 MMOL/L (ref 22–29)
CREAT SERPL-MCNC: 0.6 MG/DL (ref 0.5–0.9)
GFR AFRICAN AMERICAN: >59
GFR NON-AFRICAN AMERICAN: >60
GLUCOSE BLD-MCNC: 146 MG/DL (ref 74–109)
HBA1C MFR BLD: 6.9 % (ref 4–6)
HCT VFR BLD CALC: 38.8 % (ref 37–47)
HEMOGLOBIN: 12.4 G/DL (ref 12–16)
MCH RBC QN AUTO: 30.5 PG (ref 27–31)
MCHC RBC AUTO-ENTMCNC: 32 G/DL (ref 33–37)
MCV RBC AUTO: 95.6 FL (ref 81–99)
PDW BLD-RTO: 13.2 % (ref 11.5–14.5)
PLATELET # BLD: 258 K/UL (ref 130–400)
PMV BLD AUTO: 11.2 FL (ref 9.4–12.3)
POTASSIUM SERPL-SCNC: 4.1 MMOL/L (ref 3.5–5)
RBC # BLD: 4.06 M/UL (ref 4.2–5.4)
SODIUM BLD-SCNC: 140 MMOL/L (ref 136–145)
TOTAL PROTEIN: 7 G/DL (ref 6.6–8.7)
WBC # BLD: 5.2 K/UL (ref 4.8–10.8)

## 2022-07-26 ENCOUNTER — OFFICE VISIT (OUTPATIENT)
Dept: INTERNAL MEDICINE | Age: 72
End: 2022-07-26
Payer: MEDICARE

## 2022-07-26 VITALS
HEART RATE: 86 BPM | WEIGHT: 165 LBS | OXYGEN SATURATION: 98 % | SYSTOLIC BLOOD PRESSURE: 122 MMHG | DIASTOLIC BLOOD PRESSURE: 80 MMHG | HEIGHT: 69 IN | BODY MASS INDEX: 24.44 KG/M2

## 2022-07-26 DIAGNOSIS — E55.9 VITAMIN D DEFICIENCY: ICD-10-CM

## 2022-07-26 DIAGNOSIS — Z96.611 S/P REVERSE TOTAL SHOULDER ARTHROPLASTY, RIGHT: ICD-10-CM

## 2022-07-26 DIAGNOSIS — I10 ESSENTIAL HYPERTENSION: Chronic | ICD-10-CM

## 2022-07-26 DIAGNOSIS — E11.9 TYPE 2 DIABETES MELLITUS WITHOUT COMPLICATION, WITHOUT LONG-TERM CURRENT USE OF INSULIN (HCC): Primary | ICD-10-CM

## 2022-07-26 PROCEDURE — G8420 CALC BMI NORM PARAMETERS: HCPCS | Performed by: INTERNAL MEDICINE

## 2022-07-26 PROCEDURE — 1090F PRES/ABSN URINE INCON ASSESS: CPT | Performed by: INTERNAL MEDICINE

## 2022-07-26 PROCEDURE — G8400 PT W/DXA NO RESULTS DOC: HCPCS | Performed by: INTERNAL MEDICINE

## 2022-07-26 PROCEDURE — 3017F COLORECTAL CA SCREEN DOC REV: CPT | Performed by: INTERNAL MEDICINE

## 2022-07-26 PROCEDURE — 3044F HG A1C LEVEL LT 7.0%: CPT | Performed by: INTERNAL MEDICINE

## 2022-07-26 PROCEDURE — 1123F ACP DISCUSS/DSCN MKR DOCD: CPT | Performed by: INTERNAL MEDICINE

## 2022-07-26 PROCEDURE — 99213 OFFICE O/P EST LOW 20 MIN: CPT | Performed by: INTERNAL MEDICINE

## 2022-07-26 PROCEDURE — 2022F DILAT RTA XM EVC RTNOPTHY: CPT | Performed by: INTERNAL MEDICINE

## 2022-07-26 PROCEDURE — G8427 DOCREV CUR MEDS BY ELIG CLIN: HCPCS | Performed by: INTERNAL MEDICINE

## 2022-07-26 PROCEDURE — 1036F TOBACCO NON-USER: CPT | Performed by: INTERNAL MEDICINE

## 2022-07-26 RX ORDER — MONTELUKAST SODIUM 10 MG/1
10 TABLET ORAL NIGHTLY
Qty: 90 TABLET | Refills: 3 | Status: SHIPPED | OUTPATIENT
Start: 2022-07-26

## 2022-07-26 RX ORDER — DICLOFENAC SODIUM 75 MG/1
75 TABLET, DELAYED RELEASE ORAL 2 TIMES DAILY
COMMUNITY
Start: 2022-07-12 | End: 2022-08-31

## 2022-07-26 ASSESSMENT — PATIENT HEALTH QUESTIONNAIRE - PHQ9
SUM OF ALL RESPONSES TO PHQ QUESTIONS 1-9: 0
SUM OF ALL RESPONSES TO PHQ9 QUESTIONS 1 & 2: 0
1. LITTLE INTEREST OR PLEASURE IN DOING THINGS: 0
2. FEELING DOWN, DEPRESSED OR HOPELESS: 0
SUM OF ALL RESPONSES TO PHQ QUESTIONS 1-9: 0

## 2022-07-26 NOTE — PROGRESS NOTES
Chief Complaint   Patient presents with    3 Month Follow-Up    Diabetes       HPI: Patient is here today to follow-up diabetes hypertension other medical issues recent right shoulder surgery reverse right total shoulder replacement right shoulder surgery     Past Medical History:   Diagnosis Date    Calculus of gallbladder 8/20/2017    Chronic asthma without complication 0/38/1450    Essential hypertension 8/21/2017    Fatty liver 8/20/2017    Gastroesophageal reflux disease without esophagitis 8/20/2017    Lumbar disc disease     L5-S1    Migraine without aura, not intractable 8/20/2017    Mixed hyperlipidemia 8/20/2017    Obstructive sleep apnea 8/20/2017    Sacroiliitis ([address]     Trigeminal neuralgia of left side of face 8/20/2017    Type 2 diabetes mellitus without complication ([address] 8/85/8787    Venous insufficiency     Vitamin D deficiency        Past Surgical History:   Procedure Laterality Date    EYE LID SURGERY Bilateral 2020    eye lids raised    HYSTERECTOMY, TOTAL ABDOMINAL (CERVIX REMOVED)      No BSO    SHOULDER SURGERY Right 6/30/2022    RIGHT REVERSE SHOULDER TOTAL ARTHROPLASTY performed by Kip Weaver MD at [facility] OR       Family History   Problem Relation Age of Onset    High Blood Pressure Mother     High Blood Pressure Father     Prostate Cancer Father     Diabetes Father         Type 2    Breast Cancer Sister 47    Diabetes Sister         Type 2       Social History     Socioeconomic History    Marital status:      Spouse name: yumi    Number of children: 2    Years of education: 14    Highest education level: Not on file   Occupational History    Occupation: retired    Tobacco Use    Smoking status: Never    Smokeless tobacco: Never   Vaping Use    Vaping Use: Never used   Substance and Sexual Activity    Alcohol use: No    Drug use: No    Sexual activity: Yes     Partners: Male   Other Topics Concern    Not on file   Social History Narrative    Not on file     Social Determinants of Health     Financial Resource Strain: Low Risk     Difficulty of Paying Living Expenses: Not hard at all   Food Insecurity: No Food Insecurity    Worried About Running Out of Food in the Last Year: Never true    Ran Out of Food in the Last Year: Never true   Transportation Needs: Not on file   Physical Activity: Not on file   Stress: Not on file   Social Connections: Not on file   Intimate Partner Violence: Not on file   Housing Stability: Not on file       Allergies   Allergen Reactions    Inderal [Propranolol] Other (See Comments)     Bad dreams  unknown    Solifenacin Succinate Other (See Comments)     Severe constipation    Demerol Hcl [Meperidine]      halluncinations    Vesicare [Solifenacin]      Couldn't urinate    Zoloft [Sertraline Hcl]      hallucinations    Zovirax [Acyclovir] Hives and Other (See Comments)     PhX       Current Outpatient Medications   Medication Sig Dispense Refill    diclofenac (VOLTAREN) 75 MG EC tablet Take 75 mg by mouth in the morning and 75 mg before bedtime. verapamil (CALAN SR) 240 MG extended release tablet TAKE ONE TABLET BY MOUTH TWICE A  tablet 3    montelukast (SINGULAIR) 10 MG tablet Take 1 tablet by mouth nightly 90 tablet 3    ondansetron (ZOFRAN) 4 MG tablet Take 1 tablet by mouth every 8 hours as needed for Nausea or Vomiting 10 tablet 0    ALPRAZolam (XANAX) 0.25 MG tablet Take 0.125 mg by mouth nightly as needed.       Magnesium 400 MG TABS Take 1 tablet by mouth nightly      melatonin 5 MG TABS tablet Take 5 mg by mouth nightly as needed      rosuvastatin (CRESTOR) 5 MG tablet TAKE 1/2 TABLET ON MONDAY WEDNESDAY AND FRIDAY (Patient taking differently: Take 2.5 mg by mouth three times a week TAKE 1/2 TABLET ON MONDAY WEDNESDAY AND FRIDAY) 18 tablet 3    metFORMIN (GLUCOPHAGE) 500 MG tablet TAKE TWO TABLETS BY MOUTH TWICE A DAY (Patient taking differently: Take 1,000 mg by mouth 2 times daily (with meals) TAKE TWO TABLETS BY MOUTH TWICE A DAY) 360 tablet 3    hydroCHLOROthiazide (MICROZIDE) 12.5 MG capsule TAKE 1 CAPSULE BY MOUTH DAILY 90 capsule 3    vitamin D (ERGOCALCIFEROL) 1.25 MG (74740 UT) CAPS capsule TAKE 1 CAPSULE BY MOUTH ONCE A WEEK 12 capsule 3    Sure Comfort Lancets 30G MISC TEST DAILY AS NEEDED 100 each 0    GLUCOCARD EXPRESSION TEST strip TEST DAILY AS NEEDED 100 strip 0    albuterol (PROVENTIL) (2.5 MG/3ML) 0.083% nebulizer solution Take 3 mLs by nebulization every 6 hours as needed for Wheezing 60 each 3    budesonide (PULMICORT) 0.5 MG/2ML nebulizer suspension Take 2 mLs by nebulization 2 times daily 60 ampule 3    losartan (COZAAR) 100 MG tablet TAKE 1 TABLET BY MOUTH DAILY 90 tablet 3    esomeprazole (NEXIUM) 20 MG delayed release capsule Take 20 mg by mouth every morning (before breakfast)      conjugated estrogens (PREMARIN) 0.625 MG/GM vaginal cream 1gm twice a week (this replaces Estradiol cream per insurance 4/10/18) 1 Tube 3    aspirin 81 MG tablet Take 81 mg by mouth daily      Multiple Vitamins-Minerals (PRESERVISION/LUTEIN) CAPS Take 1 capsule by mouth daily       calcium carbonate (TUMS) 500 MG chewable tablet Take 1 tablet by mouth daily       No current facility-administered medications for this visit.        Review of Systems    /80   Pulse 86   Ht 5' 9\" (1.753 m)   Wt 165 lb (74.8 kg)   SpO2 98%   BMI 24.37 kg/m²   BP Readings from Last 7 Encounters:   07/26/22 122/80   06/30/22 122/72   04/12/22 130/88   09/13/21 118/70   06/15/21 112/70   05/04/21 124/74   03/08/21 114/70     Wt Readings from Last 7 Encounters:   07/26/22 165 lb (74.8 kg)   06/30/22 165 lb (74.8 kg)   06/24/22 165 lb (74.8 kg)   04/12/22 169 lb (76.7 kg)   09/13/21 164 lb (74.4 kg)   06/15/21 164 lb (74.4 kg)   05/04/21 163 lb (73.9 kg)     BMI Readings from Last 7 Encounters:   07/26/22 24.37 kg/m²   06/30/22 24.37 kg/m²   06/24/22 24.37 kg/m²   04/12/22 24.96 kg/m²   09/13/21 24.22 kg/m²   06/15/21 24.22 kg/m²   05/04/21 24.07 kg/m² Resp Readings from Last 7 Encounters:   06/30/22 16   01/14/19 18   01/08/18 16   08/21/17 20       Physical Exam  Constitutional:       General: She is not in acute distress. Eyes:      General: No scleral icterus. Cardiovascular:      Heart sounds: Normal heart sounds. Pulmonary:      Breath sounds: Normal breath sounds. Musculoskeletal:      Cervical back: Neck supple. Lymphadenopathy:      Cervical: No cervical adenopathy. Skin:     Findings: No rash. Psychiatric:         Mood and Affect: Mood is anxious and depressed. Results for orders placed or performed in visit on 07/25/22   Hemoglobin A1C   Result Value Ref Range    Hemoglobin A1C 6.9 (H) 4.0 - 6.0 %   Comprehensive Metabolic Panel   Result Value Ref Range    Sodium 140 136 - 145 mmol/L    Potassium 4.1 3.5 - 5.0 mmol/L    Chloride 100 98 - 111 mmol/L    CO2 30 (H) 22 - 29 mmol/L    Anion Gap 10 7 - 19 mmol/L    Glucose 146 (H) 74 - 109 mg/dL    BUN 14 8 - 23 mg/dL    Creatinine 0.6 0.5 - 0.9 mg/dL    GFR Non-African American >60 >60    GFR African American >59 >59    Calcium 10.0 8.8 - 10.2 mg/dL    Total Protein 7.0 6.6 - 8.7 g/dL    Albumin 4.6 3.5 - 5.2 g/dL    Total Bilirubin 0.4 0.2 - 1.2 mg/dL    Alkaline Phosphatase 85 35 - 104 U/L    ALT 29 5 - 33 U/L    AST 22 5 - 32 U/L   CBC   Result Value Ref Range    WBC 5.2 4.8 - 10.8 K/uL    RBC 4.06 (L) 4.20 - 5.40 M/uL    Hemoglobin 12.4 12.0 - 16.0 g/dL    Hematocrit 38.8 37.0 - 47.0 %    MCV 95.6 81.0 - 99.0 fL    MCH 30.5 27.0 - 31.0 pg    MCHC 32.0 (L) 33.0 - 37.0 g/dL    RDW 13.2 11.5 - 14.5 %    Platelets 333 149 - 201 K/uL    MPV 11.2 9.4 - 12.3 fL       ASSESSMENT/ PLAN:  1. Type 2 diabetes mellitus without complication, without long-term current use of insulin (HCC)  Diabetes control is overall good continue to follow  - CBC; Future  - Comprehensive Metabolic Panel; Future  - Hemoglobin A1C; Future  - TSH; Future  - Lipid Panel;  Future  - Microalbumin / Creatinine Urine Ratio; Future    2. Essential hypertension  Stable blood pressure continue current plan of care    3. S/P reverse total shoulder arthroplasty, right  Doing well with right total shoulder replacement    4. Vitamin D deficiency    - Vitamin D 25 Hydroxy;  Future

## 2022-08-01 RX ORDER — VERAPAMIL HYDROCHLORIDE 240 MG/1
TABLET, FILM COATED, EXTENDED RELEASE ORAL
Qty: 180 TABLET | Refills: 3 | Status: SHIPPED | OUTPATIENT
Start: 2022-08-01

## 2022-08-13 PROBLEM — Z96.611 S/P REVERSE TOTAL SHOULDER ARTHROPLASTY, RIGHT: Status: ACTIVE | Noted: 2022-08-13

## 2022-09-02 RX ORDER — ROSUVASTATIN CALCIUM 5 MG/1
TABLET, COATED ORAL
Qty: 18 TABLET | Refills: 3 | Status: SHIPPED | OUTPATIENT
Start: 2022-09-02

## 2022-09-02 NOTE — TELEPHONE ENCOUNTER
Jebia Maciej called to request a refill on her medication.       Last office visit : 7/26/2022   Next office visit : 11/1/2022     Requested Prescriptions     Pending Prescriptions Disp Refills    rosuvastatin (CRESTOR) 5 MG tablet [Pharmacy Med Name: ROSUVASTATIN CALCIUM 5 MG T 5 Tablet] 18 tablet 0     Sig: TAKE 1/2 TABLET ON [address]

## 2022-10-13 ENCOUNTER — TRANSCRIBE ORDERS (OUTPATIENT)
Dept: ADMINISTRATIVE | Facility: HOSPITAL | Age: 72
End: 2022-10-13

## 2022-10-13 DIAGNOSIS — Z12.31 ENCOUNTER FOR SCREENING MAMMOGRAM FOR MALIGNANT NEOPLASM OF BREAST: ICD-10-CM

## 2022-10-13 DIAGNOSIS — Z12.31 VISIT FOR SCREENING MAMMOGRAM: Primary | ICD-10-CM

## 2022-10-13 DIAGNOSIS — Z11.59 SCREENING FOR VIRAL DISEASE: Primary | ICD-10-CM

## 2022-11-08 RX ORDER — ERGOCALCIFEROL 1.25 MG/1
50000 CAPSULE ORAL WEEKLY
Qty: 12 CAPSULE | Refills: 3 | Status: SHIPPED | OUTPATIENT
Start: 2022-11-08

## 2022-11-15 DIAGNOSIS — E11.9 TYPE 2 DIABETES MELLITUS WITHOUT COMPLICATION, WITHOUT LONG-TERM CURRENT USE OF INSULIN (HCC): ICD-10-CM

## 2022-11-15 DIAGNOSIS — Z12.31 VISIT FOR SCREENING MAMMOGRAM: ICD-10-CM

## 2022-11-15 DIAGNOSIS — E55.9 VITAMIN D DEFICIENCY: ICD-10-CM

## 2022-11-15 LAB
ALBUMIN SERPL-MCNC: 4.8 G/DL (ref 3.5–5.2)
ALP BLD-CCNC: 79 U/L (ref 35–104)
ALT SERPL-CCNC: 35 U/L (ref 5–33)
ANION GAP SERPL CALCULATED.3IONS-SCNC: 13 MMOL/L (ref 7–19)
AST SERPL-CCNC: 26 U/L (ref 5–32)
BILIRUB SERPL-MCNC: 0.5 MG/DL (ref 0.2–1.2)
BUN BLDV-MCNC: 22 MG/DL (ref 8–23)
CALCIUM SERPL-MCNC: 10 MG/DL (ref 8.8–10.2)
CHLORIDE BLD-SCNC: 101 MMOL/L (ref 98–111)
CHOLESTEROL, TOTAL: 142 MG/DL (ref 160–199)
CO2: 25 MMOL/L (ref 22–29)
CREAT SERPL-MCNC: 0.7 MG/DL (ref 0.5–0.9)
CREATININE URINE: 179.7 MG/DL (ref 4.2–622)
GFR SERPL CREATININE-BSD FRML MDRD: >60 ML/MIN/{1.73_M2}
GLUCOSE BLD-MCNC: 149 MG/DL (ref 74–109)
HBA1C MFR BLD: 7.4 % (ref 4–6)
HCT VFR BLD CALC: 41.2 % (ref 37–47)
HDLC SERPL-MCNC: 34 MG/DL (ref 65–121)
HEMOGLOBIN: 13.6 G/DL (ref 12–16)
LDL CHOLESTEROL CALCULATED: 88 MG/DL
MCH RBC QN AUTO: 30.3 PG (ref 27–31)
MCHC RBC AUTO-ENTMCNC: 33 G/DL (ref 33–37)
MCV RBC AUTO: 91.8 FL (ref 81–99)
MICROALBUMIN UR-MCNC: 2 MG/DL (ref 0–19)
MICROALBUMIN/CREAT UR-RTO: 11.1 MG/G
PDW BLD-RTO: 13.2 % (ref 11.5–14.5)
PLATELET # BLD: 215 K/UL (ref 130–400)
PMV BLD AUTO: 12 FL (ref 9.4–12.3)
POTASSIUM SERPL-SCNC: 4.5 MMOL/L (ref 3.5–5)
RBC # BLD: 4.49 M/UL (ref 4.2–5.4)
SODIUM BLD-SCNC: 139 MMOL/L (ref 136–145)
TOTAL PROTEIN: 6.7 G/DL (ref 6.6–8.7)
TRIGL SERPL-MCNC: 98 MG/DL (ref 0–149)
TSH SERPL DL<=0.05 MIU/L-ACNC: 1.86 UIU/ML (ref 0.27–4.2)
VITAMIN D 25-HYDROXY: 50.2 NG/ML
WBC # BLD: 5.5 K/UL (ref 4.8–10.8)

## 2022-11-17 ENCOUNTER — HOSPITAL ENCOUNTER (OUTPATIENT)
Dept: MAMMOGRAPHY | Facility: HOSPITAL | Age: 72
Discharge: HOME | End: 2022-11-17
Admitting: INTERNAL MEDICINE

## 2022-11-17 DIAGNOSIS — Z12.31 ENCOUNTER FOR SCREENING MAMMOGRAM FOR MALIGNANT NEOPLASM OF BREAST: ICD-10-CM

## 2022-11-17 PROCEDURE — 77063 BREAST TOMOSYNTHESIS BI: CPT

## 2022-11-17 PROCEDURE — 77067 SCR MAMMO BI INCL CAD: CPT

## 2022-11-21 ENCOUNTER — OFFICE VISIT (OUTPATIENT)
Dept: INTERNAL MEDICINE | Age: 72
End: 2022-11-21

## 2022-11-21 VITALS
HEART RATE: 90 BPM | BODY MASS INDEX: 25.03 KG/M2 | SYSTOLIC BLOOD PRESSURE: 120 MMHG | HEIGHT: 69 IN | OXYGEN SATURATION: 98 % | WEIGHT: 169 LBS | DIASTOLIC BLOOD PRESSURE: 80 MMHG

## 2022-11-21 DIAGNOSIS — E78.2 MIXED HYPERLIPIDEMIA: Chronic | ICD-10-CM

## 2022-11-21 DIAGNOSIS — I10 ESSENTIAL HYPERTENSION: Chronic | ICD-10-CM

## 2022-11-21 DIAGNOSIS — E11.9 TYPE 2 DIABETES MELLITUS WITHOUT COMPLICATION, WITHOUT LONG-TERM CURRENT USE OF INSULIN (HCC): Chronic | ICD-10-CM

## 2022-11-21 DIAGNOSIS — F41.9 ANXIETY: ICD-10-CM

## 2022-11-21 DIAGNOSIS — G50.0 TRIGEMINAL NEURALGIA OF LEFT SIDE OF FACE: Chronic | ICD-10-CM

## 2022-11-21 DIAGNOSIS — Z96.611 S/P REVERSE TOTAL SHOULDER ARTHROPLASTY, RIGHT: ICD-10-CM

## 2022-11-21 DIAGNOSIS — J45.909 CHRONIC ASTHMA WITHOUT COMPLICATION, UNSPECIFIED ASTHMA SEVERITY, UNSPECIFIED WHETHER PERSISTENT: Chronic | ICD-10-CM

## 2022-11-21 DIAGNOSIS — Z00.00 MEDICARE ANNUAL WELLNESS VISIT, SUBSEQUENT: Primary | ICD-10-CM

## 2022-11-21 RX ORDER — BLOOD-GLUCOSE METER
EACH MISCELLANEOUS
Qty: 100 STRIP | Refills: 0 | Status: CANCELLED | OUTPATIENT
Start: 2022-11-21

## 2022-11-21 RX ORDER — ALPRAZOLAM 0.25 MG/1
0.12 TABLET ORAL NIGHTLY PRN
Qty: 30 TABLET | Refills: 1 | Status: CANCELLED | OUTPATIENT
Start: 2022-11-21 | End: 2023-01-20

## 2022-11-21 ASSESSMENT — LIFESTYLE VARIABLES
HOW OFTEN DO YOU HAVE A DRINK CONTAINING ALCOHOL: MONTHLY OR LESS
HOW MANY STANDARD DRINKS CONTAINING ALCOHOL DO YOU HAVE ON A TYPICAL DAY: 1 OR 2

## 2022-11-21 ASSESSMENT — PATIENT HEALTH QUESTIONNAIRE - PHQ9
SUM OF ALL RESPONSES TO PHQ9 QUESTIONS 1 & 2: 0
SUM OF ALL RESPONSES TO PHQ QUESTIONS 1-9: 0
1. LITTLE INTEREST OR PLEASURE IN DOING THINGS: 0
2. FEELING DOWN, DEPRESSED OR HOPELESS: 0
SUM OF ALL RESPONSES TO PHQ QUESTIONS 1-9: 0

## 2022-11-21 NOTE — PROGRESS NOTES
Chief Complaint   Patient presents with    Medicare AWV       HPI: Patient is here today for Medicare annual wellness visit and to follow-up diabetes obstructive sleep apnea hypertension and other medical issues she had right shoulder surgery and doing better but still recovering having a little bit of flareup of her trigeminal neuralgia. Right shoulder surgery - -- left trigeminal neuralgia- eye gets irritated at times. BS running higher after shoulder surgery.       Past Medical History:   Diagnosis Date    Calculus of gallbladder 8/20/2017    Chronic asthma without complication 5/51/1403    Essential hypertension 8/21/2017    Fatty liver 8/20/2017    Gastroesophageal reflux disease without esophagitis 8/20/2017    Lumbar disc disease     L5-S1    Migraine without aura, not intractable 8/20/2017    Mixed hyperlipidemia 8/20/2017    Obstructive sleep apnea 8/20/2017    Sacroiliitis ([address]     Trigeminal neuralgia of left side of face 8/20/2017    Type 2 diabetes mellitus without complication ([address] 3/67/9131    Venous insufficiency     Vitamin D deficiency        Past Surgical History:   Procedure Laterality Date    EYE LID SURGERY Bilateral 2020    eye lids raised    HYSTERECTOMY, TOTAL ABDOMINAL (CERVIX REMOVED)      No BSO    SHOULDER SURGERY Right 6/30/2022    RIGHT REVERSE SHOULDER TOTAL ARTHROPLASTY performed by Edison Uribe MD at [facility] OR       Family History   Problem Relation Age of Onset    High Blood Pressure Mother     High Blood Pressure Father     Prostate Cancer Father     Diabetes Father         Type 2    Breast Cancer Sister 47    Diabetes Sister         Type 2       Social History     Socioeconomic History    Marital status:      Spouse name: yumi    Number of children: 2    Years of education: 14    Highest education level: Not on file   Occupational History    Occupation: retired    Tobacco Use    Smoking status: Never    Smokeless tobacco: Never   Vaping Use    Vaping Use: Never used   Substance and Sexual Activity    Alcohol use: No    Drug use: No    Sexual activity: Yes     Partners: Male   Other Topics Concern    Not on file   Social History Narrative    Not on file     Social Determinants of Health     Financial Resource Strain: Low Risk     Difficulty of Paying Living Expenses: Not hard at all   Food Insecurity: No Food Insecurity    Worried About Running Out of Food in the Last Year: Never true    Ran Out of Food in the Last Year: Never true   Transportation Needs: Not on file   Physical Activity: Insufficiently Active    Days of Exercise per Week: 2 days    Minutes of Exercise per Session: 30 min   Stress: Not on file   Social Connections: Not on file   Intimate Partner Violence: Not on file   Housing Stability: Not on file       Allergies   Allergen Reactions    Inderal [Propranolol] Other (See Comments)     Bad dreams  unknown    Solifenacin Succinate Other (See Comments)     Severe constipation    Demerol Hcl [Meperidine]      halluncinations    Vesicare [Solifenacin]      Couldn't urinate    Zoloft [Sertraline Hcl]      hallucinations    Zovirax [Acyclovir] Hives and Other (See Comments)     PhX       Current Outpatient Medications   Medication Sig Dispense Refill    ALPRAZolam (XANAX) 0.25 MG tablet Take 0.5 tablets by mouth nightly as needed for Sleep for up to 90 days. 45 tablet 0    GLUCOCARD EXPRESSION TEST strip Inject 1 each into the skin daily As needed.  100 strip 0    Sure Comfort Lancets 30G MISC TEST DAILY AS NEEDED 100 each 0    vitamin D (ERGOCALCIFEROL) 1.25 MG (63966 UT) CAPS capsule TAKE 1 CAPSULE BY MOUTH ONCE A WEEK 12 capsule 3    rosuvastatin (CRESTOR) 5 MG tablet TAKE 1/2 TABLET ON MONDAY WEDNESDAY AND FRIDAY 18 tablet 3    verapamil (CALAN SR) 240 MG extended release tablet TAKE ONE TABLET BY MOUTH TWICE A  tablet 3    montelukast (SINGULAIR) 10 MG tablet Take 1 tablet by mouth nightly 90 tablet 3    diclofenac (VOLTAREN) 75 MG EC tablet Take 75 mg by mouth in the morning and 75 mg before bedtime. ondansetron (ZOFRAN) 4 MG tablet Take 1 tablet by mouth every 8 hours as needed for Nausea or Vomiting 10 tablet 0    Magnesium 400 MG TABS Take 1 tablet by mouth nightly      melatonin 5 MG TABS tablet Take 5 mg by mouth nightly as needed      metFORMIN (GLUCOPHAGE) 500 MG tablet TAKE TWO TABLETS BY MOUTH TWICE A DAY (Patient taking differently: Take 1,000 mg by mouth 2 times daily (with meals) TAKE TWO TABLETS BY MOUTH TWICE A DAY) 360 tablet 3    hydroCHLOROthiazide (MICROZIDE) 12.5 MG capsule TAKE 1 CAPSULE BY MOUTH DAILY 90 capsule 3    albuterol (PROVENTIL) (2.5 MG/3ML) 0.083% nebulizer solution Take 3 mLs by nebulization every 6 hours as needed for Wheezing 60 each 3    budesonide (PULMICORT) 0.5 MG/2ML nebulizer suspension Take 2 mLs by nebulization 2 times daily 60 ampule 3    losartan (COZAAR) 100 MG tablet TAKE 1 TABLET BY MOUTH DAILY 90 tablet 3    esomeprazole (NEXIUM) 20 MG delayed release capsule Take 20 mg by mouth every morning (before breakfast)      conjugated estrogens (PREMARIN) 0.625 MG/GM vaginal cream 1gm twice a week (this replaces Estradiol cream per insurance 4/10/18) 1 Tube 3    aspirin 81 MG tablet Take 81 mg by mouth daily      Multiple Vitamins-Minerals (PRESERVISION/LUTEIN) CAPS Take 1 capsule by mouth daily       calcium carbonate (TUMS) 500 MG chewable tablet Take 1 tablet by mouth daily       No current facility-administered medications for this visit. Review of Systems   Constitutional:  Positive for fatigue. Negative for chills and fever. HENT:  Negative for congestion and sinus pressure. Eyes:  Positive for pain and discharge. Negative for redness. Respiratory:  Negative for cough and shortness of breath. Cardiovascular:  Negative for chest pain, palpitations and leg swelling. Gastrointestinal:  Negative for abdominal distention and abdominal pain.    Genitourinary:  Negative for dysuria, frequency and urgency. Musculoskeletal:  Positive for arthralgias. Negative for back pain. Skin:  Negative for rash and wound. Neurological:  Negative for dizziness, light-headedness and headaches. Psychiatric/Behavioral:  Positive for sleep disturbance. Negative for dysphoric mood. The patient is not nervous/anxious. /80   Pulse 90   Ht 5' 9\" (1.753 m)   Wt 169 lb (76.7 kg)   SpO2 98%   BMI 24.96 kg/m²   BP Readings from Last 7 Encounters:   11/21/22 120/80   07/26/22 122/80   06/30/22 122/72   04/12/22 130/88   09/13/21 118/70   06/15/21 112/70   05/04/21 124/74     Wt Readings from Last 7 Encounters:   11/21/22 169 lb (76.7 kg)   07/26/22 165 lb (74.8 kg)   06/30/22 165 lb (74.8 kg)   06/24/22 165 lb (74.8 kg)   04/12/22 169 lb (76.7 kg)   09/13/21 164 lb (74.4 kg)   06/15/21 164 lb (74.4 kg)     BMI Readings from Last 7 Encounters:   11/21/22 24.96 kg/m²   07/26/22 24.37 kg/m²   06/30/22 24.37 kg/m²   06/24/22 24.37 kg/m²   04/12/22 24.96 kg/m²   09/13/21 24.22 kg/m²   06/15/21 24.22 kg/m²     Resp Readings from Last 7 Encounters:   06/30/22 16   01/14/19 18   01/08/18 16   08/21/17 20       Physical Exam  Constitutional:       General: She is not in acute distress. Appearance: Normal appearance. She is well-developed. HENT:      Right Ear: External ear normal. Tympanic membrane is not injected. Left Ear: External ear normal. Tympanic membrane is not injected. Mouth/Throat:      Pharynx: No oropharyngeal exudate. Eyes:      General: No scleral icterus. Conjunctiva/sclera: Conjunctivae normal.   Neck:      Thyroid: No thyroid mass or thyromegaly. Vascular: No carotid bruit. Cardiovascular:      Rate and Rhythm: Normal rate and regular rhythm. Heart sounds: S1 normal and S2 normal. No murmur heard. No S3 or S4 sounds. Pulmonary:      Effort: Pulmonary effort is normal. No respiratory distress. Breath sounds: Normal breath sounds. No wheezing or rales. Abdominal:      General: Bowel sounds are normal. There is no distension. Palpations: Abdomen is soft. There is no mass. Tenderness: There is no abdominal tenderness. Musculoskeletal:      Cervical back: Neck supple. Right lower leg: No edema. Left lower leg: No edema. Comments: Still some decreased range of motion of the shoulder but overall doing well. Lymphadenopathy:      Cervical: No cervical adenopathy. Upper Body:      Right upper body: No supraclavicular adenopathy. Left upper body: No supraclavicular adenopathy. Skin:     Findings: No rash. Neurological:      Mental Status: She is alert and oriented to person, place, and time. Cranial Nerves: No cranial nerve deficit. Psychiatric:         Mood and Affect: Mood normal.   Breast exam without any discrete masses no axillary adenopathy no nipple discharge.     Results for orders placed or performed in visit on 11/15/22   Microalbumin / Creatinine Urine Ratio   Result Value Ref Range    Microalbumin, Random Urine 2.00 0.00 - 19.00 mg/dL    Creatinine, Ur 179.7 4.2 - 622.0 mg/dL    Microalbumin Creatinine Ratio 11.1 mg/g   Vitamin D 25 Hydroxy   Result Value Ref Range    Vit D, 25-Hydroxy 50.2 >=30 ng/mL   Lipid Panel   Result Value Ref Range    Cholesterol, Total 142 (L) 160 - 199 mg/dL    Triglycerides 98 0 - 149 mg/dL    HDL 34 (L) 65 - 121 mg/dL    LDL Calculated 88 <100 mg/dL   TSH   Result Value Ref Range    TSH 1.860 0.270 - 4.200 uIU/mL   Hemoglobin A1C   Result Value Ref Range    Hemoglobin A1C 7.4 (H) 4.0 - 6.0 %   Comprehensive Metabolic Panel   Result Value Ref Range    Sodium 139 136 - 145 mmol/L    Potassium 4.5 3.5 - 5.0 mmol/L    Chloride 101 98 - 111 mmol/L    CO2 25 22 - 29 mmol/L    Anion Gap 13 7 - 19 mmol/L    Glucose 149 (H) 74 - 109 mg/dL    BUN 22 8 - 23 mg/dL    Creatinine 0.7 0.5 - 0.9 mg/dL    Est, Glom Filt Rate >60 >60    Calcium 10.0 8.8 - 10.2 mg/dL    Total Protein 6.7 6.6 - 8.7 g/dL    Albumin 4.8 3.5 - 5.2 g/dL    Total Bilirubin 0.5 0.2 - 1.2 mg/dL    Alkaline Phosphatase 79 35 - 104 U/L    ALT 35 (H) 5 - 33 U/L    AST 26 5 - 32 U/L   CBC   Result Value Ref Range    WBC 5.5 4.8 - 10.8 K/uL    RBC 4.49 4.20 - 5.40 M/uL    Hemoglobin 13.6 12.0 - 16.0 g/dL    Hematocrit 41.2 37.0 - 47.0 %    MCV 91.8 81.0 - 99.0 fL    MCH 30.3 27.0 - 31.0 pg    MCHC 33.0 33.0 - 37.0 g/dL    RDW 13.2 11.5 - 14.5 %    Platelets 714 252 - 244 K/uL    MPV 12.0 9.4 - 12.3 fL       ASSESSMENT/ PLAN:  1. Medicare annual wellness visit, subsequent  Chart, medications, labs, vaccines reviewed. Keep up to date with routine care and follow up. Call with any problems or complaints. Keep up to date with routine screening recomendations and vaccines. 2. Type 2 diabetes mellitus without complication, without long-term current use of insulin (HCC)  Adding quite as good of control as usual has had surgery other stressors we will see where she is again in 3 months  - Comprehensive Metabolic Panel; Future  - Hemoglobin A1C; Future  - Lipid Panel; Future    3. Anxiety  stAble cautious sparing use of benzo    4. Mixed hyperlipidemia  Continue the current statin therapy as she tolerates this reassess    5. Chronic asthma without complication, unspecified asthma severity, unspecified whether persistent  Currently stable and follow    6. Essential hypertension  Stable    7. S/P reverse total shoulder arthroplasty, right  That is post reverse right total shoulder arthroplasty continue with management as per orthopedics    8.  Trigeminal neuralgia of left side of face  Follow and if reflares worse will let us know - has had interventions in past with neurosurgery and has d/w Dr. Cally Hutchinson

## 2022-11-23 DIAGNOSIS — G47.00 INSOMNIA, UNSPECIFIED TYPE: Primary | ICD-10-CM

## 2022-11-23 DIAGNOSIS — E11.9 TYPE 2 DIABETES MELLITUS WITHOUT COMPLICATION, WITHOUT LONG-TERM CURRENT USE OF INSULIN (HCC): Chronic | ICD-10-CM

## 2022-11-23 RX ORDER — BLOOD-GLUCOSE METER
1 EACH MISCELLANEOUS DAILY
Qty: 100 STRIP | Refills: 0 | Status: SHIPPED | OUTPATIENT
Start: 2022-11-23

## 2022-11-23 RX ORDER — ALPRAZOLAM 0.25 MG/1
0.12 TABLET ORAL NIGHTLY PRN
Qty: 45 TABLET | Refills: 0 | Status: SHIPPED | OUTPATIENT
Start: 2022-11-23 | End: 2023-02-21

## 2022-11-23 RX ORDER — LANCETS 30 GAUGE
EACH MISCELLANEOUS
Qty: 100 EACH | Refills: 0 | Status: SHIPPED | OUTPATIENT
Start: 2022-11-23

## 2022-12-11 ASSESSMENT — ENCOUNTER SYMPTOMS
EYE DISCHARGE: 1
ABDOMINAL DISTENTION: 0
SHORTNESS OF BREATH: 0
EYE REDNESS: 0
ABDOMINAL PAIN: 0
BACK PAIN: 0
SINUS PRESSURE: 0
COUGH: 0
EYE PAIN: 1

## 2022-12-11 NOTE — PROGRESS NOTES
Medicare Annual Wellness Visit    Jannie Herrera is here for Medicare AWV    Assessment & Plan   Medicare annual wellness visit, subsequent  Type 2 diabetes mellitus without complication, without long-term current use of insulin ([facility] [address]  -     Comprehensive Metabolic Panel; Future  -     Hemoglobin A1C; Future  -     Lipid Panel; Future  Anxiety  Mixed hyperlipidemia  Chronic asthma without complication, unspecified asthma severity, unspecified whether persistent  Essential hypertension  S/P reverse total shoulder arthroplasty, right  Trigeminal neuralgia of left side of face    Recommendations for Preventive Services Due: see orders and patient instructions/AVS.  Recommended screening schedule for the next 5-10 years is provided to the patient in written form: see Patient Instructions/AVS.     Return in about 3 months (around 2/28/2023) for ov. Subjective   The following acute and/or chronic problems were also addressed today:  The attached note    Patient's complete Health Risk Assessment and screening values have been reviewed and are found in Flowsheets. The following problems were reviewed today and where indicated follow up appointments were made and/or referrals ordered. Positive Risk Factor Screenings with Interventions:                                       Objective   Vitals:    11/21/22 1056   BP: 120/80   Pulse: 90   SpO2: 98%   Weight: 169 lb (76.7 kg)   Height: 5' 9\" (1.753 m)      Body mass index is 24.96 kg/m². Allergies   Allergen Reactions    Inderal [Propranolol] Other (See Comments)     Bad dreams  unknown    Solifenacin Succinate Other (See Comments)     Severe constipation    Demerol Hcl [Meperidine]      halluncinations    Vesicare [Solifenacin]      Couldn't urinate    Zoloft [Sertraline Hcl]      hallucinations    Zovirax [Acyclovir] Hives and Other (See Comments)     PhX     Prior to Visit Medications    Medication Sig Taking?  Authorizing Provider   ALPRAZolam Ysabel Martinez) 0.25 MG tablet Take 0.5 tablets by mouth nightly as needed for Sleep for up to 90 days. MEENU Perez   GLUCOCARD EXPRESSION TEST strip Inject 1 each into the skin daily As needed. MEENU Perez   Sure Comfort Lancets 30G MISC TEST DAILY AS NEEDED  MEENU Perez   vitamin D (ERGOCALCIFEROL) 1.25 MG (49568 UT) CAPS capsule TAKE 1 CAPSULE BY MOUTH ONCE Taylor Arce MD   rosuvastatin (CRESTOR) 5 MG tablet TAKE 1/2 Tamir Miller MD   verapamil (CALAN SR) 240 MG extended release tablet TAKE ONE TABLET BY MOUTH TWICE A DAY  Dino Parson MD   montelukast (SINGULAIR) 10 MG tablet Take 1 tablet by mouth nightly  Dino Parson MD   diclofenac (VOLTAREN) 75 MG EC tablet Take 75 mg by mouth in the morning and 75 mg before bedtime.   Historical Provider, MD   ondansetron (ZOFRAN) 4 MG tablet Take 1 tablet by mouth every 8 hours as needed for Nausea or Vomiting  Rudy Lewis MD   Magnesium 400 MG TABS Take 1 tablet by mouth nightly  Historical Provider, MD   melatonin 5 MG TABS tablet Take 5 mg by mouth nightly as needed  Historical Provider, MD   metFORMIN (GLUCOPHAGE) 500 MG tablet TAKE TWO TABLETS BY MOUTH TWICE A DAY  Patient taking differently: Take 1,000 mg by mouth 2 times daily (with meals) TAKE TWO TABLETS BY MOUTH TWICE A DAY  Dino Parson MD   hydroCHLOROthiazide (MICROZIDE) 12.5 MG capsule TAKE 1 CAPSULE BY MOUTH DAILY  Dino Parson MD   ketorolac (TORADOL) 10 MG tablet Take 1 tablet by mouth 3 times daily (with meals)  Dino Parson MD   albuterol (PROVENTIL) (2.5 MG/3ML) 0.083% nebulizer solution Take 3 mLs by nebulization every 6 hours as needed for Wheezing  Dino Parson MD   budesonide (PULMICORT) 0.5 MG/2ML nebulizer suspension Take 2 mLs by nebulization 2 times daily  Dino Parson MD   losartan (COZAAR) 100 MG tablet TAKE 1 TABLET BY MOUTH DAILY  Dino Parson MD   gabapentin (NEURONTIN) 100 MG capsule Take 1 capsule by mouth nightly for 90 days.   Antoine Kidd MD   esomeprazole (NEXIUM) 20 MG delayed release capsule Take 20 mg by mouth every morning (before breakfast)  Historical Provider, MD   conjugated estrogens (PREMARIN) 0.625 MG/GM vaginal cream 1gm twice a week (this replaces Estradiol cream per insurance 4/10/18)  Gin Thurman MD   aspirin 81 MG tablet Take 81 mg by mouth daily  Historical Provider, MD   Multiple Vitamins-Minerals (PRESERVISION/LUTEIN) CAPS Take 1 capsule by mouth daily   Historical Provider, MD   calcium carbonate (TUMS) 500 MG chewable tablet Take 1 tablet by mouth daily  Historical Provider, MD Fam (Including outside providers/suppliers regularly involved in providing care):   Patient Care Team:  Antoine Kidd MD as PCP - General (Internal Medicine)  Antoine Kidd MD as PCP [facility] Empaneled Provider     Reviewed and updated this visit:  Allergies  Meds

## 2022-12-11 NOTE — PATIENT INSTRUCTIONS
have a serious illness that gets worse over time or can't be cured? What are you most afraid of that might happen? (Maybe you're afraid of having pain, losing your independence, or being kept alive by machines.)  Where would you prefer to die? (Your home? A hospital? A nursing home?)  Do you want to donate your organs when you die? Do you want certain [employer] practices performed before you die? When should you call for help? Be sure to contact your doctor if you have any questions. Where can you learn more? Go to http://www.deluna.com/ and enter R264 to learn more about \"Advance Directives: Care Instructions. \"  Current as of: June 16, 2022               Content Version: 13.5  © 2932-5069 [employer]. Care instructions adapted under license by [facility] ([facility]). If you have questions about a medical condition or this instruction, always ask your healthcare professional. [place] [address] any warranty or liability for your use of this information. Personalized Preventive Plan for Jannie Herrera - 11/21/2022  Medicare offers a range of preventive health benefits. Some of the tests and screenings are paid in full while other may be subject to a deductible, co-insurance, and/or copay. Some of these benefits include a comprehensive review of your medical history including lifestyle, illnesses that may run in your family, and various assessments and screenings as appropriate. After reviewing your medical record and screening and assessments performed today your provider may have ordered immunizations, labs, imaging, and/or referrals for you. A list of these orders (if applicable) as well as your Preventive Care list are included within your After Visit Summary for your review.     Other Preventive Recommendations:    A preventive eye exam performed by an eye specialist is recommended every 1-2 years to screen for glaucoma; cataracts, macular degeneration, and other eye disorders. A preventive dental visit is recommended every 6 months. Try to get at least 150 minutes of exercise per week or 10,000 steps per day on a pedometer . Order or download the FREE \"Exercise & Physical Activity: Your Everyday Guide\" from [employer]. Call 4-907.560.1179 or search [employer] online. You need 8715-8504 mg of calcium and 5536-6589 IU of vitamin D per day. It is possible to meet your calcium requirement with diet alone, but a vitamin D supplement is usually necessary to meet this goal.  When exposed to the sun, use a sunscreen that protects against both UVA and UVB radiation with an SPF of 30 or greater. Reapply every 2 to 3 hours or after sweating, drying off with a towel, or swimming. Always wear a seat belt when traveling in a car. Always wear a helmet when riding a bicycle or motorcycle.

## 2023-01-06 ENCOUNTER — LAB (OUTPATIENT)
Dept: LAB | Facility: HOSPITAL | Age: 73
End: 2023-01-06
Payer: MEDICARE

## 2023-01-06 DIAGNOSIS — D50.9 IRON DEFICIENCY ANEMIA, UNSPECIFIED IRON DEFICIENCY ANEMIA TYPE: ICD-10-CM

## 2023-01-06 LAB
ALBUMIN SERPL-MCNC: 4.3 G/DL (ref 3.5–5.2)
ALBUMIN/GLOB SERPL: 1.7 G/DL
ALP SERPL-CCNC: 83 U/L (ref 39–117)
ALT SERPL W P-5'-P-CCNC: 37 U/L (ref 1–33)
ANION GAP SERPL CALCULATED.3IONS-SCNC: 13 MMOL/L (ref 5–15)
AST SERPL-CCNC: 25 U/L (ref 1–32)
BASOPHILS # BLD AUTO: 0.05 10*3/MM3 (ref 0–0.2)
BASOPHILS NFR BLD AUTO: 0.8 % (ref 0–1.5)
BILIRUB SERPL-MCNC: 0.3 MG/DL (ref 0–1.2)
BUN SERPL-MCNC: 22 MG/DL (ref 8–23)
BUN/CREAT SERPL: 27.8 (ref 7–25)
CALCIUM SPEC-SCNC: 9.4 MG/DL (ref 8.6–10.5)
CHLORIDE SERPL-SCNC: 99 MMOL/L (ref 98–107)
CO2 SERPL-SCNC: 25 MMOL/L (ref 22–29)
CREAT SERPL-MCNC: 0.79 MG/DL (ref 0.57–1)
DEPRECATED RDW RBC AUTO: 46.5 FL (ref 37–54)
EGFRCR SERPLBLD CKD-EPI 2021: 79.6 ML/MIN/1.73
EOSINOPHIL # BLD AUTO: 0.27 10*3/MM3 (ref 0–0.4)
EOSINOPHIL NFR BLD AUTO: 4.6 % (ref 0.3–6.2)
ERYTHROCYTE [DISTWIDTH] IN BLOOD BY AUTOMATED COUNT: 13.8 % (ref 12.3–15.4)
FERRITIN SERPL-MCNC: 115.5 NG/ML (ref 13–150)
GLOBULIN UR ELPH-MCNC: 2.5 GM/DL
GLUCOSE SERPL-MCNC: 157 MG/DL (ref 65–99)
HCT VFR BLD AUTO: 41.7 % (ref 34–46.6)
HGB BLD-MCNC: 13.4 G/DL (ref 12–15.9)
IMM GRANULOCYTES # BLD AUTO: 0.02 10*3/MM3 (ref 0–0.05)
IMM GRANULOCYTES NFR BLD AUTO: 0.3 % (ref 0–0.5)
IRON 24H UR-MRATE: 53 MCG/DL (ref 37–145)
IRON SATN MFR SERPL: 14 % (ref 20–50)
LYMPHOCYTES # BLD AUTO: 1.66 10*3/MM3 (ref 0.7–3.1)
LYMPHOCYTES NFR BLD AUTO: 28.1 % (ref 19.6–45.3)
MCH RBC QN AUTO: 29.5 PG (ref 26.6–33)
MCHC RBC AUTO-ENTMCNC: 32.1 G/DL (ref 31.5–35.7)
MCV RBC AUTO: 91.9 FL (ref 79–97)
MONOCYTES # BLD AUTO: 0.53 10*3/MM3 (ref 0.1–0.9)
MONOCYTES NFR BLD AUTO: 9 % (ref 5–12)
NEUTROPHILS NFR BLD AUTO: 3.38 10*3/MM3 (ref 1.7–7)
NEUTROPHILS NFR BLD AUTO: 57.2 % (ref 42.7–76)
NRBC BLD AUTO-RTO: 0 /100 WBC (ref 0–0.2)
PLATELET # BLD AUTO: 229 10*3/MM3 (ref 140–450)
PMV BLD AUTO: 11.4 FL (ref 6–12)
POTASSIUM SERPL-SCNC: 4.3 MMOL/L (ref 3.5–5.2)
PROT SERPL-MCNC: 6.8 G/DL (ref 6–8.5)
RBC # BLD AUTO: 4.54 10*6/MM3 (ref 3.77–5.28)
SODIUM SERPL-SCNC: 137 MMOL/L (ref 136–145)
TIBC SERPL-MCNC: 386 MCG/DL (ref 298–536)
TRANSFERRIN SERPL-MCNC: 259 MG/DL (ref 200–360)
WBC NRBC COR # BLD: 5.91 10*3/MM3 (ref 3.4–10.8)

## 2023-01-06 PROCEDURE — 82728 ASSAY OF FERRITIN: CPT

## 2023-01-06 PROCEDURE — 83540 ASSAY OF IRON: CPT

## 2023-01-06 PROCEDURE — 80053 COMPREHEN METABOLIC PANEL: CPT

## 2023-01-06 PROCEDURE — 36415 COLL VENOUS BLD VENIPUNCTURE: CPT

## 2023-01-06 PROCEDURE — 84466 ASSAY OF TRANSFERRIN: CPT

## 2023-01-06 PROCEDURE — 85025 COMPLETE CBC W/AUTO DIFF WBC: CPT

## 2023-01-07 NOTE — PROGRESS NOTES
"MGW ONC [facility] GROUP HEMATOLOGY AND ONCOLOGY  [address]  [place] [address] [phone]    Patient Name: Carlotta Dupont  Encounter Date: 01/13/2023  YOB: 1950  Patient Number: [id number]      REASON FOR VISIT: Ms. Carlotta Dupont is a 72-year-old female who returns in follow-up of anemia with iron deficiency. It as been 48 months since last receipt of IV Injectafer. She is here alone (usually with her spouse Hung).      I have reviewed the HPI and verified with the patient the accuracy of it. No changes to interval history since the information was documented. Denis Hsieh MD 01/13/23       DIAGNOSTIC ABNORMALITIES:   1. Review of labs from the referring office dating back to 01/12/2018 includes a CBC showing a hemoglobin of 11.4, hematocrit 37.4, MCV 82.2 (81 - 99), MCH 25.1, MCHC 30 (each depressed), RDW 15.6% (elevated). CMP notable for hyperglycemia and BUN of 21 (elevated), otherwise normal with GFR greater than 60, calcium 10.1, total protein 7.6, and normal liver enzymes.   2. Labs, 02/03/2018: Hemoglobin 11.2, hematocrit 37.2, MCV 82.7, platelets 257,000, WBC 6.8. Serum iron 20, \"low iron saturation,\" ferritin 8.5. She prescribed ferrous sulfate 1 p.o. daily 02/03/2018 which she did not start due to prior intolerance, \"Bone and joint pains when I took it while I was pregnant.\" Her last colonoscopy was 12/2014 by Dr. Ozuna with 3-year followup. Stools for fecal occult blood x3 were said to be (+).   3. Labs, 02/22/2018: Hemoglobin 11.2, hematocrit 37.5, MCV 82.6, platelets 278,000, WBC 6.8. Ferritin 7.7. BUN 26, creatinine 0.6 (GFR greater than 60).  4. EGD, 03/01/2018: Normal examined duodenum. Normal stomach. Z-line regular, 38 cm from the incisors. No specimens collected.  5. Colonoscopy, 03/01/2018: The entire examined colon is normal on direct and retroflexion views. No specimens collected. Repeat 3 " years.  6. Labs, 03/19/2018: Hemoglobin 12.2, hematocrit 35.5, MCV 81.5, platelets 315,000, WBC 7.7 with a normal differential. CMP notable for glucose 157, otherwise normal with BUN 18, creatinine 0.7 (GFR 88.7), calcium 10.4, total protein 8.3, normal liver enzymes. Serum iron 30, saturation 6.22%, ferritin 5.2, TIBC 482.  7. M2A Capsule - Date capsule was swallowed: 04/20/2018. Date capsule was read : 04/28/2018. RESULTS: Gastric passage time: 1 hour 55 minutes. Small bowel passage time: 2 hours 39 minutes. The capsule was clearly seen in the colon. Conclusion: 1 small AVM was noted at 1 hour 59 minutes and 38 seconds. Several small areas of the lymphangitic ectasia noted. The capsule was seen freely passing into the cecum. There was no activity noted on this exam.    PREVIOUS INTERVENTIONS:   1. Injectafer 750 mg IV weekly x2, 03/23/2018 through 03/30/2018 (1500 mg); 10/12/2018 (750 mg); 01/11/2019 (750 mg)        Problem List Items Addressed This Visit        Other    Iron deficiency anemia - Primary    Relevant Orders    CBC & Differential    Comprehensive Metabolic Panel    Ferritin    Iron Profile     Oncology/Hematology History    No history exists.       PAST MEDICAL HISTORY:  ALLERGIES:  Allergies   Allergen Reactions   • Zovirax [Acyclovir] Unknown - High Severity     Other reaction(s): Other (See Comments)  PhX  PhX   • Meperidine Hallucinations     halluncinations  halluncinations   • Propranolol Unknown - Low Severity     Other reaction(s): Other (See Comments)  Bad dreams  unknown  Bad dreams  unknown  Bad dreams   • Sertraline Hcl Hallucinations     hallucinations  hallucinations   • Vesicare [Solifenacin Succinate] GI Intolerance     Severe constipation   • Solifenacin Unknown - Low Severity     Other reaction(s): Other (See Comments)  Phx  Phx  Couldn't urinate     CURRENT MEDICATIONS:  Outpatient Encounter Medications as of 1/13/2023   Medication Sig Dispense Refill   • albuterol sulfate   (90 Base) MCG/ACT inhaler As Needed.  5   • ALPRAZolam (XANAX) 0.25 MG tablet Take  by mouth 3 (Three) Times a Day As Needed.     • aspirin 81 MG tablet Take 81 mg by mouth.     • aspirin-acetaminophen-caffeine (EXCEDRIN MIGRAINE) 250-250-65 MG per tablet Take 1 tablet by mouth As Needed.     • calcium carbonate (TUMS) 500 MG chewable tablet Chew 1 tablet Daily.     • esomeprazole (nexIUM) 20 MG capsule Take 20 mg by mouth Every Morning Before Breakfast.     • estradiol (ESTRACE) 0.1 MG/GM vaginal cream      • famotidine (PEPCID) 10 MG tablet Take 10 mg by mouth At Night As Needed for Heartburn.     • glucose blood (Glucocard Expression Test) test strip TEST DAILY AS NEEDED     • hydrochlorothiazide (MICROZIDE) 12.5 MG capsule Take 12.5 mg by mouth Every Morning.     • losartan (COZAAR) 100 MG tablet TAKE ONE TABLET BY MOUTH EVERY DAY     • melatonin 5 MG tablet tablet Take 5 mg by mouth.     • metFORMIN (GLUCOPHAGE) 500 MG tablet Take 1,000 mg by mouth 2 (Two) Times a Day With Meals. 500m g in am and 1000mg at night     • montelukast (SINGULAIR) 10 MG tablet      • Multiple Vitamins-Minerals (PRESERVISION AREDS PO) Take  by mouth.     • rosuvastatin (CRESTOR) 5 MG tablet 1/2 tablet on Monday, Wednesday, Friday     • Sure Comfort Lancets 30G misc      • verapamil SR (CALAN-SR) 240 MG CR tablet TAKE ONE TABLET BY MOUTH TWICE A DAY     • vitamin D (ERGOCALCIFEROL) 37826 units capsule capsule Take 50,000 Units by mouth Every 7 (Seven) Days.     • conjugated estrogens (PREMARIN) 0.625 MG/GM vaginal cream 1gm twice a week (this replaces Estradiol cream per insurance 4/10/18)       No facility-administered encounter medications on file as of 1/13/2023.     ADULT ILLNESSES:   Iron deficiency anemia (disorder) ( ICD-10:D50.9 ;Iron deficiency anemia, unspecified   Elevated liver function test ( ICD-10:R94.5 ;Abnormal results of liver function studies   Fatigue ( ICD-10:R53.83 ;Other fatigue   Fatty liver ( ICD-10:K76.0  ;Fatty (change of) liver, not elsewhere classified   Gallbladder calculus (disorder) ( ICD-10:K80.20 ;Calculus of gallbladder without cholecystitis without obstruction   Gastroesophageal reflux disease without esophagitis (disorder) ( ICD-10:K21.9 ;Gastro-esophageal reflux disease without esophagitis   Hyperlipidemia ( ICD-10:E78.5 ;Hyperlipidemia, unspecified   Hypertension ( ICD-10:I10 ;Essential (primary) hypertension   Lumbar disc degenerative disease ( ICD-10:M51.36 ;Other intervertebral disc degeneration, lumbar region   Migraine ( ICD-10:G43.909 ;Migraine, unspecified, not intractable, without status migrainosus   Mixed hyperlipidemia ( ICD-10:E78.2 ;Mixed hyperlipidemia   Obstructive sleep apnea ( ICD-10:G47.33 ;Obstructive sleep apnea (adult) (pediatric)   Sacroiliitis ( ICD-10:M46.1 ;Sacroiliitis, not elsewhere classified   Trigeminal neuralgia ( ICD-10:G50.0 ;Trigeminal neuralgia   Type 2 diabetes ( ICD-10:E11.9 ;Type 2 diabetes mellitus without complications   Vitamin D deficiency (disorder) ( ICD-10:E55.9 ;Vitamin D deficiency, unspecified    SURGERIES:   Mastoidectomy, 05/17/2018. 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 Perez/Dr. Oconnor)   Colonoscopy, 03/01/2018. The entire examined colon is normal on direct and retroflexion views. No specimens collected. Repeat 3 years   Cystoscopy, 09/072018 ([facility]). No urethral lesions. No tumors, stones, or other mucosal lesions in the bladder. Ureteral orifices were orthotopic and normal in their appearance   EGD, 03/01/2018. Normal examined duodenum. Normal stomach. Z-line regular, 38 cm from the incisors. No specimens collected.   Decompression of trigeminal nerve (V) (procedure), Note: microvascular, at the [facility], 05/12/2017   Biopsy of breast, x3, 1969, 1974, 1980   Vaginal and bladder repair, 07/03/2012   Hysterectomy without BSO, 1976   Colonoscopic polypectomy: Colonoscopy, 2014.  "\"3 polyps.\" Dr. Ozuna  Sacrocolpopexy and Macroplastique, 7/3/2012  09/17/2021 -FNA left breast cyst, 3 o'clock position.  Negative for malignant cells.  Cyst contents and benign epithelial groups with apocrine metaplasia.  Right shoulder replacement, 06/30/2022-Dr. Quintanilla      ADULT ILLNESSES:  Patient Active Problem List   Diagnosis Code   • Iron deficiency anemia D50.9   • Heme positive stool R19.5   • BRBPR (bright red blood per rectum) K62.5   • NSAID long-term use Z79.1   • Gastroesophageal reflux disease K21.9   • Hx of adenomatous colonic polyps Z86.010   • Nonsmoker Z78.9   • Controlled type 2 diabetes mellitus without complication, without long-term current use of insulin (HCC) E11.9   • HTN (hypertension), benign I10   • BMI 25.0-25.9,adult Z68.25   • Superficial postoperative wound infection T81.49XA   • Trigeminal neuralgia G50.0   • Open scalp wound S01.00XA   • Wound dehiscence T81.30XA   • Mastoiditis, chronic, left H70.12   • Asthma J45.909   • Calculus of gallbladder K80.20   • Fatty liver K76.0   • Lumbar disc disease with radiculopathy M51.16   • Migraine without aura, not intractable G43.009   • Mixed hyperlipidemia E78.2   • Hill's metatarsalgia G57.60   • Plantar fasciitis M72.2   • Obstructive sleep apnea G47.33   • Nodule of vagina N89.9   • Prolapse of vaginal wall N81.10   • Pseudophakia Z96.1   • Tear film insufficiency H04.129   • Vitamin D deficiency E55.9   • History of colon polyps Z86.010   • Abnormal ECG R94.31   • Thyroid nodule E04.1   • Nontoxic multinodular goiter E04.2   • Osteoarthritis of spine with radiculopathy, cervical region M47.22   • Presence of intraocular lens Z96.1   • Lumbar disc herniation M51.26   • Degeneration of lumbar or lumbosacral intervertebral disc M51.37   • Lumbar stenosis M48.061   • Lumbar radiculopathy M54.16   • Overweight with body mass index (BMI) of 25 to 25.9 in adult E66.3, Z68.25   • Non-smoker Z78.9   • Cervical radiculopathy M54.12 "     SURGERIES:  Past Surgical History:   Procedure Laterality Date   • APPENDECTOMY     • BLADDER REPAIR      had vaginal repair at the same time   • BREAST BIOPSY Bilateral     benign   • CAPSULE ENDOSCOPY N/A 04/20/2018    Procedure: CAPSULE ENDOSCOPY M2A;  Surgeon: Garrett Ozuna MD;  Location:  [facility];  Service: Gastroenterology   • COLONOSCOPY N/A 03/01/2018    Procedure: COLONOSCOPY WITH ANESTHESIA;  Surgeon: Garrett Ozuna MD;  Location:  [facility];  Service:    • COLONOSCOPY N/A 04/16/2021    Procedure: COLONOSCOPY WITH ANESTHESIA;  Surgeon: Garrett Ozuna MD;  Location:  [facility];  Service: Gastroenterology;  Laterality: N/A;  pre: hx colon polyps  post: polyp  Misty Pleayo MD   • COLONOSCOPY W/ POLYPECTOMY  12/31/2014    Tubular adenomatous polyp at 80 cm, Hyperplastic polyp rectum repeat exam in 3 years   • ENDOSCOPY N/A 03/01/2018    Procedure: ESOPHAGOGASTRODUODENOSCOPY WITH ANESTHESIA;  Surgeon: Garrett Ozuna MD;  Location:  [facility];  Service:    • FLAP HEAD/NECK Left 05/17/2018    Procedure: POSSIBLE STERNOCLEIDOMASTOID FLAP;  Surgeon: Kadeem Oconnor MD;  Location: [facility] OR;  Service: ENT   • HYSTERECTOMY     • MASTOIDECTOMY Left 05/17/2018    Procedure: Wound exploration with possible mastoidectomy; possible sternocleidomastoid flap.  Please schedule with Dr. Perez.;  Surgeon: Kadeem Oconnor MD;  Location: [facility] OR;  Service: ENT   • SHOULDER SURGERY Right 06/30/2022   • TRIGEMINAL NERVE DECOMPRESSION     • US GUIDED CYST ASPIRATION BREAST N/A 09/16/2021     HEALTH MAINTENANCE ITEMS:  Health Maintenance Due   Topic Date Due   • Pneumococcal Vaccine 65+ (1 - PCV) Never done   • DIABETIC FOOT EXAM  Never done   • DIABETIC EYE EXAM  01/16/2021   • DXA SCAN  02/13/2021       <no information>  Last Completed Colonoscopy          COLORECTAL CANCER SCREENING (COLONOSCOPY - Every 5 Years) Next due on 4/16/2026 04/16/2021  COLONOSCOPY    04/16/2021  Surgical  "Procedure: COLONOSCOPY    03/01/2018  Surgical Procedure: COLONOSCOPY    03/01/2018  COLONOSCOPY    12/31/2014  SCANNED - COLONOSCOPY              Immunization History   Administered Date(s) Administered   • COVID-19 (MODERNA) 1st, 2nd, 3rd Dose Only 01/16/2021, 02/13/2021   • COVID-19 (PFIZER) BIVALENT BOOSTER 12+YRS 09/30/2022     Last Completed Mammogram          MAMMOGRAM (Every 2 Years) Next due on 11/17/2024 11/17/2022  Mammo Screening Digital Tomosynthesis Bilateral With CAD    09/15/2021  Mammo Diagnostic Digital Tomosynthesis Bilateral With CAD    10/15/2020  Mammo Screening Digital Tomosynthesis Bilateral With CAD    09/30/2019  Mammo Screening Digital Tomosynthesis Bilateral With CAD    08/16/2018  Mammo screening digital tomosynthesis bilateral w CAD    Only the first 5 history entries have been loaded, but more history exists.                  FAMILY HISTORY:  Family History   Problem Relation Age of Onset   • Breast cancer Sister    • Breast cancer Mother    • Heart disease Father    • No Known Problems Brother    • No Known Problems Daughter    • No Known Problems Son    • No Known Problems Maternal Grandmother    • No Known Problems Paternal Grandmother    • No Known Problems Maternal Aunt    • No Known Problems Paternal Aunt    • Breast cancer Niece    • Colon cancer Neg Hx    • Colon polyps Neg Hx    • BRCA 1/2 Neg Hx    • Endometrial cancer Neg Hx    • Ovarian cancer Neg Hx      SOCIAL HISTORY:  Social History     Socioeconomic History   • Marital status:    Tobacco Use   • Smoking status: Never   • Smokeless tobacco: Never   Vaping Use   • Vaping Use: Never used   Substance and Sexual Activity   • Alcohol use: Yes     Comment: Rare   • Drug use: No   • Sexual activity: Defer       REVIEW OF SYSTEMS:  Constitutional:   The patient's appetite is good but energy remains low to fair since right shoulder surgery, 06/30/2022.  \"Tired.\" She has gained 6 pounds (in addition to 12 pounds at her " 2 prior visits)  since her last visit.  She has no fevers, chills, or drenching night sweats. Her sleep habits have been disrupted by post-op shoulder discomfort.  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 Perez and Dr. Oconnor).  Says repeat MRI sometime 11/2018 and 12/2018 after antibiotics (Dr. Rivers) showed clearing of the mastoid infection.  Says she has learned to live with it.  She has no sore throat, nosebleeds, or sore tongue. She has no headaches. She denies any hoarseness, nor change in voice quality.   Ocular:   She reports no eye pain, significant change in visual acuity, double vision, with occasional blurry vision, left eye (OS). Had blepharoplasties last 08/2020 per Dr. Fernandes.  Respiratory:   She reports intermittent cough from asthma, but no significant shortness of breathing, phlegm production, or unexplained chest wall pain. Uses inhalers.  Has sleep apnea but still does not tolerate mask for CPAP due to the trigeminal irritation.  Cardiovascular:   She reports no exertional chest pain, chest pressure, or chest heaviness. She reports no claudication. She reports no palpitations or symptomatic orthostasis.  Gastrointestinal:   She reports no pica, dysphagia, nausea, vomiting, postprandial abdominal pain, bloating, cramping, change in bowel habits, or discoloration of the stool. She reports no rectal bleeding. She reports no constipation on as needed Miralax.  No diarrhea. EGD and colonoscopy last 03/01/2018.  Genitourinary:   She reports no urinary burning, frequency, dribbling, or discoloration. She reports no difficulty controlling her bladder. She has no need to urinate frequently through the night.   Musculoskeletal:   She reports improved lower back pains and right leg radiculitis with PT per neurosurgery.  Lingering  "right shoulder pains followed by Dr. Quintanilla.  Shoulder replacement on 06/30/2022 (11/12/2021- MRI shoulder advanced AC arthrosis with full thickness tears).    Extremities:   She reports no trouble with fluid retention or significant leg swelling.  Endocrine:   She reports no problems with excess thirst, excessive urination, vasomotor instability.  Heme/Lymphatic:   She reports no unexplained bleeding, bruising, petechial rashes, or swollen glands.  Skin:   She reports no itching, rashes, or lesions which won't heal.  Neuro:   She reports no loss of consciousness, seizures, fainting spells, or dizziness. She reports no weakness of face, arms, or legs. She has no difficulty with speech. She has no tremors. Has paresthesias of the soles of her feet.  She has residual left-sided facial numbness.   Psych:         She denies depression nor anxiety currently. She reports no mood swings.        VITAL SIGNS: /80   Pulse 87   Temp 97.8 °F (36.6 °C)   Resp 18   Ht 175.3 cm (69\")   Wt 79.3 kg (174 lb 12.8 oz)   SpO2 98%   BMI 25.81 kg/m² Body surface area is 1.95 meters squared.  Pain Score    01/13/23 0831   PainSc: 0-No pain         PHYSICAL EXAMINATION:   General:   She is a pleasant, cooperative otherwise well-developed, well-nourished, and well-kept elderly female who is comfortable at rest. She arrived in the exam room ambulatory. She appears to be her stated age. Her skin color is normal (previously slightly pale).  Head/Neck:   The patient is anicteric and atraumatic. She is wearing a surgical mask.  The trachea is midline. The neck is supple without evidence of jugular venous distention or cervical adenopathy. The left mastoid is not swollen.  Eyes:   The pupils are equal, round, and reactive to light. The extraocular movements are full. There is no scleral jaundice or erythema.   Chest:   The respiratory efforts are normal and unhindered. The chest is clear to auscultation and percussion. There are no " wheezes, rhonchi, rales, or asymmetry of breath sounds.  Cardiovascular:   The patient has a regular cardiac rate and rhythm without murmurs, rubs, or gallops. The peripheral pulses are equal and full.  Abdomen:   The belly is soft and slightly globose. There is no rebound or guarding. There is no organomegaly, mass-effect, or tenderness. Bowel sounds are active and of normal character.  Extremities:   There is no evidence of cyanosis, clubbing, or edema.    Rheumatologic:   There is no overt evidence of rheumatoid deformities of the hands. There is no sausaging of the fingers. There is no sign of active synovitis. The gait is slow but no longer overtly antalgic, previously favoring the right leg/hip  Cutaneous:   There are no overt rashes, disseminated lesions, purpura, or petechiae.   Lymphatics:   There is no evidence of adenopathy in the cervical, supraclavicular, axillary areas.   Neurologic:   The patient is alert, oriented, cooperative, and pleasant. She is appropriately conversant. She ambulated into the exam room without assistance and transferred from chair to exam table unaided. There is no overt dysfunction of the motor, sensory, cerebellar systems.  Psych:   Mood and affect are appropriate for circumstance. Eye contact is appropriate. Normal judgement and decision making.         LABS    Lab Results - Last 18 Months   Lab Units 01/06/23  0742 11/15/22  0649 07/25/22  0707 06/24/22  0707 03/29/22  0807 11/05/21  0709   HEMOGLOBIN g/dL 13.4 13.6 12.4 13.8 13.8 14.2   HEMATOCRIT % 41.7 41.2 38.8 42.0 43.6 42.7   MCV fL 91.9 91.8 95.6 93.3 97.1 90.9   WBC 10*3/mm3 5.91 5.5 5.2 5.94 6.0 5.95   RDW % 13.8 13.2 13.2 12.4 12.7 12.8   MPV fL 11.4 12.0 11.2 11.4 11.3 10.9   PLATELETS 10*3/mm3 229 215 258 207 228 214   IMM GRAN % % 0.3  --   --  0.3  --  0.3   NEUTROS ABS 10*3/mm3 3.38  --   --  3.38  --  2.92   LYMPHS ABS 10*3/mm3 1.66  --   --  1.79  --  2.13   MONOS ABS 10*3/mm3 0.53  --   --  0.47  --  0.53    EOS ABS 10*3/mm3 0.27  --   --  0.23  --  0.29   BASOS ABS 10*3/mm3 0.05  --   --  0.05  --  0.06   IMMATURE GRANS (ABS) 10*3/mm3 0.02  --   --  0.02  --  0.02   NRBC /100 WBC 0.0  --   --  0.0  --  0.0       Lab Results - Last 18 Months   Lab Units 01/06/23  0742 07/25/22  0707 06/24/22  1424 06/24/22  0707 03/29/22  0807 11/05/21  0709 09/08/21  0720   GLUCOSE mg/dL 157* 146* 207* 151* 165* 159* 135*   SODIUM mmol/L 137 140 138 137 143 137 140   POTASSIUM mmol/L 4.3 4.1 3.8 4.1 5.1* 4.0 4.1   TOTAL CO2 mmol/L  --  30* 22  --  29  --  26   CO2 mmol/L 25.0  --   --  24.0  --  28.0  --    CHLORIDE mmol/L 99 100 100 100 103 98 101   ANION GAP mmol/L 13.0 10 16 13.0 11 11.0 13   CREATININE mg/dL 0.79 0.6 0.8 0.66 0.7 0.64 0.6   BUN mg/dL 22 14 17 21 20 18 21   BUN / CREAT RATIO  27.8*  --   --  31.8*  --  28.1*  --    CALCIUM mg/dL 9.4 10.0 9.9 9.9 10.1 9.7 9.7   EGFR IF NONAFRICN AM   --  >60 >60  --  >60 91 >60   ALK PHOS U/L 83 85 84 81 71 75 73   TOTAL PROTEIN g/dL 6.8 7.0 7.3 7.2 6.8 7.2 7.0   ALT (SGPT) U/L 37* 29 53* 50* 52* 34* 33   AST (SGOT) U/L 25 22 36* 33* 32 24 20   BILIRUBIN mg/dL 0.3 0.4 0.4 0.4 0.4 0.4 0.3   ALBUMIN g/dL 4.3 4.6 4.7 4.80 4.8 4.80 4.6   GLOBULIN gm/dL 2.5  --   --  2.4  --  2.4  --        No results for input(s): MSPIKE, KAPPALAMB, IGLFLC, URICACID, FREEKAPPAL, CEA, LDH, REFLABREPO in the last [id number] hours.    Lab Results - Last 18 Months   Lab Units 01/06/23  0742 11/15/22  0649 06/24/22  0707 11/05/21  0709 09/08/21  0720   IRON mcg/dL 53  --  56 74  --    TIBC mcg/dL 386  --  383 352  --    IRON SATURATION % 14*  --  15* 21  --    FERRITIN ng/mL 115.50  --  258.70* 238.50*  --    TSH uIU/mL  --  1.860  --   --  1.030       ASSESSMENT:   1. Normocytic/borderline microcytic anemia with profound iron deficiency.   --Hemoglobin 13.4; MCV 91.9, 01/06/2023 (prior range: Hemoglobin 11.2 - 14.4; MCV 81.5 - 93.3). Resolved since last receipt of Injectafer.   2. Fatigue related to #1.  Subjectively improved.   3. Profound iron deficiency with a ferritin of 2.7 on 03/03/2018.   a. Last EGD and colonoscopy 03/01/2018 (above). Both unrevealing.   b. M2A Capsule - Date capsule was swallowed: 04/20/2018. Date capsule was read : 04/28/2018. RESULTS: Gastric passage time: 1 hour 55 minutes. Small bowel passage time: 2 hours 39 minutes. The capsule was clearly seen in the colon. Conclusion: 1 small AVM was noted at 1 hour 59 minutes and 38 seconds. Several small areas of the lymphangitic ectasia noted. The capsule was seen freely passing into the cecum. There was no activity noted on this exam.   c. Abnormal CT abdomen/enterography, 05/15/2018 at [facility]: Impression: 3.9 cm focus of mass-like enhancement involving the vagina, proximal urethra, and bladder dome. Unsure if this reflects neoplasm or post surgical change; however, neoplastic process arising from the vagina certainly not excluded by CT. Recommend direct visualization. No suspicious focal lesions identified in the bowel. No suspicious adenopathy in the abdomen or pelvis. Hepatic steatosis.   d. Seen by Urology (Dr. Mae Esquivel) at [place], 08/04/2019. Stable exam, MRI and cystoscopic findings highly suggestive of the nodule representing the Macroplastique implant.  Discussed the risks of excision including recurrent stress incontinence and fistula.  Continue observation.  Continue transvaginal estrogen.  RTC 6 months..     4. Insulin-requiring type 2 diabetes. Dr. Pelayo.  5. Hyperlipidemia. Remains on Crestor  6. Trigeminal neuralgia of the left side of the face, lingering symptoms since trigeminal decompression procedure. 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   7. Gastroesophageal reflux disease with esophagitis. On Pepcid.  8. Vitamin D deficiency. On weekly ergocalciferol.  9.  Cervical and lumbar degenerative disk disease.   Symptomatic (pain).  --05/14/2021-Lumbar MRI with abnormalities at multiple levels  --11/10/2021-MRI C-spine-degenerative changes with neuroforaminal narrowing C5-6; C6-7.  No significant spinal canal stenosis.  10. Elevated LFTs, NOS. Crestor held, hep profile and liver US ordered on 03/22. Liver US, 03/27/2019: 1. Hepatic steatosis. 2. Limited visualization of the pancreas. Hepatitis profile, 06/28/2019 negative.    11.  Complex cystic and solid mass in the left breast on mammogram/left breast ultrasound, 09/15/2021  --09/16/2021 -ultrasound-guided FNA left breast cyst, 3 o'clock position.  Negative for malignant cells.  Cyst contents and benign epithelial groups with apocrine metaplasia.  --11/17/2022-  Mammogram-No mammographic evidence of malignancy. Recommendation is for the patient to return for routine mammography in one year or sooner if clinically indicated.   BIRADS Category 2 - Benign findings   12.  Right shoulder pain.    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    13.  Hypertension.  Dr. Pelayo follows             RECOMMENDATIONS:   1.  Apprised of the labs from 01/06/2023 (above) noting the normal MCV and normal Hgb, hyperglycemia, normal calcium, trivially elevated ALT with otherwise normal CMP, repleted ferritin (> 100), depressed Fe sat (14%; from 15%; 21; 22), normal serum iron (from previously severe iron deficiency).   2.  Apprised of mammogram, 11/17/2022 (above).  CHARITY.  3.  The rationale and potential toxicities of IV iron (anaphylaxis included) previously discussed at length. She will agree to therapy as indicated.   4.  Continue currently identified medications.   5.  Continue management per primary care and other specialists.  Is followed by neurosurgery regarding her spinal degenerative disease and ortho for the right shoulder issues.  6.  Return to the [place] office in 24 weeks with pre-office CMP, CBC, serum  iron, iron saturation, ferritin.    MEDICAL DECISION MAKING: Moderate complexity   AMOUNT OF DATA: Limited    I spent ~32 minutes caring for Carlotta on this date of service. This time includes time spent by me in the following activities: preparing for the visit, reviewing tests, performing a medically appropriate examination and/or evaluation, counseling and educating the patient/family/caregiver, ordering medications, tests, or procedures and documenting information in the medical record      cc: MD Garrett Borrego MD

## 2023-01-13 ENCOUNTER — OFFICE VISIT (OUTPATIENT)
Dept: ONCOLOGY | Facility: CLINIC | Age: 73
End: 2023-01-13
Payer: MEDICARE

## 2023-01-13 VITALS
WEIGHT: 174.8 LBS | SYSTOLIC BLOOD PRESSURE: 124 MMHG | HEIGHT: 69 IN | DIASTOLIC BLOOD PRESSURE: 80 MMHG | RESPIRATION RATE: 18 BRPM | BODY MASS INDEX: 25.89 KG/M2 | HEART RATE: 87 BPM | OXYGEN SATURATION: 98 % | TEMPERATURE: 97.8 F

## 2023-01-13 DIAGNOSIS — D50.9 IRON DEFICIENCY ANEMIA, UNSPECIFIED IRON DEFICIENCY ANEMIA TYPE: Primary | ICD-10-CM

## 2023-01-13 PROCEDURE — 99214 OFFICE O/P EST MOD 30 MIN: CPT | Performed by: INTERNAL MEDICINE

## 2023-02-10 DIAGNOSIS — E11.9 TYPE 2 DIABETES MELLITUS WITHOUT COMPLICATION, WITHOUT LONG-TERM CURRENT USE OF INSULIN (HCC): Chronic | ICD-10-CM

## 2023-02-10 NOTE — TELEPHONE ENCOUNTER
Jose Vela called requesting a refill of the below medication which has been pended for you:     Requested Prescriptions     Pending Prescriptions Disp Refills    GLUCOCARD EXPRESSION TEST strip [Pharmacy Med Name: Sydnee Calderone TEST S Strip] 100 strip 0     Sig: AS DIRECTED       Last Appointment Date: Visit date not found  Next Appointment Date: 3/6/2023    Allergies   Allergen Reactions    Inderal [Propranolol] Other (See Comments)     Bad dreams  unknown    Solifenacin Succinate Other (See Comments)     Severe constipation    Demerol Hcl [Meperidine]      halluncinations    Vesicare [Solifenacin]      Couldn't urinate    Zoloft [Sertraline Hcl]      hallucinations    Zovirax [Acyclovir] Hives and Other (See Comments)     PhX

## 2023-02-13 RX ORDER — BLOOD-GLUCOSE METER
EACH MISCELLANEOUS
Qty: 100 STRIP | Refills: 0 | Status: SHIPPED | OUTPATIENT
Start: 2023-02-13

## 2023-02-28 DIAGNOSIS — E11.9 TYPE 2 DIABETES MELLITUS WITHOUT COMPLICATION, WITHOUT LONG-TERM CURRENT USE OF INSULIN (HCC): Chronic | ICD-10-CM

## 2023-02-28 LAB
ALBUMIN SERPL-MCNC: 4.6 G/DL (ref 3.5–5.2)
ALP BLD-CCNC: 82 U/L (ref 35–104)
ALT SERPL-CCNC: 35 U/L (ref 5–33)
ANION GAP SERPL CALCULATED.3IONS-SCNC: 12 MMOL/L (ref 7–19)
AST SERPL-CCNC: 24 U/L (ref 5–32)
BILIRUB SERPL-MCNC: 0.5 MG/DL (ref 0.2–1.2)
BUN BLDV-MCNC: 18 MG/DL (ref 8–23)
CALCIUM SERPL-MCNC: 10.2 MG/DL (ref 8.8–10.2)
CHLORIDE BLD-SCNC: 98 MMOL/L (ref 98–111)
CHOLESTEROL, TOTAL: 167 MG/DL (ref 160–199)
CO2: 28 MMOL/L (ref 22–29)
CREAT SERPL-MCNC: 0.7 MG/DL (ref 0.5–0.9)
GFR SERPL CREATININE-BSD FRML MDRD: >60 ML/MIN/{1.73_M2}
GLUCOSE BLD-MCNC: 148 MG/DL (ref 74–109)
HBA1C MFR BLD: 7.3 % (ref 4–6)
HDLC SERPL-MCNC: 39 MG/DL (ref 65–121)
LDL CHOLESTEROL CALCULATED: 104 MG/DL
POTASSIUM SERPL-SCNC: 4.3 MMOL/L (ref 3.5–5)
SODIUM BLD-SCNC: 138 MMOL/L (ref 136–145)
TOTAL PROTEIN: 7.1 G/DL (ref 6.6–8.7)
TRIGL SERPL-MCNC: 122 MG/DL (ref 0–149)

## 2023-03-06 ENCOUNTER — OFFICE VISIT (OUTPATIENT)
Dept: INTERNAL MEDICINE | Age: 73
End: 2023-03-06
Payer: MEDICARE

## 2023-03-06 VITALS
DIASTOLIC BLOOD PRESSURE: 80 MMHG | BODY MASS INDEX: 25.77 KG/M2 | OXYGEN SATURATION: 96 % | SYSTOLIC BLOOD PRESSURE: 150 MMHG | HEIGHT: 69 IN | HEART RATE: 98 BPM | WEIGHT: 174 LBS | RESPIRATION RATE: 18 BRPM

## 2023-03-06 DIAGNOSIS — Z96.611 S/P REVERSE TOTAL SHOULDER ARTHROPLASTY, RIGHT: ICD-10-CM

## 2023-03-06 DIAGNOSIS — E11.9 TYPE 2 DIABETES MELLITUS WITHOUT COMPLICATION, WITHOUT LONG-TERM CURRENT USE OF INSULIN (HCC): Primary | ICD-10-CM

## 2023-03-06 DIAGNOSIS — I10 ESSENTIAL HYPERTENSION: Chronic | ICD-10-CM

## 2023-03-06 DIAGNOSIS — M51.16 LUMBAR DISC DISEASE WITH RADICULOPATHY: Chronic | ICD-10-CM

## 2023-03-06 DIAGNOSIS — G50.0 TRIGEMINAL NEURALGIA OF LEFT SIDE OF FACE: Chronic | ICD-10-CM

## 2023-03-06 DIAGNOSIS — E78.2 MIXED HYPERLIPIDEMIA: Chronic | ICD-10-CM

## 2023-03-06 PROCEDURE — 3051F HG A1C>EQUAL 7.0%<8.0%: CPT | Performed by: INTERNAL MEDICINE

## 2023-03-06 PROCEDURE — 1090F PRES/ABSN URINE INCON ASSESS: CPT | Performed by: INTERNAL MEDICINE

## 2023-03-06 PROCEDURE — 3077F SYST BP >= 140 MM HG: CPT | Performed by: INTERNAL MEDICINE

## 2023-03-06 PROCEDURE — G8417 CALC BMI ABV UP PARAM F/U: HCPCS | Performed by: INTERNAL MEDICINE

## 2023-03-06 PROCEDURE — 2022F DILAT RTA XM EVC RTNOPTHY: CPT | Performed by: INTERNAL MEDICINE

## 2023-03-06 PROCEDURE — 3017F COLORECTAL CA SCREEN DOC REV: CPT | Performed by: INTERNAL MEDICINE

## 2023-03-06 PROCEDURE — 99213 OFFICE O/P EST LOW 20 MIN: CPT | Performed by: INTERNAL MEDICINE

## 2023-03-06 PROCEDURE — 3079F DIAST BP 80-89 MM HG: CPT | Performed by: INTERNAL MEDICINE

## 2023-03-06 PROCEDURE — G8427 DOCREV CUR MEDS BY ELIG CLIN: HCPCS | Performed by: INTERNAL MEDICINE

## 2023-03-06 PROCEDURE — G8484 FLU IMMUNIZE NO ADMIN: HCPCS | Performed by: INTERNAL MEDICINE

## 2023-03-06 PROCEDURE — 1036F TOBACCO NON-USER: CPT | Performed by: INTERNAL MEDICINE

## 2023-03-06 PROCEDURE — 1123F ACP DISCUSS/DSCN MKR DOCD: CPT | Performed by: INTERNAL MEDICINE

## 2023-03-06 PROCEDURE — G8400 PT W/DXA NO RESULTS DOC: HCPCS | Performed by: INTERNAL MEDICINE

## 2023-03-06 SDOH — ECONOMIC STABILITY: INCOME INSECURITY: HOW HARD IS IT FOR YOU TO PAY FOR THE VERY BASICS LIKE FOOD, HOUSING, MEDICAL CARE, AND HEATING?: NOT HARD AT ALL

## 2023-03-06 SDOH — ECONOMIC STABILITY: FOOD INSECURITY: WITHIN THE PAST 12 MONTHS, YOU WORRIED THAT YOUR FOOD WOULD RUN OUT BEFORE YOU GOT MONEY TO BUY MORE.: NEVER TRUE

## 2023-03-06 SDOH — ECONOMIC STABILITY: FOOD INSECURITY: WITHIN THE PAST 12 MONTHS, THE FOOD YOU BOUGHT JUST DIDN'T LAST AND YOU DIDN'T HAVE MONEY TO GET MORE.: NEVER TRUE

## 2023-03-06 SDOH — ECONOMIC STABILITY: HOUSING INSECURITY
IN THE LAST 12 MONTHS, WAS THERE A TIME WHEN YOU DID NOT HAVE A STEADY PLACE TO SLEEP OR SLEPT IN A SHELTER (INCLUDING NOW)?: NO

## 2023-03-06 ASSESSMENT — PATIENT HEALTH QUESTIONNAIRE - PHQ9
2. FEELING DOWN, DEPRESSED OR HOPELESS: 0
SUM OF ALL RESPONSES TO PHQ QUESTIONS 1-9: 0
SUM OF ALL RESPONSES TO PHQ9 QUESTIONS 1 & 2: 0
1. LITTLE INTEREST OR PLEASURE IN DOING THINGS: 0
SUM OF ALL RESPONSES TO PHQ QUESTIONS 1-9: 0
1. LITTLE INTEREST OR PLEASURE IN DOING THINGS: 0
SUM OF ALL RESPONSES TO PHQ QUESTIONS 1-9: 0
2. FEELING DOWN, DEPRESSED OR HOPELESS: 0
SUM OF ALL RESPONSES TO PHQ QUESTIONS 1-9: 0
SUM OF ALL RESPONSES TO PHQ9 QUESTIONS 1 & 2: 0

## 2023-03-06 NOTE — PROGRESS NOTES
Chief Complaint   Patient presents with    3 Month Follow-Up       HPI: Patient is here today to follow-up diabetes. 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 She is just been under stress with that and with pain and surgeries and life changes. In general she is doing okay except for this right shoulder and a recent flareup of her lower back recent back pain and right shoulder pain where had right reverse total shoulder surgery.      Past Medical History:   Diagnosis Date    Calculus of gallbladder 8/20/2017    Chronic asthma without complication 8/87/9684    Essential hypertension 8/21/2017    Fatty liver 8/20/2017    Gastroesophageal reflux disease without esophagitis 8/20/2017    Lumbar disc disease     L5-S1    Migraine without aura, not intractable 8/20/2017    Mixed hyperlipidemia 8/20/2017    Obstructive sleep apnea 8/20/2017    Sacroiliitis ([address]     Trigeminal neuralgia of left side of face 8/20/2017    Type 2 diabetes mellitus without complication ([address] 1/74/4041    Venous insufficiency     Vitamin D deficiency        Past Surgical History:   Procedure Laterality Date    EYE LID SURGERY Bilateral 2020    eye lids raised    HYSTERECTOMY, TOTAL ABDOMINAL (CERVIX REMOVED)      No BSO    SHOULDER SURGERY Right 6/30/2022    RIGHT REVERSE SHOULDER TOTAL ARTHROPLASTY performed by Crystal Garcia MD at [facility] OR       Family History   Problem Relation Age of Onset    High Blood Pressure Mother     High Blood Pressure Father     Prostate Cancer Father     Diabetes Father         Type 2    Breast Cancer Sister 47    Diabetes Sister         Type 2       Social History     Socioeconomic History Marital status:      Spouse name: yumi    Number of children: 2    Years of education: 14    Highest education level: Not on file   Occupational History    Occupation: retired    Tobacco Use    Smoking status: Never    Smokeless tobacco: Never   Vaping Use    Vaping Use: Never used   Substance and Sexual Activity    Alcohol use: No    Drug use: No    Sexual activity: Yes     Partners: Male   Other Topics Concern    Not on file   Social History Narrative    Not on file     Social Determinants of Health     Financial Resource Strain: Low Risk     Difficulty of Paying Living Expenses: Not hard at all   Food Insecurity: No Food Insecurity    Worried About Running Out of Food in the Last Year: Never true    [address] in the Last Year: Never true   Transportation Needs: Unknown    Lack of Transportation (Medical): Not on file    Lack of Transportation (Non-Medical):  No   Physical Activity: Insufficiently Active    Days of Exercise per Week: 2 days    Minutes of Exercise per Session: 30 min   Stress: Not on file   Social Connections: Not on file   Intimate Partner Violence: Not on file   Housing Stability: Unknown    Unable to Pay for Housing in the Last Year: Not on file    Number of Places Lived in the Last Year: Not on file    Unstable Housing in the Last Year: No       Allergies   Allergen Reactions    Inderal [Propranolol] Other (See Comments)     Bad dreams  unknown    Solifenacin Succinate Other (See Comments)     Severe constipation    Demerol Hcl [Meperidine]      halluncinations    Vesicare [Solifenacin]      Couldn't urinate    Zoloft [Sertraline Hcl]      hallucinations    Zovirax [Acyclovir] Hives and Other (See Comments)     PhX       Current Outpatient Medications   Medication Sig Dispense Refill    GLUCOCARD EXPRESSION TEST strip AS DIRECTED 100 strip 0    Sure Comfort Lancets 30G MISC TEST DAILY AS NEEDED 100 each 0    vitamin D (ERGOCALCIFEROL) 1.25 MG (24154 UT) CAPS capsule TAKE 1 CAPSULE BY MOUTH ONCE A WEEK 12 capsule 3    rosuvastatin (CRESTOR) 5 MG tablet TAKE 1/2 TABLET ON MONDAY WEDNESDAY AND FRIDAY 18 tablet 3    verapamil (CALAN SR) 240 MG extended release tablet TAKE ONE TABLET BY MOUTH TWICE A  tablet 3    montelukast (SINGULAIR) 10 MG tablet Take 1 tablet by mouth nightly 90 tablet 3    diclofenac (VOLTAREN) 75 MG EC tablet Take 75 mg by mouth in the morning and 75 mg before bedtime. ondansetron (ZOFRAN) 4 MG tablet Take 1 tablet by mouth every 8 hours as needed for Nausea or Vomiting 10 tablet 0    Magnesium 400 MG TABS Take 1 tablet by mouth nightly      melatonin 5 MG TABS tablet Take 5 mg by mouth nightly as needed      metFORMIN (GLUCOPHAGE) 500 MG tablet TAKE TWO TABLETS BY MOUTH TWICE A DAY (Patient taking differently: Take 1,000 mg by mouth 2 times daily (with meals) TAKE TWO TABLETS BY MOUTH TWICE A DAY) 360 tablet 3    hydroCHLOROthiazide (MICROZIDE) 12.5 MG capsule TAKE 1 CAPSULE BY MOUTH DAILY 90 capsule 3    albuterol (PROVENTIL) (2.5 MG/3ML) 0.083% nebulizer solution Take 3 mLs by nebulization every 6 hours as needed for Wheezing 60 each 3    budesonide (PULMICORT) 0.5 MG/2ML nebulizer suspension Take 2 mLs by nebulization 2 times daily 60 ampule 3    losartan (COZAAR) 100 MG tablet TAKE 1 TABLET BY MOUTH DAILY 90 tablet 3    esomeprazole (NEXIUM) 20 MG delayed release capsule Take 20 mg by mouth every morning (before breakfast)      conjugated estrogens (PREMARIN) 0.625 MG/GM vaginal cream 1gm twice a week (this replaces Estradiol cream per insurance 4/10/18) 1 Tube 3    aspirin 81 MG tablet Take 81 mg by mouth daily      Multiple Vitamins-Minerals (PRESERVISION/LUTEIN) CAPS Take 1 capsule by mouth daily       calcium carbonate (TUMS) 500 MG chewable tablet Take 1 tablet by mouth daily       No current facility-administered medications for this visit.        Review of Systems    BP (!) 150/80 (Site: Left Upper Arm)   Pulse 98   Resp 18   Ht 5' 9\" (1.753 m)   Wt 174 lb (78.9 kg)   SpO2 96%   BMI 25.70 kg/m²   BP Readings from Last 7 Encounters:   03/06/23 (!) 150/80   11/21/22 120/80   07/26/22 122/80   06/30/22 122/72   04/12/22 130/88   09/13/21 118/70   06/15/21 112/70     Wt Readings from Last 7 Encounters:   03/06/23 174 lb (78.9 kg)   11/21/22 169 lb (76.7 kg)   07/26/22 165 lb (74.8 kg)   06/30/22 165 lb (74.8 kg)   06/24/22 165 lb (74.8 kg)   04/12/22 169 lb (76.7 kg)   09/13/21 164 lb (74.4 kg)     BMI Readings from Last 7 Encounters:   03/06/23 25.70 kg/m²   11/21/22 24.96 kg/m²   07/26/22 24.37 kg/m²   06/30/22 24.37 kg/m²   06/24/22 24.37 kg/m²   04/12/22 24.96 kg/m²   09/13/21 24.22 kg/m²     Resp Readings from Last 7 Encounters:   03/06/23 18   06/30/22 16   01/14/19 18   01/08/18 16   08/21/17 20       Physical Exam  Constitutional:       General: She is not in acute distress. Eyes:      General: No scleral icterus. Cardiovascular:      Heart sounds: Normal heart sounds. Pulmonary:      Breath sounds: Normal breath sounds. Musculoskeletal:      Cervical back: Neck supple. Right lower leg: No edema. Left lower leg: No edema. Lymphadenopathy:      Cervical: No cervical adenopathy. Skin:     Findings: No rash.    Psychiatric:         Mood and Affect: Mood normal.       Results for orders placed or performed in visit on 02/28/23   Lipid Panel   Result Value Ref Range    Cholesterol, Total 167 160 - 199 mg/dL    Triglycerides 122 0 - 149 mg/dL    HDL 39 (L) 65 - 121 mg/dL    LDL Calculated 104 <100 mg/dL   Hemoglobin A1C   Result Value Ref Range    Hemoglobin A1C 7.3 (H) 4.0 - 6.0 %   Comprehensive Metabolic Panel   Result Value Ref Range    Sodium 138 136 - 145 mmol/L    Potassium 4.3 3.5 - 5.0 mmol/L    Chloride 98 98 - 111 mmol/L    CO2 28 22 - 29 mmol/L    Anion Gap 12 7 - 19 mmol/L    Glucose 148 (H) 74 - 109 mg/dL    BUN 18 8 - 23 mg/dL    Creatinine 0.7 0.5 - 0.9 mg/dL    Est, Glom Filt Rate >60 >60    Calcium 10.2 8.8 - 10.2 mg/dL    Total Protein 7.1 6.6 - 8.7 g/dL    Albumin 4.6 3.5 - 5.2 g/dL    Total Bilirubin 0.5 0.2 - 1.2 mg/dL    Alkaline Phosphatase 82 35 - 104 U/L    ALT 35 (H) 5 - 33 U/L    AST 24 5 - 32 U/L       ASSESSMENT/ PLAN:  1. Type 2 diabetes mellitus without complication, without long-term current use of insulin (HCC)  Continue metformin 1000 mg twice a day continue to increase her activity and exercise watch her diabetic diet if not improved in 3 months we need to add to her plan of care we talked about adding Trulicity or similar medication or Mary Jakedel in that class of medication it looks like those drugs may not be covered on her insurance she is very close to her goals of therapy has been in pain and under stress working to see where we are in 3 months and reassess  - CBC; Future  - Comprehensive Metabolic Panel; Future  - Hemoglobin A1C; Future  - Lipid Panel; Future    2. Essential hypertension  Blood pressure is high today she will keep an eye on her blood pressure usually has been doing well she was in some pain this morning. 3. Mixed hyperlipidemia  She does not tolerate statins very well only taking a very tiny dose of Crestor we talked about switching to Repatha or Praluent which I think I can get her insurance to cover due to statin intolerance. 4. Trigeminal neuralgia of left side of face  Even this is slightly flared right now continue to watch closely    5. Lumbar disc disease with radiculopathy  Recent flareup of lumbar DJD but she has had settled back down if more trouble she will let us know    6.  S/P reverse total shoulder arthroplasty, right  Unfortunately she is having some issues she needs to keep close follow-up with orthopedics

## 2023-03-29 NOTE — TELEPHONE ENCOUNTER
Pradip Kong called requesting a refill of the below medication which has been pended for you:     Requested Prescriptions     Pending Prescriptions Disp Refills    losartan (COZAAR) 100 MG tablet [Pharmacy Med Name: LOSARTAN POTASSIUM 100 MG T 100 Tablet] 90 tablet 1     Sig: TAKE ONE TABLET BY MOUTH EVERY DAY       Last Appointment Date: 3/6/2023  Next Appointment Date: 6/19/2023    Allergies   Allergen Reactions    Inderal [Propranolol] Other (See Comments)     Bad dreams  unknown    Solifenacin Succinate Other (See Comments)     Severe constipation    Demerol Hcl [Meperidine]      halluncinations    Vesicare [Solifenacin]      Couldn't urinate    Zoloft [Sertraline Hcl]      hallucinations    Zovirax [Acyclovir] Hives and Other (See Comments)     PhX

## 2023-03-30 RX ORDER — LOSARTAN POTASSIUM 100 MG/1
TABLET ORAL
Qty: 90 TABLET | Refills: 1 | Status: SHIPPED | OUTPATIENT
Start: 2023-03-30

## 2023-04-22 DIAGNOSIS — E11.9 TYPE 2 DIABETES MELLITUS WITHOUT COMPLICATION, WITHOUT LONG-TERM CURRENT USE OF INSULIN (HCC): Chronic | ICD-10-CM

## 2023-04-24 NOTE — TELEPHONE ENCOUNTER
Carlyn Porter called requesting a refill of the below medication which has been pended for you:     Requested Prescriptions     Pending Prescriptions Disp Refills    GLUCOCARD EXPRESSION TEST strip [Pharmacy Med Name: GLUCOCARD EXPRESSION TEST S Strip] 100 strip 5     Sig: AS DIRECTED       Last Appointment Date:3/6/23  Next Appointment Date:     Allergies   Allergen Reactions    Inderal [Propranolol] Other (See Comments)     Bad dreams  unknown    Solifenacin Succinate Other (See Comments)     Severe constipation    Demerol Hcl [Meperidine]      halluncinations    Vesicare [Solifenacin]      Couldn't urinate    Zoloft [Sertraline Hcl]      hallucinations    Zovirax [Acyclovir] Hives and Other (See Comments)     PhX

## 2023-04-25 DIAGNOSIS — E11.9 TYPE 2 DIABETES MELLITUS WITHOUT COMPLICATION, WITHOUT LONG-TERM CURRENT USE OF INSULIN (HCC): Chronic | ICD-10-CM

## 2023-04-25 RX ORDER — BLOOD-GLUCOSE METER
EACH MISCELLANEOUS
Qty: 100 STRIP | Refills: 5 | Status: SHIPPED | OUTPATIENT
Start: 2023-04-25

## 2023-04-25 RX ORDER — BLOOD-GLUCOSE METER
EACH MISCELLANEOUS
Qty: 100 STRIP | Refills: 0 | Status: SHIPPED | OUTPATIENT
Start: 2023-04-25

## 2023-04-25 NOTE — TELEPHONE ENCOUNTER
Marlena Wheeler called requesting a refill of the below medication which has been pended for you:     Requested Prescriptions     Pending Prescriptions Disp Refills    GLUCOCARD EXPRESSION TEST strip [Pharmacy Med Name: Christiano Carrasco TEST S Strip] 100 strip 0     Sig: AS DIRECTED       Last Appointment Date: Visit date not found  Next Appointment Date: 6/19/2023    Allergies   Allergen Reactions    Inderal [Propranolol] Other (See Comments)     Bad dreams  unknown    Solifenacin Succinate Other (See Comments)     Severe constipation    Demerol Hcl [Meperidine]      halluncinations    Vesicare [Solifenacin]      Couldn't urinate    Zoloft [Sertraline Hcl]      hallucinations    Zovirax [Acyclovir] Hives and Other (See Comments)     PhX

## 2023-06-07 RX ORDER — HYDROCHLOROTHIAZIDE 12.5 MG/1
12.5 CAPSULE, GELATIN COATED ORAL DAILY
Qty: 90 CAPSULE | Refills: 3 | Status: SHIPPED | OUTPATIENT
Start: 2023-06-07

## 2023-07-19 RX ORDER — MONTELUKAST SODIUM 10 MG/1
10 TABLET ORAL NIGHTLY
Qty: 90 TABLET | Refills: 3 | Status: SHIPPED | OUTPATIENT
Start: 2023-07-19

## 2023-07-21 ENCOUNTER — TELEPHONE (OUTPATIENT)
Dept: ONCOLOGY | Facility: CLINIC | Age: 73
End: 2023-07-21

## 2023-07-21 NOTE — TELEPHONE ENCOUNTER
Caller: Dr Chemo Dupont    Relationship to patient: Emergency Contact    Best call back number: [phone]    Chief complaint: R/S    Type of visit: LAB    Requested date: 1-12 AT 7:00     If rescheduling, when is the original appointment: 1-12     Additional notes:PLEASE ADVISE

## 2023-07-28 RX ORDER — VERAPAMIL HYDROCHLORIDE 240 MG/1
TABLET, FILM COATED, EXTENDED RELEASE ORAL
Qty: 180 TABLET | Refills: 3 | Status: SHIPPED | OUTPATIENT
Start: 2023-07-28

## 2023-08-02 DIAGNOSIS — E11.9 TYPE 2 DIABETES MELLITUS WITHOUT COMPLICATION, WITHOUT LONG-TERM CURRENT USE OF INSULIN (HCC): ICD-10-CM

## 2023-08-02 LAB
ALBUMIN SERPL-MCNC: 4.6 G/DL (ref 3.5–5.2)
ALP SERPL-CCNC: 75 U/L (ref 35–104)
ALT SERPL-CCNC: 38 U/L (ref 5–33)
ANION GAP SERPL CALCULATED.3IONS-SCNC: 10 MMOL/L (ref 7–19)
AST SERPL-CCNC: 25 U/L (ref 5–32)
BILIRUB SERPL-MCNC: 0.5 MG/DL (ref 0.2–1.2)
BUN SERPL-MCNC: 19 MG/DL (ref 8–23)
CALCIUM SERPL-MCNC: 9.8 MG/DL (ref 8.8–10.2)
CHLORIDE SERPL-SCNC: 99 MMOL/L (ref 98–111)
CHOLEST SERPL-MCNC: 145 MG/DL (ref 160–199)
CO2 SERPL-SCNC: 29 MMOL/L (ref 22–29)
CREAT SERPL-MCNC: 0.7 MG/DL (ref 0.5–0.9)
ERYTHROCYTE [DISTWIDTH] IN BLOOD BY AUTOMATED COUNT: 13 % (ref 11.5–14.5)
GLUCOSE SERPL-MCNC: 169 MG/DL (ref 74–109)
HBA1C MFR BLD: 7.4 % (ref 4–6)
HCT VFR BLD AUTO: 41.3 % (ref 37–47)
HDLC SERPL-MCNC: 37 MG/DL (ref 65–121)
HGB BLD-MCNC: 13.5 G/DL (ref 12–16)
LDLC SERPL CALC-MCNC: 88 MG/DL
MCH RBC QN AUTO: 30.3 PG (ref 27–31)
MCHC RBC AUTO-ENTMCNC: 32.7 G/DL (ref 33–37)
MCV RBC AUTO: 92.8 FL (ref 81–99)
PLATELET # BLD AUTO: 223 K/UL (ref 130–400)
PMV BLD AUTO: 11.6 FL (ref 9.4–12.3)
POTASSIUM SERPL-SCNC: 4.7 MMOL/L (ref 3.5–5)
PROT SERPL-MCNC: 7.1 G/DL (ref 6.6–8.7)
RBC # BLD AUTO: 4.45 M/UL (ref 4.2–5.4)
SODIUM SERPL-SCNC: 138 MMOL/L (ref 136–145)
TRIGL SERPL-MCNC: 102 MG/DL (ref 0–149)
WBC # BLD AUTO: 5.7 K/UL (ref 4.8–10.8)

## 2023-08-07 ENCOUNTER — OFFICE VISIT (OUTPATIENT)
Dept: INTERNAL MEDICINE | Age: 73
End: 2023-08-07
Payer: MEDICARE

## 2023-08-07 VITALS
HEART RATE: 96 BPM | SYSTOLIC BLOOD PRESSURE: 154 MMHG | BODY MASS INDEX: 26.22 KG/M2 | HEIGHT: 69 IN | DIASTOLIC BLOOD PRESSURE: 100 MMHG | OXYGEN SATURATION: 97 % | WEIGHT: 177 LBS

## 2023-08-07 DIAGNOSIS — E11.9 TYPE 2 DIABETES MELLITUS WITHOUT COMPLICATION, WITHOUT LONG-TERM CURRENT USE OF INSULIN (HCC): Chronic | ICD-10-CM

## 2023-08-07 DIAGNOSIS — I10 ESSENTIAL HYPERTENSION: Chronic | ICD-10-CM

## 2023-08-07 DIAGNOSIS — N63.13 UNSPECIFIED LUMP IN THE RIGHT BREAST, LOWER OUTER QUADRANT: ICD-10-CM

## 2023-08-07 DIAGNOSIS — N63.0 BREAST NODULE: ICD-10-CM

## 2023-08-07 DIAGNOSIS — Z80.3 FH: BREAST CANCER: ICD-10-CM

## 2023-08-07 DIAGNOSIS — I20.9 ANGINA PECTORIS (HCC): Primary | ICD-10-CM

## 2023-08-07 PROCEDURE — 1090F PRES/ABSN URINE INCON ASSESS: CPT | Performed by: INTERNAL MEDICINE

## 2023-08-07 PROCEDURE — G8427 DOCREV CUR MEDS BY ELIG CLIN: HCPCS | Performed by: INTERNAL MEDICINE

## 2023-08-07 PROCEDURE — 2022F DILAT RTA XM EVC RTNOPTHY: CPT | Performed by: INTERNAL MEDICINE

## 2023-08-07 PROCEDURE — 99214 OFFICE O/P EST MOD 30 MIN: CPT | Performed by: INTERNAL MEDICINE

## 2023-08-07 PROCEDURE — 3051F HG A1C>EQUAL 7.0%<8.0%: CPT | Performed by: INTERNAL MEDICINE

## 2023-08-07 PROCEDURE — G8400 PT W/DXA NO RESULTS DOC: HCPCS | Performed by: INTERNAL MEDICINE

## 2023-08-07 PROCEDURE — 3074F SYST BP LT 130 MM HG: CPT | Performed by: INTERNAL MEDICINE

## 2023-08-07 PROCEDURE — G8417 CALC BMI ABV UP PARAM F/U: HCPCS | Performed by: INTERNAL MEDICINE

## 2023-08-07 PROCEDURE — 3017F COLORECTAL CA SCREEN DOC REV: CPT | Performed by: INTERNAL MEDICINE

## 2023-08-07 PROCEDURE — 3078F DIAST BP <80 MM HG: CPT | Performed by: INTERNAL MEDICINE

## 2023-08-07 PROCEDURE — 1036F TOBACCO NON-USER: CPT | Performed by: INTERNAL MEDICINE

## 2023-08-07 PROCEDURE — 1123F ACP DISCUSS/DSCN MKR DOCD: CPT | Performed by: INTERNAL MEDICINE

## 2023-08-07 RX ORDER — LOSARTAN POTASSIUM AND HYDROCHLOROTHIAZIDE 25; 100 MG/1; MG/1
1 TABLET ORAL DAILY
Qty: 90 TABLET | Refills: 1 | Status: SHIPPED | OUTPATIENT
Start: 2023-08-07

## 2023-08-07 RX ORDER — CLONIDINE HYDROCHLORIDE 0.1 MG/1
TABLET ORAL
Qty: 60 TABLET | Refills: 0 | Status: SHIPPED | OUTPATIENT
Start: 2023-08-07

## 2023-08-08 ENCOUNTER — TRANSCRIBE ORDERS (OUTPATIENT)
Dept: ADMINISTRATIVE | Facility: HOSPITAL | Age: 73
End: 2023-08-08
Payer: MEDICARE

## 2023-08-08 DIAGNOSIS — N63.0 BREAST NODULE: Primary | ICD-10-CM

## 2023-08-08 DIAGNOSIS — N63.13 UNSPECIFIED LUMP IN THE RIGHT BREAST, LOWER OUTER QUADRANT: ICD-10-CM

## 2023-08-08 DIAGNOSIS — I20.9 ANGOR PECTORIS: ICD-10-CM

## 2023-08-11 ENCOUNTER — TELEPHONE (OUTPATIENT)
Dept: INTERNAL MEDICINE | Age: 73
End: 2023-08-11

## 2023-08-11 NOTE — TELEPHONE ENCOUNTER
FRANNY - she wanted you to know that she took the first Jardiance today and Blood sugar before Jardiance was 147, took pill at 9am and Blood sugar at 12noon was 98. First time it has been below 100 in a long time. Also , blood pressure is up and down. She has not had to take any clonidine today.

## 2023-08-14 ENCOUNTER — HOSPITAL ENCOUNTER (OUTPATIENT)
Dept: MAMMOGRAPHY | Facility: HOSPITAL | Age: 73
Discharge: HOME | End: 2023-08-14
Payer: MEDICARE

## 2023-08-14 ENCOUNTER — TRANSCRIBE ORDERS (OUTPATIENT)
Dept: ADMINISTRATIVE | Facility: HOSPITAL | Age: 73
End: 2023-08-14
Payer: MEDICARE

## 2023-08-14 ENCOUNTER — HOSPITAL ENCOUNTER (OUTPATIENT)
Dept: ULTRASOUND IMAGING | Facility: HOSPITAL | Age: 73
Discharge: HOME | End: 2023-08-14
Payer: MEDICARE

## 2023-08-14 DIAGNOSIS — N63.0 BREAST NODULE: ICD-10-CM

## 2023-08-14 DIAGNOSIS — I20.9 ANGINA PECTORIS: Primary | ICD-10-CM

## 2023-08-14 DIAGNOSIS — N63.10 MASS OF RIGHT BREAST, UNSPECIFIED QUADRANT: ICD-10-CM

## 2023-08-14 DIAGNOSIS — N63.13 UNSPECIFIED LUMP IN THE RIGHT BREAST, LOWER OUTER QUADRANT: ICD-10-CM

## 2023-08-14 PROBLEM — Z80.3 FH: BREAST CANCER: Status: ACTIVE | Noted: 2023-08-14

## 2023-08-14 PROCEDURE — 76642 ULTRASOUND BREAST LIMITED: CPT

## 2023-08-14 PROCEDURE — G0279 TOMOSYNTHESIS, MAMMO: HCPCS

## 2023-08-14 PROCEDURE — 77066 DX MAMMO INCL CAD BI: CPT

## 2023-08-15 ENCOUNTER — HOSPITAL ENCOUNTER (OUTPATIENT)
Dept: CARDIOLOGY | Facility: HOSPITAL | Age: 73
Discharge: HOME | End: 2023-08-15
Payer: MEDICARE

## 2023-08-15 VITALS
SYSTOLIC BLOOD PRESSURE: 161 MMHG | DIASTOLIC BLOOD PRESSURE: 87 MMHG | WEIGHT: 171.96 LBS | HEART RATE: 75 BPM | HEIGHT: 68 IN | BODY MASS INDEX: 26.06 KG/M2

## 2023-08-15 DIAGNOSIS — I20.9 ANGINA PECTORIS: ICD-10-CM

## 2023-08-15 LAB
BH CV STRESS BP STAGE 1: NORMAL
BH CV STRESS COMMENTS STAGE 1: NORMAL
BH CV STRESS DOSE REGADENOSON STAGE 1: 0.4
BH CV STRESS DURATION MIN STAGE 1: 0
BH CV STRESS DURATION SEC STAGE 1: 10
BH CV STRESS HR STAGE 1: 100
BH CV STRESS PROTOCOL 1: NORMAL
BH CV STRESS RECOVERY BP: NORMAL MMHG
BH CV STRESS RECOVERY HR: 93 BPM
BH CV STRESS STAGE 1: 1
LV EF NUC BP: 77 %
MAXIMAL PREDICTED HEART RATE: 148 BPM
PERCENT MAX PREDICTED HR: 67.57 %
STRESS BASELINE BP: NORMAL MMHG
STRESS BASELINE HR: 73 BPM
STRESS PERCENT HR: 79 %
STRESS POST EXERCISE DUR MIN: 0 MIN
STRESS POST EXERCISE DUR SEC: 10 SEC
STRESS POST PEAK BP: NORMAL MMHG
STRESS POST PEAK HR: 100 BPM
STRESS TARGET HR: 126 BPM

## 2023-08-15 PROCEDURE — A9502 TC99M TETROFOSMIN: HCPCS | Performed by: INTERNAL MEDICINE

## 2023-08-15 PROCEDURE — 25010000002 REGADENOSON 0.4 MG/5ML SOLUTION: Performed by: INTERNAL MEDICINE

## 2023-08-15 PROCEDURE — 78452 HT MUSCLE IMAGE SPECT MULT: CPT

## 2023-08-15 PROCEDURE — 78452 HT MUSCLE IMAGE SPECT MULT: CPT | Performed by: INTERNAL MEDICINE

## 2023-08-15 PROCEDURE — 93017 CV STRESS TEST TRACING ONLY: CPT

## 2023-08-15 PROCEDURE — 0 TECHNETIUM TETROFOSMIN KIT: Performed by: INTERNAL MEDICINE

## 2023-08-15 PROCEDURE — 93018 CV STRESS TEST I&R ONLY: CPT | Performed by: INTERNAL MEDICINE

## 2023-08-15 RX ORDER — REGADENOSON 0.08 MG/ML
0.4 INJECTION, SOLUTION INTRAVENOUS ONCE
Status: COMPLETED | OUTPATIENT
Start: 2023-08-15 | End: 2023-08-15

## 2023-08-15 RX ADMIN — REGADENOSON 0.4 MG: 0.08 INJECTION, SOLUTION INTRAVENOUS at 12:14

## 2023-08-15 RX ADMIN — TETROFOSMIN 1 DOSE: 1.38 INJECTION, POWDER, LYOPHILIZED, FOR SOLUTION INTRAVENOUS at 12:15

## 2023-08-15 RX ADMIN — TETROFOSMIN 1 DOSE: 1.38 INJECTION, POWDER, LYOPHILIZED, FOR SOLUTION INTRAVENOUS at 10:25

## 2023-08-16 DIAGNOSIS — I20.9 ANGINA PECTORIS (HCC): ICD-10-CM

## 2023-09-06 ENCOUNTER — OFFICE VISIT (OUTPATIENT)
Dept: INTERNAL MEDICINE | Age: 73
End: 2023-09-06
Payer: MEDICARE

## 2023-09-06 VITALS
WEIGHT: 169 LBS | BODY MASS INDEX: 25.03 KG/M2 | DIASTOLIC BLOOD PRESSURE: 70 MMHG | HEART RATE: 80 BPM | HEIGHT: 69 IN | OXYGEN SATURATION: 96 % | SYSTOLIC BLOOD PRESSURE: 140 MMHG

## 2023-09-06 DIAGNOSIS — G47.00 INSOMNIA, UNSPECIFIED TYPE: ICD-10-CM

## 2023-09-06 DIAGNOSIS — G50.0 TRIGEMINAL NEURALGIA OF LEFT SIDE OF FACE: Chronic | ICD-10-CM

## 2023-09-06 DIAGNOSIS — I10 ESSENTIAL HYPERTENSION: Primary | ICD-10-CM

## 2023-09-06 DIAGNOSIS — E11.9 TYPE 2 DIABETES MELLITUS WITHOUT COMPLICATION, WITHOUT LONG-TERM CURRENT USE OF INSULIN (HCC): Chronic | ICD-10-CM

## 2023-09-06 DIAGNOSIS — E78.2 MIXED HYPERLIPIDEMIA: Chronic | ICD-10-CM

## 2023-09-06 PROCEDURE — 3078F DIAST BP <80 MM HG: CPT | Performed by: INTERNAL MEDICINE

## 2023-09-06 PROCEDURE — G8400 PT W/DXA NO RESULTS DOC: HCPCS | Performed by: INTERNAL MEDICINE

## 2023-09-06 PROCEDURE — 3051F HG A1C>EQUAL 7.0%<8.0%: CPT | Performed by: INTERNAL MEDICINE

## 2023-09-06 PROCEDURE — 1123F ACP DISCUSS/DSCN MKR DOCD: CPT | Performed by: INTERNAL MEDICINE

## 2023-09-06 PROCEDURE — G8420 CALC BMI NORM PARAMETERS: HCPCS | Performed by: INTERNAL MEDICINE

## 2023-09-06 PROCEDURE — 3017F COLORECTAL CA SCREEN DOC REV: CPT | Performed by: INTERNAL MEDICINE

## 2023-09-06 PROCEDURE — 3074F SYST BP LT 130 MM HG: CPT | Performed by: INTERNAL MEDICINE

## 2023-09-06 PROCEDURE — 1036F TOBACCO NON-USER: CPT | Performed by: INTERNAL MEDICINE

## 2023-09-06 PROCEDURE — G8427 DOCREV CUR MEDS BY ELIG CLIN: HCPCS | Performed by: INTERNAL MEDICINE

## 2023-09-06 PROCEDURE — 99214 OFFICE O/P EST MOD 30 MIN: CPT | Performed by: INTERNAL MEDICINE

## 2023-09-06 PROCEDURE — 2022F DILAT RTA XM EVC RTNOPTHY: CPT | Performed by: INTERNAL MEDICINE

## 2023-09-06 PROCEDURE — 1090F PRES/ABSN URINE INCON ASSESS: CPT | Performed by: INTERNAL MEDICINE

## 2023-09-06 RX ORDER — ALPRAZOLAM 0.25 MG/1
0.12 TABLET ORAL NIGHTLY PRN
Qty: 45 TABLET | Refills: 2 | Status: SHIPPED | OUTPATIENT
Start: 2023-09-06 | End: 2024-06-02

## 2023-09-06 RX ORDER — NEBIVOLOL 10 MG/1
TABLET ORAL
Qty: 30 TABLET | Refills: 2 | Status: SHIPPED | OUTPATIENT
Start: 2023-09-06

## 2023-09-21 ENCOUNTER — OFFICE VISIT (OUTPATIENT)
Dept: INTERNAL MEDICINE | Age: 73
End: 2023-09-21
Payer: MEDICARE

## 2023-09-21 VITALS
BODY MASS INDEX: 25.03 KG/M2 | WEIGHT: 169 LBS | HEIGHT: 69 IN | SYSTOLIC BLOOD PRESSURE: 130 MMHG | HEART RATE: 74 BPM | OXYGEN SATURATION: 100 % | DIASTOLIC BLOOD PRESSURE: 78 MMHG

## 2023-09-21 DIAGNOSIS — G50.0 TRIGEMINAL NEURALGIA OF LEFT SIDE OF FACE: Chronic | ICD-10-CM

## 2023-09-21 DIAGNOSIS — E11.9 TYPE 2 DIABETES MELLITUS WITHOUT COMPLICATION, WITHOUT LONG-TERM CURRENT USE OF INSULIN (HCC): Primary | Chronic | ICD-10-CM

## 2023-09-21 DIAGNOSIS — I10 ESSENTIAL HYPERTENSION: ICD-10-CM

## 2023-09-21 PROBLEM — M51.379 DEGENERATION OF LUMBAR OR LUMBOSACRAL INTERVERTEBRAL DISC: Status: ACTIVE | Noted: 2021-05-24

## 2023-09-21 PROBLEM — M51.37 DEGENERATION OF LUMBAR OR LUMBOSACRAL INTERVERTEBRAL DISC: Status: ACTIVE | Noted: 2021-05-24

## 2023-09-21 PROCEDURE — 3074F SYST BP LT 130 MM HG: CPT | Performed by: INTERNAL MEDICINE

## 2023-09-21 PROCEDURE — G8420 CALC BMI NORM PARAMETERS: HCPCS | Performed by: INTERNAL MEDICINE

## 2023-09-21 PROCEDURE — G8427 DOCREV CUR MEDS BY ELIG CLIN: HCPCS | Performed by: INTERNAL MEDICINE

## 2023-09-21 PROCEDURE — 3078F DIAST BP <80 MM HG: CPT | Performed by: INTERNAL MEDICINE

## 2023-09-21 PROCEDURE — 99213 OFFICE O/P EST LOW 20 MIN: CPT | Performed by: INTERNAL MEDICINE

## 2023-09-21 PROCEDURE — 1036F TOBACCO NON-USER: CPT | Performed by: INTERNAL MEDICINE

## 2023-09-21 PROCEDURE — 2022F DILAT RTA XM EVC RTNOPTHY: CPT | Performed by: INTERNAL MEDICINE

## 2023-09-21 PROCEDURE — 1123F ACP DISCUSS/DSCN MKR DOCD: CPT | Performed by: INTERNAL MEDICINE

## 2023-09-21 PROCEDURE — 3051F HG A1C>EQUAL 7.0%<8.0%: CPT | Performed by: INTERNAL MEDICINE

## 2023-09-21 PROCEDURE — 3017F COLORECTAL CA SCREEN DOC REV: CPT | Performed by: INTERNAL MEDICINE

## 2023-09-21 PROCEDURE — 1090F PRES/ABSN URINE INCON ASSESS: CPT | Performed by: INTERNAL MEDICINE

## 2023-09-21 PROCEDURE — G8400 PT W/DXA NO RESULTS DOC: HCPCS | Performed by: INTERNAL MEDICINE

## 2023-09-21 RX ORDER — NEBIVOLOL 10 MG/1
TABLET ORAL
Qty: 135 TABLET | Refills: 2 | Status: SHIPPED | OUTPATIENT
Start: 2023-09-21 | End: 2023-09-22 | Stop reason: DRUGHIGH

## 2023-09-21 NOTE — PROGRESS NOTES
Total Bilirubin 0.5 0.2 - 1.2 mg/dL    Alkaline Phosphatase 75 35 - 104 U/L    ALT 38 (H) 5 - 33 U/L    AST 25 5 - 32 U/L   CBC   Result Value Ref Range    WBC 5.7 4.8 - 10.8 K/uL    RBC 4.45 4.20 - 5.40 M/uL    Hemoglobin 13.5 12.0 - 16.0 g/dL    Hematocrit 41.3 37.0 - 47.0 %    MCV 92.8 81.0 - 99.0 fL    MCH 30.3 27.0 - 31.0 pg    MCHC 32.7 (L) 33.0 - 37.0 g/dL    RDW 13.0 11.5 - 14.5 %    Platelets 592 843 - 448 K/uL    MPV 11.6 9.4 - 12.3 fL       ASSESSMENT/ PLAN:  1. Essential hypertension  increase Bystolic to 15 mg daily insurance actually would not pay for this pharmacy to call back so we went ahead and increase to 20 mg daily and watch closely. 2. Type 2 diabetes mellitus without complication, without long-term current use of insulin (HCC)  Stable not quite as well as we like it to be continue current plan of care she says numbers are improving we will reassess in a month  - CBC; Future  - Comprehensive Metabolic Panel; Future  - Hemoglobin A1C; Future  - TSH; Future  - Lipid Panel; Future    3. Trigeminal neuralgia of left side of face  For to refer her to Dr. Delfin Israel the patient has had procedure for trigeminal neuralgia does not tolerate medications does take very tiny dose of Xanax to help sleep at night and it helps settle the trigeminal neuralgia. Chart, medications, labs, vaccines reviewed. Keep up to date with routine care and follow up. Call with any problems or complaints. Keep up to date with routine screening recomendations and vaccines.

## 2023-09-22 DIAGNOSIS — I10 ESSENTIAL HYPERTENSION: Primary | Chronic | ICD-10-CM

## 2023-09-22 RX ORDER — NEBIVOLOL 20 MG/1
20 TABLET ORAL NIGHTLY
Qty: 90 TABLET | Refills: 1 | Status: SHIPPED | OUTPATIENT
Start: 2023-09-22

## 2023-10-11 DIAGNOSIS — I10 ESSENTIAL HYPERTENSION: Chronic | ICD-10-CM

## 2023-10-11 RX ORDER — ROSUVASTATIN CALCIUM 5 MG/1
TABLET, COATED ORAL
Qty: 18 TABLET | Refills: 3 | Status: SHIPPED | OUTPATIENT
Start: 2023-10-11

## 2023-10-11 RX ORDER — ERGOCALCIFEROL 1.25 MG/1
50000 CAPSULE ORAL WEEKLY
Qty: 12 CAPSULE | Refills: 3 | Status: SHIPPED | OUTPATIENT
Start: 2023-10-11

## 2023-10-28 ENCOUNTER — TELEPHONE (OUTPATIENT)
Dept: INTERNAL MEDICINE | Age: 73
End: 2023-10-28

## 2023-10-28 NOTE — TELEPHONE ENCOUNTER
Patient's  called. Patient was able to get the Paxlovid in [place]. They are now on their way back to [facility]. I will cancel the Paxlovid at [facility] in [facility].     Electronically signed by Ricky Ospina MD on 10/28/2023 at 9:20 AM

## 2023-10-28 NOTE — PROGRESS NOTES
I actually found a [facility] in [place] that does have the Paxlovid in stock. Notified . Prescription sent to [facility] on [address] in [place], [place] so that patient can start on medication today, rather than waiting until this evening when she gets back  in town.      Electronically signed by Abimael Cherry MD on 10/28/2023 at 8:22 AM

## 2023-11-20 DIAGNOSIS — E11.9 TYPE 2 DIABETES MELLITUS WITHOUT COMPLICATION, WITHOUT LONG-TERM CURRENT USE OF INSULIN (HCC): Chronic | ICD-10-CM

## 2023-11-20 LAB
ALBUMIN SERPL-MCNC: 4.6 G/DL (ref 3.5–5.2)
ALP SERPL-CCNC: 69 U/L (ref 35–104)
ALT SERPL-CCNC: 28 U/L (ref 5–33)
ANION GAP SERPL CALCULATED.3IONS-SCNC: 12 MMOL/L (ref 7–19)
AST SERPL-CCNC: 27 U/L (ref 5–32)
BILIRUB SERPL-MCNC: 0.4 MG/DL (ref 0.2–1.2)
BUN SERPL-MCNC: 25 MG/DL (ref 8–23)
CALCIUM SERPL-MCNC: 10.2 MG/DL (ref 8.8–10.2)
CHLORIDE SERPL-SCNC: 102 MMOL/L (ref 98–111)
CHOLEST SERPL-MCNC: 140 MG/DL (ref 160–199)
CO2 SERPL-SCNC: 27 MMOL/L (ref 22–29)
CREAT SERPL-MCNC: 0.7 MG/DL (ref 0.5–0.9)
ERYTHROCYTE [DISTWIDTH] IN BLOOD BY AUTOMATED COUNT: 13.2 % (ref 11.5–14.5)
GLUCOSE SERPL-MCNC: 147 MG/DL (ref 74–109)
HBA1C MFR BLD: 6.5 % (ref 4–6)
HCT VFR BLD AUTO: 43.7 % (ref 37–47)
HDLC SERPL-MCNC: 35 MG/DL (ref 65–121)
HGB BLD-MCNC: 13.8 G/DL (ref 12–16)
LDLC SERPL CALC-MCNC: 80 MG/DL
MCH RBC QN AUTO: 30.3 PG (ref 27–31)
MCHC RBC AUTO-ENTMCNC: 31.6 G/DL (ref 33–37)
MCV RBC AUTO: 95.8 FL (ref 81–99)
PLATELET # BLD AUTO: 209 K/UL (ref 130–400)
PMV BLD AUTO: 11.4 FL (ref 9.4–12.3)
POTASSIUM SERPL-SCNC: 4.8 MMOL/L (ref 3.5–5)
PROT SERPL-MCNC: 7.2 G/DL (ref 6.6–8.7)
RBC # BLD AUTO: 4.56 M/UL (ref 4.2–5.4)
SODIUM SERPL-SCNC: 141 MMOL/L (ref 136–145)
TRIGL SERPL-MCNC: 127 MG/DL (ref 0–149)
TSH SERPL DL<=0.005 MIU/L-ACNC: 1.67 UIU/ML (ref 0.27–4.2)
WBC # BLD AUTO: 5.7 K/UL (ref 4.8–10.8)

## 2023-11-27 ENCOUNTER — OFFICE VISIT (OUTPATIENT)
Dept: INTERNAL MEDICINE | Age: 73
End: 2023-11-27
Payer: MEDICARE

## 2023-11-27 VITALS
HEART RATE: 70 BPM | HEIGHT: 69 IN | OXYGEN SATURATION: 99 % | SYSTOLIC BLOOD PRESSURE: 150 MMHG | BODY MASS INDEX: 25.03 KG/M2 | WEIGHT: 169 LBS | DIASTOLIC BLOOD PRESSURE: 80 MMHG

## 2023-11-27 DIAGNOSIS — I10 ESSENTIAL HYPERTENSION: Chronic | ICD-10-CM

## 2023-11-27 DIAGNOSIS — E11.9 TYPE 2 DIABETES MELLITUS WITHOUT COMPLICATION, WITHOUT LONG-TERM CURRENT USE OF INSULIN (HCC): Chronic | ICD-10-CM

## 2023-11-27 DIAGNOSIS — G50.0 TRIGEMINAL NEURALGIA OF LEFT SIDE OF FACE: Chronic | ICD-10-CM

## 2023-11-27 DIAGNOSIS — E78.2 MIXED HYPERLIPIDEMIA: Chronic | ICD-10-CM

## 2023-11-27 DIAGNOSIS — Z80.3 FH: BREAST CANCER: ICD-10-CM

## 2023-11-27 DIAGNOSIS — K21.9 GASTROESOPHAGEAL REFLUX DISEASE WITHOUT ESOPHAGITIS: Chronic | ICD-10-CM

## 2023-11-27 DIAGNOSIS — Z78.0 POSTMENOPAUSAL: Primary | ICD-10-CM

## 2023-11-27 DIAGNOSIS — Z00.00 MEDICARE ANNUAL WELLNESS VISIT, SUBSEQUENT: ICD-10-CM

## 2023-11-27 PROCEDURE — 3044F HG A1C LEVEL LT 7.0%: CPT | Performed by: INTERNAL MEDICINE

## 2023-11-27 PROCEDURE — G8484 FLU IMMUNIZE NO ADMIN: HCPCS | Performed by: INTERNAL MEDICINE

## 2023-11-27 PROCEDURE — 3074F SYST BP LT 130 MM HG: CPT | Performed by: INTERNAL MEDICINE

## 2023-11-27 PROCEDURE — G0439 PPPS, SUBSEQ VISIT: HCPCS | Performed by: INTERNAL MEDICINE

## 2023-11-27 PROCEDURE — 3017F COLORECTAL CA SCREEN DOC REV: CPT | Performed by: INTERNAL MEDICINE

## 2023-11-27 PROCEDURE — 1123F ACP DISCUSS/DSCN MKR DOCD: CPT | Performed by: INTERNAL MEDICINE

## 2023-11-27 PROCEDURE — 3078F DIAST BP <80 MM HG: CPT | Performed by: INTERNAL MEDICINE

## 2023-11-27 NOTE — PROGRESS NOTES
morning (before breakfast)  ProviderNew MD   conjugated estrogens (PREMARIN) 0.625 MG/GM vaginal cream 1gm twice a week (this replaces Estradiol cream per insurance 4/10/18)  Anjali Montesinos MD   aspirin 81 MG tablet Take 81 mg by mouth daily  New Blue MD   Multiple Vitamins-Minerals (PRESERVISION/LUTEIN) CAPS Take 1 capsule by mouth daily   New Blue MD   calcium carbonate (TUMS) 500 MG chewable tablet Take 1 tablet by mouth daily  ProviderNew MD       [facility] (Including outside providers/suppliers regularly involved in providing care):   Patient Care Team:  Christi Sanchez MD as PCP - General (Internal Medicine)  Christi Sanchez MD as PCP - Empaneled Provider     Reviewed and updated this visit:  Allergies  Meds

## 2023-11-28 ENCOUNTER — TRANSCRIBE ORDERS (OUTPATIENT)
Dept: ADMINISTRATIVE | Facility: HOSPITAL | Age: 73
End: 2023-11-28
Payer: MEDICARE

## 2023-11-28 DIAGNOSIS — Z78.0 POSTMENOPAUSAL: Primary | ICD-10-CM

## 2023-11-29 ENCOUNTER — OFFICE VISIT (OUTPATIENT)
Dept: SURGERY | Facility: CLINIC | Age: 73
End: 2023-11-29
Payer: MEDICARE

## 2023-11-29 VITALS
BODY MASS INDEX: 26.22 KG/M2 | HEART RATE: 68 BPM | HEIGHT: 68 IN | DIASTOLIC BLOOD PRESSURE: 70 MMHG | WEIGHT: 173 LBS | SYSTOLIC BLOOD PRESSURE: 108 MMHG | OXYGEN SATURATION: 96 %

## 2023-11-29 DIAGNOSIS — R92.1 MAMMOGRAPHIC CALCIFICATION FOUND ON DIAGNOSTIC IMAGING OF BREAST: ICD-10-CM

## 2023-11-29 DIAGNOSIS — Z80.3 FAMILY HISTORY OF BREAST CANCER: ICD-10-CM

## 2023-11-29 DIAGNOSIS — Z91.89 AT HIGH RISK FOR BREAST CANCER: Primary | ICD-10-CM

## 2023-11-29 PROBLEM — M12.811 RIGHT ROTATOR CUFF TEAR ARTHROPATHY: Status: RESOLVED | Noted: 2022-06-28 | Resolved: 2023-11-29

## 2023-11-29 PROBLEM — M75.101 RIGHT ROTATOR CUFF TEAR ARTHROPATHY: Status: RESOLVED | Noted: 2022-06-28 | Resolved: 2023-11-29

## 2023-11-29 PROBLEM — T81.49XA SUPERFICIAL POSTOPERATIVE WOUND INFECTION: Status: RESOLVED | Noted: 2018-03-12 | Resolved: 2023-11-29

## 2023-11-29 PROBLEM — H70.12: Chronic | Status: RESOLVED | Noted: 2018-08-13 | Resolved: 2023-11-29

## 2023-11-29 PROBLEM — Z65.9 OTHER SOCIAL STRESSOR: Status: RESOLVED | Noted: 2021-01-24 | Resolved: 2023-11-29

## 2023-11-29 ASSESSMENT — ENCOUNTER SYMPTOMS
SHORTNESS OF BREATH: 0
EYE DISCHARGE: 0
ABDOMINAL DISTENTION: 0
BACK PAIN: 0
EYE REDNESS: 0
COUGH: 0
ABDOMINAL PAIN: 0
SINUS PRESSURE: 0

## 2023-11-29 NOTE — PROGRESS NOTES
Office New Patient History and Physical:     Referring Provider: Misty Pelayo MD    Chief Complaint   Patient presents with    Follow-up     Patient is here for a breast nodule          Subjective .     History of present illness:  Carlotta Dupont is a 73 y.o. female who presents with a breast US that showed an abnormality. She thought she felt something; mammogram and US showed a benign fat lobule.     Breast History information:   Prior abnormal mammograms: yes, see below   Prior breast biopsies: three open breast biopsies in 1970s - all benign. Left needle biopsy benign 2-3 years ago.   Palpable breast masses: none   Nipple discharge: none   Age at first menses: 12  Age at menopause: hysterectomy at 26   Number of biological children: 2  Age at first birth: 22  Years on birth control: 2-3 years   Years on HRT: none   Family history of breast cancer: Mother at 95, sister at 54, niece at 46 (genetics negative)   Smoking History: none   Alcohol use: none  BMI: 26     She is not on blood thinners other than 81 mg ASA.     History  Past Medical History:   Diagnosis Date    Acid reflux     Arthritis     Asthma     Diabetes mellitus     Type 2    Fistula of mastoid, left     GERD (gastroesophageal reflux disease)     History of transfusion     Hx of colonic polyps     Hyperlipidemia     Hypertension     Iron deficiency anemia     Mastoid pain, left     Numbness     left side of face    Other osteomyelitis, other site     PONV (postoperative nausea and vomiting)     Sensorineural hearing loss     Sleep apnea     does not use machine    Trigeminal neuralgia     Vitamin D deficiency    ,   Past Surgical History:   Procedure Laterality Date    APPENDECTOMY      BLADDER REPAIR      had vaginal repair at the same time    BREAST BIOPSY Bilateral     benign    CAPSULE ENDOSCOPY N/A 04/20/2018    Procedure: CAPSULE ENDOSCOPY M2A;  Surgeon: Garrett Ozuna MD;  Location: [facility] ENDOSCOPY;  Service: Gastroenterology     COLONOSCOPY N/A 03/01/2018    Procedure: COLONOSCOPY WITH ANESTHESIA;  Surgeon: Garrett Ozuna MD;  Location: [facility] ENDOSCOPY;  Service:     COLONOSCOPY N/A 04/16/2021    Procedure: COLONOSCOPY WITH ANESTHESIA;  Surgeon: Garrett Ozuna MD;  Location: [facility] ENDOSCOPY;  Service: Gastroenterology;  Laterality: N/A;  pre: hx colon polyps  post: polyp  Misty Pelayo MD    COLONOSCOPY W/ POLYPECTOMY  12/31/2014    Tubular adenomatous polyp at 80 cm, Hyperplastic polyp rectum repeat exam in 3 years    ENDOSCOPY N/A 03/01/2018    Procedure: ESOPHAGOGASTRODUODENOSCOPY WITH ANESTHESIA;  Surgeon: Garrett Ozuna MD;  Location: [facility] ENDOSCOPY;  Service:     FLAP HEAD/NECK Left 05/17/2018    Procedure: POSSIBLE STERNOCLEIDOMASTOID FLAP;  Surgeon: Kadeem Oconnor MD;  Location: [facility] OR;  Service: ENT    HYSTERECTOMY      MASTOIDECTOMY Left 05/17/2018    Procedure: Wound exploration with possible mastoidectomy; possible sternocleidomastoid flap.  Please schedule with Dr. Perez.;  Surgeon: Kadeem Oconnor MD;  Location: [facility] OR;  Service: ENT    SHOULDER SURGERY Right 06/30/2022    TRIGEMINAL NERVE DECOMPRESSION      US GUIDED CYST ASPIRATION BREAST N/A 09/16/2021   ,   Family History   Problem Relation Age of Onset    Breast cancer Sister     Breast cancer Mother     Heart disease Father     No Known Problems Brother     No Known Problems Daughter     No Known Problems Son     No Known Problems Maternal Grandmother     No Known Problems Paternal Grandmother     No Known Problems Maternal Aunt     No Known Problems Paternal Aunt     Breast cancer Niece     Colon cancer Neg Hx     Colon polyps Neg Hx     BRCA 1/2 Neg Hx     Endometrial cancer Neg Hx     Ovarian cancer Neg Hx    ,   Social History     Tobacco Use    Smoking status: Never    Smokeless tobacco: Never   Vaping Use    Vaping Use: Never used   Substance Use Topics    Alcohol use: Yes     Comment: Rare    Drug use: No   , (Not in a hospital admission)   and  Allergies:  Zovirax [acyclovir], Meperidine, Propranolol, Sertraline hcl, Vesicare [solifenacin succinate], and Solifenacin    Current Outpatient Medications:     albuterol sulfate  (90 Base) MCG/ACT inhaler, As Needed., Disp: , Rfl: 5    ALPRAZolam (XANAX) 0.25 MG tablet, Take  by mouth 3 (Three) Times a Day As Needed., Disp: , Rfl:     aspirin 81 MG tablet, Take 1 tablet by mouth., Disp: , Rfl:     aspirin-acetaminophen-caffeine (EXCEDRIN MIGRAINE) 250-250-65 MG per tablet, Take 1 tablet by mouth As Needed., Disp: , Rfl:     calcium carbonate (TUMS) 500 MG chewable tablet, Chew 1 tablet Daily., Disp: , Rfl:     esomeprazole (nexIUM) 20 MG capsule, Take 1 capsule by mouth Every Morning Before Breakfast., Disp: , Rfl:     estradiol (ESTRACE) 0.1 MG/GM vaginal cream, , Disp: , Rfl:     famotidine (PEPCID) 10 MG tablet, Take 1 tablet by mouth At Night As Needed for Heartburn., Disp: , Rfl:     glucose blood (Glucocard Expression Test) test strip, TEST DAILY AS NEEDED, Disp: , Rfl:     hydrochlorothiazide (MICROZIDE) 12.5 MG capsule, Take 1 capsule by mouth Every Morning., Disp: , Rfl:     losartan (COZAAR) 100 MG tablet, TAKE ONE TABLET BY MOUTH EVERY DAY, Disp: , Rfl:     melatonin 5 MG tablet tablet, Take 1 tablet by mouth., Disp: , Rfl:     metFORMIN (GLUCOPHAGE) 500 MG tablet, Take 2 tablets by mouth 2 (Two) Times a Day With Meals. 500m g in am and 1000mg at night, Disp: , Rfl:     montelukast (SINGULAIR) 10 MG tablet, , Disp: , Rfl:     Multiple Vitamins-Minerals (PRESERVISION AREDS PO), Take  by mouth., Disp: , Rfl:     rosuvastatin (CRESTOR) 5 MG tablet, 1/2 tablet on Monday, Wednesday, Friday, Disp: , Rfl:     Sure Comfort Lancets 30G misc, , Disp: , Rfl:     verapamil SR (CALAN-SR) 240 MG CR tablet, TAKE ONE TABLET BY MOUTH TWICE A DAY, Disp: , Rfl:     vitamin D (ERGOCALCIFEROL) 33225 units capsule capsule, Take 1 capsule by mouth Every 7 (Seven) Days., Disp: , Rfl:     Objective     Vital Signs   BP  "108/70 (BP Location: Left arm, Patient Position: Sitting, Cuff Size: Large Adult)   Pulse 68   Ht 173 cm (68.11\")   Wt 78.5 kg (173 lb)   SpO2 96%   BMI 26.22 kg/m²      Physical Exam:  General appearance - alert, well appearing, and in no distress  Mental status - alert, oriented to person, place, and time  Eyes - pupils equal and reactive, extraocular eye movements intact  Neck - supple, no significant adenopathy  Chest - no tachypnea, retractions or cyanosis  Heart - normal rate and regular rhythm  Abdomen - soft, nontender, nondistended, no masses or organomegaly  Neurological - alert, oriented, normal speech, no focal findings or movement disorder noted  Breast Exam: Bilateral breasts without obvious deformities. Bilateral nipples everted. Patient examined in the supine position with the ipsilateral arm above the head. No palpable masses bilaterally. No nipple discharge bilaterally. No palpable axillary nor supraclavicular adenopathy bilaterally.     Results Review:     The following data was reviewed by: Irasema Springer MD on 11/29/2023:    US Breast Right Limited (08/14/2023 14:05)   Mammo Diagnostic Digital Tomosynthesis Bilateral With CAD (08/14/2023 13:38)   Targeted right breast ultrasound performed at the site of the patient's  lower outer breast palpable abnormality. There is a prominent fat lobule  identified at the 7-8:00 position, 5 cm from the nipple. Otherwise, no  suspicious findings are identified. No inflammatory changes or abnormal  skin thickening.  REFERRAL/PRE-AUTH MRN - SCAN - REFERRAL-[facility]-[id number] (08/09/2023)     Assessment & Plan       Diagnoses and all orders for this visit:    1. At high risk for breast cancer (Primary)  -     MRI Breast Bilateral With & Without Contrast; Future  -     Ambulatory Referral to Genetic Counseling/Testing    2. Family history of breast cancer  -     MRI Breast Bilateral With & Without Contrast; Future  -     Ambulatory Referral to " Genetic Counseling/Testing    3. Mammographic calcification found on diagnostic imaging of breast  -     MRI Breast Bilateral With & Without Contrast; Future         Carlotta Dupont is a 73 y.o. female with who presents to review abnormal breast imaging. She had a palpable mass and imaging showed a fat lobule at 7-8:00, 5 cm FN. She as a very significant family history of breast cancer and meets NCCN criteria for high risk screening. I have recommended a breast MRI in February (6 months after her last mammogram) for high risk screening. This will also re-evaluate this area. I have also referred her to genetic counseling for genetic testing. She will follow up with me in February to review the MRI and genetic testing results. Call with any questions or concerns.      This is two chronic problems (lesion on mammogram and high risk of breast cancer). I have reviewed the mammogram and US above. I have ordered a breast MRI and genetics referral.         Irasema Springer MD  11/29/23  13:55 CST

## 2023-11-29 NOTE — PATIENT INSTRUCTIONS

## 2023-12-13 ENCOUNTER — CLINICAL SUPPORT (OUTPATIENT)
Dept: GENETICS | Facility: HOSPITAL | Age: 73
End: 2023-12-13

## 2023-12-13 DIAGNOSIS — Z80.3 FAMILY HISTORY OF BREAST CANCER: ICD-10-CM

## 2023-12-13 DIAGNOSIS — Z13.79 GENETIC TESTING: Primary | ICD-10-CM

## 2023-12-13 DIAGNOSIS — Z80.0 FAMILY HISTORY OF PANCREATIC CANCER: ICD-10-CM

## 2023-12-13 NOTE — PROGRESS NOTES
Carlotta Dupont, a 73 y.o. female, was referred for genetic counseling due to a family history of breast cancer. Ms. Dupont has no personal history of cancer. Genetic counseling was performed via telephone. Ms. Dupont was in-person at [facility] at the time of the appointment. She was 12 at menarche and had her first child at age 22. She is post-menopausal and retains her ovaries. She had a hysterectomy at age 26 due to uterine fibroids and heavy menses.  Ms. Dupont has annual mammograms and reports 4 prior breast biopsies which were benign. She reports she had 3 surgical biopsies, one in 1970, one in the mid-1970s, and one in 1980. She reports she had one needle biopsy in 2020. She has colonoscopies every 3 years and reports 3 polyps. She was interested in discussing her risk for a hereditary cancer syndrome. Ms. Dupont decided to pursue comprehensive genetic testing to evaluate her risk of cancer, therefore the Multi-Cancer Panel was ordered through [facility] which analyzes 70 genes associated with an increased cancer risk. Her blood was drawn on 12/13/2023. Results are expected in 2-3 weeks. Results are expected 2-3 weeks after [facility] receives her sample.     PERTINENT FAMILY HISTORY: (See attached pedigree)   Niece:    Breast cancer, 48  Sister:     Breast cancer, 55  Mother:    Breast cancer, 95  Father:    Prostate cancer, mid-80s  Pat. Uncle:    Pancreatic cancer, 60s    We do not have medical records regarding any of these diagnoses.     RISK ASSESSMENT:  Ms. Dupont's family history of breast cancer raises the question of a hereditary cancer syndrome. NCCN guidelines for genetic testing for BRCA1/2 states that individuals with a close relative diagnosed with breast cancer below the age of 50 may consider genetic testing. Based on Ms. Dupont's niece's diagnosis of breast cancer at age 46, she would clearly meet this criteria. This risk assessment is based on the family history information provided at the time of the  appointment.  The assessment could change in the future should new information be obtained.    GENETIC COUNSELING (30 minutes):  We reviewed the family history information in detail. Cases of cancer follow three general patterns: sporadic, familial, and hereditary.  While most cancer is sporadic, some cases appear to occur in family clusters.  These cases are said to be familial and account for 10-20% of cancer cases.  Familial cases may be due to a combination of shared genes and environmental factors among family members.  In even fewer cases, the risk for cancer is inherited, and the genes responsible for the increased cancer risk are known.       Family histories typical of hereditary cancer syndromes usually include multiple first- and second-degree relatives diagnosed with cancer types that define a syndrome.  These cases tend to be diagnosed at younger-than-expected ages and can be bilateral or multifocal.  The cancer in these families follows an autosomal dominant inheritance pattern, which indicates the likely presence of a mutation in a cancer susceptibility gene.  Children and siblings of an individual believed to carry this mutation have a 50% chance of inheriting that mutation, thereby inheriting the increased risk to develop cancer.  These mutations can be passed down from the maternal or the paternal lineage.     Due to Ms. Dupont's family history of breast cancer, we discussed hereditary breast cancer. Hereditary breast cancer accounts for 5-10% of all cases of breast cancer.  A significant proportion of hereditary breast cancer can be attributed to mutations in the BRCA1 and BRCA2 genes.  Mutations in these genes confer an increased risk for breast cancer, ovarian cancer, male breast cancer, prostate cancer, and pancreatic cancer.  Women with a BRCA1 or BRCA2 mutation have up to an 87% lifetime risk of breast cancer and up to a 60% risk of ovarian cancer.  There are other clinically significant  breast cancer related genes in addition to BRCA1/2.      There are other genes that are known to be associated with an increased risk for cancer.  Some of these genes have well defined cancer risks and established management guidelines.  Other genes that can be tested for have been more recently described, and there may be less data regarding the risks and therefore may not have established management guidelines. We discussed these limitations at length. Based on Ms. Dupont's desire to get as much information as possible regarding her personal risks and potential risks for her family, she opted to pursue testing through a panel that would look at several other genes known to increase the risk for cancer.     GENETIC TESTING:  The risks, benefits, and limitations of genetic testing and implications for clinical management following testing were reviewed. DNA test results can influence decisions regarding screening and prevention. Genetic testing can have significant psychological implications for both individuals and families. Also discussed was the possibility of employment and insurance discrimination based on genetic test results and the laws in place to prevent this, as well as the limitations of these laws.       We discussed panel testing, which would involve testing 70 genes associated with increased cancer risk. The implications of a positive or negative test result were discussed.  We also discussed the importance of testing an affected relative and how a negative result for Ms. Dupont wouldn't necessarily mean the cancers presenting in her family weren't due to a genetic mutation that Ms. Dupont did not inherit.  In general, a negative genetic test result is most informative if a mutation has first been established in an affected member of the family.  In cases where an affected individual is not available or interested in testing, it is appropriate to offer testing to an unaffected individual.     We  discussed the possibility that, in some cases, genetic test results may be ambiguous due to the identification of a genetic variant of uncertain significance (VUS). These variants may or may not be associated with an increased cancer risk. With multigene panel testing, it is not uncommon for a VUS to be identified.  If a VUS is identified, testing family members is typically not recommended and screening recommendations are made based on the family history.  The laboratories that perform genetic testing work to reclassify the VUS and send out an amended report if and when a VUS is reclassified.  The majority of variant findings are ultimately reclassified to a negative result. Given Ms. Dupont's family history, a negative test result does not eliminate all cancer risk, although the risk may not be as high as it would with positive genetic testing.     PLAN: Genetic testing was ordered via the Multi-Cancer Panel through [facility]. Her blood was drawn 12/13/2023 for testing. Results are expected 2-3 weeks after the lab receives his sample. If she has any questions in the meantime, she is welcome to call me at [phone].      Hawa Whitmore, MS, [facility] [place], [facility]  Licensed Certified Genetic Counselor

## 2023-12-28 ENCOUNTER — HOSPITAL ENCOUNTER (OUTPATIENT)
Dept: MRI IMAGING | Facility: HOSPITAL | Age: 73
Discharge: HOME | End: 2023-12-28
Payer: MEDICARE

## 2023-12-28 ENCOUNTER — HOSPITAL ENCOUNTER (OUTPATIENT)
Dept: BONE DENSITY | Facility: HOSPITAL | Age: 73
Discharge: HOME | End: 2023-12-28
Payer: MEDICARE

## 2023-12-28 DIAGNOSIS — Z80.3 FAMILY HISTORY OF BREAST CANCER: ICD-10-CM

## 2023-12-28 DIAGNOSIS — R92.1 MAMMOGRAPHIC CALCIFICATION FOUND ON DIAGNOSTIC IMAGING OF BREAST: ICD-10-CM

## 2023-12-28 DIAGNOSIS — Z78.0 POSTMENOPAUSAL: ICD-10-CM

## 2023-12-28 DIAGNOSIS — Z91.89 AT HIGH RISK FOR BREAST CANCER: ICD-10-CM

## 2023-12-28 PROCEDURE — C8937 CAD BREAST MRI: HCPCS

## 2023-12-28 PROCEDURE — A9577 INJ MULTIHANCE: HCPCS | Performed by: STUDENT IN AN ORGANIZED HEALTH CARE EDUCATION/TRAINING PROGRAM

## 2023-12-28 PROCEDURE — 0 GADOBENATE DIMEGLUMINE 529 MG/ML SOLUTION: Performed by: STUDENT IN AN ORGANIZED HEALTH CARE EDUCATION/TRAINING PROGRAM

## 2023-12-28 PROCEDURE — 82565 ASSAY OF CREATININE: CPT

## 2023-12-28 PROCEDURE — 77080 DXA BONE DENSITY AXIAL: CPT

## 2023-12-28 PROCEDURE — C8908 MRI W/O FOL W/CONT, BREAST,: HCPCS

## 2023-12-28 RX ADMIN — GADOBENATE DIMEGLUMINE 16 ML: 529 INJECTION, SOLUTION INTRAVENOUS at 09:03

## 2023-12-29 LAB — CREAT BLDA-MCNC: 0.8 MG/DL (ref 0.6–1.3)

## 2023-12-29 NOTE — PROGRESS NOTES
Benign findings. Patient called and discussed results. She voiced understanding of these. Patient has follow up in February 2024.

## 2024-01-02 ENCOUNTER — DOCUMENTATION (OUTPATIENT)
Dept: GENETICS | Facility: HOSPITAL | Age: 74
End: 2024-01-02
Payer: MEDICARE

## 2024-01-02 NOTE — PROGRESS NOTES
Carlotta Dupont, a 73 y.o. female, was referred for genetic counseling due to a family history of breast cancer. Ms. Dupont has no personal history of cancer. Genetic counseling was performed via telephone. Ms. Dupont was in-person at [facility] at the time of the appointment. She was 12 at menarche and had her first child at age 22. She is post-menopausal and retains her ovaries. She had a hysterectomy at age 26 due to uterine fibroids and heavy menses.  Ms. Dupont has annual mammograms and reports 4 prior breast biopsies which were benign. She reports she had 3 surgical biopsies, one in 1970, one in the mid-1970s, and one in 1980. She reports she had one needle biopsy in 2020. She has colonoscopies every 3 years and reports 3 polyps. She was interested in discussing her risk for a hereditary cancer syndrome. Ms. Dupont decided to pursue comprehensive genetic testing to evaluate her risk of cancer, therefore the Multi-Cancer Panel was ordered through [facility] which analyzes 70 genes associated with an increased cancer risk. Genetic testing was negative for known pathogenic mutations in BRCA1/2 and 68 additional genes on the Multi-Cancer panel.  While no known pathogenic mutations were identified, a variant of uncertain significance was identified in the BRCA2 gene. Variants are changes in DNA that may or may not affect the function of the gene. The classification of a variant to either a benign gene change or pathogenic mutation depends on a number of factors. The majority (estimated to be >90%) of VUS's are eventually reclassified as benign gene changes. It is not recommended that any unaffected relatives be tested for this variant at this time, and management should not be altered based on this variant.  Management should be guided by family history assessments. These results were discussed with Ms. Dupont by telephone on 1/2/2024.     PERTINENT FAMILY HISTORY: (See attached pedigree)   Niece:                                       Breast cancer, 48  Sister:                                      Breast cancer, 55  Mother:                                    Breast cancer, 95  Father:                                     Prostate cancer, mid-80s  Pat. Uncle:                              Pancreatic cancer, 60s     We do not have medical records regarding any of these diagnoses.      RISK ASSESSMENT:  Ms. Dupont's family history of breast cancer raises the question of a hereditary cancer syndrome. NCCN guidelines for genetic testing for BRCA1/2 states that individuals with a close relative diagnosed with breast cancer below the age of 50 may consider genetic testing. Based on Ms. Dupont's niece's diagnosis of breast cancer at age 46, she would clearly meet this criteria. This risk assessment is based on the family history information provided at the time of the appointment.  The assessment could change in the future should new information be obtained.    At this time, Ms. Rodass management should be guided by a family history-based risk assessment. Tyrer-Toniozick, version 8 is able to take into account personal factors (age at menarche, age at first live birth, etc) and family history when calculating risk for breast cancer.  Computer modeling estimates that Ms. Rodass lifetime personal risk for developing breast cancer is up to 10.2% (Negro-Toniozick, v8), compared to the average 73-year-old female's risk of 3.6%. In general, a lifetime risk above 20% is considered to be “high risk” where increased screening is warranted; Ms. Rodass risk does not fall into that category. This risk assessment is based on the family history information provided at the time of the appointment and could change in the future should new information be obtained.     GENETIC COUNSELING (30 minutes):  We reviewed the family history information in detail. Cases of cancer follow three general patterns: sporadic, familial, and hereditary.  While most cancer is sporadic,  some cases appear to occur in family clusters.  These cases are said to be familial and account for 10-20% of cancer cases.  Familial cases may be due to a combination of shared genes and environmental factors among family members.  In even fewer cases, the risk for cancer is inherited, and the genes responsible for the increased cancer risk are known.       Family histories typical of hereditary cancer syndromes usually include multiple first- and second-degree relatives diagnosed with cancer types that define a syndrome.  These cases tend to be diagnosed at younger-than-expected ages and can be bilateral or multifocal.  The cancer in these families follows an autosomal dominant inheritance pattern, which indicates the likely presence of a mutation in a cancer susceptibility gene.  Children and siblings of an individual believed to carry this mutation have a 50% chance of inheriting that mutation, thereby inheriting the increased risk to develop cancer.  These mutations can be passed down from the maternal or the paternal lineage.     Due to Ms. Dupont's family history of breast cancer, we discussed hereditary breast cancer. Hereditary breast cancer accounts for 5-10% of all cases of breast cancer.  A significant proportion of hereditary breast cancer can be attributed to mutations in the BRCA1 and BRCA2 genes.  Mutations in these genes confer an increased risk for breast cancer, ovarian cancer, male breast cancer, prostate cancer, and pancreatic cancer.  Women with a BRCA1 or BRCA2 mutation have up to an 87% lifetime risk of breast cancer and up to a 60% risk of ovarian cancer.  There are other clinically significant breast cancer related genes in addition to BRCA1/2.       There are other genes that are known to be associated with an increased risk for cancer.  Some of these genes have well defined cancer risks and established management guidelines.  Other genes that can be tested for have been more recently  described, and there may be less data regarding the risks and therefore may not have established management guidelines. We discussed these limitations at length. Based on Ms. Dupont's desire to get as much information as possible regarding her personal risks and potential risks for her family, she opted to pursue testing through a panel that would look at several other genes known to increase the risk for cancer.     GENETIC TESTING:  The risks, benefits, and limitations of genetic testing and implications for clinical management following testing were reviewed. DNA test results can influence decisions regarding screening and prevention. Genetic testing can have significant psychological implications for both individuals and families. Also discussed was the possibility of employment and insurance discrimination based on genetic test results and the laws in place to prevent this, as well as the limitations of these laws.       We discussed panel testing, which would involve testing 70 genes associated with increased cancer risk. The implications of a positive or negative test result were discussed.  We also discussed the importance of testing an affected relative and how a negative result for Ms. Dupont wouldn't necessarily mean the cancers presenting in her family weren't due to a genetic mutation that Ms. Dupont did not inherit.  In general, a negative genetic test result is most informative if a mutation has first been established in an affected member of the family.  In cases where an affected individual is not available or interested in testing, it is appropriate to offer testing to an unaffected individual.      We discussed the possibility that, in some cases, genetic test results may be ambiguous due to the identification of a genetic variant of uncertain significance (VUS). These variants may or may not be associated with an increased cancer risk. With multigene panel testing, it is not uncommon for a VUS to be  identified.  If a VUS is identified, testing family members is typically not recommended and screening recommendations are made based on the family history.  The laboratories that perform genetic testing work to reclassify the VUS and send out an amended report if and when a VUS is reclassified.  The majority of variant findings are ultimately reclassified to a negative result. Given Ms. Dupont's family history, a negative test result does not eliminate all cancer risk, although the risk may not be as high as it would with positive genetic testing.      TEST RESULTS:  Genetic testing was negative for known pathogenic mutations by sequencing, rearrangement testing, and RNA analysis for the 70 genes on this panel.  One variant of uncertain significance was identified in the BRCA2 gene. however the majority of VUS's are ultimately reclassified as benign, therefore this result should not be used in making management decisions at this time. If this variant is ever reclassified a new report will be issued by the laboratory and released directly to the ordering physician, and our office. This assessment is based on the information provided at the time of the consultation.     CANCER PREVENTION:  Options available to individuals with an elevated lifetime risk for breast and/or ovarian cancer were briefly discussed.  Based on computer modeling, Ms. Dupont's lifetime risk for breast cancer would not be considered “high risk” (>20%).   Per NCCN guidelines, it is appropriate for her to follow general population screening guidelines for her breast cancer risk including annual clinical breast exam and annual mammography. Annual mammography typically begins ten years prior to the youngest diagnosis of breast cancer in a family. This assessment is based on the information provided at the time of consultation and could change should new information be obtained.     PLAN:  Genetic counseling remains available to Ms. Dupont. She is  encouraged to call our office if she has any questions, concerns, or updates to the family history at [phone].      Hawa Whitmore MS, [facility], [facility]  Licensed Certified Genetic Counselor    Cc: Irasema Maguire MD

## 2024-01-12 ENCOUNTER — TELEPHONE (OUTPATIENT)
Dept: ONCOLOGY | Facility: CLINIC | Age: 74
End: 2024-01-12
Payer: MEDICARE

## 2024-01-12 ENCOUNTER — LAB (OUTPATIENT)
Dept: LAB | Facility: HOSPITAL | Age: 74
End: 2024-01-12
Payer: MEDICARE

## 2024-01-12 DIAGNOSIS — D50.9 IRON DEFICIENCY ANEMIA, UNSPECIFIED IRON DEFICIENCY ANEMIA TYPE: ICD-10-CM

## 2024-01-12 LAB
ALBUMIN SERPL-MCNC: 4.4 G/DL (ref 3.5–5.2)
ALBUMIN/GLOB SERPL: 1.8 G/DL
ALP SERPL-CCNC: 64 U/L (ref 39–117)
ALT SERPL W P-5'-P-CCNC: 29 U/L (ref 1–33)
ANION GAP SERPL CALCULATED.3IONS-SCNC: 12 MMOL/L (ref 5–15)
AST SERPL-CCNC: 21 U/L (ref 1–32)
BASOPHILS # BLD AUTO: 0.04 10*3/MM3 (ref 0–0.2)
BASOPHILS NFR BLD AUTO: 0.6 % (ref 0–1.5)
BILIRUB SERPL-MCNC: 0.3 MG/DL (ref 0–1.2)
BUN SERPL-MCNC: 23 MG/DL (ref 8–23)
BUN/CREAT SERPL: 27.7 (ref 7–25)
CALCIUM SPEC-SCNC: 9.2 MG/DL (ref 8.6–10.5)
CHLORIDE SERPL-SCNC: 100 MMOL/L (ref 98–107)
CO2 SERPL-SCNC: 27 MMOL/L (ref 22–29)
CREAT SERPL-MCNC: 0.83 MG/DL (ref 0.57–1)
DEPRECATED RDW RBC AUTO: 47.7 FL (ref 37–54)
EGFRCR SERPLBLD CKD-EPI 2021: 74.5 ML/MIN/1.73
EOSINOPHIL # BLD AUTO: 0.27 10*3/MM3 (ref 0–0.4)
EOSINOPHIL NFR BLD AUTO: 4.1 % (ref 0.3–6.2)
ERYTHROCYTE [DISTWIDTH] IN BLOOD BY AUTOMATED COUNT: 14.1 % (ref 12.3–15.4)
FERRITIN SERPL-MCNC: 43.51 NG/ML (ref 13–150)
GLOBULIN UR ELPH-MCNC: 2.4 GM/DL
GLUCOSE SERPL-MCNC: 155 MG/DL (ref 65–99)
HCT VFR BLD AUTO: 43.9 % (ref 34–46.6)
HGB BLD-MCNC: 13.6 G/DL (ref 12–15.9)
IMM GRANULOCYTES # BLD AUTO: 0.02 10*3/MM3 (ref 0–0.05)
IMM GRANULOCYTES NFR BLD AUTO: 0.3 % (ref 0–0.5)
IRON 24H UR-MRATE: 76 MCG/DL (ref 37–145)
IRON SATN MFR SERPL: 19 % (ref 20–50)
LYMPHOCYTES # BLD AUTO: 2.17 10*3/MM3 (ref 0.7–3.1)
LYMPHOCYTES NFR BLD AUTO: 32.8 % (ref 19.6–45.3)
MCH RBC QN AUTO: 28.8 PG (ref 26.6–33)
MCHC RBC AUTO-ENTMCNC: 31 G/DL (ref 31.5–35.7)
MCV RBC AUTO: 93 FL (ref 79–97)
MONOCYTES # BLD AUTO: 0.55 10*3/MM3 (ref 0.1–0.9)
MONOCYTES NFR BLD AUTO: 8.3 % (ref 5–12)
NEUTROPHILS NFR BLD AUTO: 3.57 10*3/MM3 (ref 1.7–7)
NEUTROPHILS NFR BLD AUTO: 53.9 % (ref 42.7–76)
NRBC BLD AUTO-RTO: 0 /100 WBC (ref 0–0.2)
PLATELET # BLD AUTO: 196 10*3/MM3 (ref 140–450)
PMV BLD AUTO: 11.1 FL (ref 6–12)
POTASSIUM SERPL-SCNC: 4.3 MMOL/L (ref 3.5–5.2)
PROT SERPL-MCNC: 6.8 G/DL (ref 6–8.5)
RBC # BLD AUTO: 4.72 10*6/MM3 (ref 3.77–5.28)
SODIUM SERPL-SCNC: 139 MMOL/L (ref 136–145)
TIBC SERPL-MCNC: 398 MCG/DL (ref 298–536)
TRANSFERRIN SERPL-MCNC: 267 MG/DL (ref 200–360)
WBC NRBC COR # BLD AUTO: 6.62 10*3/MM3 (ref 3.4–10.8)

## 2024-01-12 PROCEDURE — 85025 COMPLETE CBC W/AUTO DIFF WBC: CPT

## 2024-01-12 PROCEDURE — 80053 COMPREHEN METABOLIC PANEL: CPT

## 2024-01-12 PROCEDURE — 84466 ASSAY OF TRANSFERRIN: CPT

## 2024-01-12 PROCEDURE — 82728 ASSAY OF FERRITIN: CPT

## 2024-01-12 PROCEDURE — 83540 ASSAY OF IRON: CPT

## 2024-01-12 PROCEDURE — 36415 COLL VENOUS BLD VENIPUNCTURE: CPT

## 2024-01-12 RX ORDER — SODIUM CHLORIDE 9 MG/ML
20 INJECTION, SOLUTION INTRAVENOUS ONCE
OUTPATIENT
Start: 2024-01-18

## 2024-01-12 RX ORDER — FAMOTIDINE 10 MG/ML
20 INJECTION, SOLUTION INTRAVENOUS AS NEEDED
OUTPATIENT
Start: 2024-01-18

## 2024-01-12 RX ORDER — ACETAMINOPHEN 325 MG/1
650 TABLET ORAL ONCE
OUTPATIENT
Start: 2024-01-18

## 2024-01-12 RX ORDER — DIPHENHYDRAMINE HYDROCHLORIDE 50 MG/ML
50 INJECTION INTRAMUSCULAR; INTRAVENOUS AS NEEDED
OUTPATIENT
Start: 2024-01-18

## 2024-01-12 NOTE — TELEPHONE ENCOUNTER
Contacted patient Carlotta Dupont regarding results of her Iron Profile, she was informed that her lab values were:  Iron Sat: 19%  Ferritin: 43.51  Per Dr Hsieh recommendations he has ordered 1 txt of Injectafer, pt v/u  Apt was renee for Thursday 1/18/24 @ 1:30 pm  Patient v/u of time and date

## 2024-01-12 NOTE — TELEPHONE ENCOUNTER
----- Message from Denis Hsieh MD sent at 1/12/2024  9:14 AM CST -----  Schedule injectafer 750 mg iv x 1  ----- Message -----  From: Lab, Background User  Sent: 1/12/2024   7:10 AM CST  To: Denis Hsieh MD

## 2024-01-14 NOTE — PROGRESS NOTES
"[facility] HEMATOLOGY AND ONCOLOGY  [address]  [facility] [address] [phone]    Patient Name: Carlotta Dupont  Encounter Date: 01/19/2024  YOB: 1950  Patient Number: [id number]      REASON FOR VISIT: Ms. Laguerre \"Dot\" JUAN C Dupont is a 73-year-old female who returns in follow-up of anemia with iron deficiency. It as been 60 months since last receipt of IV Injectafer. She is here alone (usually with her spouse Hung).       I have reviewed the HPI and verified with the patient the accuracy of it. No changes to interval history since the information was documented. Denis Hsieh MD 01/19/24      DIAGNOSTIC ABNORMALITIES:   Review of labs from the referring office dating back to 01/12/2018 includes a CBC showing a hemoglobin of 11.4, hematocrit 37.4, MCV 82.2 (81 - 99), MCH 25.1, MCHC 30 (each depressed), RDW 15.6% (elevated). CMP notable for hyperglycemia and BUN of 21 (elevated), otherwise normal with GFR greater than 60, calcium 10.1, total protein 7.6, and normal liver enzymes.   Labs, 02/03/2018: Hemoglobin 11.2, hematocrit 37.2, MCV 82.7, platelets 257,000, WBC 6.8. Serum iron 20, \"low iron saturation,\" ferritin 8.5. She prescribed ferrous sulfate 1 p.o. daily 02/03/2018 which she did not start due to prior intolerance, \"Bone and joint pains when I took it while I was pregnant.\" Her last colonoscopy was 12/2014 by Dr. Ozuna with 3-year followup. Stools for fecal occult blood x3 were said to be (+).   Labs, 02/22/2018: Hemoglobin 11.2, hematocrit 37.5, MCV 82.6, platelets 278,000, WBC 6.8. Ferritin 7.7. BUN 26, creatinine 0.6 (GFR greater than 60).  EGD, 03/01/2018: Normal examined duodenum. Normal stomach. Z-line regular, 38 cm from the incisors. No specimens collected.  Colonoscopy, 03/01/2018: The entire examined colon is normal on direct and retroflexion views. No specimens collected. Repeat 3 years.  Labs, " 03/19/2018: Hemoglobin 12.2, hematocrit 35.5, MCV 81.5, platelets 315,000, WBC 7.7 with a normal differential. CMP notable for glucose 157, otherwise normal with BUN 18, creatinine 0.7 (GFR 88.7), calcium 10.4, total protein 8.3, normal liver enzymes. Serum iron 30, saturation 6.22%, ferritin 5.2, TIBC 482.  M2A Capsule - Date capsule was swallowed: 04/20/2018. Date capsule was read : 04/28/2018. RESULTS: Gastric passage time: 1 hour 55 minutes. Small bowel passage time: 2 hours 39 minutes. The capsule was clearly seen in the colon. Conclusion: 1 small AVM was noted at 1 hour 59 minutes and 38 seconds. Several small areas of the lymphangitic ectasia noted. The capsule was seen freely passing into the cecum. There was no activity noted on this exam.    PREVIOUS INTERVENTIONS:   Injectafer 750 mg IV weekly x2, 03/23/2018 through 03/30/2018 (1500 mg); 10/12/2018 (750 mg); 01/11/2019 (750 mg)        Problem List Items Addressed This Visit          Other    Iron deficiency anemia - Primary         Oncology/Hematology History    No history exists.       PAST MEDICAL HISTORY:  ALLERGIES:  Allergies   Allergen Reactions    Zovirax [Acyclovir] Unknown - High Severity     Other reaction(s): Other (See Comments)  PhX  PhX    Meperidine Hallucinations     halluncinations  halluncinations    Propranolol Unknown - Low Severity     Other reaction(s): Other (See Comments)  Bad dreams  unknown  Bad dreams  unknown  Bad dreams    Sertraline Hcl Hallucinations     hallucinations  hallucinations    Vesicare [Solifenacin Succinate] GI Intolerance     Severe constipation    Solifenacin Unknown - Low Severity     Other reaction(s): Other (See Comments)  Phx  Phx  Couldn't urinate     CURRENT MEDICATIONS:  Outpatient Encounter Medications as of 1/19/2024   Medication Sig Dispense Refill    albuterol sulfate  (90 Base) MCG/ACT inhaler As Needed.  5    ALPRAZolam (XANAX) 0.25 MG tablet Take  by mouth 3 (Three) Times a Day As Needed.       aspirin 81 MG tablet Take 1 tablet by mouth.      aspirin-acetaminophen-caffeine (EXCEDRIN MIGRAINE) 250-250-65 MG per tablet Take 1 tablet by mouth As Needed.      calcium carbonate (TUMS) 500 MG chewable tablet Chew 1 tablet Daily.      Empagliflozin (JARDIANCE PO) Take 10 mg by mouth.      esomeprazole (nexIUM) 20 MG capsule Take 1 capsule by mouth Every Morning Before Breakfast.      estradiol (ESTRACE) 0.1 MG/GM vaginal cream       famotidine (PEPCID) 10 MG tablet Take 1 tablet by mouth At Night As Needed for Heartburn.      glucose blood (Glucocard Expression Test) test strip TEST DAILY AS NEEDED      hydrochlorothiazide (MICROZIDE) 12.5 MG capsule Take 1 capsule by mouth Every Morning.      losartan (COZAAR) 100 MG tablet TAKE ONE TABLET BY MOUTH EVERY DAY      melatonin 5 MG tablet tablet Take 1 tablet by mouth.      metFORMIN (GLUCOPHAGE) 500 MG tablet Take 2 tablets by mouth 2 (Two) Times a Day With Meals. 500m g in am and 1000mg at night      montelukast (SINGULAIR) 10 MG tablet       Multiple Vitamins-Minerals (PRESERVISION AREDS PO) Take  by mouth.      rosuvastatin (CRESTOR) 5 MG tablet 1/2 tablet on Monday, Wednesday, Friday      Sure Comfort Lancets 30G misc       verapamil SR (CALAN-SR) 240 MG CR tablet TAKE ONE TABLET BY MOUTH TWICE A DAY      vitamin D (ERGOCALCIFEROL) 26627 units capsule capsule Take 1 capsule by mouth Every 7 (Seven) Days.       Facility-Administered Encounter Medications as of 1/19/2024   Medication Dose Route Frequency Provider Last Rate Last Admin    [COMPLETED] ferric carboxymaltose (INJECTAFER) 750 mg in sodium chloride 0.9 % 100 mL IVPB  750 mg Intravenous Once Denis Hsieh MD   Stopped at 01/18/24 1417    [COMPLETED] sodium chloride 0.9 % infusion  20 mL/hr Intravenous Once Denis Hsieh MD   Stopped at 01/18/24 1438    [DISCONTINUED] acetaminophen (TYLENOL) tablet 650 mg  650 mg Oral Once Denis Hsieh MD        [DISCONTINUED] diphenhydrAMINE  (BENADRYL) injection 50 mg  50 mg Intravenous PRN Denis Hsieh MD        [DISCONTINUED] famotidine (PEPCID) injection 20 mg  20 mg Intravenous PRN Denis Hsieh MD        [DISCONTINUED] Hydrocortisone Sod Suc (PF) (Solu-CORTEF) injection 100 mg  100 mg Intravenous PRN Denis Hsieh MD         ADULT ILLNESSES:   Iron deficiency anemia (disorder) ( ICD-10:D50.9 ;Iron deficiency anemia, unspecified   Elevated liver function test ( ICD-10:R94.5 ;Abnormal results of liver function studies   Fatigue ( ICD-10:R53.83 ;Other fatigue   Fatty liver ( ICD-10:K76.0 ;Fatty (change of) liver, not elsewhere classified   Gallbladder calculus (disorder) ( ICD-10:K80.20 ;Calculus of gallbladder without cholecystitis without obstruction   Gastroesophageal reflux disease without esophagitis (disorder) ( ICD-10:K21.9 ;Gastro-esophageal reflux disease without esophagitis   Hyperlipidemia ( ICD-10:E78.5 ;Hyperlipidemia, unspecified   Hypertension ( ICD-10:I10 ;Essential (primary) hypertension   Lumbar disc degenerative disease ( ICD-10:M51.36 ;Other intervertebral disc degeneration, lumbar region   Migraine ( ICD-10:G43.909 ;Migraine, unspecified, not intractable, without status migrainosus   Mixed hyperlipidemia ( ICD-10:E78.2 ;Mixed hyperlipidemia   Obstructive sleep apnea ( ICD-10:G47.33 ;Obstructive sleep apnea (adult) (pediatric)   Sacroiliitis ( ICD-10:M46.1 ;Sacroiliitis, not elsewhere classified   Trigeminal neuralgia ( ICD-10:G50.0 ;Trigeminal neuralgia   Type 2 diabetes ( ICD-10:E11.9 ;Type 2 diabetes mellitus without complications   Vitamin D deficiency (disorder) ( ICD-10:E55.9 ;Vitamin D deficiency, unspecified    SURGERIES:   Mastoidectomy, 05/17/2018. 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 Perez/Dr. Oconnor)   Colonoscopy, 03/01/2018. The entire examined colon is normal on direct and retroflexion views. No specimens collected. Repeat 3 years   Cystoscopy,  "09/072018 ([facility]). No urethral lesions. No tumors, stones, or other mucosal lesions in the bladder. Ureteral orifices were orthotopic and normal in their appearance   EGD, 03/01/2018. Normal examined duodenum. Normal stomach. Z-line regular, 38 cm from the incisors. No specimens collected.   Decompression of trigeminal nerve (V) (procedure), Note: microvascular, at the [facility], 05/12/2017   Biopsy of breast, x3, 1969, 1974, 1980   Vaginal and bladder repair, 07/03/2012   Hysterectomy without BSO, 1976   Colonoscopic polypectomy: Colonoscopy, 2014. \"3 polyps.\" Dr. Ozuna  Sacrocolpopexy and Macroplastique, 7/3/2012  09/17/2021 -FNA left breast cyst, 3 o'clock position.  Negative for malignant cells.  Cyst contents and benign epithelial groups with apocrine metaplasia.  Right shoulder replacement, 06/30/2022-Dr. Quintanilla      ADULT ILLNESSES:  Patient Active Problem List   Diagnosis Code    Iron deficiency anemia D50.9    Heme positive stool R19.5    BRBPR (bright red blood per rectum) K62.5    NSAID long-term use Z79.1    Gastroesophageal reflux disease K21.9    Hx of adenomatous colonic polyps Z86.010    Nonsmoker Z78.9    Controlled type 2 diabetes mellitus without complication, without long-term current use of insulin E11.9    HTN (hypertension), benign I10    BMI 25.0-25.9,adult Z68.25    Superficial postoperative wound infection T81.49XA    Trigeminal neuralgia G50.0    Open scalp wound S01.00XA    Wound dehiscence T81.30XA    Mastoiditis, chronic, left H70.12    Asthma J45.909    Calculus of gallbladder K80.20    Fatty liver K76.0    Lumbar disc disease with radiculopathy M51.16    Migraine without aura, not intractable G43.009    Mixed hyperlipidemia E78.2    Hill's metatarsalgia G57.60    Plantar fasciitis M72.2    Obstructive sleep apnea G47.33    Nodule of vagina N89.9    Prolapse of vaginal wall N81.10    Pseudophakia Z96.1    Tear film insufficiency H04.129    Vitamin D " deficiency E55.9    History of colon polyps Z86.010    Abnormal ECG R94.31    Thyroid nodule E04.1    Nontoxic multinodular goiter E04.2    Osteoarthritis of spine with radiculopathy, cervical region M47.22    Presence of intraocular lens Z96.1    Lumbar disc herniation M51.26    Degeneration of lumbar or lumbosacral intervertebral disc M51.37    Lumbar stenosis M48.061    Lumbar radiculopathy M54.16    Overweight with body mass index (BMI) of 25 to 25.9 in adult E66.3, Z68.25    Non-smoker Z78.9    Cervical radiculopathy M54.12    Iron deficiency anemia D50.9     SURGERIES:  Past Surgical History:   Procedure Laterality Date    APPENDECTOMY      BLADDER REPAIR      had vaginal repair at the same time    BREAST BIOPSY Bilateral     benign    CAPSULE ENDOSCOPY N/A 04/20/2018    Procedure: CAPSULE ENDOSCOPY M2A;  Surgeon: Grarett Ozuna MD;  Location: [facility] ENDOSCOPY;  Service: Gastroenterology    COLONOSCOPY N/A 03/01/2018    Procedure: COLONOSCOPY WITH ANESTHESIA;  Surgeon: Garrett Ozuna MD;  Location: [facility] ENDOSCOPY;  Service:     COLONOSCOPY N/A 04/16/2021    Procedure: COLONOSCOPY WITH ANESTHESIA;  Surgeon: Garrett Ozuna MD;  Location: [facility] ENDOSCOPY;  Service: Gastroenterology;  Laterality: N/A;  pre: hx colon polyps  post: polyp  Misty Pelayo MD    COLONOSCOPY W/ POLYPECTOMY  12/31/2014    Tubular adenomatous polyp at 80 cm, Hyperplastic polyp rectum repeat exam in 3 years    ENDOSCOPY N/A 03/01/2018    Procedure: ESOPHAGOGASTRODUODENOSCOPY WITH ANESTHESIA;  Surgeon: Garrett Ozuna MD;  Location: [facility] ENDOSCOPY;  Service:     FLAP HEAD/NECK Left 05/17/2018    Procedure: POSSIBLE STERNOCLEIDOMASTOID FLAP;  Surgeon: Kadeem Oconnor MD;  Location: [facility] OR;  Service: ENT    HYSTERECTOMY      MASTOIDECTOMY Left 05/17/2018    Procedure: Wound exploration with possible mastoidectomy; possible sternocleidomastoid flap.  Please schedule with Dr. Perez.;  Surgeon: Kadeem Oconnor MD;  Location: [facility]  OR;  Service: ENT    SHOULDER SURGERY Right 06/30/2022    TRIGEMINAL NERVE DECOMPRESSION      US GUIDED CYST ASPIRATION BREAST N/A 09/16/2021     HEALTH MAINTENANCE ITEMS:  Health Maintenance Due   Topic Date Due    Pneumococcal Vaccine 65+ (1 of 2 - PCV) Never done    DIABETIC FOOT EXAM  Never done    DIABETIC EYE EXAM  01/16/2021    URINE MICROALBUMIN  11/15/2023       <no information>  Last Completed Colonoscopy            COLORECTAL CANCER SCREENING (COLONOSCOPY - Every 5 Years) Next due on 4/16/2026 04/16/2021  Surgical Procedure: COLONOSCOPY    04/16/2021  COLONOSCOPY    03/01/2018  Surgical Procedure: COLONOSCOPY    03/01/2018  COLONOSCOPY    12/31/2014  SCANNED - COLONOSCOPY    Only the first 5 history entries have been loaded, but more history exists.                  Immunization History   Administered Date(s) Administered    COVID-19 (MODERNA) 1st,2nd,3rd Dose Monovalent 01/16/2021, 02/13/2021, 10/04/2021    COVID-19 (MODERNA) Monovalent Original Booster 04/01/2022    COVID-19 (PFIZER) BIVALENT 12+YRS 09/30/2022    COVID-19 F23 (MODERNA) 12YRS+ (SPIKEVAX) 10/05/2023     Last Completed Mammogram            MAMMOGRAM (Every 2 Years) Next due on 8/14/2025 08/14/2023  Mammo Diagnostic Digital Tomosynthesis Bilateral With CAD    11/17/2022  Mammo Screening Digital Tomosynthesis Bilateral With CAD    09/15/2021  Mammo Diagnostic Digital Tomosynthesis Bilateral With CAD    10/15/2020  Mammo Screening Digital Tomosynthesis Bilateral With CAD    09/30/2019  Mammo Screening Digital Tomosynthesis Bilateral With CAD    Only the first 5 history entries have been loaded, but more history exists.                      FAMILY HISTORY:  Family History   Problem Relation Age of Onset    Breast cancer Sister     Breast cancer Mother     Heart disease Father     No Known Problems Brother     No Known Problems Daughter     No Known Problems Son     No Known Problems Maternal Grandmother     No Known Problems  "Paternal Grandmother     No Known Problems Maternal Aunt     No Known Problems Paternal Aunt     Breast cancer Niece     Colon cancer Neg Hx     Colon polyps Neg Hx     BRCA 1/2 Neg Hx     Endometrial cancer Neg Hx     Ovarian cancer Neg Hx      SOCIAL HISTORY:  Social History     Socioeconomic History    Marital status:    Tobacco Use    Smoking status: Never    Smokeless tobacco: Never   Vaping Use    Vaping Use: Never used   Substance and Sexual Activity    Alcohol use: Yes     Comment: Rare    Drug use: No    Sexual activity: Defer       REVIEW OF SYSTEMS:  Constitutional:   The patient's appetite is good but energy remains low.  \"Just draggy.\" Had Covic infection x 2 last October and again last November.  Was given Paxlovid.  She has lost 4 lb (had gained 21 lb at her 4 prior visits)  since her last visit.  She has no fevers, chills, or drenching night sweats. Her sleep habits have been disrupted by her trigeminal neuralgia.  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 Perez and Dr. Oconnor).  Says repeat MRI sometime 11/2018 and 12/2018 after antibiotics (Dr. Rivers) showed clearing of the mastoid infection.  Says she has learned to live with it.  She has no sore throat, nosebleeds, or sore tongue. She has no headaches. She denies any hoarseness, nor change in voice quality.   Ocular:   She reports no eye pain, significant change in visual acuity, double vision, with occasional blurry vision, left eye (OS). Had blepharoplasties last 08/2020 per Dr. Fernandes.  Respiratory:   She reports intermittent cough from asthma, but no significant shortness of breathing, phlegm production, or unexplained chest wall pain. Uses inhalers.  Has sleep apnea but still does not tolerate mask for CPAP due to the trigeminal irritation.  Cardiovascular: " "  She reports no exertional chest pain, chest pressure, or chest heaviness. She reports no claudication. She reports no palpitations or symptomatic orthostasis.  Gastrointestinal:   She reports no pica, dysphagia, nausea, vomiting, postprandial abdominal pain, bloating, cramping, change in bowel habits, or discoloration of the stool. She reports no rectal bleeding. She reports occasional constipation on as needed Miralax.  No diarrhea. EGD and colonoscopy last 03/01/2018.  Genitourinary:   She reports no urinary burning, frequency, dribbling, or discoloration. She reports difficulty controlling her bladder.  Has had bladder lift surgery in 2012.  Uses pads. She has no need to urinate frequently through the night.   Musculoskeletal:   She reports lingering lower back pains and right leg radiculitis for which she had PT per neurosurgery.  Lingering right shoulder pains followed by Dr. Quintanilla.  Shoulder replacement on 06/30/2022 (11/12/2021- MRI shoulder advanced AC arthrosis with full thickness tears).    Extremities:   She reports no trouble with fluid retention or significant leg swelling.  Endocrine:   She reports no problems with excess thirst, excessive urination, vasomotor instability.  Heme/Lymphatic:   She reports no unexplained bleeding, bruising, petechial rashes, or swollen glands.  Skin:   She reports no itching, rashes, or lesions which won't heal.  Neuro:   She reports no loss of consciousness, seizures, fainting spells, or dizziness. She reports no weakness of face, arms, or legs. She has no difficulty with speech. She has no tremors. Unchanged paresthesias of the soles of her feet.  She has residual left-sided facial numbness.   Psych:         She denies depression nor anxiety currently. She reports no mood swings.        VITAL SIGNS: /78   Pulse 68   Temp 97 °F (36.1 °C) (Temporal)   Resp 18   Ht 172.7 cm (68\")   Wt 78.8 kg (173 lb 12.8 oz)   SpO2 97%   BMI 26.43 kg/m² Body surface area " is 1.93 meters squared.  Pain Score    01/19/24 0858   PainSc: 0-No pain             PHYSICAL EXAMINATION:   General:   She is a pleasant, cooperative otherwise well-developed, well-nourished, and well-kept elderly female who is comfortable at rest. She arrived in the exam room ambulatory. She appears to be her stated age. Her skin color is normal (previously slightly pale).  Head/Neck:   The patient is anicteric and atraumatic. The trachea is midline. The neck is supple without evidence of jugular venous distention or cervical adenopathy. The left mastoid is not swollen.  Eyes:   The pupils are equal, round, and reactive to light. The extraocular movements are full. There is no scleral jaundice or erythema.   Chest:   The respiratory efforts are normal and unhindered. The chest is clear to auscultation and percussion. There are no wheezes, rhonchi, rales, or asymmetry of breath sounds.  Cardiovascular:   The patient has a regular cardiac rate and rhythm without murmurs, rubs, or gallops. The peripheral pulses are equal and full.  Abdomen:   The belly is soft and slightly globose. There is no rebound or guarding. There is no organomegaly, mass-effect, or tenderness. Bowel sounds are active and of normal character.  Extremities:   There is no evidence of cyanosis, clubbing, or edema.    Rheumatologic:   There is no overt evidence of rheumatoid deformities of the hands. There is no sausaging of the fingers. There is no sign of active synovitis. The gait is slow but no longer overtly antalgic, previously favoring the right leg/hip  Cutaneous:   There are no overt rashes, disseminated lesions, purpura, or petechiae.   Lymphatics:   There is no evidence of adenopathy in the cervical, supraclavicular, axillary areas.   Neurologic:   The patient is alert, oriented, cooperative, and pleasant. She is appropriately conversant. She ambulated into the exam room without assistance and transferred from chair to exam table  unaided. There is no overt dysfunction of the motor, sensory, cerebellar systems.  Psych:   Mood and affect are appropriate for circumstance. Eye contact is appropriate. Normal judgement and decision making.         LABS    Lab Results - Last 18 Months   Lab Units 01/12/24 0703 11/20/23  0703 08/02/23  0713 07/07/23  0707 01/06/23  0742 11/15/22  0649   HEMOGLOBIN g/dL 13.6 13.8 13.5 14.0 13.4 13.6   HEMATOCRIT % 43.9 43.7 41.3 43.2 41.7 41.2   MCV fL 93.0 95.8 92.8 91.7 91.9 91.8   WBC 10*3/mm3 6.62 5.7 5.7 6.16 5.91 5.5   RDW % 14.1 13.2 13.0 13.0 13.8 13.2   MPV fL 11.1 11.4 11.6 10.7 11.4 12.0   PLATELETS 10*3/mm3 196 209 223 231 229 215   IMM GRAN % % 0.3  --   --  0.2 0.3  --    NEUTROS ABS 10*3/mm3 3.57  --   --  3.47 3.38  --    LYMPHS ABS 10*3/mm3 2.17  --   --  1.81 1.66  --    MONOS ABS 10*3/mm3 0.55  --   --  0.48 0.53  --    EOS ABS 10*3/mm3 0.27  --   --  0.32 0.27  --    BASOS ABS 10*3/mm3 0.04  --   --  0.07 0.05  --    IMMATURE GRANS (ABS) 10*3/mm3 0.02  --   --  0.01 0.02  --    NRBC /100 WBC 0.0  --   --  0.0 0.0  --        Lab Results - Last 18 Months   Lab Units 01/12/24  0703 12/28/23  0822 07/07/23  0707 01/06/23  0742 07/25/22  0707   GLUCOSE mg/dL 155*  --  165* 157* 146*   SODIUM mmol/L 139  --  137 137 140   POTASSIUM mmol/L 4.3  --  4.0 4.3 4.1   TOTAL CO2 mmol/L  --   --   --   --  30*   CO2 mmol/L 27.0  --  23.0 25.0  --    CHLORIDE mmol/L 100  --  100 99 100   ANION GAP mmol/L 12.0  --  14.0 13.0 10   CREATININE mg/dL 0.83 0.80 0.65 0.79 0.6   BUN mg/dL 23  --  15 22 14   BUN / CREAT RATIO  27.7*  --  23.1 27.8*  --    CALCIUM mg/dL 9.2  --  9.3 9.4 10.0   EGFR IF NONAFRICN AM   --   --   --   --  >60   ALK PHOS U/L 64  --  75 83 85   TOTAL PROTEIN g/dL 6.8  --  6.9 6.8 7.0   ALT (SGPT) U/L 29  --  36* 37* 29   AST (SGOT) U/L 21  --  25 25 22   BILIRUBIN mg/dL 0.3  --  0.5 0.3 0.4   ALBUMIN g/dL 4.4  --  4.5 4.3 4.6   GLOBULIN gm/dL 2.4  --  2.4 2.5  --          Lab Results - Last 18  Months   Lab Units 01/12/24  0703 11/20/23  0703 07/07/23  0707 01/06/23  0742 11/15/22  0649   IRON mcg/dL 76  --  65 53  --    TIBC mcg/dL 398  --  368 386  --    IRON SATURATION (TSAT) % 19*  --  18* 14*  --    FERRITIN ng/mL 43.51  --  99.04 115.50  --    TSH uIU/mL  --  1.670  --   --  1.860       ASSESSMENT:   1. Normocytic/borderline microcytic anemia with profound iron deficiency.   --Hemoglobin 13.6; MCV 93, 1/12/24 (prior range: Hemoglobin 11.2 - 14.4; MCV 81.5 - 93.3). Resolved since last receipt of Injectafer.   2. Fatigue multifactorial including related to #1.   3. Profound iron deficiency with a ferritin of 2.7 on 03/03/2018.   a. Last EGD and colonoscopy 03/01/2018 (above). Both unrevealing.   b. M2A Capsule - Date capsule was swallowed: 04/20/2018. Date capsule was read : 04/28/2018. RESULTS: Gastric passage time: 1 hour 55 minutes. Small bowel passage time: 2 hours 39 minutes. The capsule was clearly seen in the colon. Conclusion: 1 small AVM was noted at 1 hour 59 minutes and 38 seconds. Several small areas of the lymphangitic ectasia noted. The capsule was seen freely passing into the cecum. There was no activity noted on this exam.   c. Abnormal CT abdomen/enterography, 05/15/2018 at [facility]: Impression: 3.9 cm focus of mass-like enhancement involving the vagina, proximal urethra, and bladder dome. Unsure if this reflects neoplasm or post surgical change; however, neoplastic process arising from the vagina certainly not excluded by CT. Recommend direct visualization. No suspicious focal lesions identified in the bowel. No suspicious adenopathy in the abdomen or pelvis. Hepatic steatosis.   d. Seen by Urology (Dr. Mae Esquivel) at [place], 08/04/2019. Stable exam, MRI and cystoscopic findings highly suggestive of the nodule representing the Macroplastique implant.  Discussed the risks of excision including recurrent stress incontinence and fistula.  Continue observation.   Continue transvaginal estrogen.  RTC 6 months..     4. Insulin-requiring type 2 diabetes. Dr. Pelayo.  5. Hyperlipidemia. Remains on Crestor  6. Trigeminal neuralgia of the left side of the face, lingering symptoms since trigeminal decompression procedure. 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   7. Gastroesophageal reflux disease with esophagitis. On Pepcid.  8. Vitamin D deficiency. On weekly ergocalciferol.  9.  Cervical and lumbar degenerative disk disease.  Symptomatic (pain).  --05/14/2021-Lumbar MRI with abnormalities at multiple levels  --11/10/2021-MRI C-spine-degenerative changes with neuroforaminal narrowing C5-6; C6-7.  No significant spinal canal stenosis.  10. Elevated LFTs, NOS. Crestor held, hep profile and liver US ordered on 03/22. Liver US, 03/27/2019: 1. Hepatic steatosis. 2. Limited visualization of the pancreas. Hepatitis profile, 06/28/2019 negative.    11.  Complex cystic and solid mass in the left breast on mammogram/left breast ultrasound, 09/15/2021  --09/16/2021 -ultrasound-guided FNA left breast cyst, 3 o'clock position.  Negative for malignant cells.  Cyst contents and benign epithelial groups with apocrine metaplasia.  --11/17/2022-  Mammogram-No mammographic evidence of malignancy. Recommendation is for the patient to return for routine mammography in one year or sooner if clinically indicated.   BIRADS Category 2 - Benign findings   12.  Right shoulder pain.  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  --8/14/23- Mammogram- No mammographic or sonographic evidence of malignancy. Recommendation is for the patient to return for routine mammography in one year or sooner if clinically indicated. BIRADS Category 2 - Benign findings   --12/28/23- MRI breast- No MRI evidence of malignancy in the RIGHT or LEFT  breast. Continued risk appropriate screening. No adenopathy. Cardiomegaly.    13.  Hypertension.  Dr. Pelayo follows             RECOMMENDATIONS:   1.  Apprised of the labs from 1/12/24 (above) noting the normal MCV and normal Hgb, hyperglycemia, normal calcium, otherwise normal CMP, depressed ferritin (43.51- prior: 99), depressed Fe sat (19%- prior: 18%; 14%; 15%; 21; 22), normal serum iron (from previously severe iron deficiency).   2.  Apprised of mammogram, 8/14/23 (above).  CHARITY  3.  Apprised of MRI breast, 12/28/23 (above). CHARITY.  4.  Schedule Venofer 200 mg IV daily x 2  5.  The rationale and potential toxicities of IV iron (anaphylaxis included) previously discussed at length. She will agree to therapy as indicated.   6.  Continue currently identified medications.   7.  Continue management per primary care and other specialists.  Is followed by neurosurgery regarding her spinal degenerative disease and ortho for the right shoulder issues.  8.  Return to the [place] office in 24 weeks with pre-office CMP, CBC, serum iron, iron saturation, ferritin.    MEDICAL DECISION MAKING: Moderate complexity   AMOUNT OF DATA: Limited    I spent ~34 minutes caring for Carlotta on this date of service. This time includes time spent by me in the following activities: preparing for the visit, reviewing tests, performing a medically appropriate examination and/or evaluation, counseling and educating the patient/family/caregiver, ordering medications, tests, or procedures and documenting information in the medical record      cc: MD Garrett Borrego MD

## 2024-01-18 ENCOUNTER — INFUSION (OUTPATIENT)
Dept: ONCOLOGY | Facility: HOSPITAL | Age: 74
End: 2024-01-18
Payer: MEDICARE

## 2024-01-18 VITALS
TEMPERATURE: 96.9 F | BODY MASS INDEX: 26.52 KG/M2 | HEIGHT: 68 IN | HEART RATE: 65 BPM | OXYGEN SATURATION: 96 % | WEIGHT: 175 LBS | RESPIRATION RATE: 16 BRPM | SYSTOLIC BLOOD PRESSURE: 115 MMHG | DIASTOLIC BLOOD PRESSURE: 53 MMHG

## 2024-01-18 DIAGNOSIS — D50.9 IRON DEFICIENCY ANEMIA, UNSPECIFIED IRON DEFICIENCY ANEMIA TYPE: Primary | ICD-10-CM

## 2024-01-18 PROCEDURE — 96365 THER/PROPH/DIAG IV INF INIT: CPT

## 2024-01-18 PROCEDURE — 25010000002 FERRIC CARBOXYMALTOSE 750 MG/15ML SOLUTION 15 ML VIAL: Performed by: INTERNAL MEDICINE

## 2024-01-18 PROCEDURE — 25810000003 SODIUM CHLORIDE 0.9 % SOLUTION: Performed by: INTERNAL MEDICINE

## 2024-01-18 RX ORDER — DIPHENHYDRAMINE HYDROCHLORIDE 50 MG/ML
50 INJECTION INTRAMUSCULAR; INTRAVENOUS AS NEEDED
Status: DISCONTINUED | OUTPATIENT
Start: 2024-01-18 | End: 2024-01-18 | Stop reason: HOSPADM

## 2024-01-18 RX ORDER — FAMOTIDINE 10 MG/ML
20 INJECTION, SOLUTION INTRAVENOUS AS NEEDED
Status: DISCONTINUED | OUTPATIENT
Start: 2024-01-18 | End: 2024-01-18 | Stop reason: HOSPADM

## 2024-01-18 RX ORDER — SODIUM CHLORIDE 9 MG/ML
20 INJECTION, SOLUTION INTRAVENOUS ONCE
Status: COMPLETED | OUTPATIENT
Start: 2024-01-18 | End: 2024-01-18

## 2024-01-18 RX ORDER — ACETAMINOPHEN 325 MG/1
650 TABLET ORAL ONCE
Status: DISCONTINUED | OUTPATIENT
Start: 2024-01-18 | End: 2024-01-18 | Stop reason: HOSPADM

## 2024-01-18 RX ADMIN — FERRIC CARBOXYMALTOSE INJECTION 750 MG: 50 INJECTION, SOLUTION INTRAVENOUS at 13:56

## 2024-01-18 RX ADMIN — SODIUM CHLORIDE 20 ML/HR: 9 INJECTION, SOLUTION INTRAVENOUS at 13:55

## 2024-01-19 ENCOUNTER — OFFICE VISIT (OUTPATIENT)
Dept: ONCOLOGY | Facility: CLINIC | Age: 74
End: 2024-01-19
Payer: MEDICARE

## 2024-01-19 VITALS
DIASTOLIC BLOOD PRESSURE: 78 MMHG | WEIGHT: 173.8 LBS | BODY MASS INDEX: 26.34 KG/M2 | TEMPERATURE: 97 F | HEART RATE: 68 BPM | HEIGHT: 68 IN | SYSTOLIC BLOOD PRESSURE: 134 MMHG | RESPIRATION RATE: 18 BRPM | OXYGEN SATURATION: 97 %

## 2024-01-19 DIAGNOSIS — D50.9 IRON DEFICIENCY ANEMIA, UNSPECIFIED IRON DEFICIENCY ANEMIA TYPE: Primary | ICD-10-CM

## 2024-01-25 ENCOUNTER — INFUSION (OUTPATIENT)
Dept: ONCOLOGY | Facility: HOSPITAL | Age: 74
End: 2024-01-25
Payer: MEDICARE

## 2024-01-25 VITALS
HEART RATE: 66 BPM | SYSTOLIC BLOOD PRESSURE: 114 MMHG | DIASTOLIC BLOOD PRESSURE: 55 MMHG | TEMPERATURE: 97.2 F | HEIGHT: 68 IN | BODY MASS INDEX: 26.22 KG/M2 | WEIGHT: 173 LBS | OXYGEN SATURATION: 96 % | RESPIRATION RATE: 18 BRPM

## 2024-01-25 DIAGNOSIS — D50.9 IRON DEFICIENCY ANEMIA, UNSPECIFIED IRON DEFICIENCY ANEMIA TYPE: Primary | ICD-10-CM

## 2024-01-25 PROCEDURE — G0463 HOSPITAL OUTPT CLINIC VISIT: HCPCS

## 2024-01-31 DIAGNOSIS — I10 ESSENTIAL HYPERTENSION: Chronic | ICD-10-CM

## 2024-01-31 RX ORDER — LOSARTAN POTASSIUM AND HYDROCHLOROTHIAZIDE 25; 100 MG/1; MG/1
1 TABLET ORAL DAILY
Qty: 90 TABLET | Refills: 1 | Status: SHIPPED | OUTPATIENT
Start: 2024-01-31

## 2024-01-31 NOTE — TELEPHONE ENCOUNTER
Requested Prescriptions     Pending Prescriptions Disp Refills    losartan-hydroCHLOROthiazide (HYZAAR) 100-25 MG per tablet 90 tablet 1     Sig: Take 1 tablet by mouth daily

## 2024-02-15 ENCOUNTER — TELEPHONE (OUTPATIENT)
Dept: INTERNAL MEDICINE | Age: 74
End: 2024-02-15

## 2024-02-15 DIAGNOSIS — N95.9 MENOPAUSAL DISORDER: Primary | ICD-10-CM

## 2024-02-15 RX ORDER — ESTRADIOL 0.1 MG/G
1 CREAM VAGINAL
Qty: 42.5 G | Refills: 3 | Status: SHIPPED | OUTPATIENT
Start: 2024-02-15

## 2024-02-15 RX ORDER — CONJUGATED ESTROGENS 0.62 MG/G
0.5 CREAM VAGINAL DAILY
Qty: 30 G | Refills: 3 | Status: SHIPPED | OUTPATIENT
Start: 2024-02-15 | End: 2024-02-15

## 2024-02-15 NOTE — TELEPHONE ENCOUNTER
[facility] called back stating the Premarin came back over $400. States she has been getting the Estradiol. Can this be sent instead?

## 2024-02-15 NOTE — TELEPHONE ENCOUNTER
[facility] called to get a refill on pt's Estradiol cream. I do not see in chart recently but pt states she is still using it.

## 2024-02-26 ENCOUNTER — OFFICE VISIT (OUTPATIENT)
Dept: SURGERY | Facility: CLINIC | Age: 74
End: 2024-02-26
Payer: MEDICARE

## 2024-02-26 VITALS
WEIGHT: 170 LBS | DIASTOLIC BLOOD PRESSURE: 72 MMHG | HEIGHT: 68 IN | SYSTOLIC BLOOD PRESSURE: 118 MMHG | BODY MASS INDEX: 25.76 KG/M2

## 2024-02-26 DIAGNOSIS — Z80.3 FAMILY HISTORY OF BREAST CANCER: ICD-10-CM

## 2024-02-26 DIAGNOSIS — Z91.89 AT HIGH RISK FOR BREAST CANCER: Primary | ICD-10-CM

## 2024-02-26 DIAGNOSIS — Z12.31 ENCOUNTER FOR SCREENING MAMMOGRAM FOR MALIGNANT NEOPLASM OF BREAST: ICD-10-CM

## 2024-02-26 PROCEDURE — 3074F SYST BP LT 130 MM HG: CPT

## 2024-02-26 PROCEDURE — 99213 OFFICE O/P EST LOW 20 MIN: CPT

## 2024-02-26 PROCEDURE — 1159F MED LIST DOCD IN RCRD: CPT

## 2024-02-26 PROCEDURE — 3078F DIAST BP <80 MM HG: CPT

## 2024-02-26 PROCEDURE — 1160F RVW MEDS BY RX/DR IN RCRD: CPT

## 2024-02-26 NOTE — PROGRESS NOTES
Office Established Patient Note:     Referring Provider: Misty Pelayo MD    Chief Complaint   Patient presents with    Follow-up     Mrs. Dupont is here for breast follow up.       Subjective .     History of present illness:  Carlotta Dupont is a 73 y.o. female who presents for breast follow up. She was seen by Dr. Springer in November, who ordered an MRI for February 2024 as she is wanting to follow the patient with high risk screening due to her significant family history. Her genetic testing was done which showed a variant of undetermined significance in the BRCA2 gene.The MRI was done in December 2023, which showed no concerning findings. Today, she has no concerns with either of her breasts. She denies palpable masses, nipple discharge, or skin changes to either breast.     BMI is 25.85. She is a nonsmoker. She does not take any blood thinners other than 81mg ASA.    Review of Systems    Review of Systems - General ROS: negative  ENT ROS: negative  Respiratory ROS: no cough, shortness of breath, or wheezing  Cardiovascular ROS: no chest pain or dyspnea on exertion  Gastrointestinal ROS: no abdominal pain, change in bowel habits, or black or bloody stools  Genito-Urinary ROS: no dysuria, trouble voiding, or hematuria  Dermatological ROS: negative   Breast ROS: negative for breast lumps  Hematological and Lymphatic ROS: negative  Musculoskeletal ROS: negative   Neurological ROS: no TIA or stroke symptoms    Psychological ROS: negative  Endocrine ROS: negative    History  Past Medical History:   Diagnosis Date    Acid reflux     Arthritis     Asthma     Diabetes mellitus     Type 2    Fistula of mastoid, left     GERD (gastroesophageal reflux disease)     History of transfusion     Hx of colonic polyps     Hyperlipidemia     Hypertension     Iron deficiency anemia     Mastoid pain, left     Numbness     left side of face    Other osteomyelitis, other site     PONV (postoperative nausea and vomiting)      Sensorineural hearing loss     Sleep apnea     does not use machine    Trigeminal neuralgia     Vitamin D deficiency    ,   Past Surgical History:   Procedure Laterality Date    APPENDECTOMY      BLADDER REPAIR      had vaginal repair at the same time    BREAST BIOPSY Bilateral     benign    CAPSULE ENDOSCOPY N/A 04/20/2018    Procedure: CAPSULE ENDOSCOPY M2A;  Surgeon: Garrett Ozuna MD;  Location: [facility] ENDOSCOPY;  Service: Gastroenterology    COLONOSCOPY N/A 03/01/2018    Procedure: COLONOSCOPY WITH ANESTHESIA;  Surgeon: Garrett Ozuna MD;  Location:  [facility] ENDOSCOPY;  Service:     COLONOSCOPY N/A 04/16/2021    Procedure: COLONOSCOPY WITH ANESTHESIA;  Surgeon: Garrett Ozuan MD;  Location: [facility] ENDOSCOPY;  Service: Gastroenterology;  Laterality: N/A;  pre: hx colon polyps  post: polyp  Misty Pelayo MD    COLONOSCOPY W/ POLYPECTOMY  12/31/2014    Tubular adenomatous polyp at 80 cm, Hyperplastic polyp rectum repeat exam in 3 years    ENDOSCOPY N/A 03/01/2018    Procedure: ESOPHAGOGASTRODUODENOSCOPY WITH ANESTHESIA;  Surgeon: Garrett Ozuna MD;  Location:  [facility] ENDOSCOPY;  Service:     FLAP HEAD/NECK Left 05/17/2018    Procedure: POSSIBLE STERNOCLEIDOMASTOID FLAP;  Surgeon: Kadeem Oconnor MD;  Location: [facility] OR;  Service: ENT    HYSTERECTOMY      MASTOIDECTOMY Left 05/17/2018    Procedure: Wound exploration with possible mastoidectomy; possible sternocleidomastoid flap.  Please schedule with Dr. Perez.;  Surgeon: Kadeem Oconnor MD;  Location: [facility] OR;  Service: ENT    SHOULDER SURGERY Right 06/30/2022    TRIGEMINAL NERVE DECOMPRESSION      US GUIDED CYST ASPIRATION BREAST N/A 09/16/2021   ,   Family History   Problem Relation Age of Onset    Breast cancer Sister     Breast cancer Mother     Heart disease Father     No Known Problems Brother     No Known Problems Daughter     No Known Problems Son     No Known Problems Maternal Grandmother     No Known Problems Paternal Grandmother     No Known  Problems Maternal Aunt     No Known Problems Paternal Aunt     Breast cancer Niece     Colon cancer Neg Hx     Colon polyps Neg Hx     BRCA 1/2 Neg Hx     Endometrial cancer Neg Hx     Ovarian cancer Neg Hx    ,   Social History     Tobacco Use    Smoking status: Never    Smokeless tobacco: Never   Vaping Use    Vaping Use: Never used   Substance Use Topics    Alcohol use: Yes     Comment: Rare    Drug use: No   , (Not in a hospital admission)   and Allergies:  Zovirax [acyclovir], Meperidine, Propranolol, Sertraline hcl, Vesicare [solifenacin succinate], and Solifenacin    Current Outpatient Medications:     albuterol sulfate  (90 Base) MCG/ACT inhaler, As Needed., Disp: , Rfl: 5    ALPRAZolam (XANAX) 0.25 MG tablet, Take  by mouth 3 (Three) Times a Day As Needed., Disp: , Rfl:     aspirin 81 MG tablet, Take 1 tablet by mouth., Disp: , Rfl:     aspirin-acetaminophen-caffeine (EXCEDRIN MIGRAINE) 250-250-65 MG per tablet, Take 1 tablet by mouth As Needed., Disp: , Rfl:     calcium carbonate (TUMS) 500 MG chewable tablet, Chew 1 tablet Daily., Disp: , Rfl:     Empagliflozin (JARDIANCE PO), Take 10 mg by mouth., Disp: , Rfl:     esomeprazole (nexIUM) 20 MG capsule, Take 1 capsule by mouth Every Morning Before Breakfast., Disp: , Rfl:     estradiol (ESTRACE) 0.1 MG/GM vaginal cream, , Disp: , Rfl:     famotidine (PEPCID) 10 MG tablet, Take 1 tablet by mouth At Night As Needed for Heartburn., Disp: , Rfl:     glucose blood (Glucocard Expression Test) test strip, TEST DAILY AS NEEDED, Disp: , Rfl:     hydrochlorothiazide (MICROZIDE) 12.5 MG capsule, Take 1 capsule by mouth Every Morning., Disp: , Rfl:     losartan (COZAAR) 100 MG tablet, TAKE ONE TABLET BY MOUTH EVERY DAY, Disp: , Rfl:     melatonin 5 MG tablet tablet, Take 1 tablet by mouth., Disp: , Rfl:     metFORMIN (GLUCOPHAGE) 500 MG tablet, Take 2 tablets by mouth 2 (Two) Times a Day With Meals. 500m g in am and 1000mg at night, Disp: , Rfl:     montelukast  "(SINGULAIR) 10 MG tablet, , Disp: , Rfl:     Multiple Vitamins-Minerals (PRESERVISION AREDS PO), Take  by mouth., Disp: , Rfl:     rosuvastatin (CRESTOR) 5 MG tablet, 1/2 tablet on Monday, Wednesday, Friday, Disp: , Rfl:     Sure Comfort Lancets 30G misc, , Disp: , Rfl:     verapamil SR (CALAN-SR) 240 MG CR tablet, TAKE ONE TABLET BY MOUTH TWICE A DAY, Disp: , Rfl:     vitamin D (ERGOCALCIFEROL) 55747 units capsule capsule, Take 1 capsule by mouth Every 7 (Seven) Days., Disp: , Rfl:     Objective     Vital Signs   /72   Ht 172.7 cm (68\")   Wt 77.1 kg (170 lb)   BMI 25.85 kg/m²      Physical Exam:  General appearance - alert, well appearing, and in no distress and oriented to person, place, and time  Mental status - alert, oriented to person, place, and time, normal mood, behavior, speech, dress, motor activity, and thought processes  Eyes - pupils equal and reactive, extraocular eye movements intact, sclera anicteric  Neck - supple, no significant adenopathy  Chest - no tachypnea, retractions or cyanosis  Heart - normal rate and regular rhythm  Breasts - breasts appear normal, no suspicious masses, no skin or nipple changes or axillary nodes  Neurological - alert, oriented, normal speech, no focal findings or movement disorder noted  Skin - normal coloration and turgor, no rashes, no suspicious skin lesions noted    Results Review:  Result Review :            Progress Notes by Hawa Whitmore GC (01/02/2024 14:43)   Genetic testing was negative for known pathogenic mutations in BRCA1/2 and 68 additional genes on the Multi-Cancer panel.  While no known pathogenic mutations were identified, a variant of uncertain significance was identified in the BRCA2 gene     MRI Breast Bilateral With & Without Contrast (12/28/2023 09:03)   IMPRESSION:  1. No MRI evidence of malignancy in the RIGHT or LEFT breast. Continued  risk appropriate screening.  2. No adenopathy.  3. Cardiomegaly.     BI-RADS CATEGORY 1: " Negative.  Management Recommendation: Continued risk appropriate screening.    Progress Notes by Irasema Springer MD (11/29/2023 13:15)   Carlotta Dupont is a 73 y.o. female with who presents to review abnormal breast imaging. She had a palpable mass and imaging showed a fat lobule at 7-8:00, 5 cm FN. She as a very significant family history of breast cancer and meets NCCN criteria for high risk screening. I have recommended a breast MRI in February (6 months after her last mammogram) for high risk screening. This will also re-evaluate this area. I have also referred her to genetic counseling for genetic testing. She will follow up with me in February to review the MRI and genetic testing results. Call with any questions or concerns.     Assessment & Plan       Diagnoses and all orders for this visit:    1. At high risk for breast cancer (Primary)  -     Mammo Screening Digital Tomosynthesis Bilateral With CAD; Future    2. Family history of breast cancer  -     Mammo Screening Digital Tomosynthesis Bilateral With CAD; Future    3. Encounter for screening mammogram for malignant neoplasm of breast  -     Mammo Screening Digital Tomosynthesis Bilateral With CAD; Future     Ms. Dupont is a 72 y/o female who presents to the clinic for 3 month f/u after her breast MRI. Due to her significant family history, Dr. Springer is wanting to follow her with high risk screening. As she had her breast MRI in December, she is due for yearly mammogram in June 2024. I have placed the order for this. She will follow up with me after to go over the results and for clinical breast exam. She will call in the interim if she has any new problems or concerns. She voiced understanding and is agreeable to the plan.     Follow up:         Return [facility] about 4 months (around 7/3/2024) [facility]        Jaye Yuen PA-C  02/26/24  09:51 CST

## 2024-02-28 DIAGNOSIS — E11.9 TYPE 2 DIABETES MELLITUS WITHOUT COMPLICATION, WITHOUT LONG-TERM CURRENT USE OF INSULIN (HCC): Chronic | ICD-10-CM

## 2024-02-28 LAB
ALBUMIN SERPL-MCNC: 4.8 G/DL (ref 3.5–5.2)
ALP SERPL-CCNC: 71 U/L (ref 35–104)
ALT SERPL-CCNC: 41 U/L (ref 5–33)
ANION GAP SERPL CALCULATED.3IONS-SCNC: 12 MMOL/L (ref 7–19)
AST SERPL-CCNC: 28 U/L (ref 5–32)
BILIRUB SERPL-MCNC: 0.6 MG/DL (ref 0.2–1.2)
BUN SERPL-MCNC: 19 MG/DL (ref 8–23)
CALCIUM SERPL-MCNC: 10.1 MG/DL (ref 8.8–10.2)
CHLORIDE SERPL-SCNC: 100 MMOL/L (ref 98–111)
CHOLEST SERPL-MCNC: 149 MG/DL (ref 160–199)
CO2 SERPL-SCNC: 29 MMOL/L (ref 22–29)
CREAT SERPL-MCNC: 0.7 MG/DL (ref 0.5–0.9)
ERYTHROCYTE [DISTWIDTH] IN BLOOD BY AUTOMATED COUNT: 14.8 % (ref 11.5–14.5)
GLUCOSE SERPL-MCNC: 156 MG/DL (ref 74–109)
HBA1C MFR BLD: 7 % (ref 4–6)
HCT VFR BLD AUTO: 47.5 % (ref 37–47)
HDLC SERPL-MCNC: 38 MG/DL (ref 65–121)
HGB BLD-MCNC: 15.4 G/DL (ref 12–16)
LDLC SERPL CALC-MCNC: 92 MG/DL
MCH RBC QN AUTO: 30.5 PG (ref 27–31)
MCHC RBC AUTO-ENTMCNC: 32.4 G/DL (ref 33–37)
MCV RBC AUTO: 94.1 FL (ref 81–99)
PLATELET # BLD AUTO: 202 K/UL (ref 130–400)
PMV BLD AUTO: 12.2 FL (ref 9.4–12.3)
POTASSIUM SERPL-SCNC: 4.2 MMOL/L (ref 3.5–5)
PROT SERPL-MCNC: 7.4 G/DL (ref 6.6–8.7)
RBC # BLD AUTO: 5.05 M/UL (ref 4.2–5.4)
SODIUM SERPL-SCNC: 141 MMOL/L (ref 136–145)
TRIGL SERPL-MCNC: 96 MG/DL (ref 0–149)
WBC # BLD AUTO: 5.8 K/UL (ref 4.8–10.8)

## 2024-03-04 ENCOUNTER — OFFICE VISIT (OUTPATIENT)
Dept: INTERNAL MEDICINE | Age: 74
End: 2024-03-04

## 2024-03-04 VITALS
DIASTOLIC BLOOD PRESSURE: 88 MMHG | BODY MASS INDEX: 25.1 KG/M2 | WEIGHT: 170 LBS | SYSTOLIC BLOOD PRESSURE: 140 MMHG | HEART RATE: 67 BPM | OXYGEN SATURATION: 98 %

## 2024-03-04 DIAGNOSIS — Z86.010 HX OF ADENOMATOUS COLONIC POLYPS: ICD-10-CM

## 2024-03-04 DIAGNOSIS — M47.22 OSTEOARTHRITIS OF SPINE WITH RADICULOPATHY, CERVICAL REGION: ICD-10-CM

## 2024-03-04 DIAGNOSIS — E78.2 MIXED HYPERLIPIDEMIA: Chronic | ICD-10-CM

## 2024-03-04 DIAGNOSIS — Z96.611 S/P REVERSE TOTAL SHOULDER ARTHROPLASTY, RIGHT: ICD-10-CM

## 2024-03-04 DIAGNOSIS — E11.9 TYPE 2 DIABETES MELLITUS WITHOUT COMPLICATION, WITHOUT LONG-TERM CURRENT USE OF INSULIN (HCC): Primary | ICD-10-CM

## 2024-03-04 DIAGNOSIS — G50.0 TRIGEMINAL NEURALGIA OF LEFT SIDE OF FACE: Chronic | ICD-10-CM

## 2024-03-04 DIAGNOSIS — I10 ESSENTIAL HYPERTENSION: Chronic | ICD-10-CM

## 2024-03-04 LAB
APPEARANCE FLUID: CLEAR
BILIRUBIN, POC: NORMAL
BLOOD URINE, POC: NORMAL
CLARITY, POC: CLEAR
COLOR, POC: YELLOW
GLUCOSE URINE, POC: NORMAL
KETONES, POC: NORMAL
LEUKOCYTE EST, POC: NORMAL
NITRITE, POC: NORMAL
PH, POC: 5.5
PROTEIN, POC: NORMAL
SPECIFIC GRAVITY, POC: 1.02
UROBILINOGEN, POC: 0.2

## 2024-03-04 ASSESSMENT — PATIENT HEALTH QUESTIONNAIRE - PHQ9
SUM OF ALL RESPONSES TO PHQ9 QUESTIONS 1 & 2: 0
SUM OF ALL RESPONSES TO PHQ QUESTIONS 1-9: 0
1. LITTLE INTEREST OR PLEASURE IN DOING THINGS: 0
SUM OF ALL RESPONSES TO PHQ QUESTIONS 1-9: 0
2. FEELING DOWN, DEPRESSED OR HOPELESS: 0
SUM OF ALL RESPONSES TO PHQ QUESTIONS 1-9: 0
SUM OF ALL RESPONSES TO PHQ QUESTIONS 1-9: 0

## 2024-03-04 NOTE — PROGRESS NOTES
History Narrative    Not on file     Social Determinants of Health     Financial Resource Strain: Low Risk  (3/6/2023)    Overall Financial Resource Strain (CARDIA)     Difficulty of Paying Living Expenses: Not hard at all   Food Insecurity: Not on file (3/6/2023)   Transportation Needs: Unknown (3/6/2023)    PRAPARE - Transportation     Lack of Transportation (Medical): Not on file     Lack of Transportation (Non-Medical): No   Physical Activity: Insufficiently Active (11/21/2022)    Exercise Vital Sign     Days of Exercise per Week: 2 days     Minutes of Exercise per Session: 30 min   Stress: Not on file   Social Connections: Not on file   Intimate Partner Violence: Not on file   Housing Stability: Unknown (3/6/2023)    Housing Stability Vital Sign     Unable to Pay for Housing in the Last Year: Not on file     Number of Places Lived in the Last Year: Not on file     Unstable Housing in the Last Year: No       Allergies   Allergen Reactions    Inderal [Propranolol] Other (See Comments)     Bad dreams  unknown    Solifenacin Succinate Other (See Comments)     Severe constipation    Demerol Hcl [Meperidine]      halluncinations    Vesicare [Solifenacin]      Couldn't urinate    Zoloft [Sertraline Hcl]      hallucinations    Zovirax [Acyclovir] Hives and Other (See Comments)     PhX       Current Outpatient Medications   Medication Sig Dispense Refill    estradiol (ESTRACE) 0.1 MG/GM vaginal cream Place 1 g vaginally Twice a Week 42.5 g 3    losartan-hydroCHLOROthiazide (HYZAAR) 100-25 MG per tablet Take 1 tablet by mouth daily 90 tablet 1    rosuvastatin (CRESTOR) 5 MG tablet TAKE 1/2 TABLET ON MONDAY WEDNESDAY AND FRIDAY 18 tablet 3    vitamin D (ERGOCALCIFEROL) 1.25 MG (63693 UT) CAPS capsule TAKE 1 CAPSULE BY MOUTH ONCE A WEEK 12 capsule 3    empagliflozin (JARDIANCE) 10 MG tablet Take 1 tablet by mouth daily 90 tablet 3    nebivolol (BYSTOLIC) 20 MG TABS tablet Take 1 tablet by mouth nightly 90 tablet 1

## 2024-03-07 ENCOUNTER — TELEPHONE (OUTPATIENT)
Dept: INTERNAL MEDICINE | Age: 74
End: 2024-03-07

## 2024-03-07 NOTE — TELEPHONE ENCOUNTER
[facility] stated they tried to fax over for a colonoscopy gut just gets a busy message. They was stating that Jaspreet said patient was due for a colonoscopy but is not due until April of 2026

## 2024-03-19 ENCOUNTER — TRANSCRIBE ORDERS (OUTPATIENT)
Dept: ADMINISTRATIVE | Facility: HOSPITAL | Age: 74
End: 2024-03-19
Payer: MEDICARE

## 2024-03-19 DIAGNOSIS — I10 ESSENTIAL HYPERTENSION: Primary | ICD-10-CM

## 2024-03-22 ENCOUNTER — TELEPHONE (OUTPATIENT)
Dept: INTERNAL MEDICINE | Age: 74
End: 2024-03-22

## 2024-03-22 ENCOUNTER — HOSPITAL ENCOUNTER (OUTPATIENT)
Dept: ULTRASOUND IMAGING | Facility: HOSPITAL | Age: 74
Discharge: HOME | End: 2024-03-22
Payer: MEDICARE

## 2024-03-22 DIAGNOSIS — I10 ESSENTIAL HYPERTENSION: ICD-10-CM

## 2024-04-10 DIAGNOSIS — I10 ESSENTIAL HYPERTENSION: Chronic | ICD-10-CM

## 2024-04-10 RX ORDER — NEBIVOLOL 20 MG/1
20 TABLET ORAL NIGHTLY
Qty: 90 TABLET | Refills: 1 | Status: SHIPPED | OUTPATIENT
Start: 2024-04-10

## 2024-04-15 ENCOUNTER — HOSPITAL ENCOUNTER (OUTPATIENT)
Dept: ULTRASOUND IMAGING | Facility: HOSPITAL | Age: 74
Discharge: HOME | End: 2024-04-15
Payer: MEDICARE

## 2024-04-15 DIAGNOSIS — I10 ESSENTIAL HYPERTENSION: ICD-10-CM

## 2024-04-15 PROCEDURE — 76775 US EXAM ABDO BACK WALL LIM: CPT

## 2024-04-15 PROCEDURE — 93975 VASCULAR STUDY: CPT

## 2024-04-16 DIAGNOSIS — E78.2 MIXED HYPERLIPIDEMIA: Chronic | ICD-10-CM

## 2024-04-16 DIAGNOSIS — I70.1 RENAL ARTERY STENOSIS (HCC): Primary | ICD-10-CM

## 2024-04-16 DIAGNOSIS — Z78.9 STATIN INTOLERANCE: ICD-10-CM

## 2024-04-16 NOTE — PROGRESS NOTES
I reviewed renal us with pt's  and ordered vascular referral and pralulent for better cholesterol management.

## 2024-04-19 ENCOUNTER — TELEPHONE (OUTPATIENT)
Dept: VASCULAR SURGERY | Facility: CLINIC | Age: 74
End: 2024-04-19
Payer: MEDICARE

## 2024-04-19 DIAGNOSIS — E87.6 HYPOKALEMIA: Primary | ICD-10-CM

## 2024-04-23 ENCOUNTER — OFFICE VISIT (OUTPATIENT)
Dept: VASCULAR SURGERY | Facility: CLINIC | Age: 74
End: 2024-04-23
Payer: MEDICARE

## 2024-04-23 VITALS
OXYGEN SATURATION: 96 % | HEART RATE: 87 BPM | BODY MASS INDEX: 26.07 KG/M2 | WEIGHT: 172 LBS | DIASTOLIC BLOOD PRESSURE: 72 MMHG | HEIGHT: 68 IN | SYSTOLIC BLOOD PRESSURE: 148 MMHG

## 2024-04-23 DIAGNOSIS — E78.2 MIXED HYPERLIPIDEMIA: ICD-10-CM

## 2024-04-23 DIAGNOSIS — E11.9 CONTROLLED TYPE 2 DIABETES MELLITUS WITHOUT COMPLICATION, WITHOUT LONG-TERM CURRENT USE OF INSULIN: ICD-10-CM

## 2024-04-23 DIAGNOSIS — I10 HTN (HYPERTENSION), BENIGN: Primary | ICD-10-CM

## 2024-04-23 PROCEDURE — 1159F MED LIST DOCD IN RCRD: CPT | Performed by: SURGERY

## 2024-04-23 PROCEDURE — 99204 OFFICE O/P NEW MOD 45 MIN: CPT | Performed by: SURGERY

## 2024-04-23 PROCEDURE — 3077F SYST BP >= 140 MM HG: CPT | Performed by: SURGERY

## 2024-04-23 PROCEDURE — 3078F DIAST BP <80 MM HG: CPT | Performed by: SURGERY

## 2024-04-23 PROCEDURE — 1160F RVW MEDS BY RX/DR IN RCRD: CPT | Performed by: SURGERY

## 2024-04-23 RX ORDER — NEBIVOLOL 20 MG/1
1 TABLET ORAL NIGHTLY
COMMUNITY
Start: 2024-04-10

## 2024-04-23 RX ORDER — CLONIDINE HYDROCHLORIDE 0.1 MG/1
0.1 TABLET ORAL 3 TIMES DAILY PRN
COMMUNITY

## 2024-04-23 RX ORDER — LOSARTAN POTASSIUM AND HYDROCHLOROTHIAZIDE 25; 100 MG/1; MG/1
1 TABLET ORAL DAILY
COMMUNITY
Start: 2024-01-31

## 2024-04-23 NOTE — LETTER
April 23, 2024     Misty Pelayo MD  [address] [place] [address] [address]  [place] [address]    Patient: Carlotta Dupont   YOB: 1950   Date of Visit: 4/23/2024     Dear Misty Pelayo MD:       Thank you for referring Carlotta Dupont to me for evaluation. Below are the relevant portions of my assessment and plan of care.    If you have questions, please do not hesitate to call me. I look forward to following Carlotta along with you.         Sincerely,        Dakota Rogel DO        CC: No Recipients    Dakota Rogel DO  04/23/24 1009  Sign when Signing Visit  04/23/2024      Misty Pelayo MD  [address] [place] [address] [address]  [place] [address]    Carlotta Dupont  1950    Chief Complaint   Patient presents with   • Renal Artery Stenosis     Testing done  4/15/2024, was having uncontrolled BP, is on 3 meds with poor control       Dear Misty Pelayo MD    HPI  I had the pleasure of seeing your patient Carlotta Dupont in the office today.  Thank you kindly for this consultation.  As you recall, Carlotta Dupont is a 73 y.o.  female who you are currently following for routine health maintenance. She is having difficulty controlling her blood pressure.  She is on 3 medications which mostly controls her blood pressure, but does occasionally have to take clonidine. She is also maintained on aspirin and Crestor. She did have noninvasive testing performed which I did personally review.       Past Medical History:   Diagnosis Date   • Acid reflux    • Arthritis    • Asthma    • Diabetes mellitus     Type 2   • Fistula of mastoid, left    • GERD (gastroesophageal reflux disease)    • History of transfusion    • Hx of colonic polyps    • Hyperlipidemia    • Hypertension    • Iron deficiency anemia    • Mastoid pain, left    • Numbness     left side of face   • Other osteomyelitis, other site    • PONV (postoperative nausea and vomiting)    • Sensorineural hearing loss    • Sleep apnea     does not use machine    • Trigeminal neuralgia    • Vitamin D deficiency        Past Surgical History:   Procedure Laterality Date   • APPENDECTOMY     • BLADDER REPAIR      had vaginal repair at the same time   • BREAST BIOPSY Bilateral     benign   • CAPSULE ENDOSCOPY N/A 04/20/2018    Procedure: CAPSULE ENDOSCOPY M2A;  Surgeon: Garrett Ozuna MD;  Location:  [facility];  Service: Gastroenterology   • COLONOSCOPY N/A 03/01/2018    Procedure: COLONOSCOPY WITH ANESTHESIA;  Surgeon: Garrett Ozuna MD;  Location:  [facility];  Service:    • COLONOSCOPY N/A 04/16/2021    Procedure: COLONOSCOPY WITH ANESTHESIA;  Surgeon: Garrett Ozuna MD;  Location:  [facility];  Service: Gastroenterology;  Laterality: N/A;  pre: hx colon polyps  post: polyp  Misty Pelayo MD   • COLONOSCOPY W/ POLYPECTOMY  12/31/2014    Tubular adenomatous polyp at 80 cm, Hyperplastic polyp rectum repeat exam in 3 years   • ENDOSCOPY N/A 03/01/2018    Procedure: ESOPHAGOGASTRODUODENOSCOPY WITH ANESTHESIA;  Surgeon: Garrett Ozuna MD;  Location:  [facility];  Service:    • FLAP HEAD/NECK Left 05/17/2018    Procedure: POSSIBLE STERNOCLEIDOMASTOID FLAP;  Surgeon: Kadeem Oconnor MD;  Location: [facility] OR;  Service: ENT   • HYSTERECTOMY     • MASTOIDECTOMY Left 05/17/2018    Procedure: Wound exploration with possible mastoidectomy; possible sternocleidomastoid flap.  Please schedule with Dr. Perez.;  Surgeon: Kadeem Oconnor MD;  Location: [facility] OR;  Service: ENT   • SHOULDER SURGERY Right 06/30/2022   • TRIGEMINAL NERVE DECOMPRESSION     • US GUIDED CYST ASPIRATION BREAST N/A 09/16/2021       Family History   Problem Relation Age of Onset   • Breast cancer Sister    • Breast cancer Mother    • Heart disease Father    • No Known Problems Brother    • No Known Problems Daughter    • No Known Problems Son    • No Known Problems Maternal Grandmother    • No Known Problems Paternal Grandmother    • No Known Problems Maternal Aunt    • No Known Problems  Paternal Aunt    • Breast cancer Niece    • Colon cancer Neg Hx    • Colon polyps Neg Hx    • BRCA 1/2 Neg Hx    • Endometrial cancer Neg Hx    • Ovarian cancer Neg Hx        Social History     Socioeconomic History   • Marital status:    Tobacco Use   • Smoking status: Never   • Smokeless tobacco: Never   Vaping Use   • Vaping status: Never Used   Substance and Sexual Activity   • Alcohol use: Yes     Comment: Rare   • Drug use: No   • Sexual activity: Defer       Allergies   Allergen Reactions   • Zovirax [Acyclovir] Unknown - High Severity     Other reaction(s): Other (See Comments)  PhX  PhX   • Meperidine Hallucinations     halluncinations  halluncinations   • Propranolol Unknown - Low Severity     Other reaction(s): Other (See Comments)  Bad dreams  unknown  Bad dreams  unknown  Bad dreams   • Sertraline Hcl Hallucinations     hallucinations  hallucinations   • Vesicare [Solifenacin Succinate] GI Intolerance     Severe constipation   • Solifenacin Unknown - Low Severity     Other reaction(s): Other (See Comments)  Phx  Phx  Couldn't urinate         Current Outpatient Medications:   •  albuterol sulfate  (90 Base) MCG/ACT inhaler, As Needed., Disp: , Rfl: 5  •  ALPRAZolam (XANAX) 0.25 MG tablet, Take  by mouth 3 (Three) Times a Day As Needed., Disp: , Rfl:   •  aspirin 81 MG tablet, Take 1 tablet by mouth., Disp: , Rfl:   •  calcium carbonate (TUMS) 500 MG chewable tablet, Chew 1 tablet Daily., Disp: , Rfl:   •  Empagliflozin (JARDIANCE PO), Take 10 mg by mouth., Disp: , Rfl:   •  esomeprazole (nexIUM) 20 MG capsule, Take 1 capsule by mouth Every Morning Before Breakfast., Disp: , Rfl:   •  estradiol (ESTRACE) 0.1 MG/GM vaginal cream, , Disp: , Rfl:   •  famotidine (PEPCID) 10 MG tablet, Take 1 tablet by mouth At Night As Needed for Heartburn., Disp: , Rfl:   •  glucose blood (Glucocard Expression Test) test strip, TEST DAILY AS NEEDED, Disp: , Rfl:   •  losartan-hydrochlorothiazide (HYZAAR)  "100-25 MG per tablet, Take 1 tablet by mouth Daily., Disp: , Rfl:   •  melatonin 5 MG tablet tablet, Take 1 tablet by mouth., Disp: , Rfl:   •  metFORMIN (GLUCOPHAGE) 500 MG tablet, Take 2 tablets by mouth 2 (Two) Times a Day With Meals. 500m g in am and 1000mg at night, Disp: , Rfl:   •  montelukast (SINGULAIR) 10 MG tablet, , Disp: , Rfl:   •  Multiple Vitamins-Minerals (PRESERVISION AREDS PO), Take  by mouth., Disp: , Rfl:   •  nebivolol (BYSTOLIC) 20 MG tablet, Take 1 tablet by mouth Every Night., Disp: , Rfl:   •  rosuvastatin (CRESTOR) 5 MG tablet, 1/2 tablet on Monday, Wednesday, Friday, Disp: , Rfl:   •  Sure Comfort Lancets 30G misc, , Disp: , Rfl:   •  verapamil SR (CALAN-SR) 240 MG CR tablet, TAKE ONE TABLET BY MOUTH TWICE A DAY, Disp: , Rfl:   •  vitamin D (ERGOCALCIFEROL) 83024 units capsule capsule, Take 1 capsule by mouth Every 7 (Seven) Days., Disp: , Rfl:   •  aspirin-acetaminophen-caffeine (EXCEDRIN MIGRAINE) 250-250-65 MG per tablet, Take 1 tablet by mouth As Needed. (Patient not taking: Reported on 4/23/2024), Disp: , Rfl:   •  cloNIDine (CATAPRES) 0.1 MG tablet, Take 1 tablet by mouth 3 (Three) Times a Day As Needed for High Blood Pressure., Disp: , Rfl:     Review of Systems   Constitutional:  Positive for fatigue.   HENT: Negative.     Eyes: Negative.    Respiratory: Negative.     Cardiovascular: Negative.    Gastrointestinal: Negative.    Endocrine: Negative.    Genitourinary: Negative.    Musculoskeletal: Negative.    Skin: Negative.    Allergic/Immunologic: Negative.    Neurological: Negative.    Hematological: Negative.    Psychiatric/Behavioral: Negative.     All other systems reviewed and are negative.    /72   Pulse 87   Ht 172.7 cm (67.99\")   Wt 78 kg (172 lb)   SpO2 96%   BMI 26.16 kg/m²     Physical Exam  Vitals and nursing note reviewed.   Constitutional:       Appearance: Normal appearance. She is well-developed and overweight.   HENT:      Head: Normocephalic and " atraumatic.   Eyes:      General: No scleral icterus.     Pupils: Pupils are equal, round, and reactive to light.   Neck:      Thyroid: No thyromegaly.      Vascular: No carotid bruit or JVD.   Cardiovascular:      Rate and Rhythm: Normal rate and regular rhythm.      Pulses:           Carotid pulses are 2+ on the right side and 2+ on the left side.       Femoral pulses are 2+ on the right side and 2+ on the left side.       Popliteal pulses are 2+ on the right side and 2+ on the left side.        Dorsalis pedis pulses are 2+ on the right side and 2+ on the left side.        Posterior tibial pulses are 2+ on the right side and 2+ on the left side.      Heart sounds: Normal heart sounds.   Pulmonary:      Effort: Pulmonary effort is normal.      Breath sounds: Normal breath sounds.   Abdominal:      General: Bowel sounds are normal. There is no distension or abdominal bruit.      Palpations: Abdomen is soft. There is no mass.      Tenderness: There is no abdominal tenderness.   Musculoskeletal:         General: Normal range of motion.      Cervical back: Neck supple.   Lymphadenopathy:      Cervical: No cervical adenopathy.   Skin:     General: Skin is warm and dry.   Neurological:      Mental Status: She is alert and oriented to person, place, and time.      Cranial Nerves: No cranial nerve deficit.      Sensory: No sensory deficit.   Psychiatric:         Mood and Affect: Mood normal.         Behavior: Behavior normal. Behavior is cooperative.         Thought Content: Thought content normal.         Judgment: Judgment normal.       Diagnostic Data:  US Renal Bilateral, US Renal Artery Complete    Result Date: 4/15/2024  Narrative: EXAM: US RENAL ARTERY COMPLETE- - 4/15/2024 8:38 AM  HISTORY: essential hypertension; I10-Essential (primary) hypertension   COMPARISON: 3/27/2019.  TECHNIQUE: Grayscale, spectral, and Doppler real-time ultrasound performed with representative images and report stored to PACS per  institutional protocol.  FINDINGS: AORTA: Peak systolic velocity: 32.3 cm/sec. No aneurysm   RIGHT KIDNEY: Measures 11.3 x 4.3 x 5.7 cm. Normal renal echogenicity and contour. No hydronephrosis demonstrated. Normal vascularity.  Right renal artery proximal peak systolic velocity 92.1 cm/sec Right renal artery mid peak systolic velocity 166.6 cm/sec Right renal artery distal peak systolic velocity 98.5 cm/sec Arcuate velocity: 27.9 cm/sec Arcuate resistive index: 0.68  Right Renal aortic ratio: 2.02 at the midportion.   Right Renal vein: Appears patent.   LEFT KIDNEY: Measures 12.2 x 4.5 x 5.2 cm. Normal renal echogenicity and contour. No hydronephrosis demonstrated. Normal vascularity.  Left renal artery proximal peak systolic velocity 222.9 cm/sec Left renal artery mid peak systolic velocity 240.2 cm/sec Left renal artery distal peak systolic velocity 138.0 cm/sec Arcuate velocity: 32.8 cm/sec Arcuate resistive index: 0.64  Left Renal aortic ratio: 2.92 at the midportion.  Left Renal vein: Appears patent.   URINARY BLADDER: Decompressed, grossly within normal limits..       Impression: 1. LEFT renal artery with peak systolic velocity 240 cm/s at the midportion with correlating renal aortic ratio 2.92, consistent with renal artery stenosis. 2. RIGHT renal aortic ratio 2.02 is borderline at the midportion with correlating peak systolic velocity 166.6 cm/s. 3. No hydronephrosis.   This report was signed and finalized on 4/15/2024 2:51 PM by Dr Gloria Gibbs MD.        Patient Active Problem List   Diagnosis   • Iron deficiency anemia   • Heme positive stool   • BRBPR (bright red blood per rectum)   • NSAID long-term use   • Gastroesophageal reflux disease   • Hx of adenomatous colonic polyps   • Nonsmoker   • Controlled type 2 diabetes mellitus without complication, without long-term current use of insulin   • HTN (hypertension), benign   • BMI 25.0-25.9,adult   • Superficial postoperative wound infection   •  Trigeminal neuralgia   • Open scalp wound   • Wound dehiscence   • Mastoiditis, chronic, left   • Asthma   • Calculus of gallbladder   • Fatty liver   • Lumbar disc disease with radiculopathy   • Migraine without aura, not intractable   • Mixed hyperlipidemia   • Hill's metatarsalgia   • Plantar fasciitis   • Obstructive sleep apnea   • Nodule of vagina   • Prolapse of vaginal wall   • Pseudophakia   • Tear film insufficiency   • Vitamin D deficiency   • History of colon polyps   • Abnormal ECG   • Thyroid nodule   • Nontoxic multinodular goiter   • Osteoarthritis of spine with radiculopathy, cervical region   • Presence of intraocular lens   • Lumbar disc herniation   • Degeneration of lumbar or lumbosacral intervertebral disc   • Lumbar stenosis   • Lumbar radiculopathy   • Overweight with body mass index (BMI) of 25 to 25.9 in adult   • Non-smoker   • Cervical radiculopathy   • Iron deficiency anemia        Diagnosis Plan   1. HTN (hypertension), benign        2. Mixed hyperlipidemia        3. Controlled type 2 diabetes mellitus without complication, without long-term current use of insulin            Plan: After thoroughly evaluating Carlotta Dupont, I believe the best course of action is to remain conservative from a vascular surgery standpoint.  I did review her testing showing some renal artery stenosis, however I reviewed previous CTA of the abdomen and pelvis which shows no plaque in her arteries and no evidence of renal artery stenosis.  At this time, I would recommend to continue to find combination of medications that keep her blood pressure under controlled.  I will see her back as needed going forward.  The patient is to continue taking their medications as previously discussed.   This was all discussed in full with complete understanding.  Thank you for allowing me to participate in the care of your patient.  Please do not hesitate to call with any questions or concerns.  We will keep you aware of  any further encounters with Carlotta Dupont.        Sincerely yours,         DO Pierce Frederick Polly, MD

## 2024-04-23 NOTE — PROGRESS NOTES
04/23/2024      Misty Pelayo MD  [address] [place] [address] [address]  [place] [address]    Carlotta Dupont  1950    Chief Complaint   Patient presents with    Renal Artery Stenosis     Testing done  4/15/2024, was having uncontrolled BP, is on 3 meds with poor control       Dear Misty Pelayo MD    HPI  I had the pleasure of seeing your patient Carlotat Dupont in the office today.  Thank you kindly for this consultation.  As you recall, Carlotta Dupont is a 73 y.o.  female who you are currently following for routine health maintenance. She is having difficulty controlling her blood pressure.  She is on 3 medications which mostly controls her blood pressure, but does occasionally have to take clonidine. She is also maintained on aspirin and Crestor. She did have noninvasive testing performed which I did personally review.       Past Medical History:   Diagnosis Date    Acid reflux     Arthritis     Asthma     Diabetes mellitus     Type 2    Fistula of mastoid, left     GERD (gastroesophageal reflux disease)     History of transfusion     Hx of colonic polyps     Hyperlipidemia     Hypertension     Iron deficiency anemia     Mastoid pain, left     Numbness     left side of face    Other osteomyelitis, other site     PONV (postoperative nausea and vomiting)     Sensorineural hearing loss     Sleep apnea     does not use machine    Trigeminal neuralgia     Vitamin D deficiency        Past Surgical History:   Procedure Laterality Date    APPENDECTOMY      BLADDER REPAIR      had vaginal repair at the same time    BREAST BIOPSY Bilateral     benign    CAPSULE ENDOSCOPY N/A 04/20/2018    Procedure: CAPSULE ENDOSCOPY M2A;  Surgeon: Garrett Ozuna MD;  Location: [facility] ENDOSCOPY;  Service: Gastroenterology    COLONOSCOPY N/A 03/01/2018    Procedure: COLONOSCOPY WITH ANESTHESIA;  Surgeon: Garrett Ozuna MD;  Location: [facility] ENDOSCOPY;  Service:     COLONOSCOPY N/A 04/16/2021    Procedure: COLONOSCOPY WITH ANESTHESIA;   Surgeon: Garrett Ozuna MD;  Location: [facility] ENDOSCOPY;  Service: Gastroenterology;  Laterality: N/A;  pre: hx colon polyps  post: polyp  Misty Pelayo MD    COLONOSCOPY W/ POLYPECTOMY  12/31/2014    Tubular adenomatous polyp at 80 cm, Hyperplastic polyp rectum repeat exam in 3 years    ENDOSCOPY N/A 03/01/2018    Procedure: ESOPHAGOGASTRODUODENOSCOPY WITH ANESTHESIA;  Surgeon: Garrett Ozuna MD;  Location: [facility] ENDOSCOPY;  Service:     FLAP HEAD/NECK Left 05/17/2018    Procedure: POSSIBLE STERNOCLEIDOMASTOID FLAP;  Surgeon: Kadeem Oconnor MD;  Location: [facility] OR;  Service: ENT    HYSTERECTOMY      MASTOIDECTOMY Left 05/17/2018    Procedure: Wound exploration with possible mastoidectomy; possible sternocleidomastoid flap.  Please schedule with Dr. Perez.;  Surgeon: Kadeem Oconnor MD;  Location: [facility] OR;  Service: ENT    SHOULDER SURGERY Right 06/30/2022    TRIGEMINAL NERVE DECOMPRESSION      US GUIDED CYST ASPIRATION BREAST N/A 09/16/2021       Family History   Problem Relation Age of Onset    Breast cancer Sister     Breast cancer Mother     Heart disease Father     No Known Problems Brother     No Known Problems Daughter     No Known Problems Son     No Known Problems Maternal Grandmother     No Known Problems Paternal Grandmother     No Known Problems Maternal Aunt     No Known Problems Paternal Aunt     Breast cancer Niece     Colon cancer Neg Hx     Colon polyps Neg Hx     BRCA 1/2 Neg Hx     Endometrial cancer Neg Hx     Ovarian cancer Neg Hx        Social History     Socioeconomic History    Marital status:    Tobacco Use    Smoking status: Never    Smokeless tobacco: Never   Vaping Use    Vaping status: Never Used   Substance and Sexual Activity    Alcohol use: Yes     Comment: Rare    Drug use: No    Sexual activity: Defer       Allergies   Allergen Reactions    Zovirax [Acyclovir] Unknown - High Severity     Other reaction(s): Other (See Comments)  PhX  PhX    Meperidine Hallucinations      halluncinations  halluncinations    Propranolol Unknown - Low Severity     Other reaction(s): Other (See Comments)  Bad dreams  unknown  Bad dreams  unknown  Bad dreams    Sertraline Hcl Hallucinations     hallucinations  hallucinations    Vesicare [Solifenacin Succinate] GI Intolerance     Severe constipation    Solifenacin Unknown - Low Severity     Other reaction(s): Other (See Comments)  Phx  Phx  Couldn't urinate         Current Outpatient Medications:     albuterol sulfate  (90 Base) MCG/ACT inhaler, As Needed., Disp: , Rfl: 5    ALPRAZolam (XANAX) 0.25 MG tablet, Take  by mouth 3 (Three) Times a Day As Needed., Disp: , Rfl:     aspirin 81 MG tablet, Take 1 tablet by mouth., Disp: , Rfl:     calcium carbonate (TUMS) 500 MG chewable tablet, Chew 1 tablet Daily., Disp: , Rfl:     Empagliflozin (JARDIANCE PO), Take 10 mg by mouth., Disp: , Rfl:     esomeprazole (nexIUM) 20 MG capsule, Take 1 capsule by mouth Every Morning Before Breakfast., Disp: , Rfl:     estradiol (ESTRACE) 0.1 MG/GM vaginal cream, , Disp: , Rfl:     famotidine (PEPCID) 10 MG tablet, Take 1 tablet by mouth At Night As Needed for Heartburn., Disp: , Rfl:     glucose blood (Glucocard Expression Test) test strip, TEST DAILY AS NEEDED, Disp: , Rfl:     losartan-hydrochlorothiazide (HYZAAR) 100-25 MG per tablet, Take 1 tablet by mouth Daily., Disp: , Rfl:     melatonin 5 MG tablet tablet, Take 1 tablet by mouth., Disp: , Rfl:     metFORMIN (GLUCOPHAGE) 500 MG tablet, Take 2 tablets by mouth 2 (Two) Times a Day With Meals. 500m g in am and 1000mg at night, Disp: , Rfl:     montelukast (SINGULAIR) 10 MG tablet, , Disp: , Rfl:     Multiple Vitamins-Minerals (PRESERVISION AREDS PO), Take  by mouth., Disp: , Rfl:     nebivolol (BYSTOLIC) 20 MG tablet, Take 1 tablet by mouth Every Night., Disp: , Rfl:     rosuvastatin (CRESTOR) 5 MG tablet, 1/2 tablet on Monday, Wednesday, Friday, Disp: , Rfl:     Sure Comfort Lancets 30G misc, , Disp: , Rfl:  "    verapamil SR (CALAN-SR) 240 MG CR tablet, TAKE ONE TABLET BY MOUTH TWICE A DAY, Disp: , Rfl:     vitamin D (ERGOCALCIFEROL) 95594 units capsule capsule, Take 1 capsule by mouth Every 7 (Seven) Days., Disp: , Rfl:     aspirin-acetaminophen-caffeine (EXCEDRIN MIGRAINE) 250-250-65 MG per tablet, Take 1 tablet by mouth As Needed. (Patient not taking: Reported on 4/23/2024), Disp: , Rfl:     cloNIDine (CATAPRES) 0.1 MG tablet, Take 1 tablet by mouth 3 (Three) Times a Day As Needed for High Blood Pressure., Disp: , Rfl:     Review of Systems   Constitutional:  Positive for fatigue.   HENT: Negative.     Eyes: Negative.    Respiratory: Negative.     Cardiovascular: Negative.    Gastrointestinal: Negative.    Endocrine: Negative.    Genitourinary: Negative.    Musculoskeletal: Negative.    Skin: Negative.    Allergic/Immunologic: Negative.    Neurological: Negative.    Hematological: Negative.    Psychiatric/Behavioral: Negative.     All other systems reviewed and are negative.    /72   Pulse 87   Ht 172.7 cm (67.99\")   Wt 78 kg (172 lb)   SpO2 96%   BMI 26.16 kg/m²     Physical Exam  Vitals and nursing note reviewed.   Constitutional:       Appearance: Normal appearance. She is well-developed and overweight.   HENT:      Head: Normocephalic and atraumatic.   Eyes:      General: No scleral icterus.     Pupils: Pupils are equal, round, and reactive to light.   Neck:      Thyroid: No thyromegaly.      Vascular: No carotid bruit or JVD.   Cardiovascular:      Rate and Rhythm: Normal rate and regular rhythm.      Pulses:           Carotid pulses are 2+ on the right side and 2+ on the left side.       Femoral pulses are 2+ on the right side and 2+ on the left side.       Popliteal pulses are 2+ on the right side and 2+ on the left side.        Dorsalis pedis pulses are 2+ on the right side and 2+ on the left side.        Posterior tibial pulses are 2+ on the right side and 2+ on the left side.      Heart sounds: " Normal heart sounds.   Pulmonary:      Effort: Pulmonary effort is normal.      Breath sounds: Normal breath sounds.   Abdominal:      General: Bowel sounds are normal. There is no distension or abdominal bruit.      Palpations: Abdomen is soft. There is no mass.      Tenderness: There is no abdominal tenderness.   Musculoskeletal:         General: Normal range of motion.      Cervical back: Neck supple.   Lymphadenopathy:      Cervical: No cervical adenopathy.   Skin:     General: Skin is warm and dry.   Neurological:      Mental Status: She is alert and oriented to person, place, and time.      Cranial Nerves: No cranial nerve deficit.      Sensory: No sensory deficit.   Psychiatric:         Mood and Affect: Mood normal.         Behavior: Behavior normal. Behavior is cooperative.         Thought Content: Thought content normal.         Judgment: Judgment normal.       Diagnostic Data:  US Renal Bilateral, US Renal Artery Complete    Result Date: 4/15/2024  Narrative: EXAM: US RENAL ARTERY COMPLETE- - 4/15/2024 8:38 AM  HISTORY: essential hypertension; I10-Essential (primary) hypertension   COMPARISON: 3/27/2019.  TECHNIQUE: Grayscale, spectral, and Doppler real-time ultrasound performed with representative images and report stored to PACS per institutional protocol.  FINDINGS: AORTA: Peak systolic velocity: 32.3 cm/sec. No aneurysm   RIGHT KIDNEY: Measures 11.3 x 4.3 x 5.7 cm. Normal renal echogenicity and contour. No hydronephrosis demonstrated. Normal vascularity.  Right renal artery proximal peak systolic velocity 92.1 cm/sec Right renal artery mid peak systolic velocity 166.6 cm/sec Right renal artery distal peak systolic velocity 98.5 cm/sec Arcuate velocity: 27.9 cm/sec Arcuate resistive index: 0.68  Right Renal aortic ratio: 2.02 at the midportion.   Right Renal vein: Appears patent.   LEFT KIDNEY: Measures 12.2 x 4.5 x 5.2 cm. Normal renal echogenicity and contour. No hydronephrosis demonstrated. Normal  vascularity.  Left renal artery proximal peak systolic velocity 222.9 cm/sec Left renal artery mid peak systolic velocity 240.2 cm/sec Left renal artery distal peak systolic velocity 138.0 cm/sec Arcuate velocity: 32.8 cm/sec Arcuate resistive index: 0.64  Left Renal aortic ratio: 2.92 at the midportion.  Left Renal vein: Appears patent.   URINARY BLADDER: Decompressed, grossly within normal limits..       Impression: 1. LEFT renal artery with peak systolic velocity 240 cm/s at the midportion with correlating renal aortic ratio 2.92, consistent with renal artery stenosis. 2. RIGHT renal aortic ratio 2.02 is borderline at the midportion with correlating peak systolic velocity 166.6 cm/s. 3. No hydronephrosis.   This report was signed and finalized on 4/15/2024 2:51 PM by Dr Gloria Gibbs MD.        Patient Active Problem List   Diagnosis    Iron deficiency anemia    Heme positive stool    BRBPR (bright red blood per rectum)    NSAID long-term use    Gastroesophageal reflux disease    Hx of adenomatous colonic polyps    Nonsmoker    Controlled type 2 diabetes mellitus without complication, without long-term current use of insulin    HTN (hypertension), benign    BMI 25.0-25.9,adult    Superficial postoperative wound infection    Trigeminal neuralgia    Open scalp wound    Wound dehiscence    Mastoiditis, chronic, left    Asthma    Calculus of gallbladder    Fatty liver    Lumbar disc disease with radiculopathy    Migraine without aura, not intractable    Mixed hyperlipidemia    Hill's metatarsalgia    Plantar fasciitis    Obstructive sleep apnea    Nodule of vagina    Prolapse of vaginal wall    Pseudophakia    Tear film insufficiency    Vitamin D deficiency    History of colon polyps    Abnormal ECG    Thyroid nodule    Nontoxic multinodular goiter    Osteoarthritis of spine with radiculopathy, cervical region    Presence of intraocular lens    Lumbar disc herniation    Degeneration of lumbar or lumbosacral  intervertebral disc    Lumbar stenosis    Lumbar radiculopathy    Overweight with body mass index (BMI) of 25 to 25.9 in adult    Non-smoker    Cervical radiculopathy    Iron deficiency anemia        Diagnosis Plan   1. HTN (hypertension), benign        2. Mixed hyperlipidemia        3. Controlled type 2 diabetes mellitus without complication, without long-term current use of insulin            Plan: After thoroughly evaluating Carlotta Dupont, I believe the best course of action is to remain conservative from a vascular surgery standpoint.  I did review her testing showing some renal artery stenosis, however I reviewed previous CTA of the abdomen and pelvis which shows no plaque in her arteries and no evidence of renal artery stenosis.  At this time, I would recommend to continue to find combination of medications that keep her blood pressure under controlled.  I will see her back as needed going forward.  The patient is to continue taking their medications as previously discussed.   This was all discussed in full with complete understanding.  Thank you for allowing me to participate in the care of your patient.  Please do not hesitate to call with any questions or concerns.  We will keep you aware of any further encounters with Carlotta Dupont.        Sincerely yours,         DO Pierce Frederick Polly, MD

## 2024-04-29 DIAGNOSIS — E11.9 TYPE 2 DIABETES MELLITUS WITHOUT COMPLICATION, WITHOUT LONG-TERM CURRENT USE OF INSULIN (HCC): Chronic | ICD-10-CM

## 2024-04-29 NOTE — TELEPHONE ENCOUNTER
Clementina called requesting a refill of the below medication which has been pended for you:     Requested Prescriptions     Pending Prescriptions Disp Refills    GLUCOCARD EXPRESSION TEST strip [Pharmacy Med Name: GLUCOCARD EXPRESSION TEST S Strip] 50 strip 2     Sig: USE AS DURECTED       Last Appointment Date: Visit date not found  Next Appointment Date: 6/10/2024    Allergies   Allergen Reactions    Inderal [Propranolol] Other (See Comments)     Bad dreams  unknown    Solifenacin Succinate Other (See Comments)     Severe constipation    Demerol Hcl [Meperidine]      halluncinations    Vesicare [Solifenacin]      Couldn't urinate    Zoloft [Sertraline Hcl]      hallucinations    Zovirax [Acyclovir] Hives and Other (See Comments)     PhX

## 2024-05-01 RX ORDER — BLOOD-GLUCOSE METER
EACH MISCELLANEOUS
Qty: 50 STRIP | Refills: 2 | Status: SHIPPED | OUTPATIENT
Start: 2024-05-01

## 2024-05-10 ENCOUNTER — TELEPHONE (OUTPATIENT)
Dept: SURGERY | Facility: CLINIC | Age: 74
End: 2024-05-10

## 2024-05-10 NOTE — TELEPHONE ENCOUNTER
"Provider: DR. HUFF    Caller: Carlotta Dupont \"Dot\"    Relationship: Self    Best call back number: [phone]    What is the best time to reach you: ANY    Who are you requesting to speak with (clinical staff, provider,  specific staff member): LARRY    Do you know the name of the person who called: ROBIN    What was the call regarding: RESCHEDULING AN EXISTING APPOINTMENT    Notes: MS. DUPONT RECEIVED A VOICEMAIL FROM LARRY ABOUT RESCHEDULING AN APPOINTMENT, BUT THE MESSAGE WAS CUT OFF IN THE MIDDLE OF IT.   "

## 2024-06-05 DIAGNOSIS — E11.9 TYPE 2 DIABETES MELLITUS WITHOUT COMPLICATION, WITHOUT LONG-TERM CURRENT USE OF INSULIN (HCC): ICD-10-CM

## 2024-06-05 LAB
ALBUMIN SERPL-MCNC: 4.7 G/DL (ref 3.5–5.2)
ALP SERPL-CCNC: 72 U/L (ref 35–104)
ALT SERPL-CCNC: 26 U/L (ref 5–33)
ANION GAP SERPL CALCULATED.3IONS-SCNC: 12 MMOL/L (ref 7–19)
AST SERPL-CCNC: 20 U/L (ref 5–32)
BILIRUB SERPL-MCNC: 0.6 MG/DL (ref 0.2–1.2)
BUN SERPL-MCNC: 23 MG/DL (ref 8–23)
CALCIUM SERPL-MCNC: 10 MG/DL (ref 8.8–10.2)
CHLORIDE SERPL-SCNC: 101 MMOL/L (ref 98–111)
CHOLEST SERPL-MCNC: 143 MG/DL (ref 160–199)
CO2 SERPL-SCNC: 28 MMOL/L (ref 22–29)
CREAT SERPL-MCNC: 0.7 MG/DL (ref 0.5–0.9)
CREAT UR-MCNC: 114.5 MG/DL (ref 28–217)
ERYTHROCYTE [DISTWIDTH] IN BLOOD BY AUTOMATED COUNT: 13 % (ref 11.5–14.5)
GLUCOSE SERPL-MCNC: 159 MG/DL (ref 74–109)
HBA1C MFR BLD: 6.9 % (ref 4–6)
HCT VFR BLD AUTO: 46.8 % (ref 37–47)
HDLC SERPL-MCNC: 35 MG/DL (ref 65–121)
HGB BLD-MCNC: 15.3 G/DL (ref 12–16)
LDLC SERPL CALC-MCNC: 82 MG/DL
MCH RBC QN AUTO: 31.7 PG (ref 27–31)
MCHC RBC AUTO-ENTMCNC: 32.7 G/DL (ref 33–37)
MCV RBC AUTO: 97.1 FL (ref 81–99)
MICROALBUMIN UR-MCNC: <1.2 MG/DL (ref 0–19)
MICROALBUMIN/CREAT UR-RTO: NORMAL MG/G
PLATELET # BLD AUTO: 210 K/UL (ref 130–400)
PMV BLD AUTO: 11.4 FL (ref 9.4–12.3)
POTASSIUM SERPL-SCNC: 4.9 MMOL/L (ref 3.5–5)
PROT SERPL-MCNC: 7.1 G/DL (ref 6.6–8.7)
RBC # BLD AUTO: 4.82 M/UL (ref 4.2–5.4)
SODIUM SERPL-SCNC: 141 MMOL/L (ref 136–145)
TRIGL SERPL-MCNC: 130 MG/DL (ref 0–149)
WBC # BLD AUTO: 6.4 K/UL (ref 4.8–10.8)

## 2024-06-09 SDOH — ECONOMIC STABILITY: FOOD INSECURITY: WITHIN THE PAST 12 MONTHS, THE FOOD YOU BOUGHT JUST DIDN'T LAST AND YOU DIDN'T HAVE MONEY TO GET MORE.: NEVER TRUE

## 2024-06-09 SDOH — ECONOMIC STABILITY: FOOD INSECURITY: WITHIN THE PAST 12 MONTHS, YOU WORRIED THAT YOUR FOOD WOULD RUN OUT BEFORE YOU GOT MONEY TO BUY MORE.: NEVER TRUE

## 2024-06-09 SDOH — ECONOMIC STABILITY: INCOME INSECURITY: HOW HARD IS IT FOR YOU TO PAY FOR THE VERY BASICS LIKE FOOD, HOUSING, MEDICAL CARE, AND HEATING?: NOT HARD AT ALL

## 2024-06-09 SDOH — ECONOMIC STABILITY: TRANSPORTATION INSECURITY
IN THE PAST 12 MONTHS, HAS LACK OF TRANSPORTATION KEPT YOU FROM MEETINGS, WORK, OR FROM GETTING THINGS NEEDED FOR DAILY LIVING?: NO

## 2024-06-10 ENCOUNTER — OFFICE VISIT (OUTPATIENT)
Dept: INTERNAL MEDICINE | Age: 74
End: 2024-06-10
Payer: MEDICARE

## 2024-06-10 VITALS
HEART RATE: 68 BPM | BODY MASS INDEX: 24.66 KG/M2 | DIASTOLIC BLOOD PRESSURE: 80 MMHG | OXYGEN SATURATION: 98 % | SYSTOLIC BLOOD PRESSURE: 122 MMHG | WEIGHT: 167 LBS

## 2024-06-10 DIAGNOSIS — E78.2 MIXED HYPERLIPIDEMIA: Chronic | ICD-10-CM

## 2024-06-10 DIAGNOSIS — E11.9 TYPE 2 DIABETES MELLITUS WITHOUT COMPLICATION, WITHOUT LONG-TERM CURRENT USE OF INSULIN (HCC): ICD-10-CM

## 2024-06-10 DIAGNOSIS — D50.8 OTHER IRON DEFICIENCY ANEMIA: Primary | ICD-10-CM

## 2024-06-10 DIAGNOSIS — I10 ESSENTIAL HYPERTENSION: Chronic | ICD-10-CM

## 2024-06-10 DIAGNOSIS — E11.40 TYPE 2 DIABETES MELLITUS WITH DIABETIC NEUROPATHY, WITHOUT LONG-TERM CURRENT USE OF INSULIN (HCC): ICD-10-CM

## 2024-06-10 DIAGNOSIS — I70.1 RENAL ARTERY STENOSIS (HCC): ICD-10-CM

## 2024-06-10 DIAGNOSIS — M51.16 LUMBAR DISC DISEASE WITH RADICULOPATHY: ICD-10-CM

## 2024-06-10 PROBLEM — R94.31 ABNORMAL EKG: Status: RESOLVED | Noted: 2021-01-24 | Resolved: 2024-06-10

## 2024-06-10 PROBLEM — E11.49 TYPE 2 DIABETES MELLITUS WITH NEUROLOGIC COMPLICATION (HCC): Status: ACTIVE | Noted: 2017-08-20

## 2024-06-10 PROCEDURE — 3079F DIAST BP 80-89 MM HG: CPT | Performed by: INTERNAL MEDICINE

## 2024-06-10 PROCEDURE — 1123F ACP DISCUSS/DSCN MKR DOCD: CPT | Performed by: INTERNAL MEDICINE

## 2024-06-10 PROCEDURE — 1036F TOBACCO NON-USER: CPT | Performed by: INTERNAL MEDICINE

## 2024-06-10 PROCEDURE — 3074F SYST BP LT 130 MM HG: CPT | Performed by: INTERNAL MEDICINE

## 2024-06-10 PROCEDURE — 2022F DILAT RTA XM EVC RTNOPTHY: CPT | Performed by: INTERNAL MEDICINE

## 2024-06-10 PROCEDURE — 1090F PRES/ABSN URINE INCON ASSESS: CPT | Performed by: INTERNAL MEDICINE

## 2024-06-10 PROCEDURE — G8399 PT W/DXA RESULTS DOCUMENT: HCPCS | Performed by: INTERNAL MEDICINE

## 2024-06-10 PROCEDURE — G8427 DOCREV CUR MEDS BY ELIG CLIN: HCPCS | Performed by: INTERNAL MEDICINE

## 2024-06-10 PROCEDURE — 3044F HG A1C LEVEL LT 7.0%: CPT | Performed by: INTERNAL MEDICINE

## 2024-06-10 PROCEDURE — G8420 CALC BMI NORM PARAMETERS: HCPCS | Performed by: INTERNAL MEDICINE

## 2024-06-10 PROCEDURE — 3017F COLORECTAL CA SCREEN DOC REV: CPT | Performed by: INTERNAL MEDICINE

## 2024-06-10 PROCEDURE — 99214 OFFICE O/P EST MOD 30 MIN: CPT | Performed by: INTERNAL MEDICINE

## 2024-06-10 RX ORDER — TIZANIDINE 4 MG/1
4 TABLET ORAL 3 TIMES DAILY PRN
Qty: 30 TABLET | Refills: 0 | Status: SHIPPED | OUTPATIENT
Start: 2024-06-10

## 2024-06-10 RX ORDER — DAPAGLIFLOZIN 10 MG/1
10 TABLET, FILM COATED ORAL EVERY MORNING
Qty: 90 TABLET | Refills: 1
Start: 2024-06-10

## 2024-06-10 SDOH — ECONOMIC STABILITY: INCOME INSECURITY: HOW HARD IS IT FOR YOU TO PAY FOR THE VERY BASICS LIKE FOOD, HOUSING, MEDICAL CARE, AND HEATING?: NOT HARD AT ALL

## 2024-06-10 SDOH — ECONOMIC STABILITY: FOOD INSECURITY: WITHIN THE PAST 12 MONTHS, YOU WORRIED THAT YOUR FOOD WOULD RUN OUT BEFORE YOU GOT MONEY TO BUY MORE.: NEVER TRUE

## 2024-06-10 SDOH — ECONOMIC STABILITY: FOOD INSECURITY: WITHIN THE PAST 12 MONTHS, THE FOOD YOU BOUGHT JUST DIDN'T LAST AND YOU DIDN'T HAVE MONEY TO GET MORE.: NEVER TRUE

## 2024-06-10 NOTE — PROGRESS NOTES
Chief Complaint   Patient presents with    Follow-up       HPI: Pt is here today to f/u dm, htn, renal artery stenosis and other medical issues. BP better but still a little high at times. BS still high at times in the am.  Patient feels okay has some neuropathy symptoms at times but better she has about taking the B12 complex.    Past Medical History:   Diagnosis Date    Calculus of gallbladder 8/20/2017    Chronic asthma without complication 8/20/2017    Essential hypertension 8/21/2017    Fatty liver 8/20/2017    Gastroesophageal reflux disease without esophagitis 8/20/2017    Lumbar disc disease     L5-S1    Migraine without aura, not intractable 8/20/2017    Mixed hyperlipidemia 8/20/2017    Obstructive sleep apnea 8/20/2017    Sacroiliitis (HCC)     Trigeminal neuralgia of left side of face 8/20/2017    Type 2 diabetes mellitus without complication (HCC) 8/20/2017    Venous insufficiency     Vitamin D deficiency        Past Surgical History:   Procedure Laterality Date    EYE LID SURGERY Bilateral 2020    eye lids raised    HYSTERECTOMY, TOTAL ABDOMINAL (CERVIX REMOVED)      No BSO    SHOULDER SURGERY Right 6/30/2022    RIGHT REVERSE SHOULDER TOTAL ARTHROPLASTY performed by Harshal Mace MD at [facility] OR       Family History   Problem Relation Age of Onset    High Blood Pressure Mother     High Blood Pressure Father     Prostate Cancer Father     Diabetes Father         Type 2    Breast Cancer Sister 54    Diabetes Sister         Type 2       Social History     Socioeconomic History    Marital status:      Spouse name: yumi    Number of children: 2    Years of education: 14    Highest education level: Not on file   Occupational History    Occupation: retired    Tobacco Use    Smoking status: Never    Smokeless tobacco: Never   Vaping Use    Vaping Use: Never used   Substance and Sexual Activity    Alcohol use: No    Drug use: No    Sexual activity: Yes     Partners: Male   Other Topics

## 2024-07-05 ENCOUNTER — APPOINTMENT (OUTPATIENT)
Dept: CARDIOLOGY | Facility: HOSPITAL | Age: 74
End: 2024-07-05
Payer: MEDICARE

## 2024-07-05 ENCOUNTER — APPOINTMENT (OUTPATIENT)
Dept: GENERAL RADIOLOGY | Facility: HOSPITAL | Age: 74
End: 2024-07-05
Payer: MEDICARE

## 2024-07-05 ENCOUNTER — HOSPITAL ENCOUNTER (OUTPATIENT)
Facility: HOSPITAL | Age: 74
Setting detail: OBSERVATION
Discharge: HOME | End: 2024-07-06
Attending: EMERGENCY MEDICINE | Admitting: FAMILY MEDICINE
Payer: MEDICARE

## 2024-07-05 DIAGNOSIS — R74.01 TRANSAMINITIS: ICD-10-CM

## 2024-07-05 DIAGNOSIS — I48.92 ATRIAL FLUTTER, UNSPECIFIED TYPE: Primary | ICD-10-CM

## 2024-07-05 PROBLEM — R07.2 PRECORDIAL CHEST PAIN: Status: ACTIVE | Noted: 2024-07-05

## 2024-07-05 PROBLEM — R00.2 PALPITATIONS: Status: ACTIVE | Noted: 2024-07-05

## 2024-07-05 LAB
ALBUMIN SERPL-MCNC: 4.7 G/DL (ref 3.5–5.2)
ALBUMIN/GLOB SERPL: 1.5 G/DL
ALP SERPL-CCNC: 74 U/L (ref 39–117)
ALT SERPL W P-5'-P-CCNC: 41 U/L (ref 1–33)
ANION GAP SERPL CALCULATED.3IONS-SCNC: 16 MMOL/L (ref 5–15)
AST SERPL-CCNC: 46 U/L (ref 1–32)
BASOPHILS # BLD AUTO: 0.04 10*3/MM3 (ref 0–0.2)
BASOPHILS NFR BLD AUTO: 0.8 % (ref 0–1.5)
BILIRUB SERPL-MCNC: 0.3 MG/DL (ref 0–1.2)
BUN SERPL-MCNC: 19 MG/DL (ref 8–23)
BUN/CREAT SERPL: 24.4 (ref 7–25)
CALCIUM SPEC-SCNC: 9.9 MG/DL (ref 8.6–10.5)
CHLORIDE SERPL-SCNC: 100 MMOL/L (ref 98–107)
CO2 SERPL-SCNC: 25 MMOL/L (ref 22–29)
CREAT SERPL-MCNC: 0.78 MG/DL (ref 0.57–1)
DEPRECATED RDW RBC AUTO: 44.7 FL (ref 37–54)
EGFRCR SERPLBLD CKD-EPI 2021: 80.3 ML/MIN/1.73
EOSINOPHIL # BLD AUTO: 0.2 10*3/MM3 (ref 0–0.4)
EOSINOPHIL NFR BLD AUTO: 3.8 % (ref 0.3–6.2)
ERYTHROCYTE [DISTWIDTH] IN BLOOD BY AUTOMATED COUNT: 13 % (ref 12.3–15.4)
GEN 5 2HR TROPONIN T REFLEX: <6 NG/L
GLOBULIN UR ELPH-MCNC: 3.1 GM/DL
GLUCOSE SERPL-MCNC: 142 MG/DL (ref 65–99)
HCT VFR BLD AUTO: 48.6 % (ref 34–46.6)
HGB BLD-MCNC: 16 G/DL (ref 12–15.9)
HOLD SPECIMEN: NORMAL
IMM GRANULOCYTES # BLD AUTO: 0.02 10*3/MM3 (ref 0–0.05)
IMM GRANULOCYTES NFR BLD AUTO: 0.4 % (ref 0–0.5)
LIPASE SERPL-CCNC: 40 U/L (ref 13–60)
LYMPHOCYTES # BLD AUTO: 1.37 10*3/MM3 (ref 0.7–3.1)
LYMPHOCYTES NFR BLD AUTO: 25.8 % (ref 19.6–45.3)
MCH RBC QN AUTO: 30.7 PG (ref 26.6–33)
MCHC RBC AUTO-ENTMCNC: 32.9 G/DL (ref 31.5–35.7)
MCV RBC AUTO: 93.3 FL (ref 79–97)
MONOCYTES # BLD AUTO: 0.74 10*3/MM3 (ref 0.1–0.9)
MONOCYTES NFR BLD AUTO: 13.9 % (ref 5–12)
NEUTROPHILS NFR BLD AUTO: 2.95 10*3/MM3 (ref 1.7–7)
NEUTROPHILS NFR BLD AUTO: 55.3 % (ref 42.7–76)
NRBC BLD AUTO-RTO: 0 /100 WBC (ref 0–0.2)
NT-PROBNP SERPL-MCNC: 336.5 PG/ML (ref 0–900)
PLATELET # BLD AUTO: 195 10*3/MM3 (ref 140–450)
PMV BLD AUTO: 11 FL (ref 6–12)
POTASSIUM SERPL-SCNC: 3.7 MMOL/L (ref 3.5–5.2)
PROT SERPL-MCNC: 7.8 G/DL (ref 6–8.5)
RBC # BLD AUTO: 5.21 10*6/MM3 (ref 3.77–5.28)
SODIUM SERPL-SCNC: 141 MMOL/L (ref 136–145)
TROPONIN T DELTA: NORMAL
TROPONIN T SERPL HS-MCNC: 6 NG/L
TSH SERPL DL<=0.05 MIU/L-ACNC: 0.71 UIU/ML (ref 0.27–4.2)
WBC NRBC COR # BLD AUTO: 5.32 10*3/MM3 (ref 3.4–10.8)
WHOLE BLOOD HOLD COAG: NORMAL
WHOLE BLOOD HOLD SPECIMEN: NORMAL

## 2024-07-05 PROCEDURE — 80053 COMPREHEN METABOLIC PANEL: CPT | Performed by: EMERGENCY MEDICINE

## 2024-07-05 PROCEDURE — 85025 COMPLETE CBC W/AUTO DIFF WBC: CPT | Performed by: EMERGENCY MEDICINE

## 2024-07-05 PROCEDURE — 93010 ELECTROCARDIOGRAM REPORT: CPT | Performed by: INTERNAL MEDICINE

## 2024-07-05 PROCEDURE — 36415 COLL VENOUS BLD VENIPUNCTURE: CPT

## 2024-07-05 PROCEDURE — 93306 TTE W/DOPPLER COMPLETE: CPT | Performed by: INTERNAL MEDICINE

## 2024-07-05 PROCEDURE — 84484 ASSAY OF TROPONIN QUANT: CPT | Performed by: EMERGENCY MEDICINE

## 2024-07-05 PROCEDURE — 99285 EMERGENCY DEPT VISIT HI MDM: CPT

## 2024-07-05 PROCEDURE — G0378 HOSPITAL OBSERVATION PER HR: HCPCS

## 2024-07-05 PROCEDURE — 84443 ASSAY THYROID STIM HORMONE: CPT | Performed by: FAMILY MEDICINE

## 2024-07-05 PROCEDURE — 93005 ELECTROCARDIOGRAM TRACING: CPT | Performed by: EMERGENCY MEDICINE

## 2024-07-05 PROCEDURE — 83690 ASSAY OF LIPASE: CPT | Performed by: EMERGENCY MEDICINE

## 2024-07-05 PROCEDURE — 93005 ELECTROCARDIOGRAM TRACING: CPT

## 2024-07-05 PROCEDURE — 93356 MYOCRD STRAIN IMG SPCKL TRCK: CPT

## 2024-07-05 PROCEDURE — 93356 MYOCRD STRAIN IMG SPCKL TRCK: CPT | Performed by: INTERNAL MEDICINE

## 2024-07-05 PROCEDURE — 71045 X-RAY EXAM CHEST 1 VIEW: CPT

## 2024-07-05 PROCEDURE — 83880 ASSAY OF NATRIURETIC PEPTIDE: CPT | Performed by: EMERGENCY MEDICINE

## 2024-07-05 PROCEDURE — 93306 TTE W/DOPPLER COMPLETE: CPT

## 2024-07-05 RX ORDER — POLYETHYLENE GLYCOL 3350 17 G/17G
17 POWDER, FOR SOLUTION ORAL DAILY PRN
Status: DISCONTINUED | OUTPATIENT
Start: 2024-07-05 | End: 2024-07-06 | Stop reason: HOSPADM

## 2024-07-05 RX ORDER — LOSARTAN POTASSIUM 50 MG/1
100 TABLET ORAL
Status: DISCONTINUED | OUTPATIENT
Start: 2024-07-06 | End: 2024-07-06 | Stop reason: HOSPADM

## 2024-07-05 RX ORDER — MONTELUKAST SODIUM 10 MG/1
10 TABLET ORAL NIGHTLY
Status: DISCONTINUED | OUTPATIENT
Start: 2024-07-05 | End: 2024-07-06

## 2024-07-05 RX ORDER — ACETAMINOPHEN 325 MG/1
650 TABLET ORAL EVERY 4 HOURS PRN
Status: DISCONTINUED | OUTPATIENT
Start: 2024-07-05 | End: 2024-07-06 | Stop reason: HOSPADM

## 2024-07-05 RX ORDER — HYDROCHLOROTHIAZIDE 25 MG/1
25 TABLET ORAL
Status: DISCONTINUED | OUTPATIENT
Start: 2024-07-06 | End: 2024-07-06 | Stop reason: HOSPADM

## 2024-07-05 RX ORDER — ALUMINA, MAGNESIA, AND SIMETHICONE 2400; 2400; 240 MG/30ML; MG/30ML; MG/30ML
15 SUSPENSION ORAL EVERY 6 HOURS PRN
Status: DISCONTINUED | OUTPATIENT
Start: 2024-07-05 | End: 2024-07-06 | Stop reason: HOSPADM

## 2024-07-05 RX ORDER — NEBIVOLOL 5 MG/1
20 TABLET ORAL NIGHTLY
Status: DISCONTINUED | OUTPATIENT
Start: 2024-07-05 | End: 2024-07-06 | Stop reason: HOSPADM

## 2024-07-05 RX ORDER — ALPRAZOLAM 0.25 MG/1
0.25 TABLET ORAL 3 TIMES DAILY PRN
Status: DISCONTINUED | OUTPATIENT
Start: 2024-07-05 | End: 2024-07-06 | Stop reason: HOSPADM

## 2024-07-05 RX ORDER — AMOXICILLIN 250 MG
2 CAPSULE ORAL 2 TIMES DAILY PRN
Status: DISCONTINUED | OUTPATIENT
Start: 2024-07-05 | End: 2024-07-06 | Stop reason: HOSPADM

## 2024-07-05 RX ORDER — SODIUM CHLORIDE 0.9 % (FLUSH) 0.9 %
10 SYRINGE (ML) INJECTION EVERY 12 HOURS SCHEDULED
Status: DISCONTINUED | OUTPATIENT
Start: 2024-07-05 | End: 2024-07-06 | Stop reason: HOSPADM

## 2024-07-05 RX ORDER — ACETAMINOPHEN 650 MG/1
650 SUPPOSITORY RECTAL EVERY 4 HOURS PRN
Status: DISCONTINUED | OUTPATIENT
Start: 2024-07-05 | End: 2024-07-06 | Stop reason: HOSPADM

## 2024-07-05 RX ORDER — ACETAMINOPHEN 160 MG/5ML
650 SOLUTION ORAL EVERY 4 HOURS PRN
Status: DISCONTINUED | OUTPATIENT
Start: 2024-07-05 | End: 2024-07-06 | Stop reason: HOSPADM

## 2024-07-05 RX ORDER — SODIUM CHLORIDE 9 MG/ML
40 INJECTION, SOLUTION INTRAVENOUS AS NEEDED
Status: DISCONTINUED | OUTPATIENT
Start: 2024-07-05 | End: 2024-07-06 | Stop reason: HOSPADM

## 2024-07-05 RX ORDER — ONDANSETRON 2 MG/ML
4 INJECTION INTRAMUSCULAR; INTRAVENOUS EVERY 6 HOURS PRN
Status: DISCONTINUED | OUTPATIENT
Start: 2024-07-05 | End: 2024-07-06 | Stop reason: HOSPADM

## 2024-07-05 RX ORDER — PANTOPRAZOLE SODIUM 40 MG/1
40 TABLET, DELAYED RELEASE ORAL
Status: DISCONTINUED | OUTPATIENT
Start: 2024-07-06 | End: 2024-07-06 | Stop reason: HOSPADM

## 2024-07-05 RX ORDER — BISACODYL 5 MG/1
5 TABLET, DELAYED RELEASE ORAL DAILY PRN
Status: DISCONTINUED | OUTPATIENT
Start: 2024-07-05 | End: 2024-07-06 | Stop reason: HOSPADM

## 2024-07-05 RX ORDER — BISACODYL 10 MG
10 SUPPOSITORY, RECTAL RECTAL DAILY PRN
Status: DISCONTINUED | OUTPATIENT
Start: 2024-07-05 | End: 2024-07-06 | Stop reason: HOSPADM

## 2024-07-05 RX ORDER — SODIUM CHLORIDE 0.9 % (FLUSH) 0.9 %
10 SYRINGE (ML) INJECTION AS NEEDED
Status: DISCONTINUED | OUTPATIENT
Start: 2024-07-05 | End: 2024-07-06 | Stop reason: HOSPADM

## 2024-07-05 RX ORDER — MAGNESIUM OXIDE 400 MG/1
400 TABLET ORAL NIGHTLY
COMMUNITY

## 2024-07-05 RX ORDER — MONTELUKAST SODIUM 10 MG/1
10 TABLET ORAL DAILY
Status: DISCONTINUED | OUTPATIENT
Start: 2024-07-06 | End: 2024-07-05

## 2024-07-05 RX ORDER — ASPIRIN 81 MG/1
81 TABLET ORAL DAILY
Status: DISCONTINUED | OUTPATIENT
Start: 2024-07-06 | End: 2024-07-06 | Stop reason: HOSPADM

## 2024-07-05 RX ADMIN — NEBIVOLOL 20 MG: 5 TABLET ORAL at 19:38

## 2024-07-05 RX ADMIN — MAGNESIUM GLUCONATE 500 MG ORAL TABLET 200 MG: 500 TABLET ORAL at 19:38

## 2024-07-05 RX ADMIN — METFORMIN HCL 1000 MG: 500 TABLET ORAL at 17:27

## 2024-07-05 NOTE — ED PROVIDER NOTES
"EMERGENCY DEPARTMENT ATTENDING NOTE    Patient Name: Carlotta Dupont    Chief Complaint   Patient presents with    Chest Pain       PATIENT PRESENTATION:  Carlotta Dupont is a 73 y.o. female with PMH significant for asthma, hypertension, GERD, migraine with aura, ADA, type 2 diabetes who presents to the ED with feeling funny in her chest with discomfort but no pain and that the sensation felt like her heart was beating in her throat.  Denies any voice changes, drooling, fevers.  Reports generalized fatigue now.  Took her aspirin this morning.   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  The symptoms lasted for approximately 10 minutes.  Denies any headaches, vision changes, balance issues.  No history of smoking or family history of coronary artery disease.  Has not had any recent travel, recent surgery, lower extremity pain or swelling, personal or family history of clotting disorders, pleuritic symptoms, hemoptysis.  Has not had anything like this before.  No history of arrhythmias.  Symptoms do not radiate anywhere.  Has had a stress test that was reassuring in August 2023.  Follows with Dr. Rogers of cardiology as needed.  No history of coronary artery disease personally.      PHYSICAL EXAM:   VS: /65   Pulse 64   Temp 98.4 °F (36.9 °C) (Oral)   Resp 19   Ht 175.3 cm (69\")   Wt 76.7 kg (169 lb)   SpO2 95%   BMI 24.96 kg/m²   GENERAL: well-nourished, well-developed, awake, alert, no acute distress, nontoxic appearing, comfortable  EYES: PERRL, sclerae anicteric, extraocular movements grossly intact, symmetric lids  EARS, NOSE, MOUTH, THROAT: atraumatic external nose and ears, moist mucous membranes  NECK: symmetric, trachea midline  RESPIRATORY: unlabored respiratory effort, clear to auscultation bilaterally, good air movement  CARDIOVASCULAR: no murmurs, peripheral pulses 2+ and equal in all extremities  GI: soft, nontender, " nondistended  MUSCULOSKELETAL/EXTREMITIES: extremities without obvious deformity  SKIN: warm and dry with no obvious rashes  NEUROLOGIC: moving all 4 extremities symmetrically, CN II-XII grossly intact  PSYCHIATRIC: alert, pleasant and cooperative. Appropriate mood and affect.      MEDICAL DECISION MAKING:    Carlotta Dupont is a 73 y.o. female who presented to the ED with chest discomfort    Procedures    Differential Diagnosis Considered: Heart failure, ACS, PE, arrhythmia, A-fib, pneumonia    Labs Ordered:  Labs Reviewed   COMPREHENSIVE METABOLIC PANEL - Abnormal; Notable for the following components:       Result Value    Glucose 142 (*)     ALT (SGPT) 41 (*)     AST (SGOT) 46 (*)     Anion Gap 16.0 (*)     All other components within normal limits    Narrative:     GFR Normal >60  Chronic Kidney Disease <60  Kidney Failure <15    The GFR formula is only valid for adults with stable renal function between ages 18 and 70.   CBC WITH AUTO DIFFERENTIAL - Abnormal; Notable for the following components:    Hemoglobin 16.0 (*)     Hematocrit 48.6 (*)     Monocyte % 13.9 (*)     All other components within normal limits   LIPASE - Normal   TROPONIN - Normal    Narrative:     High Sensitive Troponin T Reference Range:  <14.0 ng/L- Negative Female for AMI  <22.0 ng/L- Negative Male for AMI  >=14 - Abnormal Female indicating possible myocardial injury.  >=22 - Abnormal Male indicating possible myocardial injury.   Clinicians would have to utilize clinical acumen, EKG, Troponin, and serial changes to determine if it is an Acute Myocardial Infarction or myocardial injury due to an underlying chronic condition.        BNP (IN-HOUSE) - Normal    Narrative:     This assay is used as an aid in the diagnosis of individuals suspected of having heart failure. It can be used as an aid in the diagnosis of acute decompensated heart failure (ADHF) in patients presenting with signs and symptoms of ADHF to the emergency  department (ED). In addition, NT-proBNP of <300 pg/mL indicates ADHF is not likely.    Age Range Result Interpretation  NT-proBNP Concentration (pg/mL:      <50             Positive            >450                   Gray                 300-450                    Negative             <300    50-75           Positive            >900                  Gray                300-900                  Negative            <300      >75             Positive            >1800                  Gray                300-1800                  Negative            <300   RAINBOW DRAW    Narrative:     The following orders were created for panel order [place] Draw.  Procedure                               Abnormality         Status                     ---------                               -----------         ------                     Green Top (Gel)[[id number]]                                  Final result               Lavender Top[[id number]]                                     Final result               Red Top[[id number]]                                          Final result               Galindo Top[[id number]]                                         Final result               Light Blue Top[[id number]]                                   Final result                 Please view results for these tests on the individual orders.   HIGH SENSITIVITIY TROPONIN T 2HR   GREEN TOP   LAVENDER TOP   RED TOP   GRAY TOP   LIGHT BLUE TOP   CBC AND DIFFERENTIAL    Narrative:     The following orders were created for panel order CBC & Differential.  Procedure                               Abnormality         Status                     ---------                               -----------         ------                     CBC Auto Differential[[id number]]        Abnormal            Final result                 Please view results for these tests on the individual orders.        Imaging Ordered:   XR Chest 1 View   Final Result   Shallow inspiration, no  acute cardiopulmonary abnormality.           This report was signed and finalized on 7/5/2024 12:46 PM by Dr. Eduardo Salguero MD.              Internal chart review:   Past Medical History:   Diagnosis Date    Acid reflux     Arthritis     Asthma     Diabetes mellitus     Type 2    Fistula of mastoid, left     GERD (gastroesophageal reflux disease)     History of transfusion     Hx of colonic polyps     Hyperlipidemia     Hypertension     Iron deficiency anemia     Mastoid pain, left     Numbness     left side of face    Other osteomyelitis, other site     PONV (postoperative nausea and vomiting)     Sensorineural hearing loss     Sleep apnea     does not use machine    Trigeminal neuralgia     Vitamin D deficiency        Past Surgical History:   Procedure Laterality Date    APPENDECTOMY      BLADDER REPAIR      had vaginal repair at the same time    BREAST BIOPSY Bilateral     benign    CAPSULE ENDOSCOPY N/A 04/20/2018    Procedure: CAPSULE ENDOSCOPY M2A;  Surgeon: Garrett Ozuna MD;  Location: [facility] ENDOSCOPY;  Service: Gastroenterology    COLONOSCOPY N/A 03/01/2018    Procedure: COLONOSCOPY WITH ANESTHESIA;  Surgeon: Garrett Ozuna MD;  Location:  [facility] ENDOSCOPY;  Service:     COLONOSCOPY N/A 04/16/2021    Procedure: COLONOSCOPY WITH ANESTHESIA;  Surgeon: Garrett Ozuna MD;  Location: [facility] ENDOSCOPY;  Service: Gastroenterology;  Laterality: N/A;  pre: hx colon polyps  post: polyp  Misty Pelayo MD    COLONOSCOPY W/ POLYPECTOMY  12/31/2014    Tubular adenomatous polyp at 80 cm, Hyperplastic polyp rectum repeat exam in 3 years    ENDOSCOPY N/A 03/01/2018    Procedure: ESOPHAGOGASTRODUODENOSCOPY WITH ANESTHESIA;  Surgeon: Garrett Ozuna MD;  Location: [facility] ENDOSCOPY;  Service:     FLAP HEAD/NECK Left 05/17/2018    Procedure: POSSIBLE STERNOCLEIDOMASTOID FLAP;  Surgeon: Kadeem Oconnor MD;  Location: [facility] OR;  Service: ENT    HYSTERECTOMY      MASTOIDECTOMY Left 05/17/2018    Procedure: Wound  exploration with possible mastoidectomy; possible sternocleidomastoid flap.  Please schedule with Dr. Perez.;  Surgeon: Kadeem Oconnor MD;  Location: [facility] OR;  Service: ENT    SHOULDER SURGERY Right 06/30/2022    TRIGEMINAL NERVE DECOMPRESSION      US GUIDED CYST ASPIRATION BREAST N/A 09/16/2021       Allergies   Allergen Reactions    Zovirax [Acyclovir] Unknown - High Severity     Other reaction(s): Other (See Comments)  PhX  PhX    Meperidine Hallucinations     halluncinations  halluncinations    Propranolol Unknown - Low Severity     Other reaction(s): Other (See Comments)  Bad dreams  unknown  Bad dreams  unknown  Bad dreams    Sertraline Hcl Hallucinations     hallucinations  hallucinations    Vesicare [Solifenacin Succinate] GI Intolerance     Severe constipation    Solifenacin Unknown - Low Severity     Other reaction(s): Other (See Comments)  Phx  Phx  Couldn't urinate         Current Facility-Administered Medications:     sodium chloride 0.9 % flush 10 mL, 10 mL, Intravenous, PRN, Daryl Ochoa MD    Current Outpatient Medications:     albuterol sulfate  (90 Base) MCG/ACT inhaler, As Needed., Disp: , Rfl: 5    ALPRAZolam (XANAX) 0.25 MG tablet, Take  by mouth 3 (Three) Times a Day As Needed., Disp: , Rfl:     aspirin 81 MG tablet, Take 1 tablet by mouth., Disp: , Rfl:     aspirin-acetaminophen-caffeine (EXCEDRIN MIGRAINE) 250-250-65 MG per tablet, Take 1 tablet by mouth As Needed. (Patient not taking: Reported on 4/23/2024), Disp: , Rfl:     calcium carbonate (TUMS) 500 MG chewable tablet, Chew 1 tablet Daily., Disp: , Rfl:     cloNIDine (CATAPRES) 0.1 MG tablet, Take 1 tablet by mouth 3 (Three) Times a Day As Needed for High Blood Pressure., Disp: , Rfl:     Empagliflozin (JARDIANCE PO), Take 10 mg by mouth., Disp: , Rfl:     empagliflozin (Jardiance) 25 MG tablet tablet, Take 20 mg by mouth Daily., Disp: , Rfl:     esomeprazole (nexIUM) 20 MG capsule, Take 1 capsule by mouth Every Morning  Before Breakfast., Disp: , Rfl:     estradiol (ESTRACE) 0.1 MG/GM vaginal cream, , Disp: , Rfl:     famotidine (PEPCID) 10 MG tablet, Take 1 tablet by mouth At Night As Needed for Heartburn., Disp: , Rfl:     glucose blood (Glucocard Expression Test) test strip, TEST DAILY AS NEEDED, Disp: , Rfl:     losartan-hydrochlorothiazide (HYZAAR) 100-25 MG per tablet, Take 1 tablet by mouth Daily., Disp: , Rfl:     melatonin 5 MG tablet tablet, Take 1 tablet by mouth., Disp: , Rfl:     metFORMIN (GLUCOPHAGE) 500 MG tablet, Take 2 tablets by mouth 2 (Two) Times a Day With Meals. 500m g in am and 1000mg at night, Disp: , Rfl:     montelukast (SINGULAIR) 10 MG tablet, , Disp: , Rfl:     Multiple Vitamins-Minerals (PRESERVISION AREDS PO), Take  by mouth., Disp: , Rfl:     nebivolol (BYSTOLIC) 20 MG tablet, Take 1 tablet by mouth Every Night., Disp: , Rfl:     rosuvastatin (CRESTOR) 5 MG tablet, 1/2 tablet on Monday, Wednesday, Friday, Disp: , Rfl:     Sure Comfort Lancets 30G misc, , Disp: , Rfl:     verapamil SR (CALAN-SR) 240 MG CR tablet, TAKE ONE TABLET BY MOUTH TWICE A DAY, Disp: , Rfl:     vitamin D (ERGOCALCIFEROL) 27091 units capsule capsule, Take 1 capsule by mouth Every 7 (Seven) Days., Disp: , Rfl:     External documents reviewed: Reviewed stress test from August 2023 with low risk study with no evidence of ischemia    No significant valvular pathology and normal left ventricular function and right ventricular function on echo and January 2021.    My EKG interpretation: EKG normal sinus rhythm, rate of 67, normal intervals, no signs of ischemia or arrhythmia.    Reviewed 12 January 2021 EKG and unchanged EKG.    My lab interpretation: Mild elevated hemoglobin and transaminitis normal BNP and troponin.    My imaging interpretation: This x-ray without acute process    Decision rules/scores evaluated: Patient is Wells low risk and do not suspect PE.    Discussed with: Patient and , hospitalist    Shared decision  making: Discussed the patient has a heart score of 4 despite coming in for risk factors and age.  No points for history or EKG.    ED Course and Re-evaluation: Had 1 episode of similar symptoms that lasted for a few seconds and reviewed telemetry which was consistent with possible atrial flutter.  Tracing printed off and left in the room for the hospitalist.  Discussed with Dr. Goode patient to be admitted for further workup for new onset flutter and symptoms.    ED Course as of 07/05/24 1403   Fri Jul 05, 2024   1256 Mild elevated hemoglobin.  Troponin and BNP within normal limits. [JJ]   1300 Telemetry with concerns for possible flutter and patient was symptomatic. [JJ]   1358 Dr. Sharpe  [JJ]      ED Course User Index  [JJ] Daryl Ochoa MD        ED Diagnosis:  (I48.92) Atrial flutter, unspecified type    (R74.01) Transaminitis     Disposition: Admission for observation        Signed:  Daryl Ochoa MD  Emergency Medicine Physician    Please note that portions of this note were completed with a voice recognition program.      Daryl Ochoa MD  07/05/24 1403       Daryl Ochoa MD  07/05/24 1403

## 2024-07-05 NOTE — PLAN OF CARE
Goal Outcome Evaluation:  Plan of Care Reviewed With: patient, spouse        Progress: no change  Outcome Evaluation: Patient admitted from ER with chest discomfort. NSR on tele since arrival to floor. No c/o pain or discomfort. Echo pending. Continue to monitor.

## 2024-07-06 ENCOUNTER — APPOINTMENT (OUTPATIENT)
Dept: CARDIOLOGY | Facility: HOSPITAL | Age: 74
End: 2024-07-06
Payer: MEDICARE

## 2024-07-06 VITALS
SYSTOLIC BLOOD PRESSURE: 116 MMHG | TEMPERATURE: 97.9 F | HEIGHT: 69 IN | BODY MASS INDEX: 24.76 KG/M2 | HEART RATE: 71 BPM | DIASTOLIC BLOOD PRESSURE: 55 MMHG | OXYGEN SATURATION: 95 % | WEIGHT: 167.2 LBS | RESPIRATION RATE: 16 BRPM

## 2024-07-06 LAB
ANION GAP SERPL CALCULATED.3IONS-SCNC: 11 MMOL/L (ref 5–15)
BASOPHILS # BLD AUTO: 0.04 10*3/MM3 (ref 0–0.2)
BASOPHILS NFR BLD AUTO: 1.1 % (ref 0–1.5)
BUN SERPL-MCNC: 18 MG/DL (ref 8–23)
BUN/CREAT SERPL: 31 (ref 7–25)
CALCIUM SPEC-SCNC: 9.4 MG/DL (ref 8.6–10.5)
CHLORIDE SERPL-SCNC: 101 MMOL/L (ref 98–107)
CO2 SERPL-SCNC: 26 MMOL/L (ref 22–29)
CREAT SERPL-MCNC: 0.58 MG/DL (ref 0.57–1)
DEPRECATED RDW RBC AUTO: 43.8 FL (ref 37–54)
EGFRCR SERPLBLD CKD-EPI 2021: 95.7 ML/MIN/1.73
EOSINOPHIL # BLD AUTO: 0.27 10*3/MM3 (ref 0–0.4)
EOSINOPHIL NFR BLD AUTO: 7.2 % (ref 0.3–6.2)
ERYTHROCYTE [DISTWIDTH] IN BLOOD BY AUTOMATED COUNT: 12.7 % (ref 12.3–15.4)
GLUCOSE SERPL-MCNC: 122 MG/DL (ref 65–99)
HCT VFR BLD AUTO: 45.1 % (ref 34–46.6)
HGB BLD-MCNC: 14.7 G/DL (ref 12–15.9)
IMM GRANULOCYTES # BLD AUTO: 0.01 10*3/MM3 (ref 0–0.05)
IMM GRANULOCYTES NFR BLD AUTO: 0.3 % (ref 0–0.5)
LYMPHOCYTES # BLD AUTO: 0.88 10*3/MM3 (ref 0.7–3.1)
LYMPHOCYTES NFR BLD AUTO: 23.3 % (ref 19.6–45.3)
MCH RBC QN AUTO: 30.7 PG (ref 26.6–33)
MCHC RBC AUTO-ENTMCNC: 32.6 G/DL (ref 31.5–35.7)
MCV RBC AUTO: 94.2 FL (ref 79–97)
MONOCYTES # BLD AUTO: 0.53 10*3/MM3 (ref 0.1–0.9)
MONOCYTES NFR BLD AUTO: 14.1 % (ref 5–12)
NEUTROPHILS NFR BLD AUTO: 2.04 10*3/MM3 (ref 1.7–7)
NEUTROPHILS NFR BLD AUTO: 54 % (ref 42.7–76)
NRBC BLD AUTO-RTO: 0 /100 WBC (ref 0–0.2)
PLATELET # BLD AUTO: 171 10*3/MM3 (ref 140–450)
PMV BLD AUTO: 11 FL (ref 6–12)
POTASSIUM SERPL-SCNC: 3.7 MMOL/L (ref 3.5–5.2)
QT INTERVAL: 426 MS
QTC INTERVAL: 450 MS
RBC # BLD AUTO: 4.79 10*6/MM3 (ref 3.77–5.28)
SODIUM SERPL-SCNC: 138 MMOL/L (ref 136–145)
WBC NRBC COR # BLD AUTO: 3.77 10*3/MM3 (ref 3.4–10.8)

## 2024-07-06 PROCEDURE — G0378 HOSPITAL OBSERVATION PER HR: HCPCS

## 2024-07-06 PROCEDURE — 93246 EXT ECG>7D<15D RECORDING: CPT

## 2024-07-06 PROCEDURE — 80048 BASIC METABOLIC PNL TOTAL CA: CPT | Performed by: FAMILY MEDICINE

## 2024-07-06 PROCEDURE — 85025 COMPLETE CBC W/AUTO DIFF WBC: CPT | Performed by: FAMILY MEDICINE

## 2024-07-06 RX ORDER — MONTELUKAST SODIUM 10 MG/1
10 TABLET ORAL DAILY
Status: DISCONTINUED | OUTPATIENT
Start: 2024-07-06 | End: 2024-07-06 | Stop reason: HOSPADM

## 2024-07-06 RX ADMIN — LOSARTAN POTASSIUM 100 MG: 50 TABLET, FILM COATED ORAL at 09:23

## 2024-07-06 RX ADMIN — Medication 10 ML: at 09:23

## 2024-07-06 RX ADMIN — ASPIRIN 81 MG: 81 TABLET, COATED ORAL at 09:23

## 2024-07-06 RX ADMIN — PANTOPRAZOLE SODIUM 40 MG: 40 TABLET, DELAYED RELEASE ORAL at 06:01

## 2024-07-06 RX ADMIN — EMPAGLIFLOZIN 20 MG: 10 TABLET, FILM COATED ORAL at 09:23

## 2024-07-06 RX ADMIN — HYDROCHLOROTHIAZIDE 25 MG: 25 TABLET ORAL at 09:23

## 2024-07-06 RX ADMIN — METFORMIN HCL 1000 MG: 500 TABLET ORAL at 09:23

## 2024-07-06 RX ADMIN — MONTELUKAST SODIUM 10 MG: 10 TABLET, FILM COATED ORAL at 09:23

## 2024-07-06 NOTE — DISCHARGE SUMMARY
[facility]  DISCHARGE SUMMARY       Date of Admission: 7/5/2024  Date of Discharge:  7/6/2024  Primary Care Physician: Misty Pelayo MD    Discharge Diagnoses:  Active Hospital Problems    Diagnosis     **Palpitations     Precordial chest pain     Controlled type 2 diabetes mellitus without complication, without long-term current use of insulin     HTN (hypertension), benign          Presenting Problem/History of Present Illness:  Atrial flutter [I48.92]     Chief Complaint on Day of Discharge:   No complaint    History of Present Illness on Day of Discharge:   Patient has had no further chest discomfort and sense of palpitations.  Overnight telemetry has been essentially within normal limits.  The patient is appropriate for discharge home.  She has been asked to follow-up with her PCP next week and thereafter to discuss the results of Holter monitor.  Currently, there is no indication for anticoagulation as atrial flutter has not been confirmed.  The rhythm strips obtained in the emergency department are absolutely not confirmatory for atrial flutter.  I am not inclined to recommend lifelong anticoagulation on the basis of incomplete information and as such we will defer to the patient's primary care physician for further evaluation and recommendations.  In the interim, I will recommend that the patient refrain from using albuterol inhaler which may induce atrial arrhythmias.    Hospital Course  This 73-year-old female presents to the emergency department with a chief complaint of a sensation of heart beating in her throat rapidly with mild chest discomfort in the upper chest and throat. The patient's  notes that he palpated her pulse and it was noted to be irregular for about 5-10 minutes. She presented to the emergency department for further evaluation. Rhythm strips obtained in the ED are not confirmatory for atrial flutter. Comfortingly, the patient  "had a stress test about a year ago that was low risk for ischemia. She is currently asymptomatic at the time of my evaluation. Workup in the emergency department shows a normal sinus rhythm on EKG with no acute changes. CMP is unremarkable except glucose 142, ALT 41 and AST 46. Troponin value was flat with 2 successive readings of 6. Lipase, BNP, CBC all unremarkable. Chest x-ray shows shallow inspiration but no acute cardiopulmonary process.   Treatment Plan  Admit to observation overnight for cardiac monitoring  Echocardiogram, done, results pending  Stat TSH  Continue the bulk of home medications  Discharge in a.m. with Zio patch in place  After discharge the patient should follow-up with her PCP for results of Holter monitor to determine any evidence of atrial dysrhythmia  I recommend no anticoagulation with virtually no evidence of atrial dysrhythmia at this point unless revealed by Zio patch      Result Review    Result Review:  I have personally reviewed the results from the time of this admission to 7/6/2024 09:49 CDT and agree with these findings:  []  Laboratory  []  Microbiology  []  Radiology  []  EKG/Telemetry   []  Cardiology/Vascular   []  Pathology  []  Old records  []  Other:    Condition on Discharge:    Stable and at baseline    Physical Exam on Discharge:  /55 (BP Location: Left arm, Patient Position: Lying)   Pulse 71   Temp 97.9 °F (36.6 °C) (Oral)   Resp 16   Ht 175.3 cm (69\")   Wt 75.8 kg (167 lb 3.2 oz)   SpO2 95%   BMI 24.69 kg/m²   Physical Exam       Constitutional:       General: She is not in acute distress.     Appearance: Normal appearance.   HENT:      Head: Normocephalic and atraumatic.      Right Ear: External ear normal.      Left Ear: External ear normal.      Mouth/Throat:      Mouth: Mucous membranes are moist.      Pharynx: Oropharynx is clear.   Eyes:      General: No scleral icterus.     Extraocular Movements: Extraocular movements intact.      " Conjunctiva/sclera: Conjunctivae normal.      Pupils: Pupils are equal, round, and reactive to light.   Cardiovascular:      Rate and Rhythm: Normal rate and regular rhythm.      Pulses: Normal pulses.      Heart sounds: Normal heart sounds. No murmur heard.  Pulmonary:      Effort: Pulmonary effort is normal. No respiratory distress.      Breath sounds: Normal breath sounds.   Abdominal:      General: Abdomen is flat. Bowel sounds are normal.      Palpations: Abdomen is soft. There is no mass.      Tenderness: There is no abdominal tenderness.   Musculoskeletal:         General: Normal range of motion.      Right lower leg: No edema.      Left lower leg: No edema.   Skin:     General: Skin is warm and dry.      Coloration: Skin is not pale.   Neurological:      General: No focal deficit present.      Mental Status: She is alert and oriented to person, place, and time. Mental status is at baseline.      Cranial Nerves: No cranial nerve deficit.   Psychiatric:         Mood and Affect: Mood normal.         Judgment: Judgment normal.     Discharge Disposition:  Home or Self Care    Discharge Medications:     Discharge Medications        Continue These Medications        Instructions Start Date   ALPRAZolam 0.25 MG tablet  Commonly known as: XANAX   0.125 mg, Oral, Nightly PRN      aspirin 81 MG tablet   81 mg, Oral, Daily      calcium carbonate 500 MG chewable tablet  Commonly known as: TUMS   1 tablet, Oral, Daily      cloNIDine 0.1 MG tablet  Commonly known as: CATAPRES   0.1 mg, Oral, 2 Times Daily PRN      empagliflozin 10 MG tablet tablet  Commonly known as: JARDIANCE   20 mg, Oral, Daily      esomeprazole 20 MG capsule  Commonly known as: nexIUM   20 mg, Oral, Every Morning Before Breakfast      estradiol 0.1 MG/GM vaginal cream  Commonly known as: ESTRACE   1 g, Vaginal, 2 Times Weekly      losartan-hydrochlorothiazide 100-25 MG per tablet  Commonly known as: HYZAAR   1 tablet, Oral, Daily      magnesium oxide  400 MG tablet  Commonly known as: MAG-OX   400 mg, Oral, Nightly      melatonin 5 MG tablet tablet   5 mg, Oral, Nightly PRN      metFORMIN 500 MG tablet  Commonly known as: GLUCOPHAGE   1,000 mg, Oral, 2 Times Daily With Meals      montelukast 10 MG tablet  Commonly known as: SINGULAIR   10 mg, Oral, Nightly      multivitamin with minerals tablet tablet   1 tablet, Oral, Daily      nebivolol 20 MG tablet  Commonly known as: BYSTOLIC   20 mg, Oral, Nightly      rosuvastatin 5 MG tablet  Commonly known as: CRESTOR   Take 1 tablet by mouth Take As Directed. 1/2 tablet on Monday, Wednesday, Friday      verapamil  MG CR tablet  Commonly known as: CALAN-SR   Take 1 tablet by mouth 2 (Two) Times a Day.      vitamin D 1.25 MG (27557 UT) capsule capsule  Commonly known as: ERGOCALCIFEROL   50,000 Units, Oral, Every 7 Days, Sunday at night              Stop These Medications      albuterol sulfate  (90 Base) MCG/ACT inhaler  Commonly known as: PROVENTIL HFA;VENTOLIN HFA;PROAIR HFA     famotidine 10 MG tablet  Commonly known as: PEPCID              Discharge Diet:   Diet Instructions       Diet: Diabetic Diets; Consistent Carbohydrate; Thin (IDDSI 0)      Discharge Diet: Diabetic Diets    Diabetic Diet: Consistent Carbohydrate    Fluid Consistency: Thin (IDDSI 0)            Discharge Care Plan / Instructions:   Discharge home    Activity at Discharge:   Activity Instructions       Activity as Tolerated              Follow-up Appointments:  Follow-up with PCP next week    Electronically signed by Jarvis Sharpe DO, 07/06/24, 09:49 CDT.    Time: Discharge less than 30 min    Part of this note may be an electronic transcription/translation of spoken language to printed text using the Dragon Dictation system.

## 2024-07-06 NOTE — PLAN OF CARE
Goal Outcome Evaluation:  Plan of Care Reviewed With: patient        Progress: improving       Pt rested well during shift. Eliquis was refused by pt. Pt stated that she spoke to Dr. Sharpe and he didn't want her taking it because she didn't have a true dx of A flutter. VSS. Pt safety maintained during shift. Will continue to monitor.

## 2024-07-06 NOTE — H&P
[facility] Medicine Services  HISTORY AND PHYSICAL    Date of Admission: 7/5/2024  Primary Care Physician: Misty Pelayo MD    Subjective   Primary Historian: The patient and her     Chief Complaint: Heart fluttering, mild chest discomfort    History of Present Illness    This 73-year-old female presents to the emergency department with a chief complaint of a sensation of heart beating in her throat rapidly with mild chest discomfort in the upper chest and throat.  The patient's  notes that he palpated her pulse and it was noted to be irregular for about 5-10 minutes.  She presented to the emergency department for further evaluation.  Rhythm strips obtained in the ED are not confirmatory for atrial flutter.  Comfortingly, the patient had a stress test about a year ago that was low risk for ischemia.  She is currently asymptomatic at the time of my evaluation.  Workup in the emergency department shows a normal sinus rhythm on EKG with no acute changes.  CMP is unremarkable except glucose 142, ALT 41 and AST 46.  Troponin value was flat with 2 successive readings of 6.  Lipase, BNP, CBC all unremarkable.  Chest x-ray shows shallow inspiration but no acute cardiopulmonary process.    Review of Systems   Constitutional:  Positive for fatigue.   HENT: Negative.     Eyes: Negative.    Respiratory:  Positive for chest tightness.    Cardiovascular:  Positive for palpitations.   Gastrointestinal: Negative.    Endocrine: Negative.    Genitourinary: Negative.    Musculoskeletal: Negative.    Skin: Negative.    Allergic/Immunologic: Negative.    Neurological: Negative.    Hematological: Negative.    Psychiatric/Behavioral: Negative.        Otherwise complete ROS reviewed and negative except as mentioned in the HPI.    Past Medical History:   Past Medical History:   Diagnosis Date    Acid reflux     Arthritis     Asthma     Diabetes mellitus     Type 2    Fistula of mastoid,  left     GERD (gastroesophageal reflux disease)     History of transfusion     Hx of colonic polyps     Hyperlipidemia     Hypertension     Iron deficiency anemia     Mastoid pain, left     Numbness     left side of face    Other osteomyelitis, other site     PONV (postoperative nausea and vomiting)     Sensorineural hearing loss     Sleep apnea     does not use machine    Trigeminal neuralgia     Vitamin D deficiency      Past Surgical History:  Past Surgical History:   Procedure Laterality Date    APPENDECTOMY      BLADDER REPAIR      had vaginal repair at the same time    BREAST BIOPSY Bilateral     benign    CAPSULE ENDOSCOPY N/A 04/20/2018    Procedure: CAPSULE ENDOSCOPY M2A;  Surgeon: Garrett Ozuna MD;  Location: [facility] ENDOSCOPY;  Service: Gastroenterology    COLONOSCOPY N/A 03/01/2018    Procedure: COLONOSCOPY WITH ANESTHESIA;  Surgeon: Garrett Ozuna MD;  Location: [facility] ENDOSCOPY;  Service:     COLONOSCOPY N/A 04/16/2021    Procedure: COLONOSCOPY WITH ANESTHESIA;  Surgeon: Garrett Ozuna MD;  Location: [facility] ENDOSCOPY;  Service: Gastroenterology;  Laterality: N/A;  pre: hx colon polyps  post: polyp  Misty Pelayo MD    COLONOSCOPY W/ POLYPECTOMY  12/31/2014    Tubular adenomatous polyp at 80 cm, Hyperplastic polyp rectum repeat exam in 3 years    ENDOSCOPY N/A 03/01/2018    Procedure: ESOPHAGOGASTRODUODENOSCOPY WITH ANESTHESIA;  Surgeon: Garrett Ozuna MD;  Location: [facility] ENDOSCOPY;  Service:     FLAP HEAD/NECK Left 05/17/2018    Procedure: POSSIBLE STERNOCLEIDOMASTOID FLAP;  Surgeon: Kadeem Oconnor MD;  Location: [facility] OR;  Service: ENT    HYSTERECTOMY      MASTOIDECTOMY Left 05/17/2018    Procedure: Wound exploration with possible mastoidectomy; possible sternocleidomastoid flap.  Please schedule with Dr. Perez.;  Surgeon: Kadeem Oconnor MD;  Location: [facility] OR;  Service: ENT    SHOULDER SURGERY Right 06/30/2022    TRIGEMINAL NERVE DECOMPRESSION      US GUIDED CYST ASPIRATION BREAST N/A  09/16/2021     Social History:  reports that she has never smoked. She has never used smokeless tobacco. She reports current alcohol use. She reports that she does not use drugs.    Family History: family history includes Breast cancer in her mother, niece, and sister; Heart disease in her father; No Known Problems in her brother, daughter, maternal aunt, maternal grandmother, paternal aunt, paternal grandmother, and son.       Allergies:  Allergies   Allergen Reactions    Zovirax [Acyclovir] Unknown - High Severity     Other reaction(s): Other (See Comments)  PhX  PhX    Meperidine Hallucinations     halluncinations  halluncinations    Propranolol Unknown - Low Severity     Other reaction(s): Other (See Comments)  Bad dreams  unknown  Bad dreams  unknown  Bad dreams    Sertraline Hcl Hallucinations     hallucinations  hallucinations    Vesicare [Solifenacin Succinate] GI Intolerance     Severe constipation    Solifenacin Unknown - Low Severity     Other reaction(s): Other (See Comments)  Phx  Phx  Couldn't urinate       Medications:  Prior to Admission medications    Medication Sig Start Date End Date Taking? Authorizing Provider   ALPRAZolam (XANAX) 0.25 MG tablet Take 0.5 tablets by mouth At Night As Needed for Anxiety or Sleep.   Yes Jose Martin Kirk MD   aspirin 81 MG tablet Take 1 tablet by mouth Daily.   Yes Jose Martin Kirk MD   calcium carbonate (TUMS) 500 MG chewable tablet Chew 1 tablet Daily.   Yes Jose Martin Kirk MD   cloNIDine (CATAPRES) 0.1 MG tablet Take 1 tablet by mouth 2 (Two) Times a Day As Needed for High Blood Pressure (SBP > 160 DBP > 90).   Yes Jose Martin Kirk MD   esomeprazole (nexIUM) 20 MG capsule Take 1 capsule by mouth Every Morning Before Breakfast.   Yes Jose Martin Kirk MD   famotidine (PEPCID) 10 MG tablet Take 1 tablet by mouth Every Night.   Yes Jose Martin Kirk MD   losartan-hydrochlorothiazide (HYZAAR) 100-25 MG per tablet Take 1 tablet by mouth  Daily. 1/31/24  Yes Jose Martin Kirk MD   magnesium oxide (MAG-OX) 400 MG tablet Take 1 tablet by mouth Every Night.   Yes Jose Martin Kirk MD   melatonin 5 MG tablet tablet Take 1 tablet by mouth At Night As Needed (sleep).   Yes Jose Martin Kirk MD   metFORMIN (GLUCOPHAGE) 500 MG tablet Take 2 tablets by mouth 2 (Two) Times a Day With Meals. 11/20/17  Yes Jose Martin Kirk MD   montelukast (SINGULAIR) 10 MG tablet Take 1 tablet by mouth Every Night. 7/20/21  Yes Jose Martin Kirk MD   Multiple Vitamins-Minerals (PRESERVISION AREDS PO) Take 1 tablet by mouth Daily.   Yes Jose Martin Kirk MD   nebivolol (BYSTOLIC) 20 MG tablet Take 1 tablet by mouth Every Night. 4/10/24  Yes Jose Martin Kirk MD   rosuvastatin (CRESTOR) 5 MG tablet Take 1 tablet by mouth Take As Directed. 1/2 tablet on Monday, Wednesday, Friday   Yes Jose Martin Kirk MD   verapamil SR (CALAN-SR) 240 MG CR tablet Take 1 tablet by mouth 2 (Two) Times a Day. 11/17/17  Yes Jose Martin Kirk MD   vitamin D (ERGOCALCIFEROL) 43054 units capsule capsule Take 1 capsule by mouth Every 7 (Seven) Days. Sunday at night 11/20/17  Yes Jose Martin Kirk MD   empagliflozin (Jardiance) 25 MG tablet tablet Take 20 mg by mouth Daily.  7/5/24 Yes Jose Martin Kirk MD   albuterol sulfate  (90 Base) MCG/ACT inhaler Inhale 1 puff Every 4 (Four) Hours As Needed for Shortness of Air. 10/25/19   Jose Martin Kirk MD   empagliflozin (JARDIANCE) 10 MG tablet tablet Take 2 tablets by mouth Daily.    Jose Martin Kirk MD   estradiol (ESTRACE) 0.1 MG/GM vaginal cream Insert 1 g into the vagina 2 (Two) Times a Week. 7/6/21   Jose Martin Kirk MD   aspirin-acetaminophen-caffeine (EXCEDRIN MIGRAINE) 250-250-65 MG per tablet Take 1 tablet by mouth As Needed for Headache.  Patient not taking: Reported on 7/5/2024 7/5/24  Jose Martin Kirk MD   Empagliflozin (JARDIANCE PO) Take 10 mg by mouth.  7/5/24  Reagan  "MD Jose Martin   glucose blood (Glucocard Expression Test) test strip TEST DAILY AS NEEDED 7/6/21 7/5/24  ProviderJose Martin MD   Sure Comfort Lancets 30G misc  8/23/21 7/5/24  Provider, MD Jose Martin     I have utilized all available immediate resources to obtain, update, or review the patient's current medications (including all prescriptions, over-the-counter products, herbals, cannabis/cannabidiol products, and vitamin/mineral/dietary (nutritional) supplements).    Objective     Vital Signs: /72 (BP Location: Left arm, Patient Position: Lying)   Pulse 67   Temp 98.2 °F (36.8 °C) (Oral)   Resp 18   Ht 175.3 cm (69\")   Wt 75.8 kg (167 lb 3.2 oz)   SpO2 92%   BMI 24.69 kg/m²   Physical Exam  Constitutional:       General: She is not in acute distress.     Appearance: Normal appearance.   HENT:      Head: Normocephalic and atraumatic.      Right Ear: External ear normal.      Left Ear: External ear normal.      Mouth/Throat:      Mouth: Mucous membranes are moist.      Pharynx: Oropharynx is clear.   Eyes:      General: No scleral icterus.     Extraocular Movements: Extraocular movements intact.      Conjunctiva/sclera: Conjunctivae normal.      Pupils: Pupils are equal, round, and reactive to light.   Cardiovascular:      Rate and Rhythm: Normal rate and regular rhythm.      Pulses: Normal pulses.      Heart sounds: Normal heart sounds. No murmur heard.  Pulmonary:      Effort: Pulmonary effort is normal. No respiratory distress.      Breath sounds: Normal breath sounds.   Abdominal:      General: Abdomen is flat. Bowel sounds are normal.      Palpations: Abdomen is soft. There is no mass.      Tenderness: There is no abdominal tenderness.   Musculoskeletal:         General: Normal range of motion.      Right lower leg: No edema.      Left lower leg: No edema.   Skin:     General: Skin is warm and dry.      Coloration: Skin is not pale.   Neurological:      General: No focal deficit present.      " Mental Status: She is alert and oriented to person, place, and time. Mental status is at baseline.      Cranial Nerves: No cranial nerve deficit.   Psychiatric:         Mood and Affect: Mood normal.         Judgment: Judgment normal.        Results Reviewed:  Lab Results (last 24 hours)       Procedure Component Value Units Date/Time    High Sensitivity Troponin T 2Hr [[id number]] Collected: 07/05/24 1425    Specimen: Blood Updated: 07/05/24 1452     HS Troponin T <6 ng/L      Troponin T Delta --     Comment: Unable to calculate.       Narrative:      High Sensitive Troponin T Reference Range:  <14.0 ng/L- Negative Female for AMI  <22.0 ng/L- Negative Male for AMI  >=14 - Abnormal Female indicating possible myocardial injury.  >=22 - Abnormal Male indicating possible myocardial injury.   Clinicians would have to utilize clinical acumen, EKG, Troponin, and serial changes to determine if it is an Acute Myocardial Infarction or myocardial injury due to an underlying chronic condition.         Comprehensive Metabolic Panel [[id number]]  (Abnormal) Collected: 07/05/24 1206    Specimen: Blood Updated: 07/05/24 1249     Glucose 142 mg/dL      BUN 19 mg/dL      Creatinine 0.78 mg/dL      Sodium 141 mmol/L      Potassium 3.7 mmol/L      Comment: Slight hemolysis detected by analyzer. Result may be falsely elevated.        Chloride 100 mmol/L      CO2 25.0 mmol/L      Calcium 9.9 mg/dL      Total Protein 7.8 g/dL      Albumin 4.7 g/dL      ALT (SGPT) 41 U/L      AST (SGOT) 46 U/L      Alkaline Phosphatase 74 U/L      Total Bilirubin 0.3 mg/dL      Globulin 3.1 gm/dL      A/G Ratio 1.5 g/dL      BUN/Creatinine Ratio 24.4     Anion Gap 16.0 mmol/L      eGFR 80.3 mL/min/1.73     Narrative:      GFR Normal >60  Chronic Kidney Disease <60  Kidney Failure <15    The GFR formula is only valid for adults with stable renal function between ages 18 and 70.    BNP [[id number]]  (Normal) Collected: 07/05/24 1206    Specimen: Blood Updated:  07/05/24 1245     proBNP 336.5 pg/mL     Narrative:      This assay is used as an aid in the diagnosis of individuals suspected of having heart failure. It can be used as an aid in the diagnosis of acute decompensated heart failure (ADHF) in patients presenting with signs and symptoms of ADHF to the emergency department (ED). In addition, NT-proBNP of <300 pg/mL indicates ADHF is not likely.    Age Range Result Interpretation  NT-proBNP Concentration (pg/mL:      <50             Positive            >450                   Gray                 300-450                    Negative             <300    50-75           Positive            >900                  Gray                300-900                  Negative            <300      >75             Positive            >1800                  Gray                300-1800                  Negative            <300    High Sensitivity Troponin T [[id number]]  (Normal) Collected: 07/05/24 1206    Specimen: Blood Updated: 07/05/24 1245     HS Troponin T 6 ng/L     Narrative:      High Sensitive Troponin T Reference Range:  <14.0 ng/L- Negative Female for AMI  <22.0 ng/L- Negative Male for AMI  >=14 - Abnormal Female indicating possible myocardial injury.  >=22 - Abnormal Male indicating possible myocardial injury.   Clinicians would have to utilize clinical acumen, EKG, Troponin, and serial changes to determine if it is an Acute Myocardial Infarction or myocardial injury due to an underlying chronic condition.         Lipase [[id number]]  (Normal) Collected: 07/05/24 1206    Specimen: Blood Updated: 07/05/24 1244     Lipase 40 U/L     CBC & Differential [[id number]]  (Abnormal) Collected: 07/05/24 1206    Specimen: Blood Updated: 07/05/24 1233    Narrative:      The following orders were created for panel order CBC & Differential.  Procedure                               Abnormality         Status                     ---------                               -----------          ------                     CBC Auto Differential[[id number]]        Abnormal            Final result                 Please view results for these tests on the individual orders.    CBC Auto Differential [[id number]]  (Abnormal) Collected: 07/05/24 1206    Specimen: Blood Updated: 07/05/24 1233     WBC 5.32 10*3/mm3      RBC 5.21 10*6/mm3      Hemoglobin 16.0 g/dL      Hematocrit 48.6 %      MCV 93.3 fL      MCH 30.7 pg      MCHC 32.9 g/dL      RDW 13.0 %      RDW-SD 44.7 fl      MPV 11.0 fL      Platelets 195 10*3/mm3      Neutrophil % 55.3 %      Lymphocyte % 25.8 %      Monocyte % 13.9 %      Eosinophil % 3.8 %      Basophil % 0.8 %      Immature Grans % 0.4 %      Neutrophils, Absolute 2.95 10*3/mm3      Lymphocytes, Absolute 1.37 10*3/mm3      Monocytes, Absolute 0.74 10*3/mm3      Eosinophils, Absolute 0.20 10*3/mm3      Basophils, Absolute 0.04 10*3/mm3      Immature Grans, Absolute 0.02 10*3/mm3      nRBC 0.0 /100 WBC     Orma Draw [[id number]] Collected: 07/05/24 1206    Specimen: Blood Updated: 07/05/24 1215    Narrative:      The following orders were created for panel order Orma Draw.  Procedure                               Abnormality         Status                     ---------                               -----------         ------                     Green Top (Gel)[[id number]]                                  Final result               Lavender Top[[id number]]                                     Final result               Red Top[[id number]]                                          Final result               Galindo Top[[id number]]                                         Final result               Light Blue Top[[id number]]                                   Final result                 Please view results for these tests on the individual orders.    Green Top (Gel) [[id number]] Collected: 07/05/24 1206    Specimen: Blood Updated: 07/05/24 1215     Extra Tube Hold for add-ons.     Comment: Auto resulted.        Lavender Top [[id number]] Collected: 07/05/24 1206    Specimen: Blood Updated: 07/05/24 1215     Extra Tube hold for add-on     Comment: Auto resulted       Red Top [[id number]] Collected: 07/05/24 1206    Specimen: Blood Updated: 07/05/24 1215     Extra Tube Hold for add-ons.     Comment: Auto resulted.       Gray Top [[id number]] Collected: 07/05/24 1206    Specimen: Blood Updated: 07/05/24 1215     Extra Tube Hold for add-ons.     Comment: Auto resulted.       Light Blue Top [[id number]] Collected: 07/05/24 1206    Specimen: Blood Updated: 07/05/24 1215     Extra Tube Hold for add-ons.     Comment: Auto resulted             Imaging Results (Last 24 Hours)       Procedure Component Value Units Date/Time    XR Chest 1 View [[id number]] Collected: 07/05/24 1245     Updated: 07/05/24 1249    Narrative:      EXAMINATION: XR CHEST 1 VW- 7/5/2024 12:45 PM     HISTORY: chest pain.     COMPARISON: Chest x-ray 12/28/2020.     REPORT:  The lungs are hypoaerated, no focal infiltrate or pulmonary  consolidation is identified. No pneumothorax or effusion is identified.  Heart size is normal. The osseous structures show no acute findings.  There has been interval right total reverse shoulder arthroplasty.       Impression:      Shallow inspiration, no acute cardiopulmonary abnormality.        This report was signed and finalized on 7/5/2024 12:46 PM by Dr. Eduardo Salguero MD.             I have personally reviewed and interpreted the radiology studies and ECG obtained at time of admission.     Assessment / Plan   Assessment:   Active Hospital Problems    Diagnosis     **Palpitations     Precordial chest pain     Controlled type 2 diabetes mellitus without complication, without long-term current use of insulin     HTN (hypertension), benign        Treatment Plan  Admit to observation overnight for cardiac monitoring  Echocardiogram, done, results pending  Stat TSH  Continue the bulk of home medications  Discharge in a.m. with  Zio patch in place  After discharge the patient should follow-up with her PCP for results of Holter monitor to determine any evidence of atrial dysrhythmia  I recommend no anticoagulation with virtually no evidence of atrial dysrhythmia at this point unless revealed by Zio patch    Medical Decision Making  Number and Complexity of problems:   1 acute, moderate complexity problem  2+ chronic, moderately complex problems    Differential Diagnosis: Atrial fibs/flutter    Conditions and Status        Condition is improving.     MDM Data  External documents reviewed: Care Everywhere  Cardiac tracing (EKG, telemetry) interpretation: HPI  Radiology interpretation: See HPI  Labs reviewed: See HPI  Any tests that were considered but not ordered: None     Decision rules/scores evaluated (example [id number], Wells, etc): [id number]      Discussed with: The patient and her      Care Planning  Shared decision making: The patient and her   Code status and discussions: Full code    Disposition  Social Determinants of Health that impact treatment or disposition: None  Estimated length of stay is 1 day.     I confirmed that the patient's advanced care plan is present, code status is documented, and a surrogate decision maker is listed in the patient's medical record.     The patient's surrogate decision maker is her .     The patient was seen and examined by me on 7/5/2024 at 1900.    Electronically signed by Jarvis Sharpe DO, 07/05/24, 19:14 CDT.

## 2024-07-09 ENCOUNTER — TELEPHONE (OUTPATIENT)
Dept: INTERNAL MEDICINE | Age: 74
End: 2024-07-09

## 2024-07-09 RX ORDER — MONTELUKAST SODIUM 10 MG/1
10 TABLET ORAL NIGHTLY
Qty: 90 TABLET | Refills: 1 | Status: SHIPPED | OUTPATIENT
Start: 2024-07-09

## 2024-07-09 NOTE — TELEPHONE ENCOUNTER
Care Transitions Initial Follow Up Call    Outreach made within 2 business days of discharge: Yes  Workman's comp claim?  Patient: Clementina Archer   Patient : 1950   MRN: [id number]    Reason for Admission: Palpitations  Admission Date:24  Discharge Date: 24       Spoke with: Clementina Archer    Discharge department/facility: [facility] Interactive Patient Contact:  Was patient able to fill all prescriptions: No, the patient wasn't given any medications.  Was patient instructed to bring all medications to the follow-up visit: Yes  Is patient taking all medications as directed in the discharge summary? Yes  Does patient understand their discharge instructions: Yes  Does patient have questions or concerns that need addressed prior to 7-14 day follow up office visit: no    The patient denies any further heart palpitations or nausea/vomiting at this time. The patient stated she is wearing a heart monitor but is feeling fine otherwise.     Scheduled appointment with PCP within 7-14 days    Follow Up  Future Appointments   Date Time Provider Department Center   2024 11:00 AM Lili Vogel MD [facility]   2024  9:00 AM Lili Vogel MD [facility]       Leeanna Larose MA

## 2024-07-13 LAB
BH CV ECHO LEFT VENTRICLE GLOBAL LONGITUDINAL STRAIN: -20 %
BH CV ECHO MEAS - AO MAX PG: 8.1 MMHG
BH CV ECHO MEAS - AO MEAN PG: 4.3 MMHG
BH CV ECHO MEAS - AO V2 MAX: 142.6 CM/SEC
BH CV ECHO MEAS - AO V2 VTI: 31 CM
BH CV ECHO MEAS - AVA(I,D): 3 CM2
BH CV ECHO MEAS - EDV(CUBED): 123.5 ML
BH CV ECHO MEAS - EDV(MOD-SP2): 47.2 ML
BH CV ECHO MEAS - EDV(MOD-SP4): 43.9 ML
BH CV ECHO MEAS - EF(MOD-SP2): 73.5 %
BH CV ECHO MEAS - EF(MOD-SP4): 67.1 %
BH CV ECHO MEAS - ESV(CUBED): 42.8 ML
BH CV ECHO MEAS - ESV(MOD-SP2): 12.5 ML
BH CV ECHO MEAS - ESV(MOD-SP4): 14.5 ML
BH CV ECHO MEAS - FS: 29.8 %
BH CV ECHO MEAS - IVS/LVPW: 1.04 CM
BH CV ECHO MEAS - IVSD: 0.97 CM
BH CV ECHO MEAS - LAT PEAK E' VEL: 6 CM/SEC
BH CV ECHO MEAS - LV MASS(C)D: 169.8 GRAMS
BH CV ECHO MEAS - LV MAX PG: 3.1 MMHG
BH CV ECHO MEAS - LV MEAN PG: 1.39 MMHG
BH CV ECHO MEAS - LV V1 MAX: 87.6 CM/SEC
BH CV ECHO MEAS - LV V1 VTI: 19.2 CM
BH CV ECHO MEAS - LVIDD: 5 CM
BH CV ECHO MEAS - LVIDS: 3.5 CM
BH CV ECHO MEAS - LVOT AREA: 4.8 CM2
BH CV ECHO MEAS - LVOT DIAM: 2.46 CM
BH CV ECHO MEAS - LVPWD: 0.93 CM
BH CV ECHO MEAS - MED PEAK E' VEL: 7 CM/SEC
BH CV ECHO MEAS - MV A MAX VEL: 61.6 CM/SEC
BH CV ECHO MEAS - MV DEC SLOPE: 188.2 CM/SEC2
BH CV ECHO MEAS - MV DEC TIME: 0.25 SEC
BH CV ECHO MEAS - MV E MAX VEL: 47.1 CM/SEC
BH CV ECHO MEAS - MV E/A: 0.76
BH CV ECHO MEAS - MV MAX PG: 2.1 MMHG
BH CV ECHO MEAS - MV MEAN PG: 0.85 MMHG
BH CV ECHO MEAS - MV V2 VTI: 23.6 CM
BH CV ECHO MEAS - MVA(VTI): 3.9 CM2
BH CV ECHO MEAS - PA V2 MAX: 76.7 CM/SEC
BH CV ECHO MEAS - PI END-D VEL: 116.9 CM/SEC
BH CV ECHO MEAS - RV MAX PG: 1.44 MMHG
BH CV ECHO MEAS - RV V1 MAX: 59.9 CM/SEC
BH CV ECHO MEAS - RV V1 VTI: 14.4 CM
BH CV ECHO MEAS - SV(LVOT): 91.3 ML
BH CV ECHO MEAS - SV(MOD-SP2): 34.7 ML
BH CV ECHO MEAS - SV(MOD-SP4): 29.5 ML
BH CV ECHO MEAS - TR MAX PG: 5.5 MMHG
BH CV ECHO MEAS - TR MAX VEL: 117.5 CM/SEC
BH CV ECHO MEASUREMENTS AVERAGE E/E' RATIO: 7.25
BH CV XLRA - TDI S': 11 CM/SEC
LEFT ATRIUM VOLUME INDEX: 27 ML/M2
LEFT ATRIUM VOLUME: 52 ML

## 2024-07-16 ENCOUNTER — OFFICE VISIT (OUTPATIENT)
Dept: INTERNAL MEDICINE | Age: 74
End: 2024-07-16

## 2024-07-16 VITALS
HEART RATE: 60 BPM | DIASTOLIC BLOOD PRESSURE: 68 MMHG | HEIGHT: 69 IN | SYSTOLIC BLOOD PRESSURE: 108 MMHG | OXYGEN SATURATION: 98 % | WEIGHT: 166 LBS | BODY MASS INDEX: 24.59 KG/M2

## 2024-07-16 DIAGNOSIS — Z09 HOSPITAL DISCHARGE FOLLOW-UP: ICD-10-CM

## 2024-07-16 DIAGNOSIS — I10 ESSENTIAL HYPERTENSION: Chronic | ICD-10-CM

## 2024-07-16 DIAGNOSIS — R00.0 TACHYCARDIA: ICD-10-CM

## 2024-07-16 DIAGNOSIS — R00.2 PALPITATIONS: Primary | ICD-10-CM

## 2024-07-16 RX ORDER — MAGNESIUM OXIDE 400 MG/1
400 TABLET ORAL NIGHTLY
COMMUNITY

## 2024-07-16 NOTE — PROGRESS NOTES
Post-Discharge Transitional Care Management Progress Note      Clementina Archer   YOB: 1950    Date of Office Visit:  7/16/2024  Date of Hospital Admission: 7/5/2024  Date of Hospital Discharge: 7/6/2024    Care management risk score Rising risk (score 2-5) and Complex Care (Scores >=6): No Risk Score On File     Non face to face  following discharge, date last encounter closed (first attempt may have been earlier): *No documented post hospital discharge outreach found in the last 14 days *No documented post hospital discharge outreach found in the last 14 days    Call initiated 2 business days of discharge: *No response recorded in the last 14 days    ASSESSMENT/PLAN:   Palpitations  Tachycardia  Essential hypertension  We will await the results of the monitor she is wearing continue her verapamil to 40 mg and Bystolic and losartan HCTZ once results are back we will reassess her plan of care reviewed previous evaluation from Dr. Carranza.  May need referral back to cardiology if recurrent symptoms and/or if abnormal findings on monitor    Medical Decision Making: moderate complexity  No follow-ups on file.         Subjective:   HPI:  Follow up of Hospital problems/diagnosis(es): Pt was recently admitted to [facility] with palpitations- tachycardia-she had overnight telemetry which was normal she had evaluation with lab with normal troponins and BNP.  She was discharged to home she does have a Zio patch.  She is feeling better currently.  She has had a lot of erratic blood pressure but it is more stable recently.  Palpitations have settled down  Inpatient course: Discharge summary reviewed- see chart.    Interval history/Current status: Patient feels better since she was in the hospital    Patient Active Problem List   Diagnosis    Trigeminal neuralgia of left side of face    Mixed hyperlipidemia    Type 2 diabetes mellitus with neurologic complication (HCC)    Chronic asthma without complication

## 2024-07-19 RX ORDER — VERAPAMIL HYDROCHLORIDE 240 MG/1
240 TABLET, FILM COATED, EXTENDED RELEASE ORAL 2 TIMES DAILY
Qty: 180 TABLET | Refills: 1 | Status: SHIPPED | OUTPATIENT
Start: 2024-07-19

## 2024-07-20 NOTE — PROGRESS NOTES
"[facility] HEMATOLOGY AND ONCOLOGY  [address]  [place] [address] [phone]    Patient Name: Carlotta Dupont  Encounter Date: 07/26/2024  YOB: 1950  Patient Number: [id number]    REASON FOR VISIT: Ms. Laguerre \"Dot\" JUAN C Dupont is a 73-year-old female who returns in follow-up of anemia with iron deficiency. It as been 66 months since last receipt of IV Injectafer. She is here alone (usually with her spouse Hung).       I have reviewed the HPI and verified with the patient the accuracy of it. No changes to interval history since the information was documented. Denis Hsieh MD 07/26/24      DIAGNOSTIC ABNORMALITIES:   Review of labs from the referring office dating back to 01/12/2018 includes a CBC showing a hemoglobin of 11.4, hematocrit 37.4, MCV 82.2 (81 - 99), MCH 25.1, MCHC 30 (each depressed), RDW 15.6% (elevated). CMP notable for hyperglycemia and BUN of 21 (elevated), otherwise normal with GFR greater than 60, calcium 10.1, total protein 7.6, and normal liver enzymes.   Labs, 02/03/2018: Hemoglobin 11.2, hematocrit 37.2, MCV 82.7, platelets 257,000, WBC 6.8. Serum iron 20, \"low iron saturation,\" ferritin 8.5. She prescribed ferrous sulfate 1 p.o. daily 02/03/2018 which she did not start due to prior intolerance, \"Bone and joint pains when I took it while I was pregnant.\" Her last colonoscopy was 12/2014 by Dr. Ozuna with 3-year followup. Stools for fecal occult blood x3 were said to be (+).   Labs, 02/22/2018: Hemoglobin 11.2, hematocrit 37.5, MCV 82.6, platelets 278,000, WBC 6.8. Ferritin 7.7. BUN 26, creatinine 0.6 (GFR greater than 60).  EGD, 03/01/2018: Normal examined duodenum. Normal stomach. Z-line regular, 38 cm from the incisors. No specimens collected.  Colonoscopy, 03/01/2018: The entire examined colon is normal on direct and retroflexion views. No specimens collected. Repeat 3 years.  Labs, " 03/19/2018: Hemoglobin 12.2, hematocrit 35.5, MCV 81.5, platelets 315,000, WBC 7.7 with a normal differential. CMP notable for glucose 157, otherwise normal with BUN 18, creatinine 0.7 (GFR 88.7), calcium 10.4, total protein 8.3, normal liver enzymes. Serum iron 30, saturation 6.22%, ferritin 5.2, TIBC 482.  M2A Capsule - Date capsule was swallowed: 04/20/2018. Date capsule was read : 04/28/2018. RESULTS: Gastric passage time: 1 hour 55 minutes. Small bowel passage time: 2 hours 39 minutes. The capsule was clearly seen in the colon. Conclusion: 1 small AVM was noted at 1 hour 59 minutes and 38 seconds. Several small areas of the lymphangitic ectasia noted. The capsule was seen freely passing into the cecum. There was no activity noted on this exam.    PREVIOUS INTERVENTIONS:   Injectafer 750 mg IV weekly x2, 03/23/2018 through 03/30/2018 (1500 mg); 10/12/2018 (750 mg); 01/11/2019 (750 mg)        Problem List Items Addressed This Visit    None          Oncology/Hematology History    No history exists.       PAST MEDICAL HISTORY:  ALLERGIES:  Allergies   Allergen Reactions    Zovirax [Acyclovir] Unknown - High Severity     Other reaction(s): Other (See Comments)  PhX  PhX    Meperidine Hallucinations     halluncinations  halluncinations    Propranolol Unknown - Low Severity     Other reaction(s): Other (See Comments)  Bad dreams  unknown  Bad dreams  unknown  Bad dreams    Sertraline Hcl Hallucinations     hallucinations  hallucinations    Vesicare [Solifenacin Succinate] GI Intolerance     Severe constipation    Solifenacin Unknown - Low Severity     Other reaction(s): Other (See Comments)  Phx  Phx  Couldn't urinate     CURRENT MEDICATIONS:  Outpatient Encounter Medications as of 7/26/2024   Medication Sig Dispense Refill    ALPRAZolam (XANAX) 0.25 MG tablet Take 0.5 tablets by mouth At Night As Needed for Anxiety or Sleep.      aspirin 81 MG tablet Take 1 tablet by mouth Daily.      calcium carbonate (TUMS) 500 MG  chewable tablet Chew 1 tablet Daily.      cloNIDine (CATAPRES) 0.1 MG tablet Take 1 tablet by mouth 2 (Two) Times a Day As Needed for High Blood Pressure (SBP > 160 DBP > 90).      empagliflozin (JARDIANCE) 10 MG tablet tablet Take 2 tablets by mouth Daily.      esomeprazole (nexIUM) 20 MG capsule Take 1 capsule by mouth Every Morning Before Breakfast.      estradiol (ESTRACE) 0.1 MG/GM vaginal cream Insert 1 g into the vagina 2 (Two) Times a Week.      losartan-hydrochlorothiazide (HYZAAR) 100-25 MG per tablet Take 1 tablet by mouth Daily.      magnesium oxide (MAG-OX) 400 MG tablet Take 1 tablet by mouth Every Night.      melatonin 5 MG tablet tablet Take 1 tablet by mouth At Night As Needed (sleep).      metFORMIN (GLUCOPHAGE) 500 MG tablet Take 2 tablets by mouth 2 (Two) Times a Day With Meals.      montelukast (SINGULAIR) 10 MG tablet Take 1 tablet by mouth Every Night.      Multiple Vitamins-Minerals (PRESERVISION AREDS PO) Take 1 tablet by mouth Daily.      nebivolol (BYSTOLIC) 20 MG tablet Take 1 tablet by mouth Every Night.      rosuvastatin (CRESTOR) 5 MG tablet Take 1 tablet by mouth Take As Directed. 1/2 tablet on Monday, Wednesday, Friday      verapamil SR (CALAN-SR) 240 MG CR tablet Take 1 tablet by mouth 2 (Two) Times a Day.      vitamin D (ERGOCALCIFEROL) 01886 units capsule capsule Take 1 capsule by mouth Every 7 (Seven) Days. Sunday at night       No facility-administered encounter medications on file as of 7/26/2024.     ADULT ILLNESSES:   Iron deficiency anemia (disorder) ( ICD-10:D50.9 ;Iron deficiency anemia, unspecified   Elevated liver function test ( ICD-10:R94.5 ;Abnormal results of liver function studies   Fatigue ( ICD-10:R53.83 ;Other fatigue   Fatty liver ( ICD-10:K76.0 ;Fatty (change of) liver, not elsewhere classified   Gallbladder calculus (disorder) ( ICD-10:K80.20 ;Calculus of gallbladder without cholecystitis without obstruction   Gastroesophageal reflux disease without  "esophagitis (disorder) ( ICD-10:K21.9 ;Gastro-esophageal reflux disease without esophagitis   Hyperlipidemia ( ICD-10:E78.5 ;Hyperlipidemia, unspecified   Hypertension ( ICD-10:I10 ;Essential (primary) hypertension   Lumbar disc degenerative disease ( ICD-10:M51.36 ;Other intervertebral disc degeneration, lumbar region   Migraine ( ICD-10:G43.909 ;Migraine, unspecified, not intractable, without status migrainosus   Mixed hyperlipidemia ( ICD-10:E78.2 ;Mixed hyperlipidemia   Obstructive sleep apnea ( ICD-10:G47.33 ;Obstructive sleep apnea (adult) (pediatric)   Sacroiliitis ( ICD-10:M46.1 ;Sacroiliitis, not elsewhere classified   Trigeminal neuralgia ( ICD-10:G50.0 ;Trigeminal neuralgia   Type 2 diabetes ( ICD-10:E11.9 ;Type 2 diabetes mellitus without complications   Vitamin D deficiency (disorder) ( ICD-10:E55.9 ;Vitamin D deficiency, unspecified    SURGERIES:   Mastoidectomy, 05/17/2018. 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 Perez/Dr. Oconnor)   Colonoscopy, 03/01/2018. The entire examined colon is normal on direct and retroflexion views. No specimens collected. Repeat 3 years   Cystoscopy, 09/072018 ([facility]). No urethral lesions. No tumors, stones, or other mucosal lesions in the bladder. Ureteral orifices were orthotopic and normal in their appearance   EGD, 03/01/2018. Normal examined duodenum. Normal stomach. Z-line regular, 38 cm from the incisors. No specimens collected.   Decompression of trigeminal nerve (V) (procedure), Note: microvascular, at the [facility], 05/12/2017   Biopsy of breast, x3, 1969, 1974, 1980   Vaginal and bladder repair, 07/03/2012   Hysterectomy without BSO, 1976   Colonoscopic polypectomy: Colonoscopy, 2014. \"3 polyps.\" Dr. Ozuna  Sacrocolpopexy and Macroplastique, 7/3/2012  09/17/2021 -FNA left breast cyst, 3 o'clock position.  Negative for malignant cells.  Cyst contents and benign epithelial groups with " apocrine metaplasia.  Right shoulder replacement, 06/30/2022-Dr. Quintanilla      ADULT ILLNESSES:  Patient Active Problem List   Diagnosis Code    Iron deficiency anemia D50.9    Heme positive stool R19.5    BRBPR (bright red blood per rectum) K62.5    NSAID long-term use Z79.1    Gastroesophageal reflux disease K21.9    Hx of adenomatous colonic polyps Z86.010    Nonsmoker Z78.9    Controlled type 2 diabetes mellitus without complication, without long-term current use of insulin E11.9    HTN (hypertension), benign I10    BMI 25.0-25.9,adult Z68.25    Superficial postoperative wound infection T81.49XA    Trigeminal neuralgia G50.0    Open scalp wound S01.00XA    Wound dehiscence T81.30XA    Mastoiditis, chronic, left H70.12    Asthma J45.909    Calculus of gallbladder K80.20    Fatty liver K76.0    Lumbar disc disease with radiculopathy M51.16    Migraine without aura, not intractable G43.009    Mixed hyperlipidemia E78.2    Hill's metatarsalgia G57.60    Plantar fasciitis M72.2    Obstructive sleep apnea G47.33    Nodule of vagina N89.9    Prolapse of vaginal wall N81.10    Pseudophakia Z96.1    Tear film insufficiency H04.129    Vitamin D deficiency E55.9    History of colon polyps Z86.010    Abnormal ECG R94.31    Thyroid nodule E04.1    Nontoxic multinodular goiter E04.2    Osteoarthritis of spine with radiculopathy, cervical region M47.22    Presence of intraocular lens Z96.1    Lumbar disc herniation M51.26    Degeneration of lumbar or lumbosacral intervertebral disc M51.37    Lumbar stenosis M48.061    Lumbar radiculopathy M54.16    Overweight with body mass index (BMI) of 25 to 25.9 in adult E66.3, Z68.25    Non-smoker Z78.9    Cervical radiculopathy M54.12    Iron deficiency anemia D50.9    Palpitations R00.2    Precordial chest pain R07.2     SURGERIES:  Past Surgical History:   Procedure Laterality Date    APPENDECTOMY      BLADDER REPAIR      had vaginal repair at the same time    BREAST BIOPSY Bilateral      benign    CAPSULE ENDOSCOPY N/A 04/20/2018    Procedure: CAPSULE ENDOSCOPY M2A;  Surgeon: Garrett Ozuna MD;  Location: [facility] ENDOSCOPY;  Service: Gastroenterology    COLONOSCOPY N/A 03/01/2018    Procedure: COLONOSCOPY WITH ANESTHESIA;  Surgeon: Garrett Ozuna MD;  Location: [facility] ENDOSCOPY;  Service:     COLONOSCOPY N/A 04/16/2021    Procedure: COLONOSCOPY WITH ANESTHESIA;  Surgeon: Garrett Ozuna MD;  Location: [facility] ENDOSCOPY;  Service: Gastroenterology;  Laterality: N/A;  pre: hx colon polyps  post: polyp  Misty Pelayo MD    COLONOSCOPY W/ POLYPECTOMY  12/31/2014    Tubular adenomatous polyp at 80 cm, Hyperplastic polyp rectum repeat exam in 3 years    ENDOSCOPY N/A 03/01/2018    Procedure: ESOPHAGOGASTRODUODENOSCOPY WITH ANESTHESIA;  Surgeon: Garrett Ozuna MD;  Location: [facility] ENDOSCOPY;  Service:     FLAP HEAD/NECK Left 05/17/2018    Procedure: POSSIBLE STERNOCLEIDOMASTOID FLAP;  Surgeon: Kadeem Oconnor MD;  Location: [facility] OR;  Service: ENT    HYSTERECTOMY      MASTOIDECTOMY Left 05/17/2018    Procedure: Wound exploration with possible mastoidectomy; possible sternocleidomastoid flap.  Please schedule with Dr. Perez.;  Surgeon: Kadeem Oconnor MD;  Location: [facility] OR;  Service: ENT    SHOULDER SURGERY Right 06/30/2022    TRIGEMINAL NERVE DECOMPRESSION      US GUIDED CYST ASPIRATION BREAST N/A 09/16/2021     HEALTH MAINTENANCE ITEMS:  Health Maintenance Due   Topic Date Due    Pneumococcal Vaccine 65+ (1 of 2 - PCV) Never done    DIABETIC FOOT EXAM  Never done    DIABETIC EYE EXAM  01/16/2021    COVID-19 Vaccine (7 - 2023-24 season) 02/05/2024       <no information>  Last Completed Colonoscopy            COLORECTAL CANCER SCREENING (COLONOSCOPY - Every 5 Years) Next due on 4/16/2026 04/16/2021  Surgical Procedure: COLONOSCOPY    04/16/2021  COLONOSCOPY    03/01/2018  Surgical Procedure: COLONOSCOPY    03/01/2018  COLONOSCOPY    12/31/2014  SCANNED - COLONOSCOPY    Only the first 5  "history entries have been loaded, but more history exists.                  Immunization History   Administered Date(s) Administered    COVID-19 (MODERNA) 1st,2nd,3rd Dose Monovalent 01/16/2021, 02/13/2021, 10/04/2021    COVID-19 (MODERNA) Monovalent Original Booster 04/01/2022    COVID-19 (PFIZER) BIVALENT 12+YRS 09/30/2022    COVID-19 F23 (MODERNA) 12YRS+ (SPIKEVAX) 10/05/2023     Last Completed Mammogram            MAMMOGRAM (Every 2 Years) Next due on 7/23/2026 07/23/2024  Mammo Screening Digital Tomosynthesis Bilateral With CAD    08/14/2023  Mammo Diagnostic Digital Tomosynthesis Bilateral With CAD    11/17/2022  Mammo Screening Digital Tomosynthesis Bilateral With CAD    09/15/2021  Mammo Diagnostic Digital Tomosynthesis Bilateral With CAD    10/15/2020  Mammo Screening Digital Tomosynthesis Bilateral With CAD    Only the first 5 history entries have been loaded, but more history exists.                      FAMILY HISTORY:  Family History   Problem Relation Age of Onset    Breast cancer Sister     Breast cancer Mother     Heart disease Father     No Known Problems Brother     No Known Problems Daughter     No Known Problems Son     No Known Problems Maternal Grandmother     No Known Problems Paternal Grandmother     No Known Problems Maternal Aunt     No Known Problems Paternal Aunt     Breast cancer Niece     Colon cancer Neg Hx     Colon polyps Neg Hx     BRCA 1/2 Neg Hx     Endometrial cancer Neg Hx     Ovarian cancer Neg Hx      SOCIAL HISTORY:  Social History     Socioeconomic History    Marital status:    Tobacco Use    Smoking status: Never    Smokeless tobacco: Never   Vaping Use    Vaping status: Never Used   Substance and Sexual Activity    Alcohol use: Yes     Comment: Rare    Drug use: No    Sexual activity: Defer       REVIEW OF SYSTEMS:  Constitutional:   The patient's appetite is good but energy remains low.  \"Not as draggy.\"  She has lost 3 lb (in addition to 4 lb at her prior " visit- had gained 21 lb at her 4 visits prior to that)  since her last visit.  She has no fevers, chills, or drenching night sweats. Her sleep habits have been disrupted by her trigeminal neuralgia.  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 Perez and Dr. Oconnor).  Says repeat MRI sometime 11/2018 and 12/2018 after antibiotics (Dr. Rivers) showed clearing of the mastoid infection.  Says she has learned to live with it.  She has no sore throat, nosebleeds, or sore tongue. She has no headaches. She denies any hoarseness, nor change in voice quality.   Ocular:   She reports no eye pain, significant change in visual acuity, double vision, with occasional blurry vision, left eye (OS). Had blepharoplasties last 08/2020 per Dr. Fernandes.  Respiratory:   She reports intermittent cough from asthma, but no significant shortness of breathing, phlegm production, or unexplained chest wall pain. Uses inhalers.  Has sleep apnea but still does not tolerate mask for CPAP due to the trigeminal irritation.  Cardiovascular:   She reports no exertional chest pain, chest pressure, or chest heaviness. She reports no claudication. She reports occasional palpitations.  Wore a Zio patch for 2 weeks.  Pending results.  symptomatic orthostasis.  Gastrointestinal:   She reports no pica, dysphagia, nausea, vomiting, postprandial abdominal pain, bloating, cramping, change in bowel habits, or discoloration of the stool. She reports no rectal bleeding. She reports occasional constipation on as needed Miralax.  No diarrhea. EGD and colonoscopy last 03/01/2018.  Genitourinary:   She reports no urinary burning, frequency, dribbling, or discoloration. She reports difficulty controlling her bladder.  Has had bladder lift surgery in 2012.  Uses pads. She has no need to urinate  "frequently through the night.   Musculoskeletal:   She reports lingering lower back pains and right leg radiculitis for which she had PT per neurosurgery.  Lingering right shoulder pains followed by Dr. Quintanilla.  Shoulder replacement on 06/30/2022 (11/12/2021- MRI shoulder advanced AC arthrosis with full thickness tears).    Extremities:   She reports no trouble with fluid retention or significant leg swelling.  Endocrine:   She reports no problems with excess thirst, excessive urination, vasomotor instability.  Heme/Lymphatic:   She reports no unexplained bleeding, bruising, petechial rashes, or swollen glands.  Skin:   She reports no itching, rashes, or lesions which won't heal.  Neuro:   She reports no loss of consciousness, seizures, fainting spells, or dizziness. She reports no weakness of face, arms, or legs. She has no difficulty with speech. She has no tremors. Unchanged paresthesias of the soles of her feet.  She has residual left-sided facial numbness.   Psych:         She denies depression nor anxiety currently. She reports no mood swings.        VITAL SIGNS: /82   Pulse 70   Temp 97.3 °F (36.3 °C) (Temporal)   Resp 18   Ht 175.3 cm (69\")   Wt 77.1 kg (170 lb)   SpO2 97%   BMI 25.10 kg/m² Body surface area is 1.93 meters squared.  Pain Score    07/26/24 0821   PainSc: 0-No pain         PHYSICAL EXAMINATION:   General:   She is a pleasant, cooperative otherwise well-developed, well-nourished, and well-kept elderly female who is comfortable at rest. She arrived in the exam room ambulatory. She appears to be her stated age. Her skin color is normal (previously slightly pale).  Head/Neck:   The patient is anicteric and atraumatic. The trachea is midline. The neck is supple without evidence of jugular venous distention or cervical adenopathy. The left mastoid is not swollen.  Eyes:   The pupils are equal, round, and reactive to light. The extraocular movements are full. There is no scleral jaundice " or erythema.   Chest:   The respiratory efforts are normal and unhindered. The chest is clear to auscultation and percussion. There are no wheezes, rhonchi, rales, or asymmetry of breath sounds.  Cardiovascular:   The patient has a regular cardiac rate and rhythm without murmurs, rubs, or gallops. The peripheral pulses are equal and full.  Abdomen:   The belly is soft and slightly globose. There is no rebound or guarding. There is no organomegaly, mass-effect, or tenderness. Bowel sounds are active and of normal character.  Extremities:   There is no evidence of cyanosis, clubbing, or edema.    Rheumatologic:   There is no overt evidence of rheumatoid deformities of the hands. There is no sausaging of the fingers. There is no sign of active synovitis. The gait is slow but no longer overtly antalgic, previously favoring the right leg/hip  Cutaneous:   There are no overt rashes, disseminated lesions, purpura, or petechiae.   Lymphatics:   There is no evidence of adenopathy in the cervical, supraclavicular, axillary areas.   Neurologic:   The patient is alert, oriented, cooperative, and pleasant. She is appropriately conversant. She ambulated into the exam room without assistance and transferred from chair to exam table unaided. There is no overt dysfunction of the motor, sensory, cerebellar systems.  Psych:   Mood and affect are appropriate for circumstance. Eye contact is appropriate. Normal judgement and decision making.         LABS    Lab Results - Last 18 Months   Lab Units 07/22/24  0757 07/06/24  0514 07/05/24  1206 06/05/24  0931 02/28/24  0955 01/12/24  0703 08/02/23  0713 07/07/23  0707   HEMOGLOBIN g/dL 15.2 14.7 16.0* 15.3 15.4 13.6   < > 14.0   HEMATOCRIT % 46.5 45.1 48.6* 46.8 47.5* 43.9   < > 43.2   MCV fL 94.5 94.2 93.3 97.1 94.1 93.0   < > 91.7   WBC 10*3/mm3 5.92 3.77 5.32 6.4 5.8 6.62   < > 6.16   RDW % 13.0 12.7 13.0 13.0 14.8* 14.1   < > 13.0   MPV fL 10.8 11.0 11.0 11.4 12.2 11.1   < > 10.7    PLATELETS 10*3/mm3 191 171 195 210 202 196   < > 231   IMM GRAN % % 0.3 0.3 0.4  --   --  0.3  --  0.2   NEUTROS ABS 10*3/mm3 3.19 2.04 2.95  --   --  3.57  --  3.47   LYMPHS ABS 10*3/mm3 1.98 0.88 1.37  --   --  2.17  --  1.81   MONOS ABS 10*3/mm3 0.42 0.53 0.74  --   --  0.55  --  0.48   EOS ABS 10*3/mm3 0.25 0.27 0.20  --   --  0.27  --  0.32   BASOS ABS 10*3/mm3 0.06 0.04 0.04  --   --  0.04  --  0.07   IMMATURE GRANS (ABS) 10*3/mm3 0.02 0.01 0.02  --   --  0.02  --  0.01   NRBC /100 WBC 0.0 0.0 0.0  --   --  0.0  --  0.0    < > = values in this interval not displayed.       Lab Results - Last 18 Months   Lab Units 07/22/24  0757 07/06/24  0514 07/05/24  1206 06/05/24  0931 01/12/24  0703 12/28/23  0822 07/07/23  0707   GLUCOSE mg/dL 146* 122* 142* 159* 155*  --  165*   SODIUM mmol/L 140 138 141 141 139  --  137   POTASSIUM mmol/L 4.0 3.7 3.7 4.9 4.3  --  4.0   TOTAL CO2 mmol/L  --   --   --  28  --   --   --    CO2 mmol/L 27.0 26.0 25.0  --  27.0  --  23.0   CHLORIDE mmol/L 100 101 100 101 100  --  100   ANION GAP mmol/L 13.0 11.0 16.0* 12 12.0  --  14.0   CREATININE mg/dL 0.62 0.58 0.78 0.7 0.83 0.80 0.65   BUN mg/dL 24* 18 19 23 23  --  15   BUN / CREAT RATIO  38.7* 31.0* 24.4  --  27.7*  --  23.1   CALCIUM mg/dL 9.8 9.4 9.9 10.0 9.2  --  9.3   ALK PHOS U/L 68  --  74 72 64  --  75   TOTAL PROTEIN g/dL 6.9  --  7.8 7.1 6.8  --  6.9   ALT (SGPT) U/L 34*  --  41* 26 29  --  36*   AST (SGOT) U/L 24  --  46* 20 21  --  25   BILIRUBIN mg/dL 0.5  --  0.3 0.6 0.3  --  0.5   ALBUMIN g/dL 4.5  --  4.7 4.7 4.4  --  4.5   GLOBULIN gm/dL 2.4  --  3.1  --  2.4  --  2.4         Lab Results - Last 18 Months   Lab Units 07/22/24  0757 07/05/24  1425 01/12/24  0703 11/20/23  0703 07/07/23  0707   IRON mcg/dL 82  --  76  --  65   TIBC mcg/dL 343  --  398  --  368   IRON SATURATION (TSAT) % 24  --  19*  --  18*   FERRITIN ng/mL 164.30*  --  43.51  --  99.04   TSH uIU/mL  --  0.715  --  1.670  --        ASSESSMENT:   1.  Normocytic/borderline microcytic anemia with profound iron deficiency.   --Hgb 15.2; MCV 94.5, 7/22/24 (prior range: Hgb 11.2 - 14.4; MCV 81.5 - 93.3). Resolved since last receipt of Injectafer.   2. Fatigue multifactorial including related to #1.   3. Profound iron deficiency with a ferritin of 2.7 on 03/03/2018.   a. Last EGD and colonoscopy 03/01/2018 (above). Both unrevealing.   b. M2A Capsule - Date capsule was swallowed: 04/20/2018. Date capsule was read : 04/28/2018. RESULTS: Gastric passage time: 1 hour 55 minutes. Small bowel passage time: 2 hours 39 minutes. The capsule was clearly seen in the colon. Conclusion: 1 small AVM was noted at 1 hour 59 minutes and 38 seconds. Several small areas of the lymphangitic ectasia noted. The capsule was seen freely passing into the cecum. There was no activity noted on this exam.   c. Abnormal CT abdomen/enterography, 05/15/2018 at [facility]: Impression: 3.9 cm focus of mass-like enhancement involving the vagina, proximal urethra, and bladder dome. Unsure if this reflects neoplasm or post surgical change; however, neoplastic process arising from the vagina certainly not excluded by CT. Recommend direct visualization. No suspicious focal lesions identified in the bowel. No suspicious adenopathy in the abdomen or pelvis. Hepatic steatosis.   d. Seen by Urology (Dr. Mae Esquivel) at [facility], 08/04/2019. Stable exam, MRI and cystoscopic findings highly suggestive of the nodule representing the Macroplastique implant.  Discussed the risks of excision including recurrent stress incontinence and fistula.  Continue observation.  Continue transvaginal estrogen.  RTC 6 months..     4. Insulin-requiring type 2 diabetes. Dr. Pelayo.  5. Hyperlipidemia. Remains on Crestor  6. Trigeminal neuralgia of the left side of the face, lingering symptoms since trigeminal decompression procedure. Improved since mastoidectomy on 05/17/2018 for excision of mastoid-cutaneous  fistula 1 x 5 cm, left mastoidectomy, cortical.   7. Gastroesophageal reflux disease with esophagitis. On Pepcid.  8. Vitamin D deficiency. On weekly ergocalciferol.  9.  Cervical and lumbar degenerative disk disease.  Symptomatic (pain).  --05/14/2021-Lumbar MRI with abnormalities at multiple levels  --11/10/2021-MRI C-spine-degenerative changes with neuroforaminal narrowing C5-6; C6-7.  No significant spinal canal stenosis.  10. Elevated LFTs, NOS. Crestor held, hep profile and liver US ordered on 03/22. Liver US, 03/27/2019: 1. Hepatic steatosis. 2. Limited visualization of the pancreas. Hepatitis profile, 06/28/2019 negative.    11.  Complex cystic and solid mass in the left breast on mammogram/left breast ultrasound, 09/15/2021  --09/16/2021 -ultrasound-guided FNA left breast cyst, 3 o'clock position.  Negative for malignant cells.  Cyst contents and benign epithelial groups with apocrine metaplasia.  --11/17/2022-  Mammogram-No mammographic evidence of malignancy. Recommendation is for the patient to return for routine mammography in one year or sooner if clinically indicated.   BIRADS Category 2 - Benign findings   12.  Right shoulder pain.  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  --8/14/23- Mammogram- No mammographic or sonographic evidence of malignancy. Recommendation is for the patient to return for routine mammography in one year or sooner if clinically indicated. BIRADS Category 2 - Benign findings   --12/28/23- MRI breast- No MRI evidence of malignancy in the RIGHT or LEFT breast. Continued risk appropriate screening. No adenopathy. Cardiomegaly.    13.  Hypertension.  Dr. Pelayo follows  14.  Admitted [facility], 7/5/24-7/6/24 with atrial flutter and chest pain. Discharged with Zio patch.  No anticoagulation with virtually no evidence of atrial dysrhythmia at this point unless revealed by Zio patch      RECOMMENDATIONS:   1.  Apprised of the labs from 7/5/24 -7/6/24-7/22/24 (above) noting the CBC, glucose 146, normal calcium, ALT 34, otherwise normal CMP, ferritin 164 (prior: 43), Fe sat 24 (prior: 19%; 18%; 14%; 15%; 21; 22), serum iron 82 (from previously severe iron deficiency).     2.  Review admission to [facility], 7/5/24-7/6/24 with atrial flutter and chest pain. Discharged with Zio patch. No anticoagulation.  Says she is to see cardiology.  3.  Note mammogram, 7/23/24. [facility].  Dr. Springer following  4.  Apprised of MRI breast, 12/28/23 (above). [facility].    5.  The rationale and potential toxicities of IV iron (anaphylaxis included) previously discussed at length. She will agree to therapy as indicated.   6.  Continue currently identified medications.   7.  Continue management per primary care and other specialists.  Is followed by neurosurgery regarding her spinal degenerative disease and ortho for the right shoulder issues.  8.  Return to the [facility] office in 24 weeks with pre-office CMP, CBC, serum iron, iron saturation, ferritin.    MEDICAL DECISION MAKING: Moderate complexity   AMOUNT OF DATA: Limited    I spent ~   minutes caring for Carlotta on this date of service. This time includes time spent by me in the following activities: preparing for the visit, reviewing tests, performing a medically appropriate examination and/or evaluation, counseling and educating the patient/family/caregiver, ordering medications, tests, or procedures and documenting information in the medical record      cc: MD Garrett Borrego MD

## 2024-07-22 ENCOUNTER — LAB (OUTPATIENT)
Dept: LAB | Facility: HOSPITAL | Age: 74
End: 2024-07-22
Payer: MEDICARE

## 2024-07-22 DIAGNOSIS — D50.9 IRON DEFICIENCY ANEMIA, UNSPECIFIED IRON DEFICIENCY ANEMIA TYPE: ICD-10-CM

## 2024-07-22 LAB
ALBUMIN SERPL-MCNC: 4.5 G/DL (ref 3.5–5.2)
ALBUMIN/GLOB SERPL: 1.9 G/DL
ALP SERPL-CCNC: 68 U/L (ref 39–117)
ALT SERPL W P-5'-P-CCNC: 34 U/L (ref 1–33)
ANION GAP SERPL CALCULATED.3IONS-SCNC: 13 MMOL/L (ref 5–15)
AST SERPL-CCNC: 24 U/L (ref 1–32)
BASOPHILS # BLD AUTO: 0.06 10*3/MM3 (ref 0–0.2)
BASOPHILS NFR BLD AUTO: 1 % (ref 0–1.5)
BILIRUB SERPL-MCNC: 0.5 MG/DL (ref 0–1.2)
BUN SERPL-MCNC: 24 MG/DL (ref 8–23)
BUN/CREAT SERPL: 38.7 (ref 7–25)
CALCIUM SPEC-SCNC: 9.8 MG/DL (ref 8.6–10.5)
CHLORIDE SERPL-SCNC: 100 MMOL/L (ref 98–107)
CO2 SERPL-SCNC: 27 MMOL/L (ref 22–29)
CREAT SERPL-MCNC: 0.62 MG/DL (ref 0.57–1)
DEPRECATED RDW RBC AUTO: 45.1 FL (ref 37–54)
EGFRCR SERPLBLD CKD-EPI 2021: 94.2 ML/MIN/1.73
EOSINOPHIL # BLD AUTO: 0.25 10*3/MM3 (ref 0–0.4)
EOSINOPHIL NFR BLD AUTO: 4.2 % (ref 0.3–6.2)
ERYTHROCYTE [DISTWIDTH] IN BLOOD BY AUTOMATED COUNT: 13 % (ref 12.3–15.4)
FERRITIN SERPL-MCNC: 164.3 NG/ML (ref 13–150)
GLOBULIN UR ELPH-MCNC: 2.4 GM/DL
GLUCOSE SERPL-MCNC: 146 MG/DL (ref 65–99)
HCT VFR BLD AUTO: 46.5 % (ref 34–46.6)
HGB BLD-MCNC: 15.2 G/DL (ref 12–15.9)
IMM GRANULOCYTES # BLD AUTO: 0.02 10*3/MM3 (ref 0–0.05)
IMM GRANULOCYTES NFR BLD AUTO: 0.3 % (ref 0–0.5)
IRON 24H UR-MRATE: 82 MCG/DL (ref 37–145)
IRON SATN MFR SERPL: 24 % (ref 20–50)
LYMPHOCYTES # BLD AUTO: 1.98 10*3/MM3 (ref 0.7–3.1)
LYMPHOCYTES NFR BLD AUTO: 33.4 % (ref 19.6–45.3)
MCH RBC QN AUTO: 30.9 PG (ref 26.6–33)
MCHC RBC AUTO-ENTMCNC: 32.7 G/DL (ref 31.5–35.7)
MCV RBC AUTO: 94.5 FL (ref 79–97)
MONOCYTES # BLD AUTO: 0.42 10*3/MM3 (ref 0.1–0.9)
MONOCYTES NFR BLD AUTO: 7.1 % (ref 5–12)
NEUTROPHILS NFR BLD AUTO: 3.19 10*3/MM3 (ref 1.7–7)
NEUTROPHILS NFR BLD AUTO: 54 % (ref 42.7–76)
NRBC BLD AUTO-RTO: 0 /100 WBC (ref 0–0.2)
PLATELET # BLD AUTO: 191 10*3/MM3 (ref 140–450)
PMV BLD AUTO: 10.8 FL (ref 6–12)
POTASSIUM SERPL-SCNC: 4 MMOL/L (ref 3.5–5.2)
PROT SERPL-MCNC: 6.9 G/DL (ref 6–8.5)
RBC # BLD AUTO: 4.92 10*6/MM3 (ref 3.77–5.28)
SODIUM SERPL-SCNC: 140 MMOL/L (ref 136–145)
TIBC SERPL-MCNC: 343 MCG/DL (ref 298–536)
TRANSFERRIN SERPL-MCNC: 230 MG/DL (ref 200–360)
WBC NRBC COR # BLD AUTO: 5.92 10*3/MM3 (ref 3.4–10.8)

## 2024-07-22 PROCEDURE — 83540 ASSAY OF IRON: CPT

## 2024-07-22 PROCEDURE — 82728 ASSAY OF FERRITIN: CPT

## 2024-07-22 PROCEDURE — 80053 COMPREHEN METABOLIC PANEL: CPT

## 2024-07-22 PROCEDURE — 85025 COMPLETE CBC W/AUTO DIFF WBC: CPT

## 2024-07-22 PROCEDURE — 36415 COLL VENOUS BLD VENIPUNCTURE: CPT

## 2024-07-22 PROCEDURE — 84466 ASSAY OF TRANSFERRIN: CPT

## 2024-07-23 ENCOUNTER — HOSPITAL ENCOUNTER (OUTPATIENT)
Dept: MAMMOGRAPHY | Facility: HOSPITAL | Age: 74
Discharge: HOME | End: 2024-07-23
Payer: MEDICARE

## 2024-07-23 DIAGNOSIS — Z80.3 FAMILY HISTORY OF BREAST CANCER: ICD-10-CM

## 2024-07-23 DIAGNOSIS — Z12.31 ENCOUNTER FOR SCREENING MAMMOGRAM FOR MALIGNANT NEOPLASM OF BREAST: ICD-10-CM

## 2024-07-23 DIAGNOSIS — Z91.89 AT HIGH RISK FOR BREAST CANCER: ICD-10-CM

## 2024-07-23 PROCEDURE — 77063 BREAST TOMOSYNTHESIS BI: CPT

## 2024-07-23 PROCEDURE — 77067 SCR MAMMO BI INCL CAD: CPT

## 2024-07-26 ENCOUNTER — OFFICE VISIT (OUTPATIENT)
Dept: ONCOLOGY | Facility: CLINIC | Age: 74
End: 2024-07-26
Payer: MEDICARE

## 2024-07-26 VITALS
HEART RATE: 70 BPM | OXYGEN SATURATION: 97 % | RESPIRATION RATE: 18 BRPM | WEIGHT: 170 LBS | TEMPERATURE: 97.3 F | DIASTOLIC BLOOD PRESSURE: 82 MMHG | BODY MASS INDEX: 25.18 KG/M2 | SYSTOLIC BLOOD PRESSURE: 120 MMHG | HEIGHT: 69 IN

## 2024-07-26 DIAGNOSIS — D50.9 IRON DEFICIENCY ANEMIA, UNSPECIFIED IRON DEFICIENCY ANEMIA TYPE: Primary | ICD-10-CM

## 2024-07-28 PROBLEM — R00.0 TACHYCARDIA: Status: ACTIVE | Noted: 2024-07-28

## 2024-07-29 ENCOUNTER — OFFICE VISIT (OUTPATIENT)
Age: 74
End: 2024-07-29
Payer: MEDICARE

## 2024-07-29 VITALS
SYSTOLIC BLOOD PRESSURE: 122 MMHG | WEIGHT: 168.8 LBS | HEIGHT: 69 IN | HEART RATE: 65 BPM | DIASTOLIC BLOOD PRESSURE: 78 MMHG | BODY MASS INDEX: 25 KG/M2 | OXYGEN SATURATION: 98 %

## 2024-07-29 DIAGNOSIS — Z80.3 FAMILY HISTORY OF BREAST CANCER: Primary | ICD-10-CM

## 2024-07-29 DIAGNOSIS — E11.9 TYPE 2 DIABETES MELLITUS WITHOUT COMPLICATION, WITHOUT LONG-TERM CURRENT USE OF INSULIN (HCC): ICD-10-CM

## 2024-07-29 DIAGNOSIS — R00.2 PALPITATIONS: Primary | ICD-10-CM

## 2024-07-29 DIAGNOSIS — R92.0 MAMMOGRAPHIC MICROCALCIFICATION FOUND ON DIAGNOSTIC IMAGING OF BREAST: ICD-10-CM

## 2024-07-29 DIAGNOSIS — Z91.89 AT HIGH RISK FOR BREAST CANCER: ICD-10-CM

## 2024-07-29 NOTE — PROGRESS NOTES
I referred her to Dr. Carranza- I reviewed the zio with her - she is feeling better but I would like Dr. Carranza opinion re: testing

## 2024-07-29 NOTE — PROGRESS NOTES
Office Established Patient Note:     Referring Provider: No ref. provider found    Chief Complaint   Patient presents with    Follow-up     Breast follow up        Subjective .     History of present illness:  Carlotta Dupont is a 73 y.o. female who presents to the clinic for [facility]BC follow up. She had her mammogram done on 7/23/24 which was benign.Today, she has no concerns with either of her breasts. She denies palpable masses, nipple discharge, or skin changes of either breast.     Review of Systems    Review of Systems - General ROS: negative  ENT ROS: negative  Respiratory ROS: no cough, shortness of breath, or wheezing  Cardiovascular ROS: no chest pain or dyspnea on exertion  Gastrointestinal ROS: no abdominal pain, change in bowel habits, or black or bloody stools  Genito-Urinary ROS: no dysuria, trouble voiding, or hematuria  Dermatological ROS: negative   Breast ROS: negative for breast lumps  Hematological and Lymphatic ROS: negative  Musculoskeletal ROS: negative   Neurological ROS: no TIA or stroke symptoms    Psychological ROS: negative  Endocrine ROS: negative    History  Past Medical History:   Diagnosis Date    Acid reflux     Arthritis     Asthma     Diabetes mellitus     Type 2    Fistula of mastoid, left     GERD (gastroesophageal reflux disease)     History of transfusion     Hx of colonic polyps     Hyperlipidemia     Hypertension     Iron deficiency anemia     Mastoid pain, left     Numbness     left side of face    Other osteomyelitis, other site     PONV (postoperative nausea and vomiting)     Sensorineural hearing loss     Sleep apnea     does not use machine    Trigeminal neuralgia     Vitamin D deficiency    ,   Past Surgical History:   Procedure Laterality Date    APPENDECTOMY      BLADDER REPAIR      had vaginal repair at the same time    BREAST BIOPSY Bilateral     benign    CAPSULE ENDOSCOPY N/A 04/20/2018    Procedure: CAPSULE ENDOSCOPY M2A;  Surgeon: Garrett Ozuna MD;   Location: [facility] ENDOSCOPY;  Service: Gastroenterology    COLONOSCOPY N/A 03/01/2018    Procedure: COLONOSCOPY WITH ANESTHESIA;  Surgeon: Garrett Ozuna MD;  Location: [facility] ENDOSCOPY;  Service:     COLONOSCOPY N/A 04/16/2021    Procedure: COLONOSCOPY WITH ANESTHESIA;  Surgeon: Garrett Ozuna MD;  Location: [facility] ENDOSCOPY;  Service: Gastroenterology;  Laterality: N/A;  pre: hx colon polyps  post: polyp  Misty Pelayo MD    COLONOSCOPY W/ POLYPECTOMY  12/31/2014    Tubular adenomatous polyp at 80 cm, Hyperplastic polyp rectum repeat exam in 3 years    ENDOSCOPY N/A 03/01/2018    Procedure: ESOPHAGOGASTRODUODENOSCOPY WITH ANESTHESIA;  Surgeon: Garrett Ozuna MD;  Location: [facility] ENDOSCOPY;  Service:     FLAP HEAD/NECK Left 05/17/2018    Procedure: POSSIBLE STERNOCLEIDOMASTOID FLAP;  Surgeon: Kadeem Oconnor MD;  Location: [facility] OR;  Service: ENT    HYSTERECTOMY      MASTOIDECTOMY Left 05/17/2018    Procedure: Wound exploration with possible mastoidectomy; possible sternocleidomastoid flap.  Please schedule with Dr. Perez.;  Surgeon: Kadeem Oconnor MD;  Location: [facility] OR;  Service: ENT    SHOULDER SURGERY Right 06/30/2022    TRIGEMINAL NERVE DECOMPRESSION      US GUIDED CYST ASPIRATION BREAST N/A 09/16/2021   ,   Family History   Problem Relation Age of Onset    Breast cancer Sister     Breast cancer Mother     Heart disease Father     No Known Problems Brother     No Known Problems Daughter     No Known Problems Son     No Known Problems Maternal Grandmother     No Known Problems Paternal Grandmother     No Known Problems Maternal Aunt     No Known Problems Paternal Aunt     Breast cancer Niece     Colon cancer Neg Hx     Colon polyps Neg Hx     BRCA 1/2 Neg Hx     Endometrial cancer Neg Hx     Ovarian cancer Neg Hx    ,   Social History     Tobacco Use    Smoking status: Never    Smokeless tobacco: Never   Vaping Use    Vaping status: Never Used   Substance Use Topics    Alcohol use: Not Currently      Comment: Rare    Drug use: No   , (Not in a hospital admission)   and Allergies:  Zovirax [acyclovir], Meperidine, Propranolol, Sertraline hcl, Vesicare [solifenacin succinate], and Solifenacin    Current Outpatient Medications:     ALPRAZolam (XANAX) 0.25 MG tablet, Take 0.5 tablets by mouth At Night As Needed for Anxiety or Sleep., Disp: , Rfl:     aspirin 81 MG tablet, Take 1 tablet by mouth Daily., Disp: , Rfl:     calcium carbonate (TUMS) 500 MG chewable tablet, Chew 1 tablet Daily., Disp: , Rfl:     cloNIDine (CATAPRES) 0.1 MG tablet, Take 1 tablet by mouth 2 (Two) Times a Day As Needed for High Blood Pressure (SBP > 160 DBP > 90)., Disp: , Rfl:     empagliflozin (JARDIANCE) 10 MG tablet tablet, Take 2 tablets by mouth Daily., Disp: , Rfl:     esomeprazole (nexIUM) 20 MG capsule, Take 1 capsule by mouth Every Morning Before Breakfast., Disp: , Rfl:     estradiol (ESTRACE) 0.1 MG/GM vaginal cream, Insert 1 g into the vagina 2 (Two) Times a Week., Disp: , Rfl:     losartan-hydrochlorothiazide (HYZAAR) 100-25 MG per tablet, Take 1 tablet by mouth Daily., Disp: , Rfl:     magnesium oxide (MAG-OX) 400 MG tablet, Take 1 tablet by mouth Every Night., Disp: , Rfl:     melatonin 5 MG tablet tablet, Take 1 tablet by mouth At Night As Needed (sleep)., Disp: , Rfl:     metFORMIN (GLUCOPHAGE) 500 MG tablet, Take 2 tablets by mouth 2 (Two) Times a Day With Meals., Disp: , Rfl:     montelukast (SINGULAIR) 10 MG tablet, Take 1 tablet by mouth Every Night., Disp: , Rfl:     Multiple Vitamins-Minerals (PRESERVISION AREDS PO), Take 1 tablet by mouth Daily., Disp: , Rfl:     nebivolol (BYSTOLIC) 20 MG tablet, Take 1 tablet by mouth Every Night., Disp: , Rfl:     rosuvastatin (CRESTOR) 5 MG tablet, Take 1 tablet by mouth Take As Directed. 1/2 tablet on Monday, Wednesday, Friday, Disp: , Rfl:     verapamil SR (CALAN-SR) 240 MG CR tablet, Take 1 tablet by mouth 2 (Two) Times a Day., Disp: , Rfl:     vitamin D (ERGOCALCIFEROL) 11048  "units capsule capsule, Take 1 capsule by mouth Every 7 (Seven) Days. Sunday at night, Disp: , Rfl:     Objective     Vital Signs   /78 (BP Location: Left arm, Patient Position: Sitting, Cuff Size: Adult)   Pulse 65   Ht 175.3 cm (69.02\")   Wt 76.6 kg (168 lb 12.8 oz)   SpO2 98%   BMI 24.92 kg/m²      Physical Exam:  General appearance - alert, well appearing, and in no distress  Mental status - normal mood, behavior, speech, dress, motor activity, and thought processes  Eyes - sclera anicteric  Neck - supple, no significant adenopathy  Chest - no tachypnea, retractions or cyanosis  Heart - normal rate and regular rhythm  Breasts - breasts appear normal, no suspicious masses, no skin or nipple changes or axillary nodes, surgical scars noted from previous bilateral biopsies well healed without signs of infection or wound dehiscence   Neurological - alert, oriented, normal speech, no focal findings or movement disorder noted  Extremities - no pedal edema noted  Skin - normal coloration and turgor, no rashes, no suspicious skin lesions noted    Results Review:  Result Review :            Mammo Screening Digital Tomosynthesis Bilateral With CAD (07/23/2024 12:53)   FINDINGS: No architectural distortion, suspicious masses, or  microcalcifications are identified. Scar marker placed over the superior  right breast at 12:00 with mild scarring in the superior as well as  medial right breast related to prior surgery.. Benign-appearing group of  coarse calcifications within the lateral left breast likely 2-3 o'clock.  No skin changes are noted.     IMPRESSION AND RECOMMENDATION:   No mammographic evidence of malignancy. Recommendation is for the  patient to return for routine mammography in one year or sooner if  clinically indicated.      BIRADS Category 2 - Benign findings      Assessment & Plan       Diagnoses and all orders for this visit:    1. Family history of breast cancer (Primary)  -     MRI Breast " Bilateral Screening With & Without Contrast; Future    2. At high risk for breast cancer  -     MRI Breast Bilateral Screening With & Without Contrast; Future    3. Mammographic microcalcification found on diagnostic imaging of breast  -     MRI Breast Bilateral Screening With & Without Contrast; Future     Ms. Sanchez is a 73 year old female who presents to the clinic with for high risk breast cancer screening follow up. Her most recent mammogram was benign. She is due for MRI in 6 months. She will follow up afterwards for clinical breast exam and discussion of results. She is to call for an appointment sooner if she has any new problems or concerns. She voiced understanding and is agreeable to the plan.     Follow up:     BMI is within normal parameters. No other follow-up for BMI required.    Return in about 6 months (around 1/29/2025) [facility]        Jaye Yuen PA-C  07/30/24  21:22 CDT

## 2024-07-31 DIAGNOSIS — I10 ESSENTIAL HYPERTENSION: Chronic | ICD-10-CM

## 2024-07-31 RX ORDER — LOSARTAN POTASSIUM AND HYDROCHLOROTHIAZIDE 25; 100 MG/1; MG/1
1 TABLET ORAL DAILY
Qty: 90 TABLET | Refills: 1 | Status: SHIPPED | OUTPATIENT
Start: 2024-07-31

## 2024-08-14 ENCOUNTER — OFFICE VISIT (OUTPATIENT)
Dept: CARDIOLOGY | Facility: CLINIC | Age: 74
End: 2024-08-14
Payer: MEDICARE

## 2024-08-14 VITALS
HEART RATE: 69 BPM | WEIGHT: 167 LBS | BODY MASS INDEX: 24.73 KG/M2 | SYSTOLIC BLOOD PRESSURE: 140 MMHG | DIASTOLIC BLOOD PRESSURE: 70 MMHG | OXYGEN SATURATION: 98 % | HEIGHT: 69 IN

## 2024-08-14 DIAGNOSIS — I47.10 PSVT (PAROXYSMAL SUPRAVENTRICULAR TACHYCARDIA): Primary | ICD-10-CM

## 2024-08-14 DIAGNOSIS — I10 HTN (HYPERTENSION), BENIGN: ICD-10-CM

## 2024-08-14 PROCEDURE — 99204 OFFICE O/P NEW MOD 45 MIN: CPT | Performed by: INTERNAL MEDICINE

## 2024-08-14 PROCEDURE — 3077F SYST BP >= 140 MM HG: CPT | Performed by: INTERNAL MEDICINE

## 2024-08-14 PROCEDURE — 93000 ELECTROCARDIOGRAM COMPLETE: CPT | Performed by: INTERNAL MEDICINE

## 2024-08-14 PROCEDURE — 1160F RVW MEDS BY RX/DR IN RCRD: CPT | Performed by: INTERNAL MEDICINE

## 2024-08-14 PROCEDURE — 3078F DIAST BP <80 MM HG: CPT | Performed by: INTERNAL MEDICINE

## 2024-08-14 PROCEDURE — 1159F MED LIST DOCD IN RCRD: CPT | Performed by: INTERNAL MEDICINE

## 2024-08-15 NOTE — PROGRESS NOTES
[facility] - CARDIOLOGY  New Patient Initial Outpatient Evaluation    Primary Care Physician: Misty Rivers MD    Subjective     Chief Complaint: palpitations    History of Present Illness  The patient is a 73-year-old female referred by her PCP, Dr. Rivers, for further evaluation.    She was briefly admitted to our facility from 07/05/2024 until 07/06/2024 for palpitations with mild chest discomfort. Her  noted an irregular pulse for about 5 to 10 minutes. An EKG performed at 11:55 AM on 07/05/2024 showed sinus rhythm. There was concern for atrial flutter, but this was never captured on any monitoring. She was discharged with a ZIO patch for extended monitoring. Dr. Sharpe, the discharging provider, advised against anticoagulation without definitive proof of an atrial arrhythmia, which was not present at discharge. The ZIO patch monitor was relatively benign, showing frequent but nonsustained SVT, the longest lasting 15 seconds, and a singular 5-beat run of nonsustained VT. She did not trigger the device but reported feeling her heart beating in her throat on one occasion, during which she was in sinus rhythm. Due to these issues, she was referred for further evaluation.    She experiences occasional chest flutters or sensations. On the day of her hospital visit, she felt faint while sitting on the sofa, a sensation that lasted for about 30 minutes. This episode was particularly distressing. Upon reaching the ER, she felt slightly better but remained weak. These episodes are characterized by an abrupt onset and cessation, leaving her feeling weak afterward. She recalls a similar episode 3 years ago while caring for her mother. Two weeks ago, she experienced another episode while sitting in her recliner, which lasted for 20 minutes. During these episodes, she feels lightheaded, nauseous, and her heart races. She reports no chest tightness, heaviness, squeezing, or pressure. The nausea and lightheadedness  intensify as the episode progresses. Over the past weekend, she had brief episodes while packing boxes in the garage.    She has been taking Bystolic for about 3.5 months. Her blood pressure has been fluctuating since last August, and she checks it twice daily. She used to take clonidine every other day but has not taken it in the past 2 months. Her blood pressure has stabilized since starting Bystolic. Despite being on Bystolic, her palpitations have become more frequent and problematic. She occasionally experiences momentary flutters while driving, which she describes as an empty feeling followed by rapid beeping. She tends to hold her breath during these episodes. She has been experiencing these symptoms for a long time but did not consider them serious. She has not been advised on any specific measures to take during these episodes.    She is scheduled for rectocele surgery in the coming months, and her doctor wants to ensure it is safe for her to undergo anesthesia. She avoids caffeine due to her blood pressure medications, which make her feel tired. She admits to experiencing stress but prefers to work through it.      Review of Systems   Constitutional: Negative for malaise/fatigue.   Cardiovascular:  Positive for palpitations. Negative for chest pain, claudication, dyspnea on exertion, leg swelling, near-syncope, orthopnea, paroxysmal nocturnal dyspnea and syncope.   Respiratory:  Negative for shortness of breath.    Hematologic/Lymphatic: Does not bruise/bleed easily.      Otherwise complete ROS reviewed and negative except as mentioned in the HPI.      Past Medical History:   Past Medical History:   Diagnosis Date    Abnormal ECG     Acid reflux     Arrhythmia     Arthritis     Asthma     Diabetes mellitus     Type 2    Fistula of mastoid, left     GERD (gastroesophageal reflux disease)     History of transfusion     Hx of colonic polyps     Hyperlipidemia     Hypertension     Iron deficiency anemia      Mastoid pain, left     Myocardial infarction     Numbness     left side of face    Other osteomyelitis, other site     PONV (postoperative nausea and vomiting)     Sensorineural hearing loss     Sleep apnea     does not use machine    Trigeminal neuralgia     Vitamin D deficiency        Past Surgical History:  Past Surgical History:   Procedure Laterality Date    APPENDECTOMY      BLADDER REPAIR      had vaginal repair at the same time    BREAST BIOPSY Bilateral     benign    CAPSULE ENDOSCOPY N/A 04/20/2018    Procedure: CAPSULE ENDOSCOPY M2A;  Surgeon: Garrett Ozuna MD;  Location: [facility] ENDOSCOPY;  Service: Gastroenterology    COLONOSCOPY N/A 03/01/2018    Procedure: COLONOSCOPY WITH ANESTHESIA;  Surgeon: Garrett Ozuna MD;  Location: [facility] ENDOSCOPY;  Service:     COLONOSCOPY N/A 04/16/2021    Procedure: COLONOSCOPY WITH ANESTHESIA;  Surgeon: Garrett Ozuna MD;  Location: [facility] ENDOSCOPY;  Service: Gastroenterology;  Laterality: N/A;  pre: hx colon polyps  post: polyp  Misty Pelayo MD    COLONOSCOPY W/ POLYPECTOMY  12/31/2014    Tubular adenomatous polyp at 80 cm, Hyperplastic polyp rectum repeat exam in 3 years    ENDOSCOPY N/A 03/01/2018    Procedure: ESOPHAGOGASTRODUODENOSCOPY WITH ANESTHESIA;  Surgeon: Garrett Ozuna MD;  Location: [facility] ENDOSCOPY;  Service:     FLAP HEAD/NECK Left 05/17/2018    Procedure: POSSIBLE STERNOCLEIDOMASTOID FLAP;  Surgeon: Kadeem Oconnor MD;  Location: [facility] OR;  Service: ENT    HYSTERECTOMY      MASTOIDECTOMY Left 05/17/2018    Procedure: Wound exploration with possible mastoidectomy; possible sternocleidomastoid flap.  Please schedule with Dr. Perez.;  Surgeon: Kadeem Oconnor MD;  Location: [facility] OR;  Service: ENT    SHOULDER SURGERY Right 06/30/2022    TRIGEMINAL NERVE DECOMPRESSION      US GUIDED CYST ASPIRATION BREAST N/A 09/16/2021       Family History: family history includes Breast cancer in her mother, niece, and sister; Heart disease in her father;  Hypertension in her mother; No Known Problems in her brother, daughter, maternal aunt, maternal grandmother, paternal aunt, paternal grandmother, and son.    Social History:  reports that she has never smoked. She has never used smokeless tobacco. She reports that she does not currently use alcohol. She reports that she does not use drugs.    Medications:  Prior to Admission medications    Medication Sig Start Date End Date Taking? Authorizing Provider   ALPRAZolam (XANAX) 0.25 MG tablet Take 0.5 tablets by mouth At Night As Needed for Anxiety or Sleep.   Yes Jose Martin Kirk MD   aspirin 81 MG tablet Take 1 tablet by mouth Daily.   Yes Jose Martin Kirk MD   calcium carbonate (TUMS) 500 MG chewable tablet Chew 1 tablet Daily.   Yes Jose Martin Kirk MD   cloNIDine (CATAPRES) 0.1 MG tablet Take 1 tablet by mouth 2 (Two) Times a Day As Needed for High Blood Pressure (SBP > 160 DBP > 90).   Yes Jose Martin Kirk MD   empagliflozin (JARDIANCE) 10 MG tablet tablet Take 2 tablets by mouth Daily.   Yes Jose Martin Kirk MD   esomeprazole (nexIUM) 20 MG capsule Take 1 capsule by mouth Every Morning Before Breakfast.   Yes Jose Martin Kirk MD   estradiol (ESTRACE) 0.1 MG/GM vaginal cream Insert 1 g into the vagina 2 (Two) Times a Week. 7/6/21  Yes Jose Martin Kirk MD   losartan-hydrochlorothiazide (HYZAAR) 100-25 MG per tablet Take 1 tablet by mouth Daily. 1/31/24  Yes Jose Martin Kirk MD   magnesium oxide (MAG-OX) 400 MG tablet Take 1 tablet by mouth Every Night.   Yes Jose Martin Kirk MD   melatonin 5 MG tablet tablet Take 1 tablet by mouth At Night As Needed (sleep).   Yes Jose Martin Kirk MD   metFORMIN (GLUCOPHAGE) 500 MG tablet Take 2 tablets by mouth 2 (Two) Times a Day With Meals. 11/20/17  Yes Jose Martin Kirk MD   montelukast (SINGULAIR) 10 MG tablet Take 1 tablet by mouth Every Night. 7/20/21  Yes Jose Martin Kirk MD   Multiple Vitamins-Minerals  "(PRESERVISION AREDS PO) Take 1 tablet by mouth Daily.   Yes ProviderJose Martin MD   nebivolol (BYSTOLIC) 20 MG tablet Take 1 tablet by mouth Every Night. 4/10/24  Yes Jose Martin Kirk MD   rosuvastatin (CRESTOR) 5 MG tablet Take 1 tablet by mouth Take As Directed. 1/2 tablet on Monday, Wednesday, Friday   Yes Jose Martin Kirk MD   verapamil SR (CALAN-SR) 240 MG CR tablet Take 1 tablet by mouth 2 (Two) Times a Day. 11/17/17  Yes ProviderJose Martin MD   vitamin D (ERGOCALCIFEROL) 84869 units capsule capsule Take 1 capsule by mouth Every 7 (Seven) Days. Sunday at night 11/20/17  Yes ProviderJose Martin MD     Allergies:  Allergies   Allergen Reactions    Zovirax [Acyclovir] Unknown - High Severity     Other reaction(s): Other (See Comments)  PhX  PhX    Meperidine Hallucinations     halluncinations  halluncinations    Propranolol Unknown - Low Severity     Other reaction(s): Other (See Comments)  Bad dreams  unknown  Bad dreams  unknown  Bad dreams    Sertraline Hcl Hallucinations     hallucinations  hallucinations    Vesicare [Solifenacin Succinate] GI Intolerance     Severe constipation    Solifenacin Unknown - Low Severity     Other reaction(s): Other (See Comments)  Phx  Phx  Couldn't urinate       Objective     Vital Signs: /70   Pulse 69   Ht 175.3 cm (69.02\")   Wt 75.8 kg (167 lb)   SpO2 98%   BMI 24.65 kg/m²     Vitals and nursing note reviewed.   Constitutional:       General: Not in acute distress.     Appearance: Not in distress.   Neck:      Vascular: No JVD or JVR. JVD normal.   Pulmonary:      Effort: Pulmonary effort is normal.      Breath sounds: Normal breath sounds.   Cardiovascular:      Normal rate. Regular rhythm.      Murmurs: There is no murmur.      No gallop.  No rub.   Pulses:     Intact distal pulses.   Edema:     Peripheral edema absent.   Skin:     General: Skin is warm and dry.   Neurological:      Mental Status: Alert, oriented to person, place, and time and " oriented to person, place and time.   Physical Exam      Results Reviewed:  Results      Testing  ZIO patch monitor showed frequent occurrences of nonsustained SVT, the longest of which was 15 seconds. A singular five beat run of nonsustained VT was noted.      ECG 12 Lead    Date/Time: 8/14/2024 1:24 PM  Performed by: Jack Gomez MD    Authorized by: Jack Gomez MD  Comparison: compared with previous ECG from 7/5/2024  Similar to previous ECG  Rhythm: sinus rhythm  Rate: normal  BPM: 69  Q waves: III and aVF    Other findings: low voltage  Other findings comments: Cannot rule out anterolateral infarct, age undetermined    Clinical impression: abnormal EKG        Lab Results   Component Value Date    CHOL 142 06/21/2019    TRIG 96 02/28/2024    HDL 38 (L) 02/28/2024    LDLHDL 3.14 06/21/2019     Lab Results   Component Value Date    HGBA1C 6.9 (H) 06/05/2024       Results for orders placed during the hospital encounter of 07/05/24    Adult Transthoracic Echo Complete W/ Cont if Necessary Per Protocol    Interpretation Summary    Left ventricular ejection fraction appears to be 61 - 65%.    Left ventricular diastolic function was normal.    Estimated right ventricular systolic pressure from tricuspid regurgitation is normal (<35 mmHg).    Normal global longitudinal LV strain (GLS) = -20.0%      Assessment / Plan        Problem List Items Addressed This Visit          Cardiac and Vasculature    HTN (hypertension), benign: established, well controlled    PSVT (paroxysmal supraventricular tachycardia) - Primary: newly diagnosed, no further work-up required at this point in time. More below.       Assessment & Plan    The ZIO patch monitor used after a brief hospitalization revealed a short burst of SVT.  She did not have any significant symptoms while wearing this, I do suspect these brief episodes captured on the monitor are I cannot as to the etiology behind her more prolonged episode that required ER  evaluation.  In other words, by historical context provided today regarding the abrupt onset and offset of her sustained episode of palpitations prompting brief hospital stay in early July, I suspect that was a sustained episode of supraventricular tachycardia.    She is currently on the maximum dose of nebivolol 20 mg, and also on a CCB in the form of verapamil 240 mg daily, limiting further options for medical suppression of atrial ectopy. The sporadic and infrequent nature of these events suggests that additional rhythm monitoring devices may not be beneficial at this time.     There are no current concerns about obstructive coronary artery disease, as her low-risk stress test last year and undetectable troponins twice (in July '24) after 30 minutes of sustained palpitations in the ER in July 2024 do not indicate this.     Supraventricular tachycardia (SVT) was discussed in detail, and I provided reassurance about its benign nature. Educational materials were provided, covering abortive maneuvers such as Valsalva, and information on trigger identification and avoidance. It was suggested that she consider an Apple watch or the Dimmi pedro for her iPhone to record her heart rhythm during future episodes. This could help confirm the suspected underlying rhythm during these symptoms.     If symptoms persist despite abortive maneuvers and trigger avoidance, she should contact the office. With proper verification that symptoms are from SVT, a referral to Dr. Kearney for consideration of an ablation would be made. At this time, no further cardiac testing is deemed necessary.    Follow-up  A routine clinic follow-up is scheduled in 6 months.    52-minute spent on day of service, excluding time interpreting ECG    Transcribed from ambient dictation for Jack Gomez MD by Jack Gomez MD.  08/15/24   17:07 CDT    Patient or patient representative verbalized consent to the visit recording.

## 2024-08-16 ENCOUNTER — PATIENT ROUNDING (BHMG ONLY) (OUTPATIENT)
Dept: CARDIOLOGY | Facility: CLINIC | Age: 74
End: 2024-08-16
Payer: MEDICARE

## 2024-08-16 NOTE — PROGRESS NOTES
A clickTRUE message has been sent to the patient for PATIENT ROUNDING with [facility].

## 2024-09-04 RX ORDER — ERGOCALCIFEROL 1.25 MG/1
50000 CAPSULE, LIQUID FILLED ORAL WEEKLY
Qty: 12 CAPSULE | Refills: 1 | Status: SHIPPED | OUTPATIENT
Start: 2024-09-04

## 2024-09-04 RX ORDER — ROSUVASTATIN CALCIUM 5 MG/1
TABLET, COATED ORAL
Qty: 18 TABLET | Refills: 1 | Status: SHIPPED | OUTPATIENT
Start: 2024-09-04

## 2024-10-10 DIAGNOSIS — E11.9 TYPE 2 DIABETES MELLITUS WITHOUT COMPLICATION, WITHOUT LONG-TERM CURRENT USE OF INSULIN (HCC): Chronic | ICD-10-CM

## 2024-10-10 DIAGNOSIS — I10 ESSENTIAL HYPERTENSION: Chronic | ICD-10-CM

## 2024-10-10 RX ORDER — NEBIVOLOL 20 MG/1
20 TABLET ORAL NIGHTLY
Qty: 90 TABLET | Refills: 1 | Status: SHIPPED | OUTPATIENT
Start: 2024-10-10

## 2024-10-10 RX ORDER — BLOOD-GLUCOSE METER
EACH MISCELLANEOUS
Qty: 50 STRIP | Refills: 2 | Status: SHIPPED | OUTPATIENT
Start: 2024-10-10

## 2024-10-28 DIAGNOSIS — I10 ESSENTIAL HYPERTENSION: Chronic | ICD-10-CM

## 2024-10-28 RX ORDER — LOSARTAN POTASSIUM AND HYDROCHLOROTHIAZIDE 25; 100 MG/1; MG/1
1 TABLET ORAL DAILY
Qty: 90 TABLET | Refills: 1 | Status: SHIPPED | OUTPATIENT
Start: 2024-10-28

## 2024-10-28 RX ORDER — VERAPAMIL HYDROCHLORIDE 240 MG/1
240 TABLET, FILM COATED, EXTENDED RELEASE ORAL 2 TIMES DAILY
Qty: 180 TABLET | Refills: 1 | Status: SHIPPED | OUTPATIENT
Start: 2024-10-28

## 2024-11-12 DIAGNOSIS — D50.8 OTHER IRON DEFICIENCY ANEMIA: ICD-10-CM

## 2024-11-12 DIAGNOSIS — E11.9 TYPE 2 DIABETES MELLITUS WITHOUT COMPLICATION, WITHOUT LONG-TERM CURRENT USE OF INSULIN (HCC): ICD-10-CM

## 2024-11-12 LAB
ALBUMIN SERPL-MCNC: 4.7 G/DL (ref 3.5–5.2)
ALP SERPL-CCNC: 66 U/L (ref 35–104)
ALT SERPL-CCNC: 27 U/L (ref 5–33)
ANION GAP SERPL CALCULATED.3IONS-SCNC: 12 MMOL/L (ref 7–19)
AST SERPL-CCNC: 23 U/L (ref 5–32)
BILIRUB SERPL-MCNC: 0.5 MG/DL (ref 0.2–1.2)
BUN SERPL-MCNC: 24 MG/DL (ref 8–23)
CALCIUM SERPL-MCNC: 10.2 MG/DL (ref 8.8–10.2)
CHLORIDE SERPL-SCNC: 102 MMOL/L (ref 98–111)
CO2 SERPL-SCNC: 29 MMOL/L (ref 22–29)
CREAT SERPL-MCNC: 0.7 MG/DL (ref 0.5–0.9)
ERYTHROCYTE [DISTWIDTH] IN BLOOD BY AUTOMATED COUNT: 13.3 % (ref 11.5–14.5)
FOLATE SERPL-MCNC: 13.8 NG/ML (ref 4.8–37.3)
GLUCOSE SERPL-MCNC: 138 MG/DL (ref 70–99)
HBA1C MFR BLD: 6.9 % (ref 4–5.6)
HCT VFR BLD AUTO: 48.1 % (ref 37–47)
HGB BLD-MCNC: 15.5 G/DL (ref 12–16)
MCH RBC QN AUTO: 30.6 PG (ref 27–31)
MCHC RBC AUTO-ENTMCNC: 32.2 G/DL (ref 33–37)
MCV RBC AUTO: 94.9 FL (ref 81–99)
PLATELET # BLD AUTO: 193 K/UL (ref 130–400)
PMV BLD AUTO: 11.5 FL (ref 9.4–12.3)
POTASSIUM SERPL-SCNC: 4.3 MMOL/L (ref 3.5–5)
PROT SERPL-MCNC: 7.2 G/DL (ref 6.4–8.3)
RBC # BLD AUTO: 5.07 M/UL (ref 4.2–5.4)
SODIUM SERPL-SCNC: 143 MMOL/L (ref 136–145)
VIT B12 SERPL-MCNC: 260 PG/ML (ref 232–1245)
WBC # BLD AUTO: 5.9 K/UL (ref 4.8–10.8)

## 2024-11-21 ENCOUNTER — OFFICE VISIT (OUTPATIENT)
Dept: INTERNAL MEDICINE | Age: 74
End: 2024-11-21

## 2024-11-21 VITALS
HEART RATE: 70 BPM | OXYGEN SATURATION: 98 % | SYSTOLIC BLOOD PRESSURE: 120 MMHG | DIASTOLIC BLOOD PRESSURE: 70 MMHG | HEIGHT: 69 IN | BODY MASS INDEX: 24.59 KG/M2 | WEIGHT: 166 LBS

## 2024-11-21 DIAGNOSIS — E11.9 TYPE 2 DIABETES MELLITUS WITHOUT COMPLICATION, WITHOUT LONG-TERM CURRENT USE OF INSULIN (HCC): Primary | Chronic | ICD-10-CM

## 2024-11-21 DIAGNOSIS — F32.A ANXIETY AND DEPRESSION: ICD-10-CM

## 2024-11-21 DIAGNOSIS — G47.00 INSOMNIA, UNSPECIFIED TYPE: ICD-10-CM

## 2024-11-21 DIAGNOSIS — I10 ESSENTIAL HYPERTENSION: Chronic | ICD-10-CM

## 2024-11-21 DIAGNOSIS — E78.2 MIXED HYPERLIPIDEMIA: Chronic | ICD-10-CM

## 2024-11-21 DIAGNOSIS — E53.8 B12 DEFICIENCY: ICD-10-CM

## 2024-11-21 DIAGNOSIS — F41.9 ANXIETY AND DEPRESSION: ICD-10-CM

## 2024-11-21 RX ORDER — LOSARTAN POTASSIUM AND HYDROCHLOROTHIAZIDE 25; 100 MG/1; MG/1
1 TABLET ORAL DAILY
Qty: 90 TABLET | Refills: 1 | Status: SHIPPED | OUTPATIENT
Start: 2024-11-21

## 2024-11-21 RX ORDER — VERAPAMIL HYDROCHLORIDE 240 MG/1
240 TABLET, FILM COATED, EXTENDED RELEASE ORAL 2 TIMES DAILY
Qty: 180 TABLET | Refills: 1 | Status: SHIPPED | OUTPATIENT
Start: 2024-11-21

## 2024-11-21 RX ORDER — CYANOCOBALAMIN 1000 UG/ML
1000 INJECTION, SOLUTION INTRAMUSCULAR; SUBCUTANEOUS ONCE
Status: COMPLETED | OUTPATIENT
Start: 2024-11-21 | End: 2024-11-21

## 2024-11-21 RX ORDER — MONTELUKAST SODIUM 10 MG/1
10 TABLET ORAL NIGHTLY
Qty: 90 TABLET | Refills: 1 | Status: SHIPPED | OUTPATIENT
Start: 2024-11-21

## 2024-11-21 RX ORDER — ALPRAZOLAM 0.25 MG/1
0.25 TABLET ORAL NIGHTLY PRN
Qty: 30 TABLET | Refills: 2 | Status: SHIPPED | OUTPATIENT
Start: 2024-11-21 | End: 2025-02-19

## 2024-11-21 RX ADMIN — CYANOCOBALAMIN 1000 MCG: 1000 INJECTION, SOLUTION INTRAMUSCULAR; SUBCUTANEOUS at 12:45

## 2024-11-21 NOTE — PROGRESS NOTES
Chief Complaint   Patient presents with    3 Month Follow-Up    Diabetes       HPI: Patient is here today to follow-up diabetes hypertension other medical issues some stressors in the process of getting ready to move has had stressors in the recent years with caregiving for elderly parents.  Overall patient is stable doing okay focusing on diabetic diet some insomnia and anxiety at times.    Past Medical History:   Diagnosis Date    Calculus of gallbladder 8/20/2017    Chronic asthma without complication 8/20/2017    Essential hypertension 8/21/2017    Fatty liver 8/20/2017    Gastroesophageal reflux disease without esophagitis 8/20/2017    Lumbar disc disease     L5-S1    Migraine without aura, not intractable 8/20/2017    Mixed hyperlipidemia 8/20/2017    Obstructive sleep apnea 8/20/2017    Sacroiliitis (HCC)     Trigeminal neuralgia of left side of face 8/20/2017    Type 2 diabetes mellitus without complication (HCC) 8/20/2017    Venous insufficiency     Vitamin D deficiency        Past Surgical History:   Procedure Laterality Date    EYE LID SURGERY Bilateral 2020    eye lids raised    HYSTERECTOMY, TOTAL ABDOMINAL (CERVIX REMOVED)      No BSO    SHOULDER SURGERY Right 6/30/2022    RIGHT REVERSE SHOULDER TOTAL ARTHROPLASTY performed by Harshal Mace MD at [facility] OR       Family History   Problem Relation Age of Onset    High Blood Pressure Mother     High Blood Pressure Father     Prostate Cancer Father     Diabetes Father         Type 2    Breast Cancer Sister 54    Diabetes Sister         Type 2       Social History     Socioeconomic History    Marital status:      Spouse name: yumi    Number of children: 2    Years of education: 14    Highest education level: Not on file   Occupational History    Occupation: retired    Tobacco Use    Smoking status: Never    Smokeless tobacco: Never   Vaping Use    Vaping status: Never Used   Substance and Sexual Activity    Alcohol use: No    Drug use:

## 2024-12-12 PROBLEM — F41.9 ANXIETY AND DEPRESSION: Status: ACTIVE | Noted: 2024-12-12

## 2024-12-12 PROBLEM — F32.A ANXIETY AND DEPRESSION: Status: ACTIVE | Noted: 2024-12-12

## 2024-12-14 ENCOUNTER — TELEPHONE (OUTPATIENT)
Dept: INTERNAL MEDICINE | Age: 74
End: 2024-12-14

## 2024-12-14 RX ORDER — AZITHROMYCIN 250 MG/1
TABLET, FILM COATED ORAL
Qty: 6 TABLET | Refills: 0 | Status: SHIPPED | OUTPATIENT
Start: 2024-12-14 | End: 2024-12-24

## 2025-01-16 ENCOUNTER — HOSPITAL ENCOUNTER (OUTPATIENT)
Dept: MRI IMAGING | Facility: HOSPITAL | Age: 75
Discharge: HOME | End: 2025-01-16
Payer: MEDICARE

## 2025-01-16 ENCOUNTER — LAB (OUTPATIENT)
Dept: LAB | Facility: HOSPITAL | Age: 75
End: 2025-01-16
Payer: MEDICARE

## 2025-01-16 DIAGNOSIS — D50.9 IRON DEFICIENCY ANEMIA, UNSPECIFIED IRON DEFICIENCY ANEMIA TYPE: ICD-10-CM

## 2025-01-16 DIAGNOSIS — Z91.89 AT HIGH RISK FOR BREAST CANCER: ICD-10-CM

## 2025-01-16 DIAGNOSIS — R92.0 MAMMOGRAPHIC MICROCALCIFICATION FOUND ON DIAGNOSTIC IMAGING OF BREAST: ICD-10-CM

## 2025-01-16 DIAGNOSIS — Z80.3 FAMILY HISTORY OF BREAST CANCER: ICD-10-CM

## 2025-01-16 LAB
ALBUMIN SERPL-MCNC: 4.4 G/DL (ref 3.5–5.2)
ALBUMIN/GLOB SERPL: 1.9 G/DL
ALP SERPL-CCNC: 65 U/L (ref 39–117)
ALT SERPL W P-5'-P-CCNC: 30 U/L (ref 1–33)
ANION GAP SERPL CALCULATED.3IONS-SCNC: 11 MMOL/L (ref 5–15)
AST SERPL-CCNC: 20 U/L (ref 1–32)
BASOPHILS # BLD AUTO: 0.04 10*3/MM3 (ref 0–0.2)
BASOPHILS NFR BLD AUTO: 0.7 % (ref 0–1.5)
BILIRUB SERPL-MCNC: 0.4 MG/DL (ref 0–1.2)
BUN SERPL-MCNC: 27 MG/DL (ref 8–23)
BUN/CREAT SERPL: 34.2 (ref 7–25)
CALCIUM SPEC-SCNC: 9.7 MG/DL (ref 8.6–10.5)
CHLORIDE SERPL-SCNC: 102 MMOL/L (ref 98–107)
CO2 SERPL-SCNC: 25 MMOL/L (ref 22–29)
CREAT BLDA-MCNC: 1 MG/DL (ref 0.6–1.3)
CREAT SERPL-MCNC: 0.79 MG/DL (ref 0.57–1)
DEPRECATED RDW RBC AUTO: 46.2 FL (ref 37–54)
EGFRCR SERPLBLD CKD-EPI 2021: 78.6 ML/MIN/1.73
EOSINOPHIL # BLD AUTO: 0.26 10*3/MM3 (ref 0–0.4)
EOSINOPHIL NFR BLD AUTO: 4.3 % (ref 0.3–6.2)
ERYTHROCYTE [DISTWIDTH] IN BLOOD BY AUTOMATED COUNT: 13.5 % (ref 12.3–15.4)
FERRITIN SERPL-MCNC: 108.1 NG/ML (ref 13–150)
GLOBULIN UR ELPH-MCNC: 2.3 GM/DL
GLUCOSE SERPL-MCNC: 164 MG/DL (ref 65–99)
HCT VFR BLD AUTO: 43.9 % (ref 34–46.6)
HGB BLD-MCNC: 14.6 G/DL (ref 12–15.9)
IMM GRANULOCYTES # BLD AUTO: 0.01 10*3/MM3 (ref 0–0.05)
IMM GRANULOCYTES NFR BLD AUTO: 0.2 % (ref 0–0.5)
IRON 24H UR-MRATE: 48 MCG/DL (ref 37–145)
IRON SATN MFR SERPL: 13 % (ref 20–50)
LYMPHOCYTES # BLD AUTO: 1.98 10*3/MM3 (ref 0.7–3.1)
LYMPHOCYTES NFR BLD AUTO: 32.9 % (ref 19.6–45.3)
MCH RBC QN AUTO: 31.2 PG (ref 26.6–33)
MCHC RBC AUTO-ENTMCNC: 33.3 G/DL (ref 31.5–35.7)
MCV RBC AUTO: 93.8 FL (ref 79–97)
MONOCYTES # BLD AUTO: 0.45 10*3/MM3 (ref 0.1–0.9)
MONOCYTES NFR BLD AUTO: 7.5 % (ref 5–12)
NEUTROPHILS NFR BLD AUTO: 3.27 10*3/MM3 (ref 1.7–7)
NEUTROPHILS NFR BLD AUTO: 54.4 % (ref 42.7–76)
NRBC BLD AUTO-RTO: 0 /100 WBC (ref 0–0.2)
PLATELET # BLD AUTO: 189 10*3/MM3 (ref 140–450)
PMV BLD AUTO: 11 FL (ref 6–12)
POTASSIUM SERPL-SCNC: 4.1 MMOL/L (ref 3.5–5.2)
PROT SERPL-MCNC: 6.7 G/DL (ref 6–8.5)
RBC # BLD AUTO: 4.68 10*6/MM3 (ref 3.77–5.28)
SODIUM SERPL-SCNC: 138 MMOL/L (ref 136–145)
TIBC SERPL-MCNC: 378 MCG/DL (ref 298–536)
TRANSFERRIN SERPL-MCNC: 254 MG/DL (ref 200–360)
WBC NRBC COR # BLD AUTO: 6.01 10*3/MM3 (ref 3.4–10.8)

## 2025-01-16 PROCEDURE — 83540 ASSAY OF IRON: CPT

## 2025-01-16 PROCEDURE — 85025 COMPLETE CBC W/AUTO DIFF WBC: CPT

## 2025-01-16 PROCEDURE — A9579 GAD-BASE MR CONTRAST NOS,1ML: HCPCS

## 2025-01-16 PROCEDURE — 36415 COLL VENOUS BLD VENIPUNCTURE: CPT

## 2025-01-16 PROCEDURE — 80053 COMPREHEN METABOLIC PANEL: CPT

## 2025-01-16 PROCEDURE — C8908 MRI W/O FOL W/CONT, BREAST,: HCPCS

## 2025-01-16 PROCEDURE — 82565 ASSAY OF CREATININE: CPT

## 2025-01-16 PROCEDURE — C8937 CAD BREAST MRI: HCPCS

## 2025-01-16 PROCEDURE — 82728 ASSAY OF FERRITIN: CPT

## 2025-01-16 PROCEDURE — 25510000001 GADOPICLENOL 0.5 MMOL/ML SOLUTION

## 2025-01-16 PROCEDURE — 84466 ASSAY OF TRANSFERRIN: CPT

## 2025-01-16 RX ADMIN — GADOPICLENOL 7.5 ML: 485.1 INJECTION INTRAVENOUS at 09:13

## 2025-01-17 ENCOUNTER — TELEPHONE (OUTPATIENT)
Dept: ONCOLOGY | Facility: CLINIC | Age: 75
End: 2025-01-17

## 2025-01-17 NOTE — TELEPHONE ENCOUNTER
"Caller: Carlotta Dupont \"Dot\"    Relationship to patient: Self    Best call back number: [phone]    Chief complaint: RESCHEDULE     Type of visit: FOLLOW UP     Requested date: 1-23 ANYTIME,  1-27     If rescheduling, when is the original appointment: 1-24-25     Additional notes:PLEASE ADVISE          "

## 2025-01-20 ENCOUNTER — TELEPHONE (OUTPATIENT)
Age: 75
End: 2025-01-20
Payer: MEDICARE

## 2025-01-20 NOTE — TELEPHONE ENCOUNTER
Called patient to let her know that her MRI was benign. Offered a appointment to continue high risk screenings in office. Pt states it will be March before she could follow up. Scheduled patient a f/u on March 12th at 1:15 PM.

## 2025-01-28 ENCOUNTER — TELEPHONE (OUTPATIENT)
Dept: INTERNAL MEDICINE | Age: 75
End: 2025-01-28

## 2025-01-28 DIAGNOSIS — E11.9 TYPE 2 DIABETES MELLITUS WITHOUT COMPLICATION, WITHOUT LONG-TERM CURRENT USE OF INSULIN (HCC): Primary | ICD-10-CM

## 2025-01-28 RX ORDER — INSULIN ASPART 100 [IU]/ML
INJECTION, SOLUTION INTRAVENOUS; SUBCUTANEOUS
Qty: 3 ML | Refills: 0 | Status: SHIPPED | OUTPATIENT
Start: 2025-01-28 | End: 2025-01-28 | Stop reason: SDUPTHER

## 2025-01-28 RX ORDER — INSULIN ASPART 100 [IU]/ML
INJECTION, SOLUTION INTRAVENOUS; SUBCUTANEOUS
Qty: 3 ML | Refills: 0 | Status: SHIPPED | OUTPATIENT
Start: 2025-01-28

## 2025-01-28 NOTE — TELEPHONE ENCOUNTER
Patient is scheduled for surgery at [place] February 6 has to be off Jardiance 4 to 5 days and usually metformin is also stopped for 2 days prior to surgery we will send in some sliding scale short acting insulin for her to use although I think she will be okay without this it would be safer to have it on hand.  I sent in a ssi to have for before surgery - stop jardiance 4-5 days before and stop metformin 2 days before   I sent to [facility] but if needs to be elsewhere let us know

## 2025-01-28 NOTE — TELEPHONE ENCOUNTER
Message was given to Dr. Archer and I resent the insulin to [facility]'s pharmacy.

## 2025-02-24 RX ORDER — ROSUVASTATIN CALCIUM 5 MG/1
TABLET, COATED ORAL
Qty: 18 TABLET | Refills: 1 | Status: SHIPPED | OUTPATIENT
Start: 2025-02-24

## 2025-02-24 RX ORDER — ERGOCALCIFEROL 1.25 MG/1
1 CAPSULE ORAL WEEKLY
Qty: 12 CAPSULE | Refills: 1 | Status: SHIPPED | OUTPATIENT
Start: 2025-02-24

## 2025-02-25 DIAGNOSIS — I10 ESSENTIAL HYPERTENSION: Primary | Chronic | ICD-10-CM

## 2025-02-25 DIAGNOSIS — E11.40 TYPE 2 DIABETES MELLITUS WITH DIABETIC NEUROPATHY, WITHOUT LONG-TERM CURRENT USE OF INSULIN (HCC): ICD-10-CM

## 2025-02-25 DIAGNOSIS — E04.1 THYROID NODULE: ICD-10-CM

## 2025-02-25 DIAGNOSIS — I10 ESSENTIAL HYPERTENSION: Chronic | ICD-10-CM

## 2025-02-25 LAB
ALBUMIN SERPL-MCNC: 4.9 G/DL (ref 3.5–5.2)
ALP SERPL-CCNC: 74 U/L (ref 35–104)
ALT SERPL-CCNC: 30 U/L (ref 5–33)
ANION GAP SERPL CALCULATED.3IONS-SCNC: 13 MMOL/L (ref 8–16)
AST SERPL-CCNC: 20 U/L (ref 5–32)
BILIRUB SERPL-MCNC: 0.6 MG/DL (ref 0.2–1.2)
BUN SERPL-MCNC: 24 MG/DL (ref 8–23)
CALCIUM SERPL-MCNC: 10.3 MG/DL (ref 8.8–10.2)
CHLORIDE SERPL-SCNC: 99 MMOL/L (ref 98–107)
CHOLEST SERPL-MCNC: 146 MG/DL (ref 0–199)
CO2 SERPL-SCNC: 28 MMOL/L (ref 22–29)
CREAT SERPL-MCNC: 0.7 MG/DL (ref 0.5–0.9)
ERYTHROCYTE [DISTWIDTH] IN BLOOD BY AUTOMATED COUNT: 13.2 % (ref 11.5–14.5)
GLUCOSE SERPL-MCNC: 125 MG/DL (ref 70–99)
HBA1C MFR BLD: 7.1 % (ref 4–5.6)
HCT VFR BLD AUTO: 47.2 % (ref 37–47)
HDLC SERPL-MCNC: 36 MG/DL (ref 40–60)
HGB BLD-MCNC: 15.4 G/DL (ref 12–16)
LDLC SERPL CALC-MCNC: 86 MG/DL
MCH RBC QN AUTO: 31.1 PG (ref 27–31)
MCHC RBC AUTO-ENTMCNC: 32.6 G/DL (ref 33–37)
MCV RBC AUTO: 95.4 FL (ref 81–99)
PLATELET # BLD AUTO: 233 K/UL (ref 130–400)
PMV BLD AUTO: 11.5 FL (ref 9.4–12.3)
POTASSIUM SERPL-SCNC: 4.2 MMOL/L (ref 3.5–5.1)
PROT SERPL-MCNC: 7.6 G/DL (ref 6.4–8.3)
RBC # BLD AUTO: 4.95 M/UL (ref 4.2–5.4)
SODIUM SERPL-SCNC: 140 MMOL/L (ref 136–145)
TRIGL SERPL-MCNC: 120 MG/DL (ref 0–149)
TSH SERPL DL<=0.005 MIU/L-ACNC: 1.32 UIU/ML (ref 0.27–4.2)
WBC # BLD AUTO: 6.1 K/UL (ref 4.8–10.8)

## 2025-02-26 DIAGNOSIS — E11.9 TYPE 2 DIABETES MELLITUS WITHOUT COMPLICATION, WITHOUT LONG-TERM CURRENT USE OF INSULIN (HCC): Chronic | ICD-10-CM

## 2025-02-26 RX ORDER — BLOOD-GLUCOSE METER
EACH MISCELLANEOUS
Qty: 50 STRIP | Refills: 2 | Status: SHIPPED | OUTPATIENT
Start: 2025-02-26

## 2025-03-03 ENCOUNTER — OFFICE VISIT (OUTPATIENT)
Dept: INTERNAL MEDICINE | Age: 75
End: 2025-03-03
Payer: MEDICARE

## 2025-03-03 VITALS
HEIGHT: 69 IN | DIASTOLIC BLOOD PRESSURE: 60 MMHG | OXYGEN SATURATION: 98 % | BODY MASS INDEX: 25.18 KG/M2 | WEIGHT: 170 LBS | SYSTOLIC BLOOD PRESSURE: 120 MMHG | HEART RATE: 73 BPM

## 2025-03-03 DIAGNOSIS — I10 ESSENTIAL HYPERTENSION: Chronic | ICD-10-CM

## 2025-03-03 DIAGNOSIS — E83.52 HYPERCALCEMIA: ICD-10-CM

## 2025-03-03 DIAGNOSIS — E55.9 VITAMIN D DEFICIENCY: Chronic | ICD-10-CM

## 2025-03-03 DIAGNOSIS — E04.2 NONTOXIC MULTINODULAR GOITER: ICD-10-CM

## 2025-03-03 DIAGNOSIS — Z98.890 H/O RECTOCELE REPAIR: ICD-10-CM

## 2025-03-03 DIAGNOSIS — E11.40 TYPE 2 DIABETES MELLITUS WITH DIABETIC NEUROPATHY, WITHOUT LONG-TERM CURRENT USE OF INSULIN (HCC): ICD-10-CM

## 2025-03-03 DIAGNOSIS — Z00.00 MEDICARE ANNUAL WELLNESS VISIT, SUBSEQUENT: Primary | ICD-10-CM

## 2025-03-03 DIAGNOSIS — M51.16 LUMBAR DISC DISEASE WITH RADICULOPATHY: Chronic | ICD-10-CM

## 2025-03-03 DIAGNOSIS — G47.33 OBSTRUCTIVE SLEEP APNEA: Chronic | ICD-10-CM

## 2025-03-03 DIAGNOSIS — K21.9 GASTROESOPHAGEAL REFLUX DISEASE WITHOUT ESOPHAGITIS: Chronic | ICD-10-CM

## 2025-03-03 PROBLEM — I47.10 PSVT (PAROXYSMAL SUPRAVENTRICULAR TACHYCARDIA): Status: ACTIVE | Noted: 2024-08-14

## 2025-03-03 LAB
25(OH)D3 SERPL-MCNC: 68.9 NG/ML
ALBUMIN SERPL-MCNC: 4.9 G/DL (ref 3.5–5.2)
ALP SERPL-CCNC: 73 U/L (ref 35–104)
ALT SERPL-CCNC: 31 U/L (ref 5–33)
ANION GAP SERPL CALCULATED.3IONS-SCNC: 11 MMOL/L (ref 8–16)
AST SERPL-CCNC: 22 U/L (ref 5–32)
BILIRUB SERPL-MCNC: 0.4 MG/DL (ref 0.2–1.2)
BUN SERPL-MCNC: 31 MG/DL (ref 8–23)
CALCIUM SERPL-MCNC: 10.1 MG/DL (ref 8.8–10.2)
CHLORIDE SERPL-SCNC: 101 MMOL/L (ref 98–107)
CO2 SERPL-SCNC: 28 MMOL/L (ref 22–29)
CREAT SERPL-MCNC: 0.7 MG/DL (ref 0.5–0.9)
GLUCOSE SERPL-MCNC: 171 MG/DL (ref 70–99)
POTASSIUM SERPL-SCNC: 4 MMOL/L (ref 3.5–5.1)
PROT SERPL-MCNC: 7.6 G/DL (ref 6.4–8.3)
SODIUM SERPL-SCNC: 140 MMOL/L (ref 136–145)

## 2025-03-03 PROCEDURE — 1159F MED LIST DOCD IN RCRD: CPT | Performed by: INTERNAL MEDICINE

## 2025-03-03 PROCEDURE — 3051F HG A1C>EQUAL 7.0%<8.0%: CPT | Performed by: INTERNAL MEDICINE

## 2025-03-03 PROCEDURE — 1123F ACP DISCUSS/DSCN MKR DOCD: CPT | Performed by: INTERNAL MEDICINE

## 2025-03-03 PROCEDURE — 3078F DIAST BP <80 MM HG: CPT | Performed by: INTERNAL MEDICINE

## 2025-03-03 PROCEDURE — G0439 PPPS, SUBSEQ VISIT: HCPCS | Performed by: INTERNAL MEDICINE

## 2025-03-03 PROCEDURE — 3017F COLORECTAL CA SCREEN DOC REV: CPT | Performed by: INTERNAL MEDICINE

## 2025-03-03 PROCEDURE — 3074F SYST BP LT 130 MM HG: CPT | Performed by: INTERNAL MEDICINE

## 2025-03-03 SDOH — ECONOMIC STABILITY: FOOD INSECURITY: WITHIN THE PAST 12 MONTHS, YOU WORRIED THAT YOUR FOOD WOULD RUN OUT BEFORE YOU GOT MONEY TO BUY MORE.: NEVER TRUE

## 2025-03-03 SDOH — ECONOMIC STABILITY: FOOD INSECURITY: WITHIN THE PAST 12 MONTHS, THE FOOD YOU BOUGHT JUST DIDN'T LAST AND YOU DIDN'T HAVE MONEY TO GET MORE.: NEVER TRUE

## 2025-03-03 ASSESSMENT — PATIENT HEALTH QUESTIONNAIRE - PHQ9
2. FEELING DOWN, DEPRESSED OR HOPELESS: NOT AT ALL
SUM OF ALL RESPONSES TO PHQ QUESTIONS 1-9: 2
7. TROUBLE CONCENTRATING ON THINGS, SUCH AS READING THE NEWSPAPER OR WATCHING TELEVISION: NOT AT ALL
3. TROUBLE FALLING OR STAYING ASLEEP: SEVERAL DAYS
SUM OF ALL RESPONSES TO PHQ QUESTIONS 1-9: 2
6. FEELING BAD ABOUT YOURSELF - OR THAT YOU ARE A FAILURE OR HAVE LET YOURSELF OR YOUR FAMILY DOWN: NOT AT ALL
SUM OF ALL RESPONSES TO PHQ QUESTIONS 1-9: 2
SUM OF ALL RESPONSES TO PHQ QUESTIONS 1-9: 0
10. IF YOU CHECKED OFF ANY PROBLEMS, HOW DIFFICULT HAVE THESE PROBLEMS MADE IT FOR YOU TO DO YOUR WORK, TAKE CARE OF THINGS AT HOME, OR GET ALONG WITH OTHER PEOPLE: NOT DIFFICULT AT ALL
5. POOR APPETITE OR OVEREATING: NOT AT ALL
8. MOVING OR SPEAKING SO SLOWLY THAT OTHER PEOPLE COULD HAVE NOTICED. OR THE OPPOSITE, BEING SO FIGETY OR RESTLESS THAT YOU HAVE BEEN MOVING AROUND A LOT MORE THAN USUAL: NOT AT ALL
1. LITTLE INTEREST OR PLEASURE IN DOING THINGS: NOT AT ALL
4. FEELING TIRED OR HAVING LITTLE ENERGY: SEVERAL DAYS
9. THOUGHTS THAT YOU WOULD BE BETTER OFF DEAD, OR OF HURTING YOURSELF: NOT AT ALL
2. FEELING DOWN, DEPRESSED OR HOPELESS: NOT AT ALL
SUM OF ALL RESPONSES TO PHQ QUESTIONS 1-9: 0
SUM OF ALL RESPONSES TO PHQ QUESTIONS 1-9: 2
SUM OF ALL RESPONSES TO PHQ QUESTIONS 1-9: 0
1. LITTLE INTEREST OR PLEASURE IN DOING THINGS: NOT AT ALL
SUM OF ALL RESPONSES TO PHQ QUESTIONS 1-9: 0

## 2025-03-03 ASSESSMENT — LIFESTYLE VARIABLES
HOW MANY STANDARD DRINKS CONTAINING ALCOHOL DO YOU HAVE ON A TYPICAL DAY: PATIENT DOES NOT DRINK
HOW OFTEN DO YOU HAVE A DRINK CONTAINING ALCOHOL: NEVER

## 2025-03-03 NOTE — PROGRESS NOTES
Chief Complaint   Patient presents with    Medicare AWV       HPI: Pt is here today for medicare annual wellness and to f/u rectocoele repair.  Also here to f/u dm, htn and other medical problems.  Patient and her  are moving in the near future.  Stress with that.  In general she is doing okay she did well with her rectocele repair discomfort with postop.  But she is overall doing well.    Past Medical History:   Diagnosis Date    Calculus of gallbladder 8/20/2017    Chronic asthma without complication 8/20/2017    Essential hypertension 8/21/2017    Fatty liver 8/20/2017    Gastroesophageal reflux disease without esophagitis 8/20/2017    Lumbar disc disease     L5-S1    Migraine without aura, not intractable 8/20/2017    Mixed hyperlipidemia 8/20/2017    Obstructive sleep apnea 8/20/2017    Sacroiliitis     Trigeminal neuralgia of left side of face 8/20/2017    Type 2 diabetes mellitus without complication (HCC) 8/20/2017    Venous insufficiency     Vitamin D deficiency        Past Surgical History:   Procedure Laterality Date    EYE LID SURGERY Bilateral 2020    eye lids raised    HYSTERECTOMY, TOTAL ABDOMINAL (CERVIX REMOVED)      No BSO    SHOULDER SURGERY Right 6/30/2022    RIGHT REVERSE SHOULDER TOTAL ARTHROPLASTY performed by Harshal Mace MD at [facility] OR       Family History   Problem Relation Age of Onset    High Blood Pressure Mother     High Blood Pressure Father     Prostate Cancer Father     Diabetes Father         Type 2    Breast Cancer Sister 54    Diabetes Sister         Type 2       Social History     Socioeconomic History    Marital status:      Spouse name: yumi    Number of children: 2    Years of education: 14    Highest education level: Not on file   Occupational History    Occupation: retired    Tobacco Use    Smoking status: Never    Smokeless tobacco: Never   Vaping Use    Vaping status: Never Used   Substance and Sexual Activity    Alcohol use: No    Drug use: No

## 2025-03-11 PROBLEM — Z98.890 H/O RECTOCELE REPAIR: Status: ACTIVE | Noted: 2025-03-11

## 2025-03-12 ENCOUNTER — OFFICE VISIT (OUTPATIENT)
Age: 75
End: 2025-03-12
Payer: MEDICARE

## 2025-03-12 VITALS
SYSTOLIC BLOOD PRESSURE: 126 MMHG | BODY MASS INDEX: 25.03 KG/M2 | WEIGHT: 169 LBS | HEIGHT: 69 IN | DIASTOLIC BLOOD PRESSURE: 68 MMHG

## 2025-03-12 DIAGNOSIS — Z91.89 AT HIGH RISK FOR BREAST CANCER: Primary | ICD-10-CM

## 2025-03-12 NOTE — PROGRESS NOTES
Office Established Patient Note:     Referring Provider: No ref. provider found    Chief Complaint   Patient presents with    Follow-up     High risk breast cancer       Subjective .     History of present illness:  Carlotta Dupont is a 74 y.o. female who presents to the clinic for 6 month breast follow up. She has been followed in our high risk breast clinic due to her VUS of the BRCA2 gene. Today she has no concerns with her breasts. She denies palpable masses, nipple discharge, or skin changes of either breast.     BMI is 24.94. She is a non smoker. She does not take any blood thinners.     Review of Systems    Review of Systems - General ROS: negative  ENT ROS: negative  Respiratory ROS: no cough, shortness of breath, or wheezing  Cardiovascular ROS: no chest pain or dyspnea on exertion  Gastrointestinal ROS: no abdominal pain, change in bowel habits, or black or bloody stools  Genito-Urinary ROS: no dysuria, trouble voiding, or hematuria  Dermatological ROS: negative   Breast ROS: negative for breast lumps  Hematological and Lymphatic ROS: negative  Musculoskeletal ROS: negative   Neurological ROS: no TIA or stroke symptoms    Psychological ROS: negative  Endocrine ROS: negative    History  Past Medical History:   Diagnosis Date    Abnormal ECG     Acid reflux     Arrhythmia     Arthritis     Asthma     Diabetes mellitus     Type 2    Fistula of mastoid, left     GERD (gastroesophageal reflux disease)     History of transfusion     Hx of colonic polyps     Hyperlipidemia     Hypertension     Iron deficiency anemia     Mastoid pain, left     Myocardial infarction     Numbness     left side of face    Other osteomyelitis, other site     PONV (postoperative nausea and vomiting)     Sensorineural hearing loss     Sleep apnea     does not use machine    Trigeminal neuralgia     Vitamin D deficiency    ,   Past Surgical History:   Procedure Laterality Date    APPENDECTOMY      BLADDER REPAIR      had vaginal  repair at the same time    BREAST BIOPSY Bilateral     benign    CAPSULE ENDOSCOPY N/A 04/20/2018    Procedure: CAPSULE ENDOSCOPY M2A;  Surgeon: Garrett Ozuna MD;  Location: [facility] ENDOSCOPY;  Service: Gastroenterology    COLONOSCOPY N/A 03/01/2018    Procedure: COLONOSCOPY WITH ANESTHESIA;  Surgeon: Garrett Ozuna MD;  Location: [facility] ENDOSCOPY;  Service:     COLONOSCOPY N/A 04/16/2021    Procedure: COLONOSCOPY WITH ANESTHESIA;  Surgeon: Garrett Ozuna MD;  Location: [facility] ENDOSCOPY;  Service: Gastroenterology;  Laterality: N/A;  pre: hx colon polyps  post: polyp  Misty Pelayo MD    COLONOSCOPY W/ POLYPECTOMY  12/31/2014    Tubular adenomatous polyp at 80 cm, Hyperplastic polyp rectum repeat exam in 3 years    ENDOSCOPY N/A 03/01/2018    Procedure: ESOPHAGOGASTRODUODENOSCOPY WITH ANESTHESIA;  Surgeon: Garrett Ozuna MD;  Location: [facility] ENDOSCOPY;  Service:     FLAP HEAD/NECK Left 05/17/2018    Procedure: POSSIBLE STERNOCLEIDOMASTOID FLAP;  Surgeon: Kadeem Oconnor MD;  Location: [facility] OR;  Service: ENT    HYSTERECTOMY      MASTOIDECTOMY Left 05/17/2018    Procedure: Wound exploration with possible mastoidectomy; possible sternocleidomastoid flap.  Please schedule with Dr. Perez.;  Surgeon: Kadeem Oconnor MD;  Location: [facility] OR;  Service: ENT    SHOULDER SURGERY Right 06/30/2022    TRIGEMINAL NERVE DECOMPRESSION      US GUIDED CYST ASPIRATION BREAST N/A 09/16/2021   ,   Family History   Problem Relation Age of Onset    Hypertension Mother     Breast cancer Mother     Heart disease Father     Breast cancer Sister     No Known Problems Brother     No Known Problems Maternal Aunt     No Known Problems Paternal Aunt     No Known Problems Maternal Grandmother     No Known Problems Paternal Grandmother     No Known Problems Daughter     No Known Problems Son     Breast cancer Niece     Colon cancer Neg Hx     Colon polyps Neg Hx     BRCA 1/2 Neg Hx     Endometrial cancer Neg Hx     Ovarian cancer Neg Hx     ,   Social History     Tobacco Use    Smoking status: Never    Smokeless tobacco: Never   Vaping Use    Vaping status: Never Used   Substance Use Topics    Alcohol use: Not Currently    Drug use: No   , (Not in a hospital admission)   and Allergies:  Zovirax [acyclovir], Meperidine, Propranolol, Sertraline hcl, Vesicare [solifenacin succinate], and Solifenacin    Current Outpatient Medications:     ALPRAZolam (XANAX) 0.25 MG tablet, Take 0.5 tablets by mouth At Night As Needed for Anxiety or Sleep., Disp: , Rfl:     aspirin 81 MG tablet, Take 1 tablet by mouth Daily., Disp: , Rfl:     calcium carbonate (TUMS) 500 MG chewable tablet, Chew 1 tablet Daily., Disp: , Rfl:     cloNIDine (CATAPRES) 0.1 MG tablet, Take 1 tablet by mouth 2 (Two) Times a Day As Needed for High Blood Pressure (SBP > 160 DBP > 90)., Disp: , Rfl:     empagliflozin (JARDIANCE) 10 MG tablet tablet, Take 2 tablets by mouth Daily. (Patient taking differently: Take 2.5 tablets by mouth Daily.), Disp: , Rfl:     esomeprazole (nexIUM) 20 MG capsule, Take 1 capsule by mouth Every Morning Before Breakfast., Disp: , Rfl:     losartan-hydrochlorothiazide (HYZAAR) 100-25 MG per tablet, Take 1 tablet by mouth Daily., Disp: , Rfl:     magnesium oxide (MAG-OX) 400 MG tablet, Take 1 tablet by mouth Every Night., Disp: , Rfl:     melatonin 5 MG tablet tablet, Take 1 tablet by mouth At Night As Needed (sleep)., Disp: , Rfl:     metFORMIN (GLUCOPHAGE) 500 MG tablet, Take 2 tablets by mouth 2 (Two) Times a Day With Meals., Disp: , Rfl:     montelukast (SINGULAIR) 10 MG tablet, Take 1 tablet by mouth Every Night., Disp: , Rfl:     Multiple Vitamins-Minerals (PRESERVISION AREDS PO), Take 1 tablet by mouth Daily., Disp: , Rfl:     nebivolol (BYSTOLIC) 20 MG tablet, Take 1 tablet by mouth Every Night., Disp: , Rfl:     rosuvastatin (CRESTOR) 5 MG tablet, Take 1 tablet by mouth Take As Directed. 1/2 tablet on Monday, Wednesday, Friday, Disp: , Rfl:     verapamil SR  "(CALAN-SR) 240 MG CR tablet, Take 1 tablet by mouth 2 (Two) Times a Day., Disp: , Rfl:     vitamin D (ERGOCALCIFEROL) 41969 units capsule capsule, Take 1 capsule by mouth Every 7 (Seven) Days. Sunday at night, Disp: , Rfl:     Objective     Vital Signs   /68   Ht 175.3 cm (69.02\")   Wt 76.7 kg (169 lb)   BMI 24.94 kg/m²      Physical Exam:  General appearance - alert, well appearing, and in no distress  Mental status - normal mood, behavior, speech, dress, motor activity, and thought processes  Eyes - sclera anicteric  Neck - supple, no significant adenopathy  Chest - no tachypnea, retractions or cyanosis  Heart - normal rate and regular rhythm  Breasts - breasts appear normal, no suspicious masses, no skin or nipple changes or axillary nodes  Neurological - alert, oriented, normal speech, no focal findings or movement disorder noted  Skin - normal coloration and turgor, no rashes, no suspicious skin lesions noted    Results Review:  Result Review :            MRI Breast Bilateral Screening With & Without Contrast (01/16/2025 09:15)   IMPRESSION:  1. No suspicious findings in the RIGHT or LEFT breast. Recommend  continued risk appropriate screening.  2. Prominent inferior heart size.     BI-RADS CATEGORY 1: Negative.  Management Recommendation: Continued risk appropriate screening.    Assessment & Plan       Diagnoses and all orders for this visit:    1. At high risk for breast cancer (Primary)         Carlotat Dupont is a 74 y.o. female who presents to the clinic for breast follow up. Her most recent imaging was done on 1/16/25 and showed BIRADS-1 findings. At this time, the patient is due for screening mammogram in 4 months. However, patient is moving to [place] and will resume care there. Therefore, she will follow up in office on an as needed basis. She is to call for an appointment if she has any new problems or concerns. She voices understanding and is agreeable to the plan.     Follow " up:     BMI is within normal parameters. No other follow-up for BMI required.    Return if symptoms worsen or fail to improve.        Jaye Yuen PA-C  03/12/25  13:12 CDT

## 2025-04-08 DIAGNOSIS — I10 ESSENTIAL HYPERTENSION: Chronic | ICD-10-CM

## 2025-04-08 RX ORDER — NEBIVOLOL 20 MG/1
1 TABLET ORAL NIGHTLY
Qty: 90 TABLET | Refills: 1 | Status: SHIPPED | OUTPATIENT
Start: 2025-04-08

## 2025-05-01 DIAGNOSIS — E83.52 HYPERCALCEMIA: ICD-10-CM

## 2025-05-01 DIAGNOSIS — E55.9 VITAMIN D DEFICIENCY: Chronic | ICD-10-CM

## 2025-05-01 DIAGNOSIS — E11.40 TYPE 2 DIABETES MELLITUS WITH DIABETIC NEUROPATHY, WITHOUT LONG-TERM CURRENT USE OF INSULIN (HCC): ICD-10-CM

## 2025-05-01 LAB
25(OH)D3 SERPL-MCNC: 67.2 NG/ML
ALBUMIN SERPL-MCNC: 4.8 G/DL (ref 3.5–5.2)
ALP SERPL-CCNC: 74 U/L (ref 35–104)
ALT SERPL-CCNC: 36 U/L (ref 10–35)
ANION GAP SERPL CALCULATED.3IONS-SCNC: 11 MMOL/L (ref 8–16)
AST SERPL-CCNC: 26 U/L (ref 10–35)
BILIRUB SERPL-MCNC: 0.6 MG/DL (ref 0.2–1.2)
BUN SERPL-MCNC: 25 MG/DL (ref 8–23)
CALCIUM SERPL-MCNC: 10.4 MG/DL (ref 8.8–10.2)
CHLORIDE SERPL-SCNC: 100 MMOL/L (ref 98–107)
CO2 SERPL-SCNC: 29 MMOL/L (ref 22–29)
CREAT SERPL-MCNC: 0.7 MG/DL (ref 0.5–0.9)
GLUCOSE SERPL-MCNC: 148 MG/DL (ref 70–99)
POTASSIUM SERPL-SCNC: 4.3 MMOL/L (ref 3.5–5.1)
PROT SERPL-MCNC: 7.1 G/DL (ref 6.4–8.3)
PTH-INTACT SERPL-MCNC: 37.7 PG/ML (ref 15–65)
SODIUM SERPL-SCNC: 140 MMOL/L (ref 136–145)

## 2025-05-06 ENCOUNTER — OFFICE VISIT (OUTPATIENT)
Dept: INTERNAL MEDICINE | Age: 75
End: 2025-05-06

## 2025-05-06 VITALS
BODY MASS INDEX: 24.96 KG/M2 | SYSTOLIC BLOOD PRESSURE: 124 MMHG | OXYGEN SATURATION: 98 % | HEART RATE: 70 BPM | WEIGHT: 169 LBS | DIASTOLIC BLOOD PRESSURE: 80 MMHG

## 2025-05-06 DIAGNOSIS — J45.20 MILD INTERMITTENT CHRONIC ASTHMA WITHOUT COMPLICATION: Chronic | ICD-10-CM

## 2025-05-06 DIAGNOSIS — G47.00 INSOMNIA, UNSPECIFIED TYPE: ICD-10-CM

## 2025-05-06 DIAGNOSIS — E78.2 MIXED HYPERLIPIDEMIA: ICD-10-CM

## 2025-05-06 DIAGNOSIS — E11.9 TYPE 2 DIABETES MELLITUS WITHOUT COMPLICATION, WITHOUT LONG-TERM CURRENT USE OF INSULIN (HCC): Primary | Chronic | ICD-10-CM

## 2025-05-06 DIAGNOSIS — I10 ESSENTIAL HYPERTENSION: Chronic | ICD-10-CM

## 2025-05-06 DIAGNOSIS — M54.32 SCIATICA OF LEFT SIDE: ICD-10-CM

## 2025-05-06 RX ORDER — MONTELUKAST SODIUM 10 MG/1
10 TABLET ORAL NIGHTLY
Qty: 90 TABLET | Refills: 3 | Status: SHIPPED | OUTPATIENT
Start: 2025-05-06

## 2025-05-06 RX ORDER — VERAPAMIL HYDROCHLORIDE 240 MG/1
240 TABLET, FILM COATED, EXTENDED RELEASE ORAL 2 TIMES DAILY
Qty: 180 TABLET | Refills: 3 | Status: SHIPPED | OUTPATIENT
Start: 2025-05-06

## 2025-05-06 RX ORDER — NEBIVOLOL 20 MG/1
1 TABLET ORAL NIGHTLY
Qty: 90 TABLET | Refills: 3 | Status: SHIPPED | OUTPATIENT
Start: 2025-05-06

## 2025-05-06 RX ORDER — DICLOFENAC SODIUM 75 MG/1
75 TABLET, DELAYED RELEASE ORAL 2 TIMES DAILY PRN
Qty: 60 TABLET | Refills: 0 | Status: SHIPPED | OUTPATIENT
Start: 2025-05-06

## 2025-05-06 RX ORDER — ALPRAZOLAM 0.25 MG
0.12 TABLET ORAL 2 TIMES DAILY PRN
Qty: 60 TABLET | Refills: 0 | Status: SHIPPED | OUTPATIENT
Start: 2025-05-06 | End: 2025-08-04

## 2025-05-06 RX ORDER — CLONIDINE HYDROCHLORIDE 0.1 MG/1
TABLET ORAL
Qty: 60 TABLET | Refills: 0 | Status: SHIPPED | OUTPATIENT
Start: 2025-05-06

## 2025-05-06 RX ORDER — LOSARTAN POTASSIUM AND HYDROCHLOROTHIAZIDE 25; 100 MG/1; MG/1
1 TABLET ORAL DAILY
Qty: 90 TABLET | Refills: 3 | Status: SHIPPED | OUTPATIENT
Start: 2025-05-06

## 2025-05-06 RX ORDER — ROSUVASTATIN CALCIUM 5 MG/1
TABLET, COATED ORAL
Qty: 18 TABLET | Refills: 3 | Status: SHIPPED | OUTPATIENT
Start: 2025-05-06

## 2025-05-06 RX ORDER — METHYLPREDNISOLONE 4 MG/1
TABLET ORAL
Qty: 1 KIT | Refills: 0 | Status: SHIPPED | OUTPATIENT
Start: 2025-05-06 | End: 2025-05-12

## 2025-05-06 NOTE — PROGRESS NOTES
BY MOUTH TWO TIMES DAILY 360 tablet 3    verapamil (CALAN SR) 240 MG extended release tablet Take 1 tablet by mouth 2 times daily 180 tablet 3    losartan-hydroCHLOROthiazide (HYZAAR) 100-25 MG per tablet Take 1 tablet by mouth daily 90 tablet 3    nebivolol (BYSTOLIC) 20 MG TABS tablet Take 1 tablet by mouth nightly 90 tablet 3    montelukast (SINGULAIR) 10 MG tablet Take 1 tablet by mouth nightly 90 tablet 3    rosuvastatin (CRESTOR) 5 MG tablet TAKE 1/2 TABLET BY MOUTH ON MONDAY WEDNESDAY AND FRIDAY 18 tablet 3    cloNIDine (CATAPRES) 0.1 MG tablet Take 1 tablet po bid prn sbp >160 and/or dbp>90 60 tablet 0    diclofenac (VOLTAREN) 75 MG EC tablet Take 1 tablet by mouth 2 times daily as needed for Pain 60 tablet 0    ALPRAZolam (XANAX) 0.25 MG tablet Take 0.5 tablets by mouth 2 times daily as needed for Anxiety or Sleep for up to 90 days. Max Daily Amount: 0.5 mg 60 tablet 0    estradiol (ESTRACE) 0.1 MG/GM vaginal cream PLACE 1 G VAGINALLY TWICE A WEEK 42.5 g 3    GLUCOCARD EXPRESSION TEST strip USE AS DIRECTED 50 strip 2    Insulin Aspart FlexPen 100 UNIT/ML SOPN Sliding scale insulin tid before meals -Give 4 units if bs 200-250, give 6 units if bs 251-300, give 8 units if bs greater than 300 3 mL 0    empagliflozin (JARDIANCE) 25 MG tablet Take 1 tablet by mouth daily      Cyanocobalamin (B-12) 1000 MCG CAPS Take 1 capsule by mouth daily 90 capsule 3    magnesium oxide (MAG-OX) 400 MG tablet Take 1 tablet by mouth nightly      tiZANidine (ZANAFLEX) 4 MG tablet Take 1 tablet by mouth 3 times daily as needed (muscle spasm) 30 tablet 0    Sure Comfort Lancets 30G MISC TEST DAILY AS NEEDED 100 each 0    ondansetron (ZOFRAN) 4 MG tablet Take 1 tablet by mouth every 8 hours as needed for Nausea or Vomiting 10 tablet 0    Magnesium 400 MG TABS Take 1 tablet by mouth nightly      melatonin 5 MG TABS tablet Take 5 mg by mouth nightly as needed      albuterol (PROVENTIL) (2.5 MG/3ML) 0.083% nebulizer solution Take 3 mLs

## 2025-05-13 DIAGNOSIS — N95.9 MENOPAUSAL DISORDER: ICD-10-CM

## 2025-05-13 RX ORDER — ESTRADIOL 0.1 MG/G
1 CREAM VAGINAL
Qty: 42.5 G | Refills: 3 | Status: SHIPPED | OUTPATIENT
Start: 2025-05-15

## 2025-06-23 ENCOUNTER — TELEPHONE (OUTPATIENT)
Dept: INTERNAL MEDICINE | Age: 75
End: 2025-06-23

## 2025-06-23 DIAGNOSIS — E11.9 TYPE 2 DIABETES MELLITUS WITHOUT COMPLICATION, WITHOUT LONG-TERM CURRENT USE OF INSULIN (HCC): Chronic | ICD-10-CM

## 2025-06-23 RX ORDER — BLOOD-GLUCOSE METER
1 EACH MISCELLANEOUS 2 TIMES DAILY
Qty: 50 STRIP | Refills: 2 | Status: SHIPPED | OUTPATIENT
Start: 2025-06-23

## (undated) DEVICE — SHEET,DRAPE,53X77,STERILE: Brand: MEDLINE

## (undated) DEVICE — ADHS LIQ MASTISOL 2/3ML

## (undated) DEVICE — DRESSING BORDERED ADH GZ UNIV GEN USE 8INX4IN AND 6INX2IN

## (undated) DEVICE — SOLUTION IRRIG 3000ML 0.9% SOD CHL USP UROMATIC PLAS CONT

## (undated) DEVICE — T-MAX DISPOSABLE FACE MASK 8 PER BOX

## (undated) DEVICE — ADHESIVE SKIN CLSR 0.7ML TOP DERMBND ADV

## (undated) DEVICE — SUT PROLN 4/0 P3 18IN 8699G

## (undated) DEVICE — THE CHANNEL CLEANING BRUSH IS A NYLON FLEXI BRUSH ATTACHED TO A FLEXIBLE PLASTIC SHEATH DESIGNED TO SAFELY REMOVE DEBRIS FROM FLEXIBLE ENDOSCOPES.

## (undated) DEVICE — PROXIMATE RH ROTATING HEAD SKIN STAPLERS (35 WIDE) CONTAINS 35 STAINLESS STEEL STAPLES: Brand: PROXIMATE

## (undated) DEVICE — CAPS PILLCAM ENDO SB3EX

## (undated) DEVICE — TWIST DRILL 4.7MM DIA 127.0MM LONG

## (undated) DEVICE — SUTURE VCRL SZ 2-0 L36IN ABSRB UD L36MM CT-1 1/2 CIR J945H

## (undated) DEVICE — ENDOGATOR AUXILIARY WATER JET CONNECTOR: Brand: ENDOGATOR

## (undated) DEVICE — TUBE ET 7MM NSL ORAL BASIC CUF INTMED MURPHY EYE RADPQ MRK

## (undated) DEVICE — SWAB CULT AERO TWIN MD

## (undated) DEVICE — SYR SLP TP 10ML DISP

## (undated) DEVICE — BLD TYMP/STAPE BEAVR BVLDWN 60D 2.5 LF

## (undated) DEVICE — TOOL 75BA30DLF LGND 7.5CM 3MM BALL D LF: Brand: MIDAS REX®

## (undated) DEVICE — ELECTRODE 8227411 PAIRED 4 CH SET ROHS

## (undated) DEVICE — ULTRACLEAN ACCESSORY ELECTRODE 1" (2.54 CM) COATED BLADE: Brand: ULTRACLEAN

## (undated) DEVICE — SENSR O2 OXIMAX FNGR A/ 18IN NONSTR

## (undated) DEVICE — SHOULDER CDS

## (undated) DEVICE — CURAVIEW LED LARYNSCP BLDE

## (undated) DEVICE — SPNG GZ WOVN 4X4IN 12PLY 10/BX STRL

## (undated) DEVICE — TBG SMPL FLTR LINE NASL 02/C02 A/ BX/100

## (undated) DEVICE — SUTURE ETHBND EXCEL SZ 5 L30IN NONABSORBABLE GRN L40MM V-37 MB66G

## (undated) DEVICE — BLANKET WRM W40.2XL55.9IN IORT LO BODY + MISTRAL AIR

## (undated) DEVICE — BIPOLAR SEALER 23-112-1 AQM 6.0: Brand: AQUAMANTYS ®

## (undated) DEVICE — CUFF,BP,DISP,1 TUBE,ADULT,HP: Brand: MEDLINE

## (undated) DEVICE — CHLORAPREP 26ML ORANGE

## (undated) DEVICE — DUAL CUT SAGITTAL BLADE

## (undated) DEVICE — CONMED SCOPE SAVER BITE BLOCK, 20X27 MM: Brand: SCOPE SAVER

## (undated) DEVICE — GAUZE,SPONGE,AVANT,4"X4",4PLY,STRL,10/TR: Brand: MEDLINE

## (undated) DEVICE — SUT VIC 4/0 P3 18IN UD VCP494H

## (undated) DEVICE — TUBING IRD300 5PK IPC IRRIGATION

## (undated) DEVICE — Device: Brand: DEFENDO AIR/WATER/SUCTION AND BIOPSY VALVE

## (undated) DEVICE — DISPOSABLE BIPOLAR CABLE 12FT. (3.6M): Brand: KIRWAN

## (undated) DEVICE — UNDERGLOVE SURG SZ 8 FNGR THK0.21MIL GRN LTX BEAD CUF

## (undated) DEVICE — SYR TB PRECISIONGLIDE 1CC 26G 3/8IN LF

## (undated) DEVICE — BANDAGE GZ W2XL75IN ST RAYON POLY CNFRM STRTCH LTWT

## (undated) DEVICE — THE SINGLE USE ETRAP – POLYP TRAP IS USED FOR SUCTION RETRIEVAL OF ENDOSCOPICALLY REMOVED POLYPS.: Brand: ETRAP

## (undated) DEVICE — MARKR SKIN W/RULR AND LBL

## (undated) DEVICE — DRSNG TELFA PAD NONADH STR 1S 3X8IN

## (undated) DEVICE — CULT ANAERB

## (undated) DEVICE — PK TURNOVER RM ADV

## (undated) DEVICE — TOOL 75BA60T LGND 7.5CM 6MM BALL SYM-TRI: Brand: MIDAS REX ™

## (undated) DEVICE — 3M™ IOBAN™ 2 ANTIMICROBIAL INCISE DRAPE 6651EZ: Brand: IOBAN™ 2

## (undated) DEVICE — SUTURE VCRL SZ 0 L27IN ABSRB UD L36MM CT-1 1/2 CIR J260H

## (undated) DEVICE — COVER,TABLE,44X90,STERILE: Brand: MEDLINE

## (undated) DEVICE — SHIELD SURG COAX MULT TIP ORIFICE INTERPULSE

## (undated) DEVICE — MASK,OXYGEN,MED CONC,ADLT,7' TUB, UC: Brand: PENDING

## (undated) DEVICE — YANKAUER,BULB TIP WITH VENT: Brand: ARGYLE

## (undated) DEVICE — MSK O2 MD CONCENTR A/ LF 7FT 1P/U

## (undated) DEVICE — ELECTRD NDL EDGE/INSUL/PFTE.787MM 2.84IN

## (undated) DEVICE — SET GUIDEPIN DIA2.4MM DRL TIP DIA2.4MM GWIRE DIA2.8MM FOR

## (undated) DEVICE — SOLUTION IV IRRIG POUR BRL 0.9% SODIUM CHL 2F7124

## (undated) DEVICE — SOLUTION IV 250ML 0.9% SOD CHL PH 5 INJ USP VIAFLX PLAS

## (undated) DEVICE — SUT SILK 2/0 SH 30IN K833H

## (undated) DEVICE — SUT GUT PLN FAST ABS 5/0 PC1 18IN 1915G

## (undated) DEVICE — DRAPE,SHOULDER,BEACH CHAIR,STERILE: Brand: MEDLINE

## (undated) DEVICE — SUT VIC 3/0 RB1 27IN UD VCP215H

## (undated) DEVICE — BLADE SURG NO10 C STL DISP ST

## (undated) DEVICE — PAD,ARMBOARD,CONV,FOAM,2X8X20",12PR/CS: Brand: MEDLINE

## (undated) DEVICE — NDL HYPO PRECISIONGLIDE/REG 18G 11/2 PNK

## (undated) DEVICE — PK ENT HD AND NK 30

## (undated) DEVICE — SYR SLPTP 5CC

## (undated) DEVICE — SNAR POLYP SENSATION MICRO OVL 13 240X40

## (undated) DEVICE — DRESSING FOAM W4XL10IN SIL RECT ADH WTRPRF FLM BK W/ BORD

## (undated) DEVICE — GLV SURG BIOGEL LTX PF 8

## (undated) DEVICE — PENCIL SMK EVAC 10 FT BLADE ELECTRD ROCKER FOR TELSCP

## (undated) DEVICE — WIPE MEROCEL 3.625X3IN

## (undated) DEVICE — 1016 S-DRAPE IRRIG POUCH 10/BOX: Brand: STERI-DRAPE™

## (undated) DEVICE — SHOULDER STABILIZATION KIT,                                    DISPOSABLE 12 PER BOX

## (undated) DEVICE — GLOVE SURG SZ 8 CRM LTX FREE POLYISOPRENE POLYMER BEAD ANTI